# Patient Record
Sex: MALE | Race: WHITE | Employment: PART TIME | URBAN - METROPOLITAN AREA
[De-identification: names, ages, dates, MRNs, and addresses within clinical notes are randomized per-mention and may not be internally consistent; named-entity substitution may affect disease eponyms.]

---

## 2018-12-27 RX ORDER — AMITRIPTYLINE HYDROCHLORIDE 50 MG/1
TABLET, FILM COATED ORAL
Qty: 90 TABLET | Refills: 0 | Status: SHIPPED | OUTPATIENT
Start: 2018-12-27 | End: 2019-03-21 | Stop reason: SDUPTHER

## 2019-01-09 ENCOUNTER — OFFICE VISIT (OUTPATIENT)
Dept: PRIMARY CARE CLINIC | Age: 59
End: 2019-01-09
Payer: MEDICARE

## 2019-01-09 VITALS
BODY MASS INDEX: 28.25 KG/M2 | HEIGHT: 68 IN | WEIGHT: 186.4 LBS | HEART RATE: 114 BPM | SYSTOLIC BLOOD PRESSURE: 128 MMHG | DIASTOLIC BLOOD PRESSURE: 76 MMHG | OXYGEN SATURATION: 94 %

## 2019-01-09 DIAGNOSIS — Z13.6 ENCOUNTER FOR LIPID SCREENING FOR CARDIOVASCULAR DISEASE: ICD-10-CM

## 2019-01-09 DIAGNOSIS — I10 ESSENTIAL HYPERTENSION: Primary | ICD-10-CM

## 2019-01-09 DIAGNOSIS — Z12.5 SCREENING PSA (PROSTATE SPECIFIC ANTIGEN): ICD-10-CM

## 2019-01-09 DIAGNOSIS — Z13.220 ENCOUNTER FOR LIPID SCREENING FOR CARDIOVASCULAR DISEASE: ICD-10-CM

## 2019-01-09 PROCEDURE — 3017F COLORECTAL CA SCREEN DOC REV: CPT | Performed by: FAMILY MEDICINE

## 2019-01-09 PROCEDURE — 1036F TOBACCO NON-USER: CPT | Performed by: FAMILY MEDICINE

## 2019-01-09 PROCEDURE — G8427 DOCREV CUR MEDS BY ELIG CLIN: HCPCS | Performed by: FAMILY MEDICINE

## 2019-01-09 PROCEDURE — G8419 CALC BMI OUT NRM PARAM NOF/U: HCPCS | Performed by: FAMILY MEDICINE

## 2019-01-09 PROCEDURE — G8484 FLU IMMUNIZE NO ADMIN: HCPCS | Performed by: FAMILY MEDICINE

## 2019-01-09 PROCEDURE — 99213 OFFICE O/P EST LOW 20 MIN: CPT | Performed by: FAMILY MEDICINE

## 2019-01-09 RX ORDER — ATENOLOL 50 MG/1
TABLET ORAL
Qty: 90 TABLET | Refills: 3 | Status: SHIPPED | OUTPATIENT
Start: 2019-01-09 | End: 2019-12-11 | Stop reason: SDUPTHER

## 2019-01-09 ASSESSMENT — ENCOUNTER SYMPTOMS
SORE THROAT: 0
RHINORRHEA: 0
VOMITING: 0
COUGH: 0
DIARRHEA: 0
EYE DISCHARGE: 0
EYE REDNESS: 0
NAUSEA: 0
WHEEZING: 0
SHORTNESS OF BREATH: 0
ABDOMINAL PAIN: 0

## 2019-01-09 ASSESSMENT — PATIENT HEALTH QUESTIONNAIRE - PHQ9
1. LITTLE INTEREST OR PLEASURE IN DOING THINGS: 0
SUM OF ALL RESPONSES TO PHQ9 QUESTIONS 1 & 2: 0
SUM OF ALL RESPONSES TO PHQ QUESTIONS 1-9: 0
SUM OF ALL RESPONSES TO PHQ QUESTIONS 1-9: 0
2. FEELING DOWN, DEPRESSED OR HOPELESS: 0

## 2019-01-11 DIAGNOSIS — I10 ESSENTIAL HYPERTENSION: ICD-10-CM

## 2019-01-11 DIAGNOSIS — Z12.5 SCREENING PSA (PROSTATE SPECIFIC ANTIGEN): ICD-10-CM

## 2019-01-11 DIAGNOSIS — Z13.220 ENCOUNTER FOR LIPID SCREENING FOR CARDIOVASCULAR DISEASE: ICD-10-CM

## 2019-01-11 DIAGNOSIS — Z13.6 ENCOUNTER FOR LIPID SCREENING FOR CARDIOVASCULAR DISEASE: ICD-10-CM

## 2019-01-11 LAB
ANION GAP SERPL CALCULATED.3IONS-SCNC: NORMAL MMOL/L
BUN BLDV-MCNC: NORMAL MG/DL
BUN/CREAT BLD: NORMAL
CALCIUM SERPL-MCNC: NORMAL MG/DL
CHLORIDE BLD-SCNC: NORMAL MMOL/L
CHOLESTEROL, FASTING: 176
CO2: NORMAL MMOL/L
CREAT SERPL-MCNC: NORMAL MG/DL
GFR AFRICAN AMERICAN: NORMAL
GFR NON-AFRICAN AMERICAN: NORMAL
GLUCOSE FASTING: 104 MG/DL
HDLC SERPL-MCNC: 39 MG/DL (ref 35–70)
LDL CHOLESTEROL CALCULATED: 122 MG/DL (ref 0–160)
POTASSIUM SERPL-SCNC: NORMAL MMOL/L
PROSTATE SPECIFIC ANTIGEN: NORMAL NG/ML
SODIUM BLD-SCNC: NORMAL MMOL/L
TRIGLYCERIDE, FASTING: 77

## 2019-03-21 RX ORDER — AMITRIPTYLINE HYDROCHLORIDE 50 MG/1
TABLET, FILM COATED ORAL
Qty: 90 TABLET | Refills: 0 | Status: SHIPPED | OUTPATIENT
Start: 2019-03-21 | End: 2019-06-17 | Stop reason: SDUPTHER

## 2019-06-18 RX ORDER — AMITRIPTYLINE HYDROCHLORIDE 50 MG/1
TABLET, FILM COATED ORAL
Qty: 90 TABLET | Refills: 0 | Status: SHIPPED | OUTPATIENT
Start: 2019-06-18 | End: 2019-09-24 | Stop reason: SDUPTHER

## 2019-09-24 RX ORDER — AMITRIPTYLINE HYDROCHLORIDE 50 MG/1
TABLET, FILM COATED ORAL
Qty: 90 TABLET | Refills: 0 | Status: SHIPPED | OUTPATIENT
Start: 2019-09-24 | End: 2019-12-10 | Stop reason: SDUPTHER

## 2019-12-10 DIAGNOSIS — I10 ESSENTIAL HYPERTENSION: ICD-10-CM

## 2019-12-11 RX ORDER — ATENOLOL 50 MG/1
TABLET ORAL
Qty: 90 TABLET | Refills: 0 | Status: SHIPPED | OUTPATIENT
Start: 2019-12-11 | End: 2020-03-24 | Stop reason: SDUPTHER

## 2019-12-11 RX ORDER — AMITRIPTYLINE HYDROCHLORIDE 50 MG/1
50 TABLET, FILM COATED ORAL NIGHTLY
Qty: 90 TABLET | Refills: 0 | Status: SHIPPED | OUTPATIENT
Start: 2019-12-11 | End: 2020-03-24 | Stop reason: SDUPTHER

## 2020-03-24 RX ORDER — AMITRIPTYLINE HYDROCHLORIDE 50 MG/1
50 TABLET, FILM COATED ORAL NIGHTLY
Qty: 90 TABLET | Refills: 0 | Status: SHIPPED | OUTPATIENT
Start: 2020-03-24 | End: 2020-06-29 | Stop reason: SDUPTHER

## 2020-03-24 RX ORDER — ATENOLOL 50 MG/1
TABLET ORAL
Qty: 90 TABLET | Refills: 0 | Status: SHIPPED | OUTPATIENT
Start: 2020-03-24 | End: 2020-06-29 | Stop reason: SDUPTHER

## 2020-05-08 ENCOUNTER — TELEPHONE (OUTPATIENT)
Dept: PRIMARY CARE CLINIC | Age: 60
End: 2020-05-08

## 2020-06-29 RX ORDER — AMITRIPTYLINE HYDROCHLORIDE 50 MG/1
50 TABLET, FILM COATED ORAL NIGHTLY
Qty: 90 TABLET | Refills: 0 | Status: SHIPPED | OUTPATIENT
Start: 2020-06-29 | End: 2020-10-02 | Stop reason: SDUPTHER

## 2020-06-29 RX ORDER — ATENOLOL 50 MG/1
TABLET ORAL
Qty: 90 TABLET | Refills: 0 | Status: SHIPPED | OUTPATIENT
Start: 2020-06-29 | End: 2020-10-01 | Stop reason: SDUPTHER

## 2020-08-19 ENCOUNTER — OFFICE VISIT (OUTPATIENT)
Dept: PRIMARY CARE CLINIC | Age: 60
End: 2020-08-19
Payer: MEDICARE

## 2020-08-19 VITALS
BODY MASS INDEX: 27.58 KG/M2 | SYSTOLIC BLOOD PRESSURE: 124 MMHG | HEART RATE: 123 BPM | TEMPERATURE: 98.1 F | OXYGEN SATURATION: 95 % | DIASTOLIC BLOOD PRESSURE: 88 MMHG | WEIGHT: 181.6 LBS

## 2020-08-19 PROCEDURE — G0439 PPPS, SUBSEQ VISIT: HCPCS | Performed by: PHYSICIAN ASSISTANT

## 2020-08-19 RX ORDER — IBUPROFEN 600 MG/1
600 TABLET ORAL DAILY PRN
Qty: 90 TABLET | Refills: 0 | Status: SHIPPED | OUTPATIENT
Start: 2020-08-19 | End: 2020-11-20 | Stop reason: SDUPTHER

## 2020-08-19 ASSESSMENT — PATIENT HEALTH QUESTIONNAIRE - PHQ9
SUM OF ALL RESPONSES TO PHQ QUESTIONS 1-9: 0
1. LITTLE INTEREST OR PLEASURE IN DOING THINGS: 0
2. FEELING DOWN, DEPRESSED OR HOPELESS: 0
SUM OF ALL RESPONSES TO PHQ9 QUESTIONS 1 & 2: 0
SUM OF ALL RESPONSES TO PHQ QUESTIONS 1-9: 0

## 2020-08-19 ASSESSMENT — ENCOUNTER SYMPTOMS
RHINORRHEA: 0
PHOTOPHOBIA: 0
SHORTNESS OF BREATH: 0
DIARRHEA: 0
SINUS PRESSURE: 0
ABDOMINAL DISTENTION: 0
SORE THROAT: 0
COUGH: 0
ABDOMINAL PAIN: 0
EYE DISCHARGE: 0
CONSTIPATION: 0
TROUBLE SWALLOWING: 1
CHEST TIGHTNESS: 0
VOMITING: 0

## 2020-08-19 NOTE — PROGRESS NOTES
[address] [facility] PRIMARY CARE  [address] [facility] [address]  Dept: [phone]    Too Rolle is a 61 y.o. male who presents today for his medical conditions/complaintsas noted below. Chief Complaint   Patient presents with    Medication Check     Pt last seen in january. 2019    Leg Pain     Pt c/o left leg pain x1 month       HPI:     HPI: The patient is taking both of his medications. He is having no concerns. He is drooling after work which started a month ago. He will be drooling and it will fall on right cheek. No weakness or facial asymmetry. The patient has always needed his pills crushed. He states he doesn't swallow his medication well. He also has pain behind his left knee. Standing at work makes the pain worse. LDL Calculated (mg/dL)   Date Value   01/11/2019 122   01/19/2018 98   12/02/2016 96       (goal LDL is <100)   No results found for: AST, ALT, BUN  BP Readings from Last 3 Encounters:   08/19/20 124/88   01/09/19 128/76   05/16/18 112/78          (goal 120/80)    No past medical history on file. No past surgical history on file. No family history on file. Social History     Tobacco Use    Smoking status: Never Smoker    Smokeless tobacco: Never Used   Substance Use Topics    Alcohol use: No      Current Outpatient Medications   Medication Sig Dispense Refill    ibuprofen (ADVIL;MOTRIN) 600 MG tablet Take 1 tablet by mouth daily as needed for Pain 90 tablet 0    atenolol (TENORMIN) 50 MG tablet TAKE 1 TABLET BY MOUTH DAILY 90 tablet 0    amitriptyline (ELAVIL) 50 MG tablet Take 1 tablet by mouth nightly 90 tablet 0     No current facility-administered medications for this visit.       No Known Allergies    Health Maintenance   Topic Date Due    HIV screen  02/24/1975    DTaP/Tdap/Td vaccine (1 - Tdap) 02/24/1979    Shingles Vaccine (1 of 2) 02/24/2010    Colon cancer screen colonoscopy  02/24/2010   [place] Annual Wellness Visit (AWV)  05/29/2019    Potassium monitoring  01/11/2020    Creatinine monitoring  01/11/2020    Flu vaccine (1) 09/01/2020    Lipid screen  01/11/2024    Hepatitis C screen  Completed    Hepatitis A vaccine  Aged Out    Hepatitis B vaccine  Aged Out    Hib vaccine  Aged Out    Meningococcal (ACWY) vaccine  Aged Out    Pneumococcal 0-64 years Vaccine  Aged Out       Subjective:      Review of Systems   Constitutional: Negative for chills, fever and unexpected weight change. HENT: Positive for drooling and trouble swallowing (chronic ). Negative for congestion, hearing loss, rhinorrhea, sinus pressure and sore throat. Eyes: Negative for photophobia, discharge and visual disturbance. Respiratory: Negative for cough, chest tightness and shortness of breath. Cardiovascular: Positive for leg swelling (left leg over the last month ). Negative for chest pain and palpitations. Gastrointestinal: Negative for abdominal distention, abdominal pain, constipation, diarrhea and vomiting. Endocrine: Negative for polydipsia and polyuria. Genitourinary: Negative for decreased urine volume, difficulty urinating, frequency and urgency. Musculoskeletal: Negative for arthralgias, gait problem and myalgias. Skin: Negative for rash. Allergic/Immunologic: Negative for food allergies. Neurological: Negative for dizziness, weakness, numbness and headaches. Hematological: Negative for adenopathy. Psychiatric/Behavioral: Negative for dysphoric mood and sleep disturbance. The patient is not nervous/anxious. Objective:     /88   Pulse 123   Temp 98.1 °F (36.7 °C)   Wt 181 lb 9.6 oz (82.4 kg)   SpO2 95%   BMI 27.58 kg/m²   Physical Exam  Constitutional:       General: He is not in acute distress. Appearance: Normal appearance. He is not ill-appearing. HENT:      Head: Normocephalic and atraumatic.       Right Ear: Tympanic membrane, ear canal and external ear normal. Left Ear: Tympanic membrane, ear canal and external ear normal.      Nose: Nose normal.      Mouth/Throat:      Mouth: Mucous membranes are moist.   Eyes:      Extraocular Movements: Extraocular movements intact. Conjunctiva/sclera: Conjunctivae normal.      Pupils: Pupils are equal, round, and reactive to light. Neck:      Musculoskeletal: Normal range of motion and neck supple. Vascular: No carotid bruit. Cardiovascular:      Rate and Rhythm: Normal rate and regular rhythm. Pulses: Normal pulses. Heart sounds: Normal heart sounds. Pulmonary:      Effort: Pulmonary effort is normal. No respiratory distress. Breath sounds: Normal breath sounds. Abdominal:      General: Bowel sounds are normal. There is no distension. Tenderness: There is no abdominal tenderness. Musculoskeletal: Normal range of motion. Left lower leg: Edema (with varicose veins ) present. Lymphadenopathy:      Cervical: No cervical adenopathy. Skin:     General: Skin is warm and dry. Neurological:      General: No focal deficit present. Mental Status: He is alert and oriented to person, place, and time. Comments: No facial asymmetry, no asymmetric weakness. No swollen tonsils no deviated uvula. No asymmetric tonsillar folds. Psychiatric:         Mood and Affect: Mood normal.         Thought Content: Thought content normal.         Assessment:       Diagnosis Orders   1. Annual physical exam  Lipid, Fasting    PSA Screening    Comprehensive Metabolic Panel   2. Colon cancer screening  [facility] Screening Colonoscopy   3. Prostate cancer screening  PSA Screening   4. Acute pain of left knee  XR KNEE LEFT (3 VIEWS)    ibuprofen (ADVIL;MOTRIN) 600 MG tablet   5. Encounter for screening for HIV  HIV Screen   6. Left leg swelling  US DUP LOWER EXTREMITY LEFT PAKO        Plan:       Patients last colonoscopy in 2013 reordered due to unknown timeframe and unknown results.    Will contact [place] for patient information. X ray knee   Blood work   Medication reordered   Doppler ordered   Consider neurologic work up if drooling does not improve. Patient educated to contact the office if this does not improve or worsens. Patient educated to go to the ER with any signs symptoms of stroke. AWV in 3 months     Return in about 3 months (around 11/19/2020), or AWV, for Follow up if symptoms persist or worsen. Orders Placed This Encounter   Procedures    XR KNEE LEFT (3 VIEWS)     Standing Status:   Future     Standing Expiration Date:   8/19/2021     Order Specific Question:   Reason for exam:     Answer:   knee pain    US DUP LOWER EXTREMITY LEFT PAKO     Standing Status:   Future     Standing Expiration Date:   8/19/2021    Lipid, Fasting     Standing Status:   Future     Standing Expiration Date:   8/19/2021    PSA Screening     Standing Status:   Future     Standing Expiration Date:   8/19/2021    Comprehensive Metabolic Panel     Standing Status:   Future     Standing Expiration Date:   8/19/2021    HIV Screen     Standing Status:   Future     Standing Expiration Date:   8/19/2021   [facility] Screening Colonoscopy     Referral Priority:   Routine     Referral Type: Other     Referral Reason:   Specialty Services Required     Number of Visits Requested:   1     Orders Placed This Encounter   Medications    ibuprofen (ADVIL;MOTRIN) 600 MG tablet     Sig: Take 1 tablet by mouth daily as needed for Pain     Dispense:  90 tablet     Refill:  0       Patient given educationalmaterials - see patient instructions. Discussed use, benefit, and side effectsof prescribed medications. All patient questions answered. Pt voiced understanding. Reviewed health maintenance. Instructed to continue current medications, diet andexercise. Patient agreed with treatment plan. Follow up as directed.      Electronicallysigned by DOROTA Zhou on 8/19/2020 at 5:02 PM

## 2020-08-20 LAB
ALBUMIN SERPL-MCNC: NORMAL G/DL
ALP BLD-CCNC: NORMAL U/L
ALT SERPL-CCNC: NORMAL U/L
ANION GAP SERPL CALCULATED.3IONS-SCNC: NORMAL MMOL/L
AST SERPL-CCNC: NORMAL U/L
BILIRUB SERPL-MCNC: NORMAL MG/DL
BUN BLDV-MCNC: NORMAL MG/DL
CALCIUM SERPL-MCNC: NORMAL MG/DL
CHLORIDE BLD-SCNC: NORMAL MMOL/L
CHOLESTEROL, FASTING: 181
CO2: NORMAL
CREAT SERPL-MCNC: NORMAL MG/DL
GFR CALCULATED: NORMAL
GLUCOSE BLD-MCNC: 115 MG/DL
HDLC SERPL-MCNC: 42 MG/DL (ref 35–70)
HIV AG/AB: NORMAL
LDL CHOLESTEROL CALCULATED: 124 MG/DL (ref 0–160)
POTASSIUM SERPL-SCNC: NORMAL MMOL/L
PROSTATE SPECIFIC ANTIGEN: NORMAL
SODIUM BLD-SCNC: NORMAL MMOL/L
TOTAL PROTEIN: NORMAL
TRIGLYCERIDE, FASTING: 74

## 2020-08-21 ENCOUNTER — TELEPHONE (OUTPATIENT)
Dept: PRIMARY CARE CLINIC | Age: 60
End: 2020-08-21

## 2020-08-24 ENCOUNTER — HOSPITAL ENCOUNTER (OUTPATIENT)
Dept: GENERAL RADIOLOGY | Age: 60
Discharge: HOME | End: 2020-08-26
Payer: MEDICARE

## 2020-08-24 ENCOUNTER — HOSPITAL ENCOUNTER (OUTPATIENT)
Age: 60
Discharge: HOME | End: 2020-08-26
Payer: MEDICARE

## 2020-08-24 PROCEDURE — 73562 X-RAY EXAM OF KNEE 3: CPT

## 2020-08-25 ENCOUNTER — HOSPITAL ENCOUNTER (OUTPATIENT)
Dept: ULTRASOUND IMAGING | Age: 60
Discharge: HOME | End: 2020-08-27
Payer: MEDICARE

## 2020-08-25 PROCEDURE — 93971 EXTREMITY STUDY: CPT

## 2020-10-02 RX ORDER — AMITRIPTYLINE HYDROCHLORIDE 50 MG/1
50 TABLET, FILM COATED ORAL NIGHTLY
Qty: 90 TABLET | Refills: 0 | Status: SHIPPED | OUTPATIENT
Start: 2020-10-02 | End: 2020-11-16

## 2020-10-02 RX ORDER — ATENOLOL 50 MG/1
TABLET ORAL
Qty: 90 TABLET | Refills: 0 | Status: SHIPPED | OUTPATIENT
Start: 2020-10-02 | End: 2020-11-16

## 2020-11-16 ENCOUNTER — OFFICE VISIT (OUTPATIENT)
Dept: PRIMARY CARE CLINIC | Age: 60
End: 2020-11-16

## 2020-11-16 VITALS
OXYGEN SATURATION: 95 % | HEIGHT: 68 IN | SYSTOLIC BLOOD PRESSURE: 126 MMHG | WEIGHT: 183 LBS | HEART RATE: 100 BPM | BODY MASS INDEX: 27.74 KG/M2 | TEMPERATURE: 97.2 F | DIASTOLIC BLOOD PRESSURE: 78 MMHG

## 2020-11-16 PROCEDURE — G8427 DOCREV CUR MEDS BY ELIG CLIN: HCPCS | Performed by: FAMILY MEDICINE

## 2020-11-16 PROCEDURE — 3017F COLORECTAL CA SCREEN DOC REV: CPT | Performed by: FAMILY MEDICINE

## 2020-11-16 PROCEDURE — 1036F TOBACCO NON-USER: CPT | Performed by: FAMILY MEDICINE

## 2020-11-16 PROCEDURE — G8484 FLU IMMUNIZE NO ADMIN: HCPCS | Performed by: FAMILY MEDICINE

## 2020-11-16 PROCEDURE — G8419 CALC BMI OUT NRM PARAM NOF/U: HCPCS | Performed by: FAMILY MEDICINE

## 2020-11-16 PROCEDURE — 99999 PR OFFICE/OUTPT VISIT,PROCEDURE ONLY: CPT | Performed by: FAMILY MEDICINE

## 2020-11-16 RX ORDER — ATENOLOL 50 MG/1
TABLET ORAL
Qty: 90 TABLET | Refills: 1 | Status: SHIPPED | OUTPATIENT
Start: 2020-11-16 | End: 2021-06-29 | Stop reason: SDUPTHER

## 2020-11-16 RX ORDER — AMITRIPTYLINE HYDROCHLORIDE 50 MG/1
50 TABLET, FILM COATED ORAL NIGHTLY
Qty: 90 TABLET | Refills: 1 | Status: SHIPPED | OUTPATIENT
Start: 2020-11-16 | End: 2021-06-29 | Stop reason: SDUPTHER

## 2020-11-16 ASSESSMENT — ENCOUNTER SYMPTOMS
WHEEZING: 0
ABDOMINAL PAIN: 0
DIARRHEA: 0
EYE DISCHARGE: 0
SORE THROAT: 0
SHORTNESS OF BREATH: 0
EYE REDNESS: 0
VOMITING: 0
RHINORRHEA: 0
COUGH: 0
NAUSEA: 0

## 2020-11-16 ASSESSMENT — PATIENT HEALTH QUESTIONNAIRE - PHQ9
SUM OF ALL RESPONSES TO PHQ QUESTIONS 1-9: 0
SUM OF ALL RESPONSES TO PHQ QUESTIONS 1-9: 0
SUM OF ALL RESPONSES TO PHQ9 QUESTIONS 1 & 2: 0
SUM OF ALL RESPONSES TO PHQ QUESTIONS 1-9: 0
1. LITTLE INTEREST OR PLEASURE IN DOING THINGS: 0
2. FEELING DOWN, DEPRESSED OR HOPELESS: 0

## 2020-11-16 NOTE — PROGRESS NOTES
[address] [facility] PRIMARY CARE  [address] [facility] [address]  Dept: [phone]    Louise Stephenson is a 61 y.o. male who presents today for his medical conditions/complaintsas noted below. Chief Complaint   Patient presents with    3 Month Follow-Up    Flu Vaccine     Declined    Immunizations     Tdap-declined       HPI:     HPI  Patient here for recheck. Denies any chest pain or shortness of breath. Not checking blood pressure outside the office. No nausea or vomiting. Otherwise remarkable. Denies any fevers or chills. LDL Calculated (mg/dL)   Date Value   08/20/2020 124   01/11/2019 122   01/19/2018 98       (goal LDL is <100)   No results found for: AST, ALT, BUN  BP Readings from Last 3 Encounters:   11/16/20 126/78   08/19/20 124/88   01/09/19 128/76          (goal 120/80)    No past medical history on file. No past surgical history on file. No family history on file. Social History     Tobacco Use    Smoking status: Never Smoker    Smokeless tobacco: Never Used   Substance Use Topics    Alcohol use: No      Current Outpatient Medications   Medication Sig Dispense Refill    atenolol (TENORMIN) 50 MG tablet TAKE 1 TABLET BY MOUTH DAILY 90 tablet 1    amitriptyline (ELAVIL) 50 MG tablet Take 1 tablet by mouth nightly 90 tablet 1    ibuprofen (ADVIL;MOTRIN) 600 MG tablet Take 1 tablet by mouth daily as needed for Pain 90 tablet 0     No current facility-administered medications for this visit.       No Known Allergies    Health Maintenance   Topic Date Due    DTaP/Tdap/Td vaccine (1 - Tdap) 02/24/1979    Shingles Vaccine (1 of 2) 02/24/2010    Colon cancer screen colonoscopy  02/24/2010    Flu vaccine (1) 06/30/2021 (Originally 9/1/2020)    Annual Wellness Visit (AWV)  08/20/2021    Potassium monitoring  08/20/2021    Creatinine monitoring  08/20/2021    Diabetes screen  01/11/2022    Lipid screen  08/20/2025    Hepatitis C screen No rash. Neurological:      Mental Status: He is alert and oriented to person, place, and time. Psychiatric:         Mood and Affect: Mood normal.         Behavior: Behavior normal.         Thought Content: Thought content normal.         Assessment:       Diagnosis Orders   1. Essential hypertension  atenolol (TENORMIN) 50 MG tablet        Plan:    get copy of colonoscopy  Continue present medications as is. Return in about 6 months (around 5/16/2021). No orders of the defined types were placed in this encounter. Orders Placed This Encounter   Medications    atenolol (TENORMIN) 50 MG tablet     Sig: TAKE 1 TABLET BY MOUTH DAILY     Dispense:  90 tablet     Refill:  1    amitriptyline (ELAVIL) 50 MG tablet     Sig: Take 1 tablet by mouth nightly     Dispense:  90 tablet     Refill:  1       Patient given educationalmaterials - see patient instructions. Discussed use, benefit, and side effectsof prescribed medications. All patient questions answered. Pt voiced understanding. Reviewed health maintenance. Instructed to continue current medications, diet andexercise. Patient agreed with treatment plan. Follow up as directed.      Electronicallysigned by Anyi Bain MD on 11/16/2020 at 4:03 PM

## 2020-11-20 RX ORDER — IBUPROFEN 600 MG/1
600 TABLET ORAL DAILY PRN
Qty: 90 TABLET | Refills: 0 | Status: SHIPPED | OUTPATIENT
Start: 2020-11-20 | End: 2021-02-17 | Stop reason: SDUPTHER

## 2021-02-17 DIAGNOSIS — M25.562 ACUTE PAIN OF LEFT KNEE: ICD-10-CM

## 2021-02-17 RX ORDER — IBUPROFEN 600 MG/1
600 TABLET ORAL DAILY PRN
Qty: 90 TABLET | Refills: 0 | Status: SHIPPED | OUTPATIENT
Start: 2021-02-17 | End: 2021-05-17

## 2021-05-15 DIAGNOSIS — M25.562 ACUTE PAIN OF LEFT KNEE: ICD-10-CM

## 2021-05-17 RX ORDER — IBUPROFEN 600 MG/1
600 TABLET ORAL DAILY PRN
Qty: 90 TABLET | Refills: 0 | Status: SHIPPED | OUTPATIENT
Start: 2021-05-17 | End: 2021-05-19 | Stop reason: SDUPTHER

## 2021-05-18 DIAGNOSIS — M25.562 ACUTE PAIN OF LEFT KNEE: ICD-10-CM

## 2021-05-19 RX ORDER — IBUPROFEN 600 MG/1
600 TABLET ORAL DAILY PRN
Qty: 90 TABLET | Refills: 0 | Status: SHIPPED | OUTPATIENT
Start: 2021-05-19 | End: 2021-06-29

## 2021-06-29 ENCOUNTER — OFFICE VISIT (OUTPATIENT)
Dept: PRIMARY CARE CLINIC | Age: 61
End: 2021-06-29
Payer: MEDICARE

## 2021-06-29 VITALS
HEART RATE: 71 BPM | BODY MASS INDEX: 27.52 KG/M2 | WEIGHT: 181.6 LBS | OXYGEN SATURATION: 94 % | SYSTOLIC BLOOD PRESSURE: 124 MMHG | HEIGHT: 68 IN | DIASTOLIC BLOOD PRESSURE: 70 MMHG

## 2021-06-29 DIAGNOSIS — I10 ESSENTIAL HYPERTENSION: Primary | ICD-10-CM

## 2021-06-29 DIAGNOSIS — Z00.00 ANNUAL PHYSICAL EXAM: ICD-10-CM

## 2021-06-29 DIAGNOSIS — Z12.5 SCREENING PSA (PROSTATE SPECIFIC ANTIGEN): ICD-10-CM

## 2021-06-29 DIAGNOSIS — Z13.6 ENCOUNTER FOR LIPID SCREENING FOR CARDIOVASCULAR DISEASE: ICD-10-CM

## 2021-06-29 DIAGNOSIS — Z13.220 ENCOUNTER FOR LIPID SCREENING FOR CARDIOVASCULAR DISEASE: ICD-10-CM

## 2021-06-29 PROCEDURE — G8427 DOCREV CUR MEDS BY ELIG CLIN: HCPCS | Performed by: FAMILY MEDICINE

## 2021-06-29 PROCEDURE — G8419 CALC BMI OUT NRM PARAM NOF/U: HCPCS | Performed by: FAMILY MEDICINE

## 2021-06-29 PROCEDURE — 3017F COLORECTAL CA SCREEN DOC REV: CPT | Performed by: FAMILY MEDICINE

## 2021-06-29 PROCEDURE — 1036F TOBACCO NON-USER: CPT | Performed by: FAMILY MEDICINE

## 2021-06-29 PROCEDURE — 99213 OFFICE O/P EST LOW 20 MIN: CPT | Performed by: FAMILY MEDICINE

## 2021-06-29 RX ORDER — ATENOLOL 50 MG/1
TABLET ORAL
Qty: 90 TABLET | Refills: 1 | Status: SHIPPED | OUTPATIENT
Start: 2021-06-29 | End: 2022-01-18 | Stop reason: SDUPTHER

## 2021-06-29 RX ORDER — AMITRIPTYLINE HYDROCHLORIDE 50 MG/1
50 TABLET, FILM COATED ORAL NIGHTLY
Qty: 90 TABLET | Refills: 1 | Status: SHIPPED | OUTPATIENT
Start: 2021-06-29 | End: 2022-01-18 | Stop reason: SDUPTHER

## 2021-06-29 SDOH — ECONOMIC STABILITY: FOOD INSECURITY: WITHIN THE PAST 12 MONTHS, YOU WORRIED THAT YOUR FOOD WOULD RUN OUT BEFORE YOU GOT MONEY TO BUY MORE.: NEVER TRUE

## 2021-06-29 SDOH — ECONOMIC STABILITY: FOOD INSECURITY: WITHIN THE PAST 12 MONTHS, THE FOOD YOU BOUGHT JUST DIDN'T LAST AND YOU DIDN'T HAVE MONEY TO GET MORE.: NEVER TRUE

## 2021-06-29 ASSESSMENT — ENCOUNTER SYMPTOMS
WHEEZING: 0
COUGH: 0
DIARRHEA: 0
VOMITING: 0
EYE REDNESS: 0
SHORTNESS OF BREATH: 0
EYE DISCHARGE: 0
ABDOMINAL PAIN: 0
NAUSEA: 0
RHINORRHEA: 0
SORE THROAT: 0

## 2021-06-29 ASSESSMENT — PATIENT HEALTH QUESTIONNAIRE - PHQ9
1. LITTLE INTEREST OR PLEASURE IN DOING THINGS: 0
SUM OF ALL RESPONSES TO PHQ QUESTIONS 1-9: 0
2. FEELING DOWN, DEPRESSED OR HOPELESS: 0
SUM OF ALL RESPONSES TO PHQ9 QUESTIONS 1 & 2: 0

## 2021-06-29 ASSESSMENT — SOCIAL DETERMINANTS OF HEALTH (SDOH): HOW HARD IS IT FOR YOU TO PAY FOR THE VERY BASICS LIKE FOOD, HOUSING, MEDICAL CARE, AND HEATING?: NOT HARD AT ALL

## 2021-06-29 NOTE — PROGRESS NOTES
[address] [facility] PRIMARY CARE  [address] [facility] [address]  Dept: [phone]    Wesley Adame is a 64 y.o. male Established patient, who presents today for his medical conditions/complaints as noted below. Chief Complaint   Patient presents with    Medication Check       HPI:     HPI  Patient here for blood pressure check. States not checking his sugars on a regular basis. Denies any fevers or chills. No chest pain or shortness of breath. Patient had his colonoscopy done in [place]. States does not remember the hospital or the physician. Reviewed prior notes None  Reviewed previous Labs    LDL Calculated (mg/dL)   Date Value   08/20/2020 124   01/11/2019 122   01/19/2018 98       (goal LDL is <100)   No results found for: AST, ALT, BUN, LABA1C, TSH  BP Readings from Last 3 Encounters:   06/29/21 124/70   11/16/20 126/78   08/19/20 124/88          (goal 120/80)    No past medical history on file. No past surgical history on file. No family history on file. Social History     Tobacco Use    Smoking status: Never Smoker    Smokeless tobacco: Never Used   Substance Use Topics    Alcohol use: No      Current Outpatient Medications   Medication Sig Dispense Refill    atenolol (TENORMIN) 50 MG tablet TAKE 1 TABLET BY MOUTH DAILY 90 tablet 1    amitriptyline (ELAVIL) 50 MG tablet Take 1 tablet by mouth nightly 90 tablet 1     No current facility-administered medications for this visit.      No Known Allergies    Health Maintenance   Topic Date Due    DTaP/Tdap/Td vaccine (1 - Tdap) Never done    Shingles Vaccine (1 of 2) Never done    Colon cancer screen colonoscopy  Never done    Annual Wellness Visit (AWV)  08/20/2021    Potassium monitoring  08/20/2021    Creatinine monitoring  08/20/2021    Flu vaccine (Season Ended) 09/01/2021    Diabetes screen  01/11/2022    Lipid screen  08/20/2025    COVID-19 Vaccine  Completed    Hepatitis C screen  Completed    HIV screen  Completed    Hepatitis A vaccine  Aged Out    Hepatitis B vaccine  Aged Out    Hib vaccine  Aged Out    Meningococcal (ACWY) vaccine  Aged Out    Pneumococcal 0-64 years Vaccine  Aged Out       Subjective:      Review of Systems   Constitutional: Negative for chills and fever. HENT: Negative for rhinorrhea and sore throat. Eyes: Negative for discharge and redness. Respiratory: Negative for cough, shortness of breath and wheezing. Cardiovascular: Negative for chest pain and palpitations. Gastrointestinal: Negative for abdominal pain, diarrhea, nausea and vomiting. Genitourinary: Negative for dysuria and frequency. Musculoskeletal: Negative for arthralgias and myalgias. Neurological: Negative for dizziness, light-headedness and headaches. Psychiatric/Behavioral: Negative for sleep disturbance. Objective:     /70   Pulse 71   Ht 5' 8.04\" (1.728 m)   Wt 181 lb 9.6 oz (82.4 kg)   SpO2 94%   BMI 27.58 kg/m²   Physical Exam  Vitals and nursing note reviewed. Constitutional:       General: He is not in acute distress. Appearance: He is well-developed. He is not ill-appearing. HENT:      Head: Normocephalic and atraumatic. Right Ear: External ear normal.      Left Ear: External ear normal.   Eyes:      General: No scleral icterus. Right eye: No discharge. Left eye: No discharge. Conjunctiva/sclera: Conjunctivae normal.      Pupils: Pupils are equal, round, and reactive to light. Neck:      Thyroid: No thyromegaly. Trachea: No tracheal deviation. Cardiovascular:      Rate and Rhythm: Normal rate and regular rhythm. Heart sounds: Normal heart sounds. Pulmonary:      Effort: Pulmonary effort is normal. No respiratory distress. Breath sounds: Normal breath sounds. No wheezing. Lymphadenopathy:      Cervical: No cervical adenopathy. Skin:     General: Skin is warm.       Findings: No rash.   Neurological:      Mental Status: He is alert and oriented to person, place, and time. Psychiatric:         Mood and Affect: Mood normal.         Behavior: Behavior normal.         Thought Content: Thought content normal.         Assessment:       Diagnosis Orders   1. Essential hypertension  atenolol (TENORMIN) 50 MG tablet   2. Screening PSA (prostate specific antigen)  PSA screening   3. Encounter for lipid screening for cardiovascular disease  Basic Metabolic Panel, Fasting    Hepatic Function Panel   4. Annual physical exam  Lipid, Fasting        Plan:    Patient add on Shingrix and Boostrix. Patient to try to get copy of colonoscopy report. Blood work ordered after August 20. Return in about 6 months (around 12/29/2021), or Medicare wellness. Orders Placed This Encounter   Procedures    Lipid, Fasting     Standing Status:   Future     Standing Expiration Date:   9/29/2021    Basic Metabolic Panel, Fasting     Standing Status:   Future     Standing Expiration Date:   9/29/2021    Hepatic Function Panel     Standing Status:   Future     Standing Expiration Date:   9/29/2021    PSA screening     Standing Status:   Future     Standing Expiration Date:   9/29/2021     Orders Placed This Encounter   Medications    atenolol (TENORMIN) 50 MG tablet     Sig: TAKE 1 TABLET BY MOUTH DAILY     Dispense:  90 tablet     Refill:  1    amitriptyline (ELAVIL) 50 MG tablet     Sig: Take 1 tablet by mouth nightly     Dispense:  90 tablet     Refill:  1       Patient given educational materials - see patient instructions. Discussed use, benefit, and side effects of prescribed medications. All patient questions answered. Pt voiced understanding. Reviewed health maintenance. Instructed to continue current medications, diet andexercise. Patient agreed with treatment plan. Follow up as directed.      Electronicallysigned by Nicolasa Durán MD on 6/29/2021 at 9:17 AM

## 2021-08-20 LAB
A/G RATIO: NORMAL
ALBUMIN SERPL-MCNC: NORMAL G/DL
ALP BLD-CCNC: NORMAL U/L
ALT SERPL-CCNC: NORMAL U/L
ANION GAP SERPL CALCULATED.3IONS-SCNC: NORMAL MMOL/L
AST SERPL-CCNC: NORMAL U/L
BILIRUB SERPL-MCNC: NORMAL MG/DL
BILIRUBIN DIRECT: NORMAL
BILIRUBIN, INDIRECT: NORMAL
BUN BLDV-MCNC: NORMAL MG/DL
BUN/CREAT BLD: NORMAL
CALCIUM SERPL-MCNC: NORMAL MG/DL
CHLORIDE BLD-SCNC: NORMAL MMOL/L
CHOLESTEROL, FASTING: 158
CO2: NORMAL
CREAT SERPL-MCNC: NORMAL MG/DL
GFR AFRICAN AMERICAN: NORMAL
GFR NON-AFRICAN AMERICAN: NORMAL
GLOBULIN: NORMAL
GLUCOSE FASTING: 106 MG/DL
HDLC SERPL-MCNC: 38 MG/DL (ref 35–70)
LDL CHOLESTEROL CALCULATED: 108 MG/DL (ref 0–160)
POTASSIUM SERPL-SCNC: NORMAL MMOL/L
PROSTATE SPECIFIC ANTIGEN: NORMAL
PROTEIN TOTAL: NORMAL
SODIUM BLD-SCNC: NORMAL MMOL/L
TRIGLYCERIDE, FASTING: 61

## 2021-08-25 DIAGNOSIS — Z13.6 ENCOUNTER FOR LIPID SCREENING FOR CARDIOVASCULAR DISEASE: ICD-10-CM

## 2021-08-25 DIAGNOSIS — Z00.00 ANNUAL PHYSICAL EXAM: ICD-10-CM

## 2021-08-25 DIAGNOSIS — Z13.220 ENCOUNTER FOR LIPID SCREENING FOR CARDIOVASCULAR DISEASE: ICD-10-CM

## 2021-08-25 DIAGNOSIS — Z12.5 SCREENING PSA (PROSTATE SPECIFIC ANTIGEN): ICD-10-CM

## 2022-01-18 ENCOUNTER — OFFICE VISIT (OUTPATIENT)
Dept: PRIMARY CARE CLINIC | Age: 62
End: 2022-01-18
Payer: MEDICARE

## 2022-01-18 VITALS
BODY MASS INDEX: 25.04 KG/M2 | WEIGHT: 165.2 LBS | SYSTOLIC BLOOD PRESSURE: 134 MMHG | DIASTOLIC BLOOD PRESSURE: 88 MMHG | HEART RATE: 93 BPM | HEIGHT: 68 IN | OXYGEN SATURATION: 98 %

## 2022-01-18 DIAGNOSIS — Z00.00 ROUTINE GENERAL MEDICAL EXAMINATION AT A HEALTH CARE FACILITY: Primary | ICD-10-CM

## 2022-01-18 DIAGNOSIS — I10 ESSENTIAL HYPERTENSION: ICD-10-CM

## 2022-01-18 PROCEDURE — G0439 PPPS, SUBSEQ VISIT: HCPCS | Performed by: FAMILY MEDICINE

## 2022-01-18 PROCEDURE — G8484 FLU IMMUNIZE NO ADMIN: HCPCS | Performed by: FAMILY MEDICINE

## 2022-01-18 PROCEDURE — 3017F COLORECTAL CA SCREEN DOC REV: CPT | Performed by: FAMILY MEDICINE

## 2022-01-18 RX ORDER — AMITRIPTYLINE HYDROCHLORIDE 50 MG/1
50 TABLET, FILM COATED ORAL NIGHTLY
Qty: 90 TABLET | Refills: 3 | Status: SHIPPED | OUTPATIENT
Start: 2022-01-18

## 2022-01-18 RX ORDER — ATENOLOL 50 MG/1
TABLET ORAL
Qty: 90 TABLET | Refills: 3 | Status: SHIPPED | OUTPATIENT
Start: 2022-01-18

## 2022-01-18 ASSESSMENT — PATIENT HEALTH QUESTIONNAIRE - PHQ9
1. LITTLE INTEREST OR PLEASURE IN DOING THINGS: 0
SUM OF ALL RESPONSES TO PHQ QUESTIONS 1-9: 0
SUM OF ALL RESPONSES TO PHQ QUESTIONS 1-9: 0
2. FEELING DOWN, DEPRESSED OR HOPELESS: 0
SUM OF ALL RESPONSES TO PHQ9 QUESTIONS 1 & 2: 0
SUM OF ALL RESPONSES TO PHQ QUESTIONS 1-9: 0
SUM OF ALL RESPONSES TO PHQ QUESTIONS 1-9: 0

## 2022-01-18 NOTE — PATIENT INSTRUCTIONS
Personalized Preventive Plan for Haley Knox - 1/18/2022  Medicare offers a range of preventive health benefits. Some of the tests and screenings are paid in full while other may be subject to a deductible, co-insurance, and/or copay. Some of these benefits include a comprehensive review of your medical history including lifestyle, illnesses that may run in your family, and various assessments and screenings as appropriate. After reviewing your medical record and screening and assessments performed today your provider may have ordered immunizations, labs, imaging, and/or referrals for you. A list of these orders (if applicable) as well as your Preventive Care list are included within your After Visit Summary for your review. Other Preventive Recommendations:    · A preventive eye exam performed by an eye specialist is recommended every 1-2 years to screen for glaucoma; cataracts, macular degeneration, and other eye disorders. · A preventive dental visit is recommended every 6 months. · Try to get at least 150 minutes of exercise per week or 10,000 steps per day on a pedometer . · Order or download the FREE \"Exercise & Physical Activity: Your Everyday Guide\" from [employer]. Call 9-693.857.5049 or search [employer] online. · You need 1602-1891 mg of calcium and 1297-7198 IU of vitamin D per day. It is possible to meet your calcium requirement with diet alone, but a vitamin D supplement is usually necessary to meet this goal.  · When exposed to the sun, use a sunscreen that protects against both UVA and UVB radiation with an SPF of 30 or greater. Reapply every 2 to 3 hours or after sweating, drying off with a towel, or swimming. · Always wear a seat belt when traveling in a car. Always wear a helmet when riding a bicycle or motorcycle.

## 2022-01-18 NOTE — PROGRESS NOTES
Medicare Annual Wellness Visit  Name: Ayaan Caldera Date: 2022   MRN: [id number] Sex: Male   Age: 64 y.o. Ethnicity: Non- / Non    : 1960 Race: White (non-)      Shiraz Pierce is here for Medicare AWV, Flu Vaccine (Declined), and Other [employer] booster- declined)    Pt denies all vaccinations. Pt states he does not check his blood pressure at home. Pt states his mood has been stable and happy. Screenings for behavioral, psychosocial and functional/safety risks, and cognitive dysfunction are all negative except as indicated below. These results, as well as other patient data from the [address] form, are documented in Flowsheets linked to this Encounter. No Known Allergies    Prior to Visit Medications    Medication Sig Taking? Authorizing Provider   atenolol (TENORMIN) 50 MG tablet TAKE 1 TABLET BY MOUTH DAILY Yes Ramana Rock MD   amitriptyline (ELAVIL) 50 MG tablet Take 1 tablet by mouth nightly Yes Ramana Rock MD       No past medical history on file. No past surgical history on file. No family history on file. CareTeam (Including outside providers/suppliers regularly involved in providing care):   Patient Care Team:  Ramana Rock MD as PCP - General (Family Medicine)  Ramana Rock MD as PCP - [facility] Empaneled Provider    Wt Readings from Last 3 Encounters:   22 165 lb 3.2 oz (74.9 kg)   21 181 lb 9.6 oz (82.4 kg)   20 183 lb (83 kg)     Vitals:    22 0840   BP: 134/88   Pulse: 93   SpO2: 98%   Weight: 165 lb 3.2 oz (74.9 kg)   Height: 5' 8.04\" (1.728 m)     Body mass index is 25.09 kg/m². Based upon direct observation of the patient, evaluation of cognition reveals recent and remote memory intact.     General Appearance: alert and oriented to person, place and time, well developed and well- nourished, in no acute distress  Skin: warm and dry, no rash or erythema  Head: normocephalic and atraumatic  Eyes: pupils equal, round, and reactive to light, extraocular eye movements intact, conjunctivae normal  ENT: tympanic membrane, external ear and ear canal normal bilaterally, nose without deformity, nasal mucosa and turbinates normal without polyps  Neck: supple and non-tender without mass, no thyromegaly or thyroid nodules, no cervical lymphadenopathy  Pulmonary/Chest: clear to auscultation bilaterally- no wheezes, rales or rhonchi, normal air movement, no respiratory distress  Cardiovascular: normal rate, regular rhythm, normal S1 and S2, no murmurs, rubs, clicks, or gallops, distal pulses intact, no carotid bruits  Abdomen: soft, non-tender, non-distended, normal bowel sounds, no masses or organomegaly  Extremities: no cyanosis, clubbing or edema  Musculoskeletal: normal range of motion, no joint swelling, deformity or tenderness  Neurologic: reflexes normal and symmetric, no cranial nerve deficit, gait, coordination and speech normal    Patient's complete Health Risk Assessment and screening values have been reviewed and are found in Flowsheets. The following problems were reviewed today and where indicated follow up appointments were made and/or referrals ordered. Positive Risk Factor Screenings with Interventions:            General Health and ACP:  General  In general, how would you say your health is?: Good  In the past 7 days, have you experienced any of the following? New or Increased Pain, New or Increased Fatigue, Loneliness, Social Isolation, Stress or Anger?: None of These  Do you get the social and emotional support that you need?: Yes  Do you have a Living Will?: Yes  Advance Directives     Power of [place] [employer] Will ACP-Advance Directive ACP-Power of     Not on File Not on File Not on File Not on File      General Health Risk Interventions:  Pt has a living will.     Health Habits/Nutrition:  Health Habits/Nutrition  Do you exercise for at least 20 minutes 2-3 times per week?: (!) No  Have you lost any weight without trying in the past 3 months?: No  Do you eat only one meal per day?: No  Have you seen the dentist within the past year?: (!) No  Body mass index: (!) 25.09  Health Habits/Nutrition Interventions:  · Dental exam overdue:  patient encouraged to make appointment with his/her dentist, patient declines dental evaluation    Hearing/Vision:  No exam data present  Hearing/Vision  Do you or your family notice any trouble with your hearing that hasn't been managed with hearing aids?: No  Do you have difficulty driving, watching TV, or doing any of your daily activities because of your eyesight?: No  Have you had an eye exam within the past year?: (!) No  Hearing/Vision Interventions:  No hearing or vision concerns. Safety:  Safety  Do you have working smoke detectors?: Yes  Have all throw rugs been removed or fastened?: Yes  Do you have non-slip mats or surfaces in all bathtubs/showers?: (!) No  Do all of your stairways have a railing or banister?: Yes  Are your doorways, halls and stairs free of clutter?: Yes  Do you always fasten your seatbelt when you are in a car?: Yes  Safety Interventions:  · Home safety tips provided    ADL:  ADLs  In the past 7 days, did you need help from others to perform any of the following everyday activities? Eating, dressing, grooming, bathing, toileting, or walking/balance?: None  In the past 7 days, did you need help from others to take care of any of the following? Laundry, housekeeping, banking/finances, shopping, telephone use, food preparation, transportation, or taking medications?: (!) Banking/Finances  ADL Interventions:  Pt states his brother helps him with his finances.        Personalized Preventive Plan   Current Health Maintenance Status  Immunization History   Administered Date(s) Administered    COVID-19, ThumbAd, PF, 30mcg/0.3mL 03/03/2021, 03/24/2021, 03/31/2021        Health Maintenance   Topic Date Due    DTaP/Tdap/Td vaccine (1 - Tdap) Never done    Colon cancer screen colonoscopy  Never done    Shingles Vaccine (1 of 2) Never done    Annual Wellness Visit (AWV)  08/20/2021    Flu vaccine (1) Never done    COVID-19 Vaccine (3 - Booster for Pfizer series) 09/30/2021    Depression Screen  06/29/2022    Potassium monitoring  08/20/2022    Creatinine monitoring  08/20/2022    Diabetes screen  08/20/2024    Lipid screen  08/20/2026    Hepatitis C screen  Completed    HIV screen  Completed    Hepatitis A vaccine  Aged Out    Hepatitis B vaccine  Aged Out    Hib vaccine  Aged Out    Meningococcal (ACWY) vaccine  Aged Out    Pneumococcal 0-64 years Vaccine  Aged Out     Recommendations for [employer] Due: see orders and patient instructions/AVS.  . Recommended screening schedule for the next 5-10 years is provided to the patient in written form: see Patient Instructions/AVS.    There are no diagnoses linked to this encounter. Discussion with patient regarding screening colonoscopy. Advised he does have family history that does increase his risk of developing colon cancer. Advised if it is caught early that it can be treated and cured through the scope. Patient will decide.

## 2022-07-20 ENCOUNTER — OFFICE VISIT (OUTPATIENT)
Dept: PRIMARY CARE CLINIC | Age: 62
End: 2022-07-20
Payer: MEDICARE

## 2022-07-20 VITALS
BODY MASS INDEX: 26.98 KG/M2 | OXYGEN SATURATION: 95 % | SYSTOLIC BLOOD PRESSURE: 122 MMHG | HEIGHT: 68 IN | HEART RATE: 113 BPM | WEIGHT: 178 LBS | DIASTOLIC BLOOD PRESSURE: 76 MMHG

## 2022-07-20 DIAGNOSIS — Z13.220 ENCOUNTER FOR LIPID SCREENING FOR CARDIOVASCULAR DISEASE: ICD-10-CM

## 2022-07-20 DIAGNOSIS — Z00.00 ANNUAL PHYSICAL EXAM: Primary | ICD-10-CM

## 2022-07-20 DIAGNOSIS — Z13.6 ENCOUNTER FOR LIPID SCREENING FOR CARDIOVASCULAR DISEASE: ICD-10-CM

## 2022-07-20 DIAGNOSIS — Z12.5 SCREENING PSA (PROSTATE SPECIFIC ANTIGEN): ICD-10-CM

## 2022-07-20 PROCEDURE — G0439 PPPS, SUBSEQ VISIT: HCPCS | Performed by: FAMILY MEDICINE

## 2022-07-20 SDOH — ECONOMIC STABILITY: FOOD INSECURITY: WITHIN THE PAST 12 MONTHS, THE FOOD YOU BOUGHT JUST DIDN'T LAST AND YOU DIDN'T HAVE MONEY TO GET MORE.: NEVER TRUE

## 2022-07-20 SDOH — ECONOMIC STABILITY: FOOD INSECURITY: WITHIN THE PAST 12 MONTHS, YOU WORRIED THAT YOUR FOOD WOULD RUN OUT BEFORE YOU GOT MONEY TO BUY MORE.: NEVER TRUE

## 2022-07-20 ASSESSMENT — PATIENT HEALTH QUESTIONNAIRE - PHQ9
2. FEELING DOWN, DEPRESSED OR HOPELESS: SEVERAL DAYS
DEPRESSION UNABLE TO ASSESS: YES
1. LITTLE INTEREST OR PLEASURE IN DOING THINGS: SEVERAL DAYS
SUM OF ALL RESPONSES TO PHQ9 QUESTIONS 1 & 2: 2

## 2022-07-20 ASSESSMENT — ENCOUNTER SYMPTOMS
WHEEZING: 0
SORE THROAT: 0
COUGH: 0
DIARRHEA: 0
NAUSEA: 0
EYE REDNESS: 0
SHORTNESS OF BREATH: 0
VOMITING: 0
RHINORRHEA: 0
ABDOMINAL PAIN: 0
EYE DISCHARGE: 0

## 2022-07-20 ASSESSMENT — SOCIAL DETERMINANTS OF HEALTH (SDOH): HOW HARD IS IT FOR YOU TO PAY FOR THE VERY BASICS LIKE FOOD, HOUSING, MEDICAL CARE, AND HEATING?: NOT HARD AT ALL

## 2022-07-20 NOTE — PROGRESS NOTES
[facility] PRIMARY CARE  [address]  [facility] [address]  Dept: [phone]    Mahnaz Do is a 58 y.o. male Established patient, who presents today for his medical conditions/complaints as noted below. Chief Complaint   Patient presents with    Annual Exam       HPI:     HPI  Patient here for annual physical for [employer]. Denies any complaints. Has been taking his atenolol for blood pressure. Patient states last had his colonoscopy in 2013 done at a small hospital in [place]. No report available. Patient denies any history of chronic back pain. No history of seizure disorder. Patient has never had restrictions put on him from participation in sports in the past.    Reviewed prior notes None  Reviewed previous Labs    LDL Calculated (mg/dL)   Date Value   08/20/2021 108   08/20/2020 124   01/11/2019 122       (goal LDL is <100)   No results found for: AST, ALT, BUN, CR, LABA1C, TSH  BP Readings from Last 3 Encounters:   07/20/22 122/76   01/18/22 134/88   06/29/21 124/70          (goal 120/80)    No past medical history on file. No past surgical history on file. Family History   Problem Relation Age of Onset    Colon Cancer Father     Colon Cancer Sister        Social History     Tobacco Use    Smoking status: Never    Smokeless tobacco: Never   Substance Use Topics    Alcohol use: No      Current Outpatient Medications   Medication Sig Dispense Refill    atenolol (TENORMIN) 50 MG tablet TAKE 1 TABLET BY MOUTH DAILY 90 tablet 3    amitriptyline (ELAVIL) 50 MG tablet Take 1 tablet by mouth nightly 90 tablet 3     No current facility-administered medications for this visit.      No Known Allergies    Health Maintenance   Topic Date Due    DTaP/Tdap/Td vaccine (1 - Tdap) Never done    Colorectal Cancer Screen  Never done    Shingles vaccine (1 of 2) Never done    COVID-19 Vaccine (4 - Booster for Pfizer series) 07/31/2021    Prostate Specific Antigen (PSA) Screening or Monitoring  08/20/2022    Flu vaccine (1) 09/01/2022    Depression Screen  01/18/2023    Annual Wellness Visit (AWV)  01/19/2023    Diabetes screen  08/20/2024    Lipids  08/20/2026    Hepatitis C screen  Completed    HIV screen  Completed    Hepatitis A vaccine  Aged Out    Hepatitis B vaccine  Aged Out    Hib vaccine  Aged Out    Meningococcal (ACWY) vaccine  Aged Out    Pneumococcal 0-64 years Vaccine  Aged Out       Subjective:      Review of Systems   Constitutional:  Negative for chills and fever. HENT:  Negative for rhinorrhea and sore throat. Eyes:  Negative for discharge and redness. Respiratory:  Negative for cough, shortness of breath and wheezing. Cardiovascular:  Negative for chest pain and palpitations. Gastrointestinal:  Negative for abdominal pain, diarrhea, nausea and vomiting. Genitourinary:  Negative for dysuria and frequency. Musculoskeletal:  Negative for arthralgias and myalgias. Neurological:  Negative for dizziness, light-headedness and headaches. Psychiatric/Behavioral:  Negative for sleep disturbance. Objective:     /76   Pulse (!) 113   Ht 5' 8.04\" (1.728 m)   Wt 178 lb (80.7 kg)   SpO2 95%   BMI 27.03 kg/m²   Physical Exam  Vitals and nursing note reviewed. Constitutional:       General: He is not in acute distress. Appearance: He is well-developed. He is not ill-appearing. HENT:      Head: Normocephalic and atraumatic. Right Ear: External ear normal.      Left Ear: External ear normal.   Eyes:      General: No scleral icterus. Right eye: No discharge. Left eye: No discharge. Conjunctiva/sclera: Conjunctivae normal.   Neck:      Thyroid: No thyromegaly. Trachea: No tracheal deviation. Cardiovascular:      Rate and Rhythm: Normal rate and regular rhythm. Heart sounds: Normal heart sounds. Pulmonary:      Effort: Pulmonary effort is normal. No respiratory distress. Breath sounds: Normal breath sounds. No wheezing. Lymphadenopathy:      Cervical: No cervical adenopathy. Skin:     General: Skin is warm. Findings: No rash. Neurological:      Mental Status: He is alert and oriented to person, place, and time. Psychiatric:         Mood and Affect: Mood normal.         Behavior: Behavior normal.         Thought Content: Thought content normal.       Assessment:       Diagnosis Orders   1. Encounter for lipid screening for cardiovascular disease  Lipid, Fasting      2. Annual physical exam  Basic Metabolic Panel, Fasting    Hepatic Function Panel      3. Screening PSA (prostate specific antigen)  PSA Screening           Plan:   Blood work ordered in August  Staff to try to find colonoscopy report  [employer] form filled out    Return in about 6 months (around 1/20/2023). Orders Placed This Encounter   Procedures    Lipid, Fasting     Standing Status:   Future     Standing Expiration Date:   8/05/6825    Basic Metabolic Panel, Fasting     Standing Status:   Future     Standing Expiration Date:   8/25/2023    Hepatic Function Panel     Standing Status:   Future     Standing Expiration Date:   8/25/2023    PSA Screening     Standing Status:   Future     Standing Expiration Date:   8/25/2023     No orders of the defined types were placed in this encounter. Patient given educational materials - see patient instructions. Discussed use, benefit, and side effects of prescribed medications. All patient questions answered. Pt voiced understanding. Reviewed health maintenance. Instructed to continue current medications, diet andexercise. Patient agreed with treatment plan. Follow up as directed.      Electronicallysigned by Shameka Ramires MD on 7/20/2022 at 3:03 PM

## 2022-08-29 ENCOUNTER — HOSPITAL ENCOUNTER (OUTPATIENT)
Age: 62
Setting detail: SPECIMEN
Discharge: HOME | End: 2022-08-29

## 2022-08-29 DIAGNOSIS — Z13.220 ENCOUNTER FOR LIPID SCREENING FOR CARDIOVASCULAR DISEASE: ICD-10-CM

## 2022-08-29 DIAGNOSIS — Z00.00 ANNUAL PHYSICAL EXAM: ICD-10-CM

## 2022-08-29 DIAGNOSIS — Z13.6 ENCOUNTER FOR LIPID SCREENING FOR CARDIOVASCULAR DISEASE: ICD-10-CM

## 2022-08-29 DIAGNOSIS — Z12.5 SCREENING PSA (PROSTATE SPECIFIC ANTIGEN): ICD-10-CM

## 2022-08-29 LAB
ALBUMIN SERPL-MCNC: 4 G/DL (ref 3.5–5.2)
ALBUMIN/GLOBULIN RATIO: 1.5 (ref 1–2.5)
ALP BLD-CCNC: 51 U/L (ref 40–129)
ALT SERPL-CCNC: 10 U/L (ref 5–41)
ANION GAP SERPL CALCULATED.3IONS-SCNC: 10 MMOL/L (ref 9–17)
AST SERPL-CCNC: 15 U/L
BILIRUB SERPL-MCNC: 0.31 MG/DL (ref 0.3–1.2)
BILIRUBIN DIRECT: <0.08 MG/DL
BILIRUBIN, INDIRECT: NORMAL MG/DL (ref 0–1)
BUN BLDV-MCNC: 11 MG/DL (ref 8–23)
CALCIUM SERPL-MCNC: 8.7 MG/DL (ref 8.6–10.4)
CHLORIDE BLD-SCNC: 103 MMOL/L (ref 98–107)
CHOLESTEROL, FASTING: 146 MG/DL
CHOLESTEROL/HDL RATIO: 3.2
CO2: 25 MMOL/L (ref 20–31)
CREAT SERPL-MCNC: 0.92 MG/DL (ref 0.7–1.2)
GFR AFRICAN AMERICAN: >60 ML/MIN
GFR NON-AFRICAN AMERICAN: >60 ML/MIN
GFR SERPL CREATININE-BSD FRML MDRD: NORMAL ML/MIN/{1.73_M2}
GLUCOSE FASTING: 84 MG/DL (ref 70–99)
HDLC SERPL-MCNC: 45 MG/DL
LDL CHOLESTEROL: 91 MG/DL (ref 0–130)
POTASSIUM SERPL-SCNC: 4.3 MMOL/L (ref 3.7–5.3)
PROSTATE SPECIFIC ANTIGEN: 2.23 NG/ML
SODIUM BLD-SCNC: 138 MMOL/L (ref 135–144)
TOTAL PROTEIN: 6.6 G/DL (ref 6.4–8.3)
TRIGLYCERIDE, FASTING: 50 MG/DL

## 2023-01-03 DIAGNOSIS — I10 ESSENTIAL HYPERTENSION: ICD-10-CM

## 2023-01-03 RX ORDER — ATENOLOL 50 MG/1
TABLET ORAL
Qty: 90 TABLET | Refills: 3 | Status: SHIPPED | OUTPATIENT
Start: 2023-01-03

## 2023-01-13 RX ORDER — AMITRIPTYLINE HYDROCHLORIDE 50 MG/1
50 TABLET, FILM COATED ORAL NIGHTLY
Qty: 90 TABLET | Refills: 3 | Status: SHIPPED | OUTPATIENT
Start: 2023-01-13

## 2023-09-26 ENCOUNTER — TELEPHONE (OUTPATIENT)
Dept: PRIMARY CARE CLINIC | Age: 63
End: 2023-09-26

## 2023-09-27 ENCOUNTER — OFFICE VISIT (OUTPATIENT)
Dept: PRIMARY CARE CLINIC | Age: 63
End: 2023-09-27
Payer: MEDICARE

## 2023-09-27 VITALS
HEIGHT: 68 IN | TEMPERATURE: 97.7 F | BODY MASS INDEX: 26.37 KG/M2 | OXYGEN SATURATION: 94 % | WEIGHT: 174 LBS | SYSTOLIC BLOOD PRESSURE: 122 MMHG | HEART RATE: 100 BPM | DIASTOLIC BLOOD PRESSURE: 68 MMHG

## 2023-09-27 DIAGNOSIS — R05.1 ACUTE COUGH: ICD-10-CM

## 2023-09-27 DIAGNOSIS — R06.7 SNEEZING: ICD-10-CM

## 2023-09-27 DIAGNOSIS — R06.7 SNEEZING: Primary | ICD-10-CM

## 2023-09-27 PROCEDURE — 3078F DIAST BP <80 MM HG: CPT | Performed by: STUDENT IN AN ORGANIZED HEALTH CARE EDUCATION/TRAINING PROGRAM

## 2023-09-27 PROCEDURE — G8427 DOCREV CUR MEDS BY ELIG CLIN: HCPCS | Performed by: STUDENT IN AN ORGANIZED HEALTH CARE EDUCATION/TRAINING PROGRAM

## 2023-09-27 PROCEDURE — 99213 OFFICE O/P EST LOW 20 MIN: CPT | Performed by: STUDENT IN AN ORGANIZED HEALTH CARE EDUCATION/TRAINING PROGRAM

## 2023-09-27 PROCEDURE — 1036F TOBACCO NON-USER: CPT | Performed by: STUDENT IN AN ORGANIZED HEALTH CARE EDUCATION/TRAINING PROGRAM

## 2023-09-27 PROCEDURE — 3074F SYST BP LT 130 MM HG: CPT | Performed by: STUDENT IN AN ORGANIZED HEALTH CARE EDUCATION/TRAINING PROGRAM

## 2023-09-27 PROCEDURE — 3017F COLORECTAL CA SCREEN DOC REV: CPT | Performed by: STUDENT IN AN ORGANIZED HEALTH CARE EDUCATION/TRAINING PROGRAM

## 2023-09-27 PROCEDURE — G8419 CALC BMI OUT NRM PARAM NOF/U: HCPCS | Performed by: STUDENT IN AN ORGANIZED HEALTH CARE EDUCATION/TRAINING PROGRAM

## 2023-09-27 RX ORDER — CETIRIZINE HYDROCHLORIDE 10 MG/1
10 TABLET ORAL DAILY
Qty: 30 TABLET | Refills: 0 | Status: SHIPPED | OUTPATIENT
Start: 2023-09-27 | End: 2023-10-27

## 2023-09-27 RX ORDER — FLUTICASONE PROPIONATE 50 MCG
2 SPRAY, SUSPENSION (ML) NASAL DAILY
Qty: 16 G | Refills: 0 | Status: SHIPPED | OUTPATIENT
Start: 2023-09-27 | End: 2023-09-28

## 2023-09-27 ASSESSMENT — ENCOUNTER SYMPTOMS
COUGH: 1
SORE THROAT: 0
RHINORRHEA: 1
WHEEZING: 0
SHORTNESS OF BREATH: 1
DIARRHEA: 0
SINUS PRESSURE: 0
TROUBLE SWALLOWING: 0
NAUSEA: 0
ABDOMINAL PAIN: 0
VOMITING: 0

## 2023-09-27 NOTE — TELEPHONE ENCOUNTER
LAST VISIT:   9/27/2023     No future appointments.          [facility]

## 2023-09-27 NOTE — PROGRESS NOTES
[address] PRIMARY CARE  [address]  [facility] [address]  Dept: [phone]    Sae Dalton is a 61 y.o. male Established patient, who presents acutely for his complaints as noted below:    Chief Complaint   Patient presents with    Cough     Patient c/o coughing and sneezing x 5 days. HPI:     As noted above: has not tried anything. Not coughing anything up. Denies any sick contacts. No hx of allergies. Reviewed prior notes from PCP. Reviewed previous labs. LDL Cholesterol (mg/dL)   Date Value   08/29/2022 91     LDL Calculated (mg/dL)   Date Value   08/20/2021 108   08/20/2020 124   01/11/2019 122       (goal LDL is <100)   AST (U/L)   Date Value   08/29/2022 15     ALT (U/L)   Date Value   08/29/2022 10     BUN (mg/dL)   Date Value   08/29/2022 11     BP Readings from Last 3 Encounters:   09/27/23 122/68   07/20/22 122/76   01/18/22 134/88          (goal 120/80)    No past medical history on file. No past surgical history on file. Family History   Problem Relation Age of Onset    Colon Cancer Father     Colon Cancer Sister        Social History     Tobacco Use    Smoking status: Never    Smokeless tobacco: Never   Substance Use Topics    Alcohol use: No      Current Outpatient Medications   Medication Sig Dispense Refill    fluticasone (FLONASE) 50 MCG/ACT nasal spray 2 sprays by Each Nostril route daily 16 g 0    cetirizine (ZYRTEC) 10 MG tablet Take 1 tablet by mouth daily 30 tablet 0    amitriptyline (ELAVIL) 50 MG tablet Take 1 tablet by mouth nightly 90 tablet 3    atenolol (TENORMIN) 50 MG tablet TAKE 1 TABLET BY MOUTH DAILY 90 tablet 3     No current facility-administered medications for this visit.      No Known Allergies    Health Maintenance   Topic Date Due    DTaP/Tdap/Td vaccine (1 - Tdap) Never done    Colorectal Cancer Screen  Never done    Shingles vaccine (1 of 2) Never done    COVID-19 Vaccine (4 - Pfizer series) 05/26/2021

## 2023-09-28 RX ORDER — FLUTICASONE PROPIONATE 50 MCG
2 SPRAY, SUSPENSION (ML) NASAL DAILY
Qty: 48 G | Refills: 0 | Status: SHIPPED | OUTPATIENT
Start: 2023-09-28

## 2023-10-24 DIAGNOSIS — R06.7 SNEEZING: ICD-10-CM

## 2023-10-25 RX ORDER — FLUTICASONE PROPIONATE 50 MCG
2 SPRAY, SUSPENSION (ML) NASAL DAILY
Qty: 48 G | Refills: 0 | Status: SHIPPED | OUTPATIENT
Start: 2023-10-25

## 2023-10-25 NOTE — TELEPHONE ENCOUNTER
LAST VISIT:   9/27/2023     Future Appointments   Date Time Provider [address]   11/7/2023  9:20 AM MD TOM Khanna

## 2023-11-07 ENCOUNTER — OFFICE VISIT (OUTPATIENT)
Dept: PRIMARY CARE CLINIC | Age: 63
End: 2023-11-07

## 2023-11-07 VITALS
SYSTOLIC BLOOD PRESSURE: 122 MMHG | HEART RATE: 79 BPM | HEIGHT: 68 IN | BODY MASS INDEX: 25.76 KG/M2 | DIASTOLIC BLOOD PRESSURE: 78 MMHG | WEIGHT: 170 LBS | OXYGEN SATURATION: 100 %

## 2023-11-07 DIAGNOSIS — Z13.220 ENCOUNTER FOR LIPID SCREENING FOR CARDIOVASCULAR DISEASE: ICD-10-CM

## 2023-11-07 DIAGNOSIS — Z12.5 SCREENING PSA (PROSTATE SPECIFIC ANTIGEN): ICD-10-CM

## 2023-11-07 DIAGNOSIS — Z00.00 ANNUAL PHYSICAL EXAM: ICD-10-CM

## 2023-11-07 DIAGNOSIS — Z13.6 ENCOUNTER FOR LIPID SCREENING FOR CARDIOVASCULAR DISEASE: ICD-10-CM

## 2023-11-07 DIAGNOSIS — I10 ESSENTIAL HYPERTENSION: ICD-10-CM

## 2023-11-07 DIAGNOSIS — Z12.11 ENCOUNTER FOR SCREENING FOR MALIGNANT NEOPLASM OF COLON: Primary | ICD-10-CM

## 2023-11-07 RX ORDER — AMITRIPTYLINE HYDROCHLORIDE 50 MG/1
50 TABLET, FILM COATED ORAL NIGHTLY
Qty: 90 TABLET | Refills: 3 | Status: SHIPPED | OUTPATIENT
Start: 2023-11-07

## 2023-11-07 RX ORDER — ATENOLOL 50 MG/1
50 TABLET ORAL DAILY
Qty: 90 TABLET | Refills: 3 | Status: SHIPPED | OUTPATIENT
Start: 2023-11-07

## 2023-11-07 ASSESSMENT — PATIENT HEALTH QUESTIONNAIRE - PHQ9
SUM OF ALL RESPONSES TO PHQ QUESTIONS 1-9: 0
SUM OF ALL RESPONSES TO PHQ9 QUESTIONS 1 & 2: 0
SUM OF ALL RESPONSES TO PHQ QUESTIONS 1-9: 0
2. FEELING DOWN, DEPRESSED OR HOPELESS: 0
1. LITTLE INTEREST OR PLEASURE IN DOING THINGS: 0
SUM OF ALL RESPONSES TO PHQ QUESTIONS 1-9: 0
SUM OF ALL RESPONSES TO PHQ QUESTIONS 1-9: 0

## 2023-11-07 ASSESSMENT — ENCOUNTER SYMPTOMS
VOMITING: 0
SHORTNESS OF BREATH: 0
WHEEZING: 0
NAUSEA: 0
DIARRHEA: 0
SORE THROAT: 0
ABDOMINAL PAIN: 0
RHINORRHEA: 0
EYE DISCHARGE: 0
COUGH: 0
EYE REDNESS: 0

## 2023-11-15 ENCOUNTER — HOSPITAL ENCOUNTER (OUTPATIENT)
Age: 63
Setting detail: SPECIMEN
Discharge: HOME | End: 2023-11-15

## 2023-11-15 DIAGNOSIS — Z12.5 SCREENING PSA (PROSTATE SPECIFIC ANTIGEN): ICD-10-CM

## 2023-11-15 DIAGNOSIS — Z00.00 ANNUAL PHYSICAL EXAM: ICD-10-CM

## 2023-11-15 DIAGNOSIS — Z13.220 ENCOUNTER FOR LIPID SCREENING FOR CARDIOVASCULAR DISEASE: ICD-10-CM

## 2023-11-15 DIAGNOSIS — Z13.6 ENCOUNTER FOR LIPID SCREENING FOR CARDIOVASCULAR DISEASE: ICD-10-CM

## 2023-11-16 LAB
ALBUMIN SERPL-MCNC: 4.1 G/DL (ref 3.5–5.2)
ALBUMIN/GLOB SERPL: 1 {RATIO} (ref 1–2.5)
ALP SERPL-CCNC: 75 U/L (ref 40–129)
ALT SERPL-CCNC: 8 U/L (ref 10–50)
ANION GAP SERPL CALCULATED.3IONS-SCNC: 11 MMOL/L (ref 9–16)
AST SERPL-CCNC: 24 U/L (ref 10–50)
BILIRUB DIRECT SERPL-MCNC: <0.2 MG/DL (ref 0–0.3)
BILIRUB INDIRECT SERPL-MCNC: 0.5 MG/DL (ref 0–1)
BILIRUB SERPL-MCNC: 0.6 MG/DL (ref 0–1.2)
BUN SERPL-MCNC: 11 MG/DL (ref 8–23)
CALCIUM SERPL-MCNC: 8.9 MG/DL (ref 8.6–10.4)
CHLORIDE SERPL-SCNC: 103 MMOL/L (ref 98–107)
CHOLEST SERPL-MCNC: 156 MG/DL (ref 0–199)
CHOLESTEROL/HDL RATIO: 3
CO2 SERPL-SCNC: 25 MMOL/L (ref 20–31)
CREAT SERPL-MCNC: 0.9 MG/DL (ref 0.7–1.2)
GLOBULIN SER CALC-MCNC: 3 G/DL
GLUCOSE P FAST SERPL-MCNC: 85 MG/DL (ref 74–99)
HDLC SERPL-MCNC: 48 MG/DL
LDLC SERPL CALC-MCNC: 98 MG/DL (ref 0–100)
POTASSIUM SERPL-SCNC: 4 MMOL/L (ref 3.7–5.3)
PROT SERPL-MCNC: 7.1 G/DL (ref 6.6–8.7)
PSA SERPL-MCNC: 2.6 NG/ML (ref 0–4)
SODIUM SERPL-SCNC: 139 MMOL/L (ref 136–145)
TRIGL SERPL-MCNC: 47 MG/DL (ref 0–149)
VLDLC SERPL CALC-MCNC: 9 MG/DL

## 2024-01-16 ENCOUNTER — OFFICE VISIT (OUTPATIENT)
Dept: PRIMARY CARE CLINIC | Age: 64
End: 2024-01-16
Payer: MEDICARE

## 2024-01-16 VITALS
DIASTOLIC BLOOD PRESSURE: 78 MMHG | OXYGEN SATURATION: 97 % | WEIGHT: 163.6 LBS | HEIGHT: 68 IN | SYSTOLIC BLOOD PRESSURE: 118 MMHG | BODY MASS INDEX: 24.8 KG/M2 | HEART RATE: 89 BPM

## 2024-01-16 DIAGNOSIS — J06.9 UPPER RESPIRATORY TRACT INFECTION, UNSPECIFIED TYPE: Primary | ICD-10-CM

## 2024-01-16 PROCEDURE — G8420 CALC BMI NORM PARAMETERS: HCPCS | Performed by: FAMILY MEDICINE

## 2024-01-16 PROCEDURE — G8427 DOCREV CUR MEDS BY ELIG CLIN: HCPCS | Performed by: FAMILY MEDICINE

## 2024-01-16 PROCEDURE — 1036F TOBACCO NON-USER: CPT | Performed by: FAMILY MEDICINE

## 2024-01-16 PROCEDURE — G8484 FLU IMMUNIZE NO ADMIN: HCPCS | Performed by: FAMILY MEDICINE

## 2024-01-16 PROCEDURE — 3078F DIAST BP <80 MM HG: CPT | Performed by: FAMILY MEDICINE

## 2024-01-16 PROCEDURE — 3074F SYST BP LT 130 MM HG: CPT | Performed by: FAMILY MEDICINE

## 2024-01-16 PROCEDURE — 3017F COLORECTAL CA SCREEN DOC REV: CPT | Performed by: FAMILY MEDICINE

## 2024-01-16 PROCEDURE — 99213 OFFICE O/P EST LOW 20 MIN: CPT | Performed by: FAMILY MEDICINE

## 2024-01-16 RX ORDER — AZITHROMYCIN 250 MG/1
250 TABLET, FILM COATED ORAL SEE ADMIN INSTRUCTIONS
Qty: 6 TABLET | Refills: 0 | Status: SHIPPED | OUTPATIENT
Start: 2024-01-16 | End: 2024-01-21

## 2024-01-16 RX ORDER — GUAIFENESIN AND DEXTROMETHORPHAN HYDROBROMIDE 1200; 60 MG/1; MG/1
1 TABLET, EXTENDED RELEASE ORAL 2 TIMES DAILY
Qty: 28 TABLET | Refills: 0 | Status: SHIPPED | OUTPATIENT
Start: 2024-01-16

## 2024-01-16 SDOH — ECONOMIC STABILITY: FOOD INSECURITY: WITHIN THE PAST 12 MONTHS, YOU WORRIED THAT YOUR FOOD WOULD RUN OUT BEFORE YOU GOT MONEY TO BUY MORE.: NEVER TRUE

## 2024-01-16 SDOH — ECONOMIC STABILITY: HOUSING INSECURITY
IN THE LAST 12 MONTHS, WAS THERE A TIME WHEN YOU DID NOT HAVE A STEADY PLACE TO SLEEP OR SLEPT IN A SHELTER (INCLUDING NOW)?: NO

## 2024-01-16 SDOH — ECONOMIC STABILITY: INCOME INSECURITY: HOW HARD IS IT FOR YOU TO PAY FOR THE VERY BASICS LIKE FOOD, HOUSING, MEDICAL CARE, AND HEATING?: NOT HARD AT ALL

## 2024-01-16 SDOH — ECONOMIC STABILITY: FOOD INSECURITY: WITHIN THE PAST 12 MONTHS, THE FOOD YOU BOUGHT JUST DIDN'T LAST AND YOU DIDN'T HAVE MONEY TO GET MORE.: NEVER TRUE

## 2024-01-16 ASSESSMENT — PATIENT HEALTH QUESTIONNAIRE - PHQ9
SUM OF ALL RESPONSES TO PHQ QUESTIONS 1-9: 2
SUM OF ALL RESPONSES TO PHQ QUESTIONS 1-9: 2
2. FEELING DOWN, DEPRESSED OR HOPELESS: 1
SUM OF ALL RESPONSES TO PHQ9 QUESTIONS 1 & 2: 2
SUM OF ALL RESPONSES TO PHQ QUESTIONS 1-9: 2
1. LITTLE INTEREST OR PLEASURE IN DOING THINGS: 1
SUM OF ALL RESPONSES TO PHQ QUESTIONS 1-9: 2

## 2024-01-16 ASSESSMENT — ENCOUNTER SYMPTOMS
VOMITING: 0
WHEEZING: 0
SORE THROAT: 0
DIARRHEA: 0
EYE REDNESS: 0
BACK PAIN: 1
NAUSEA: 0
SHORTNESS OF BREATH: 0
RHINORRHEA: 0
ABDOMINAL PAIN: 0
COUGH: 1
EYE DISCHARGE: 0

## 2024-01-16 NOTE — PROGRESS NOTES
[facility]  [facility] PRIMARY CARE  [address]  [facility] [address]  Dept: [phone]    William Babcock is a 63 y.o. male Established patient, who presents today for his medical conditions/complaints as noted below.      Chief Complaint   Patient presents with    Cough     Pt c/o cough 12/30    Back Pain       HPI:     HPI  Pt with cough since 12/30.  Has some phlegm.  No fever or chills. No myalgias.  Has been having some back pain work.  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   Pt did not get colonoscopy done yet.     Reviewed prior notes None  Reviewed previous Labs    LDL Cholesterol (mg/dL)   Date Value   11/15/2023 98   08/29/2022 91     LDL Calculated (mg/dL)   Date Value   08/20/2021 108   08/20/2020 124   01/11/2019 122       (goal LDL is <100)   AST (U/L)   Date Value   11/15/2023 24     ALT (U/L)   Date Value   11/15/2023 8 (L)     BUN (mg/dL)   Date Value   11/15/2023 11     BP Readings from Last 3 Encounters:   01/16/24 118/78   11/07/23 122/78   09/27/23 122/68          (goal 120/80)    No past medical history on file.   No past surgical history on file.    Family History   Problem Relation Age of Onset    Colon Cancer Father     Colon Cancer Sister        Social History     Tobacco Use    Smoking status: Never    Smokeless tobacco: Never   Substance Use Topics    Alcohol use: No      Current Outpatient Medications   Medication Sig Dispense Refill    azithromycin (ZITHROMAX) 250 MG tablet Take 1 tablet by mouth See Admin Instructions for 5 days 500mg on day 1 followed by 250mg on days 2 - 5 6 tablet 0    Dextromethorphan-guaiFENesin (MUCINEX DM MAXIMUM STRENGTH)  MG TB12 Take 1 tablet by mouth 2 times daily 28 tablet 0    atenolol (TENORMIN) 50 MG tablet Take 1 tablet by mouth daily 90 tablet 3    amitriptyline (ELAVIL) 50 MG tablet Take 1 tablet by mouth nightly 90 tablet 3     No current facility-administered medications for this visit.     No Known

## 2024-01-20 DIAGNOSIS — R06.7 SNEEZING: ICD-10-CM

## 2024-01-22 RX ORDER — FLUTICASONE PROPIONATE 50 MCG
2 SPRAY, SUSPENSION (ML) NASAL DAILY
Qty: 48 G | Refills: 0 | Status: SHIPPED | OUTPATIENT
Start: 2024-01-22

## 2024-02-05 ENCOUNTER — APPOINTMENT (OUTPATIENT)
Dept: GENERAL RADIOLOGY | Age: 64
DRG: 876 | End: 2024-02-05
Payer: MEDICARE

## 2024-02-05 ENCOUNTER — APPOINTMENT (OUTPATIENT)
Dept: CT IMAGING | Age: 64
DRG: 876 | End: 2024-02-05
Payer: MEDICARE

## 2024-02-05 ENCOUNTER — HOSPITAL ENCOUNTER (INPATIENT)
Age: 64
LOS: 1 days | Discharge: ANOTHER HOSPITAL | DRG: 876 | End: 2024-02-06
Attending: EMERGENCY MEDICINE | Admitting: INTERNAL MEDICINE
Payer: MEDICARE

## 2024-02-05 DIAGNOSIS — F29 PSYCHOSIS, UNSPECIFIED PSYCHOSIS TYPE (HCC): ICD-10-CM

## 2024-02-05 DIAGNOSIS — R93.0 ABNORMAL CT OF THE HEAD: Primary | ICD-10-CM

## 2024-02-05 LAB
ALBUMIN SERPL-MCNC: 4.6 G/DL (ref 3.5–5.2)
ALBUMIN/GLOB SERPL: 1.4 {RATIO} (ref 1–2.5)
ALP SERPL-CCNC: 78 U/L (ref 40–129)
ALT SERPL-CCNC: 15 U/L (ref 5–41)
ANION GAP SERPL CALCULATED.3IONS-SCNC: 10 MMOL/L (ref 9–17)
APAP SERPL-MCNC: <5 UG/ML (ref 10–30)
AST SERPL-CCNC: 25 U/L
BASOPHILS # BLD: 0.1 K/UL (ref 0–0.2)
BASOPHILS NFR BLD: 1 % (ref 0–2)
BILIRUB SERPL-MCNC: 0.8 MG/DL (ref 0.3–1.2)
BUN SERPL-MCNC: 12 MG/DL (ref 8–23)
CALCIUM SERPL-MCNC: 9.1 MG/DL (ref 8.6–10.4)
CHLORIDE SERPL-SCNC: 99 MMOL/L (ref 98–107)
CO2 SERPL-SCNC: 25 MMOL/L (ref 20–31)
CREAT SERPL-MCNC: 0.8 MG/DL (ref 0.7–1.2)
EOSINOPHIL # BLD: 0.1 K/UL (ref 0–0.4)
EOSINOPHILS RELATIVE PERCENT: 1 % (ref 1–4)
ERYTHROCYTE [DISTWIDTH] IN BLOOD BY AUTOMATED COUNT: 14 % (ref 12.5–15.4)
ETHANOL PERCENT: <0.01 %
ETHANOLAMINE SERPL-MCNC: <10 MG/DL
GFR SERPL CREATININE-BSD FRML MDRD: >60 ML/MIN/1.73M2
GLUCOSE SERPL-MCNC: 92 MG/DL (ref 70–99)
HCT VFR BLD AUTO: 43.2 % (ref 41–53)
HGB BLD-MCNC: 14.8 G/DL (ref 13.5–17.5)
LYMPHOCYTES NFR BLD: 1.7 K/UL (ref 1–4.8)
LYMPHOCYTES RELATIVE PERCENT: 22 % (ref 24–44)
MAGNESIUM SERPL-MCNC: 2.3 MG/DL (ref 1.6–2.6)
MCH RBC QN AUTO: 30.3 PG (ref 26–34)
MCHC RBC AUTO-ENTMCNC: 34.2 G/DL (ref 31–37)
MCV RBC AUTO: 88.5 FL (ref 80–100)
MONOCYTES NFR BLD: 0.9 K/UL (ref 0.1–1.2)
MONOCYTES NFR BLD: 11 % (ref 2–11)
NEUTROPHILS NFR BLD: 65 % (ref 36–66)
NEUTS SEG NFR BLD: 5.1 K/UL (ref 1.8–7.7)
PLATELET # BLD AUTO: 268 K/UL (ref 140–450)
PMV BLD AUTO: 6.5 FL (ref 6–12)
POTASSIUM SERPL-SCNC: 3.8 MMOL/L (ref 3.7–5.3)
PROT SERPL-MCNC: 7.9 G/DL (ref 6.4–8.3)
RBC # BLD AUTO: 4.88 M/UL (ref 4.5–5.9)
SALICYLATES SERPL-MCNC: <1 MG/DL (ref 3–10)
SODIUM SERPL-SCNC: 134 MMOL/L (ref 135–144)
TROPONIN I SERPL HS-MCNC: 15 NG/L (ref 0–22)
TSH SERPL DL<=0.05 MIU/L-ACNC: 2.61 UIU/ML (ref 0.3–5)
WBC OTHER # BLD: 7.8 K/UL (ref 3.5–11)

## 2024-02-05 PROCEDURE — 83735 ASSAY OF MAGNESIUM: CPT

## 2024-02-05 PROCEDURE — 93005 ELECTROCARDIOGRAM TRACING: CPT | Performed by: NURSE PRACTITIONER

## 2024-02-05 PROCEDURE — 2580000003 HC RX 258: Performed by: NURSE PRACTITIONER

## 2024-02-05 PROCEDURE — 99285 EMERGENCY DEPT VISIT HI MDM: CPT

## 2024-02-05 PROCEDURE — 83036 HEMOGLOBIN GLYCOSYLATED A1C: CPT

## 2024-02-05 PROCEDURE — 71045 X-RAY EXAM CHEST 1 VIEW: CPT

## 2024-02-05 PROCEDURE — 80143 DRUG ASSAY ACETAMINOPHEN: CPT

## 2024-02-05 PROCEDURE — G0480 DRUG TEST DEF 1-7 CLASSES: HCPCS

## 2024-02-05 PROCEDURE — 81001 URINALYSIS AUTO W/SCOPE: CPT

## 2024-02-05 PROCEDURE — 80061 LIPID PANEL: CPT

## 2024-02-05 PROCEDURE — 85025 COMPLETE CBC W/AUTO DIFF WBC: CPT

## 2024-02-05 PROCEDURE — 80053 COMPREHEN METABOLIC PANEL: CPT

## 2024-02-05 PROCEDURE — 36415 COLL VENOUS BLD VENIPUNCTURE: CPT

## 2024-02-05 PROCEDURE — 84443 ASSAY THYROID STIM HORMONE: CPT

## 2024-02-05 PROCEDURE — 80179 DRUG ASSAY SALICYLATE: CPT

## 2024-02-05 PROCEDURE — 80307 DRUG TEST PRSMV CHEM ANLYZR: CPT

## 2024-02-05 PROCEDURE — 84484 ASSAY OF TROPONIN QUANT: CPT

## 2024-02-05 PROCEDURE — 70498 CT ANGIOGRAPHY NECK: CPT

## 2024-02-05 PROCEDURE — 6360000004 HC RX CONTRAST MEDICATION: Performed by: NURSE PRACTITIONER

## 2024-02-05 PROCEDURE — 70450 CT HEAD/BRAIN W/O DYE: CPT

## 2024-02-05 RX ORDER — HALOPERIDOL 5 MG/ML
INJECTION INTRAMUSCULAR
Status: DISPENSED
Start: 2024-02-05 | End: 2024-02-06

## 2024-02-05 RX ORDER — 0.9 % SODIUM CHLORIDE 0.9 %
80 INTRAVENOUS SOLUTION INTRAVENOUS ONCE
Status: DISCONTINUED | OUTPATIENT
Start: 2024-02-05 | End: 2024-02-06 | Stop reason: HOSPADM

## 2024-02-05 RX ORDER — SODIUM CHLORIDE 0.9 % (FLUSH) 0.9 %
5-40 SYRINGE (ML) INJECTION PRN
Status: DISCONTINUED | OUTPATIENT
Start: 2024-02-05 | End: 2024-02-06 | Stop reason: HOSPADM

## 2024-02-05 RX ADMIN — IOPAMIDOL 75 ML: 755 INJECTION, SOLUTION INTRAVENOUS at 20:38

## 2024-02-05 RX ADMIN — Medication 80 ML: at 20:49

## 2024-02-05 RX ADMIN — SODIUM CHLORIDE, PRESERVATIVE FREE 10 ML: 5 INJECTION INTRAVENOUS at 20:50

## 2024-02-05 NOTE — ED TRIAGE NOTES
Pt stated \"there is mold in the lamp light cafe in the fan which has been there for month.\" and \"eyes hurt, spitting, decreased urination because someone is putting medication in the food.\" Family is concerned due to cspo being filed due to showing up unannounced and \"alarming voicemails\". Family stated he has a change in his speech, patient stayed in hotel due to CO2 concern, patient rented care because his car was broken but it wasn't. Patient isn't at his baseline.

## 2024-02-06 ENCOUNTER — APPOINTMENT (OUTPATIENT)
Dept: MRI IMAGING | Age: 64
DRG: 876 | End: 2024-02-06
Payer: MEDICARE

## 2024-02-06 ENCOUNTER — HOSPITAL ENCOUNTER (INPATIENT)
Age: 64
LOS: 6 days | Discharge: ANOTHER HOSPITAL | DRG: 876 | End: 2024-02-12
Attending: STUDENT IN AN ORGANIZED HEALTH CARE EDUCATION/TRAINING PROGRAM
Payer: MEDICARE

## 2024-02-06 VITALS
SYSTOLIC BLOOD PRESSURE: 127 MMHG | TEMPERATURE: 98.3 F | HEART RATE: 137 BPM | WEIGHT: 160.94 LBS | RESPIRATION RATE: 28 BRPM | HEIGHT: 68 IN | DIASTOLIC BLOOD PRESSURE: 96 MMHG | BODY MASS INDEX: 24.39 KG/M2 | OXYGEN SATURATION: 97 %

## 2024-02-06 DIAGNOSIS — I72.9 PSEUDOANEURYSM (HCC): Primary | ICD-10-CM

## 2024-02-06 PROBLEM — R93.0 ABNORMAL CT OF THE HEAD: Status: ACTIVE | Noted: 2024-02-06

## 2024-02-06 PROBLEM — F29 PSYCHOSIS (HCC): Status: ACTIVE | Noted: 2024-02-06

## 2024-02-06 PROBLEM — F22 PSYCHOSIS, PARANOID (HCC): Status: ACTIVE | Noted: 2024-02-06

## 2024-02-06 LAB
AMPHET UR QL SCN: NEGATIVE
BACTERIA URNS QL MICRO: ABNORMAL
BARBITURATES UR QL SCN: NEGATIVE
BENZODIAZ UR QL: NEGATIVE
BILIRUB UR QL STRIP: ABNORMAL
CANNABINOIDS UR QL SCN: NEGATIVE
CHARACTER UR: ABNORMAL
CHOLEST SERPL-MCNC: 157 MG/DL
CHOLESTEROL/HDL RATIO: 2.8
CLARITY UR: CLEAR
COCAINE UR QL SCN: NEGATIVE
COLOR UR: YELLOW
EPI CELLS #/AREA URNS HPF: ABNORMAL /HPF (ref 0–5)
EST. AVERAGE GLUCOSE BLD GHB EST-MCNC: 97 MG/DL
FENTANYL UR QL: NEGATIVE
GLUCOSE UR STRIP-MCNC: NEGATIVE MG/DL
HBA1C MFR BLD: 5 % (ref 4–6)
HDLC SERPL-MCNC: 56 MG/DL
HGB UR QL STRIP.AUTO: ABNORMAL
KETONES UR STRIP-MCNC: ABNORMAL MG/DL
LDLC SERPL CALC-MCNC: 90 MG/DL (ref 0–130)
LEUKOCYTE ESTERASE UR QL STRIP: NEGATIVE
METHADONE UR QL: NEGATIVE
MUCOUS THREADS URNS QL MICRO: ABNORMAL
NITRITE UR QL STRIP: NEGATIVE
OPIATES UR QL SCN: NEGATIVE
OXYCODONE UR QL SCN: NEGATIVE
PCP UR QL SCN: NEGATIVE
PH UR STRIP: 6 [PH] (ref 5–8)
PROT UR STRIP-MCNC: NEGATIVE MG/DL
RBC #/AREA URNS HPF: ABNORMAL /HPF (ref 0–2)
SP GR UR STRIP: 1.02 (ref 1–1.03)
TEST INFORMATION: NORMAL
TRIGL SERPL-MCNC: 57 MG/DL
UROBILINOGEN UR STRIP-ACNC: NORMAL EU/DL (ref 0–1)
WBC #/AREA URNS HPF: ABNORMAL /HPF (ref 0–5)

## 2024-02-06 PROCEDURE — 6360000002 HC RX W HCPCS: Performed by: NURSE PRACTITIONER

## 2024-02-06 PROCEDURE — 97166 OT EVAL MOD COMPLEX 45 MIN: CPT

## 2024-02-06 PROCEDURE — 6370000000 HC RX 637 (ALT 250 FOR IP): Performed by: NURSE PRACTITIONER

## 2024-02-06 PROCEDURE — 6360000002 HC RX W HCPCS: Performed by: STUDENT IN AN ORGANIZED HEALTH CARE EDUCATION/TRAINING PROGRAM

## 2024-02-06 PROCEDURE — 2580000003 HC RX 258: Performed by: STUDENT IN AN ORGANIZED HEALTH CARE EDUCATION/TRAINING PROGRAM

## 2024-02-06 PROCEDURE — 2580000003 HC RX 258: Performed by: PHYSICIAN ASSISTANT

## 2024-02-06 PROCEDURE — 6370000000 HC RX 637 (ALT 250 FOR IP): Performed by: STUDENT IN AN ORGANIZED HEALTH CARE EDUCATION/TRAINING PROGRAM

## 2024-02-06 PROCEDURE — 99221 1ST HOSP IP/OBS SF/LOW 40: CPT | Performed by: STUDENT IN AN ORGANIZED HEALTH CARE EDUCATION/TRAINING PROGRAM

## 2024-02-06 PROCEDURE — 2580000003 HC RX 258: Performed by: NURSE PRACTITIONER

## 2024-02-06 PROCEDURE — 92610 EVALUATE SWALLOWING FUNCTION: CPT

## 2024-02-06 PROCEDURE — 99223 1ST HOSP IP/OBS HIGH 75: CPT

## 2024-02-06 PROCEDURE — G1010 CDSM STANSON: HCPCS

## 2024-02-06 PROCEDURE — 99222 1ST HOSP IP/OBS MODERATE 55: CPT | Performed by: PSYCHIATRY & NEUROLOGY

## 2024-02-06 PROCEDURE — 1210000000 HC MED SURG R&B

## 2024-02-06 PROCEDURE — 99443 PR PHYS/QHP TELEPHONE EVALUATION 21-30 MIN: CPT | Performed by: PSYCHIATRY & NEUROLOGY

## 2024-02-06 PROCEDURE — 97162 PT EVAL MOD COMPLEX 30 MIN: CPT

## 2024-02-06 PROCEDURE — 1200000000 HC SEMI PRIVATE

## 2024-02-06 RX ORDER — SODIUM CHLORIDE 9 MG/ML
INJECTION, SOLUTION INTRAVENOUS PRN
Status: DISCONTINUED | OUTPATIENT
Start: 2024-02-06 | End: 2024-02-06 | Stop reason: HOSPADM

## 2024-02-06 RX ORDER — LORAZEPAM 2 MG/ML
0.5 INJECTION INTRAMUSCULAR EVERY 6 HOURS PRN
Status: DISCONTINUED | OUTPATIENT
Start: 2024-02-06 | End: 2024-02-06 | Stop reason: HOSPADM

## 2024-02-06 RX ORDER — CLOPIDOGREL BISULFATE 75 MG/1
75 TABLET ORAL DAILY
Status: DISCONTINUED | OUTPATIENT
Start: 2024-02-06 | End: 2024-02-06

## 2024-02-06 RX ORDER — DEXTROSE MONOHYDRATE 50 MG/ML
INJECTION, SOLUTION INTRAVENOUS CONTINUOUS
Status: CANCELLED | OUTPATIENT
Start: 2024-02-06

## 2024-02-06 RX ORDER — ASPIRIN 81 MG/1
243 TABLET, CHEWABLE ORAL ONCE
Status: COMPLETED | OUTPATIENT
Start: 2024-02-06 | End: 2024-02-06

## 2024-02-06 RX ORDER — SODIUM CHLORIDE 9 MG/ML
INJECTION, SOLUTION INTRAVENOUS PRN
Status: CANCELLED | OUTPATIENT
Start: 2024-02-06

## 2024-02-06 RX ORDER — POTASSIUM CHLORIDE 20 MEQ/1
40 TABLET, EXTENDED RELEASE ORAL PRN
Status: DISCONTINUED | OUTPATIENT
Start: 2024-02-06 | End: 2024-02-12 | Stop reason: HOSPADM

## 2024-02-06 RX ORDER — ENOXAPARIN SODIUM 100 MG/ML
40 INJECTION SUBCUTANEOUS DAILY
Status: DISCONTINUED | OUTPATIENT
Start: 2024-02-06 | End: 2024-02-06 | Stop reason: HOSPADM

## 2024-02-06 RX ORDER — ASPIRIN 81 MG/1
81 TABLET, CHEWABLE ORAL ONCE
Status: COMPLETED | OUTPATIENT
Start: 2024-02-06 | End: 2024-02-06

## 2024-02-06 RX ORDER — HALOPERIDOL 5 MG/ML
5 INJECTION INTRAMUSCULAR ONCE
Status: COMPLETED | OUTPATIENT
Start: 2024-02-06 | End: 2024-02-06

## 2024-02-06 RX ORDER — SODIUM CHLORIDE 0.9 % (FLUSH) 0.9 %
10 SYRINGE (ML) INJECTION EVERY 12 HOURS SCHEDULED
Status: DISCONTINUED | OUTPATIENT
Start: 2024-02-06 | End: 2024-02-06 | Stop reason: HOSPADM

## 2024-02-06 RX ORDER — ACETAMINOPHEN 325 MG/1
650 TABLET ORAL EVERY 6 HOURS PRN
Status: DISCONTINUED | OUTPATIENT
Start: 2024-02-06 | End: 2024-02-09 | Stop reason: SDUPTHER

## 2024-02-06 RX ORDER — ONDANSETRON 4 MG/1
4 TABLET, ORALLY DISINTEGRATING ORAL EVERY 8 HOURS PRN
Status: DISCONTINUED | OUTPATIENT
Start: 2024-02-06 | End: 2024-02-06 | Stop reason: HOSPADM

## 2024-02-06 RX ORDER — ASPIRIN 325 MG
300 TABLET ORAL ONCE
Status: DISCONTINUED | OUTPATIENT
Start: 2024-02-06 | End: 2024-02-06

## 2024-02-06 RX ORDER — ONDANSETRON 4 MG/1
4 TABLET, ORALLY DISINTEGRATING ORAL EVERY 8 HOURS PRN
Status: DISCONTINUED | OUTPATIENT
Start: 2024-02-06 | End: 2024-02-09 | Stop reason: SDUPTHER

## 2024-02-06 RX ORDER — ONDANSETRON 2 MG/ML
4 INJECTION INTRAMUSCULAR; INTRAVENOUS EVERY 6 HOURS PRN
Status: CANCELLED | OUTPATIENT
Start: 2024-02-06

## 2024-02-06 RX ORDER — POTASSIUM CHLORIDE 7.45 MG/ML
10 INJECTION INTRAVENOUS PRN
Status: DISCONTINUED | OUTPATIENT
Start: 2024-02-06 | End: 2024-02-12 | Stop reason: HOSPADM

## 2024-02-06 RX ORDER — ONDANSETRON 2 MG/ML
4 INJECTION INTRAMUSCULAR; INTRAVENOUS EVERY 6 HOURS PRN
Status: DISCONTINUED | OUTPATIENT
Start: 2024-02-06 | End: 2024-02-06 | Stop reason: HOSPADM

## 2024-02-06 RX ORDER — CLOPIDOGREL BISULFATE 75 MG/1
150 TABLET ORAL ONCE
Status: DISCONTINUED | OUTPATIENT
Start: 2024-02-06 | End: 2024-02-06

## 2024-02-06 RX ORDER — SODIUM CHLORIDE 0.9 % (FLUSH) 0.9 %
10 SYRINGE (ML) INJECTION PRN
Status: DISCONTINUED | OUTPATIENT
Start: 2024-02-06 | End: 2024-02-06 | Stop reason: HOSPADM

## 2024-02-06 RX ORDER — POLYETHYLENE GLYCOL 3350 17 G/17G
17 POWDER, FOR SOLUTION ORAL DAILY PRN
Status: DISCONTINUED | OUTPATIENT
Start: 2024-02-06 | End: 2024-02-12 | Stop reason: HOSPADM

## 2024-02-06 RX ORDER — SODIUM CHLORIDE 0.9 % (FLUSH) 0.9 %
5-40 SYRINGE (ML) INJECTION EVERY 12 HOURS SCHEDULED
Status: DISCONTINUED | OUTPATIENT
Start: 2024-02-06 | End: 2024-02-12 | Stop reason: HOSPADM

## 2024-02-06 RX ORDER — CLOPIDOGREL BISULFATE 75 MG/1
75 TABLET ORAL ONCE
Status: COMPLETED | OUTPATIENT
Start: 2024-02-06 | End: 2024-02-06

## 2024-02-06 RX ORDER — LORAZEPAM 2 MG/ML
0.5 INJECTION INTRAMUSCULAR EVERY 6 HOURS PRN
Status: CANCELLED | OUTPATIENT
Start: 2024-02-06

## 2024-02-06 RX ORDER — CLOPIDOGREL BISULFATE 75 MG/1
225 TABLET ORAL ONCE
Status: COMPLETED | OUTPATIENT
Start: 2024-02-06 | End: 2024-02-06

## 2024-02-06 RX ORDER — ONDANSETRON 2 MG/ML
4 INJECTION INTRAMUSCULAR; INTRAVENOUS EVERY 6 HOURS PRN
Status: DISCONTINUED | OUTPATIENT
Start: 2024-02-06 | End: 2024-02-09 | Stop reason: SDUPTHER

## 2024-02-06 RX ORDER — SODIUM CHLORIDE 0.9 % (FLUSH) 0.9 %
10 SYRINGE (ML) INJECTION PRN
Status: CANCELLED | OUTPATIENT
Start: 2024-02-06

## 2024-02-06 RX ORDER — ASPIRIN 81 MG/1
243 TABLET, CHEWABLE ORAL DAILY
Status: DISCONTINUED | OUTPATIENT
Start: 2024-02-06 | End: 2024-02-06

## 2024-02-06 RX ORDER — ENOXAPARIN SODIUM 100 MG/ML
40 INJECTION SUBCUTANEOUS DAILY
Status: CANCELLED | OUTPATIENT
Start: 2024-02-07

## 2024-02-06 RX ORDER — ONDANSETRON 4 MG/1
4 TABLET, ORALLY DISINTEGRATING ORAL EVERY 8 HOURS PRN
Status: CANCELLED | OUTPATIENT
Start: 2024-02-06

## 2024-02-06 RX ORDER — DEXTROSE MONOHYDRATE 50 MG/ML
INJECTION, SOLUTION INTRAVENOUS CONTINUOUS
Status: DISCONTINUED | OUTPATIENT
Start: 2024-02-06 | End: 2024-02-06 | Stop reason: HOSPADM

## 2024-02-06 RX ORDER — ATORVASTATIN CALCIUM 40 MG/1
40 TABLET, FILM COATED ORAL NIGHTLY
Status: DISCONTINUED | OUTPATIENT
Start: 2024-02-06 | End: 2024-02-06 | Stop reason: HOSPADM

## 2024-02-06 RX ORDER — SODIUM CHLORIDE 0.9 % (FLUSH) 0.9 %
10 SYRINGE (ML) INJECTION EVERY 12 HOURS SCHEDULED
Status: CANCELLED | OUTPATIENT
Start: 2024-02-06

## 2024-02-06 RX ORDER — MAGNESIUM SULFATE IN WATER 40 MG/ML
2000 INJECTION, SOLUTION INTRAVENOUS PRN
Status: DISCONTINUED | OUTPATIENT
Start: 2024-02-06 | End: 2024-02-12 | Stop reason: HOSPADM

## 2024-02-06 RX ORDER — ACETAMINOPHEN 650 MG/1
650 SUPPOSITORY RECTAL EVERY 6 HOURS PRN
Status: DISCONTINUED | OUTPATIENT
Start: 2024-02-06 | End: 2024-02-12 | Stop reason: HOSPADM

## 2024-02-06 RX ORDER — SODIUM CHLORIDE 0.9 % (FLUSH) 0.9 %
5-40 SYRINGE (ML) INJECTION PRN
Status: CANCELLED | OUTPATIENT
Start: 2024-02-06

## 2024-02-06 RX ORDER — ENOXAPARIN SODIUM 100 MG/ML
40 INJECTION SUBCUTANEOUS DAILY
Status: DISCONTINUED | OUTPATIENT
Start: 2024-02-07 | End: 2024-02-12 | Stop reason: HOSPADM

## 2024-02-06 RX ORDER — SODIUM CHLORIDE 9 MG/ML
INJECTION, SOLUTION INTRAVENOUS PRN
Status: DISCONTINUED | OUTPATIENT
Start: 2024-02-06 | End: 2024-02-12 | Stop reason: HOSPADM

## 2024-02-06 RX ORDER — SODIUM CHLORIDE 0.9 % (FLUSH) 0.9 %
5-40 SYRINGE (ML) INJECTION PRN
Status: DISCONTINUED | OUTPATIENT
Start: 2024-02-06 | End: 2024-02-12 | Stop reason: HOSPADM

## 2024-02-06 RX ORDER — ASPIRIN 81 MG/1
81 TABLET ORAL DAILY
Status: DISCONTINUED | OUTPATIENT
Start: 2024-02-06 | End: 2024-02-06

## 2024-02-06 RX ADMIN — ASPIRIN 81 MG 81 MG: 81 TABLET ORAL at 01:08

## 2024-02-06 RX ADMIN — HALOPERIDOL LACTATE 5 MG: 5 INJECTION, SOLUTION INTRAMUSCULAR at 21:08

## 2024-02-06 RX ADMIN — CLOPIDOGREL BISULFATE 225 MG: 75 TABLET ORAL at 16:11

## 2024-02-06 RX ADMIN — CLOPIDOGREL BISULFATE 75 MG: 75 TABLET ORAL at 08:26

## 2024-02-06 RX ADMIN — ENOXAPARIN SODIUM 40 MG: 100 INJECTION SUBCUTANEOUS at 08:26

## 2024-02-06 RX ADMIN — SODIUM CHLORIDE, PRESERVATIVE FREE 10 ML: 5 INJECTION INTRAVENOUS at 23:06

## 2024-02-06 RX ADMIN — Medication 0.5 MG: at 08:26

## 2024-02-06 RX ADMIN — OLANZAPINE 5 MG: 10 INJECTION, POWDER, LYOPHILIZED, FOR SOLUTION INTRAMUSCULAR at 23:01

## 2024-02-06 RX ADMIN — CLOPIDOGREL BISULFATE 75 MG: 75 TABLET ORAL at 01:30

## 2024-02-06 RX ADMIN — ASPIRIN 81 MG 243 MG: 81 TABLET ORAL at 16:11

## 2024-02-06 RX ADMIN — ASPIRIN 81 MG: 81 TABLET, COATED ORAL at 08:26

## 2024-02-06 RX ADMIN — DEXTROSE MONOHYDRATE: 50 INJECTION, SOLUTION INTRAVENOUS at 16:53

## 2024-02-06 ASSESSMENT — ENCOUNTER SYMPTOMS
RESPIRATORY NEGATIVE: 1
GASTROINTESTINAL NEGATIVE: 1
EYES NEGATIVE: 1

## 2024-02-06 NOTE — ED PROVIDER NOTES
[facility] EMERGENCY DEPARTMENT  EMERGENCY DEPARTMENT ENCOUNTER      Pt Name: William Babcock  MRN: [id number]  Birthdate 1960  Date of evaluation: 2/5/2024  Provider: BENOIT Beauchamp CNP  2:21 AM    CHIEF COMPLAINT       Chief Complaint   Patient presents with    Mental Health Problem         HISTORY OF PRESENT ILLNESS    William Babcock is a 63 y.o. male who presents to the emergency department      This is a nontoxic unkempt appearing 63-year-old male being brought in by his nephew and niece in law, the family has noticed extremely odd behavior the patient showing up in weird places, the patient has had increased confusion and altered mental status, they report that he now has a stalking charge because the patient has been calling and showing up at a previous  office on solicited, the patient does have a history of learning disability with IQ of 70 per the family members; the patient moved from [facility] to the [facility] in 2015 the family states that they have not seen him since Christmas 2023 but the patient has had a major decline in behavior, housekeeping, paranoia in the last 4 to 6 weeks the patient believes that people are poisoning his food, that he is getting 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  The patient is alert and oriented to self confused regarding circumstance place and time.    The history is provided by the patient, medical records and a relative.       Nursing Notes were reviewed.    REVIEW OF SYSTEMS       Review of Systems   Unable to perform ROS: Psychiatric disorder       Except as noted above the remainder of the review of systems was reviewed and negative.       PAST MEDICAL HISTORY     Past Medical History:   Diagnosis Date     (37 °C) -- 93 16 97 % 1.727 m (5' 8\") --       Physical Exam  Vitals and nursing note reviewed.   Constitutional:       General: He is not in acute distress.     Appearance: Normal appearance. He is not ill-appearing or toxic-appearing.      Comments: Unkempt   HENT:      Head: Normocephalic and atraumatic.      Right Ear: External ear normal. There is no impacted cerumen.      Left Ear: External ear normal. There is no impacted cerumen.      Nose: Nose normal.      Mouth/Throat:      Mouth: Mucous membranes are moist.   Eyes:      General:         Right eye: No discharge.         Left eye: No discharge.      Conjunctiva/sclera: Conjunctivae normal.      Pupils: Pupils are equal, round, and reactive to light.      Comments: Bilateral pupils PERRL 3 mm.  Patient will not follow instructions for EOMI.   Cardiovascular:      Rate and Rhythm: Normal rate and regular rhythm.      Pulses: Normal pulses.   Pulmonary:      Effort: Pulmonary effort is normal.      Comments:   Left lower lobe diminished otherwise clear lung sounds.  Abdominal:      General: There is no distension.      Palpations: Abdomen is soft. There is no mass.      Tenderness: There is no abdominal tenderness. There is no guarding.      Hernia: No hernia is present.   Musculoskeletal:         General: No deformity or signs of injury.      Cervical back: Normal range of motion and neck supple. No tenderness.      Right lower leg: No edema.      Left lower leg: No edema.      Comments: Bilateral upper lower extremities 5 out of 5 motor strength without gross deformities or swelling.     Skin:     General: Skin is warm and dry.      Capillary Refill: Capillary refill takes less than 2 seconds.   Neurological:      General: No focal deficit present.      Mental Status: He is alert. He is disoriented.      Gait: Gait normal.   Psychiatric:      Comments: Patient has flight of ideas, paranoid thoughts, states that he does not want to harm himself or anybody

## 2024-02-06 NOTE — CONSULTS
Denies     Panic Attacks: Endorses history, none recent     Delusions:  Denies     Phobias:  Denies     Trauma: Denies    Prior to Admission medications    Medication Sig Start Date End Date Taking? Authorizing Provider   fluticasone (FLONASE) 50 MCG/ACT nasal spray SHAKE LIQUID AND USE 2 SPRAYS IN EACH NOSTRIL DAILY  Patient not taking: Reported on 2/5/2024 1/22/24   Billy Francois MD   Dextromethorphan-guaiFENesin (MUCINEX DM MAXIMUM STRENGTH)  MG TB12 Take 1 tablet by mouth 2 times daily  Patient not taking: Reported on 2/5/2024 1/16/24   Billy Francois MD   atenolol (TENORMIN) 50 MG tablet Take 1 tablet by mouth daily 11/7/23   Billy Francois MD   amitriptyline (ELAVIL) 50 MG tablet Take 1 tablet by mouth nightly 11/7/23   Billy Francois MD        Medications:    Current Facility-Administered Medications: sodium chloride flush 0.9 % injection 10 mL, 10 mL, IntraVENous, 2 times per day  sodium chloride flush 0.9 % injection 10 mL, 10 mL, IntraVENous, PRN  0.9 % sodium chloride infusion, , IntraVENous, PRN  ondansetron (ZOFRAN-ODT) disintegrating tablet 4 mg, 4 mg, Oral, Q8H PRN **OR** ondansetron (ZOFRAN) injection 4 mg, 4 mg, IntraVENous, Q6H PRN  magnesium hydroxide (MILK OF MAGNESIA) 400 MG/5ML suspension 30 mL, 30 mL, Oral, Daily PRN  enoxaparin (LOVENOX) injection 40 mg, 40 mg, SubCUTAneous, Daily  atorvastatin (LIPITOR) tablet 40 mg, 40 mg, Oral, Nightly  clopidogrel (PLAVIX) tablet 75 mg, 75 mg, Oral, Daily  aspirin EC tablet 81 mg, 81 mg, Oral, Daily  LORazepam (ATIVAN) injection 0.5 mg, 0.5 mg, IntraVENous, Q6H PRN  sodium chloride 0.9 % bolus 80 mL, 80 mL, IntraVENous, Once  sodium chloride flush 0.9 % injection 5-40 mL, 5-40 mL, IntraVENous, PRN     Physical:    Vitals:  BP (!) 103/58   Pulse 72   Temp 98.3 °F (36.8 °C) (Oral)   Resp 16   Ht 1.727 m (5' 8\")   Wt 73 kg (160 lb 15 oz)   SpO2 98%   BMI 24.47 kg/m²      Qtc: 447 on 2/5/2020    Neuro Exam:   Muscle

## 2024-02-06 NOTE — CONSULTS
[facility] [place]  [address], [address]  [place] [address]  Ph: [phone] or [phone]  FAX: [phone]        Reason for consult: Abnormal CT scan of the head    I had the pleasure of seeing your patient in neurology consultation for his symptoms. As you would recall William Babcock is a 63 y.o. yo male admitted on 2/5/2024.  The patient is not a reliable historian.  He presented with altered mental status.  The patient has a past medical history of psychosis and paranoia.  He tells me that he stopped taking all of his medications due to concern of side effects.  As part of workup, he underwent CT head which showed some chronic changes.  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  MRI scan of the brain is negative for acute process.  The patient believes lately, he has been having falls and intractable bouts of coughing  Past Medical History:   Diagnosis Date    Anxiety     Autism      Past Surgical History:   Procedure Laterality Date    EYE SURGERY       Social History     Socioeconomic History    Marital status: Single     Spouse name: Not on file    Number of children: Not on file    Years of education: Not on file    Highest education level: Not on file   Occupational History    Not on file   Tobacco Use    Smoking status: Never    Smokeless tobacco: Never   Substance and Sexual Activity    Alcohol use: No    Drug use: No    Sexual activity: Never   Other Topics Concern    Not on file   Social History Narrative    Not on file     Social Determinants of Health     Financial Resource Strain: Low Risk  (1/16/2024)    Overall Financial Resource Strain (CARDIA)     Difficulty of Paying Living Expenses: Not hard at all   Food Insecurity: No Food Insecurity (2/6/2024)    Hunger Vital Sign     Worried About Running Out of Food in the Last Year: Never true     Ran Out of Food in the Last Year: Never true   Transportation Needs: No Transportation           III, IV, VI - extra-ocular muscles full: no pupillary defect; no SARA, no nystagmus, no ptosis                                                                      V - normal facial sensation                                                               VII - normal facial symmetry                                                             VIII - intact hearing                                                                             IX, X - symmetrical palate                                                                  XI - symmetrical shoulder shrug                                                       XII - midline tongue without atrophy or fasciculation     Motor function  Normal muscle bulk and tone; normal power 5/5, including fine motor movements     Sensory function Intact to touch, pin, vibration, proprioception     Cerebellar Intact fine motor movement. No involuntary movements or tremors     Reflex function Intact 2+ DTR and symmetric. Negative Babinski     Gait                  Not tested       Lab Results   Component Value Date    LDLCALC 108 08/20/2021    LDLCHOLESTEROL 98 11/15/2023      No components found for: \"CHLPL\"   Lab Results   Component Value Date    TRIG 61 01/19/2018    TRIG 77 12/02/2016      Lab Results   Component Value Date    HDL 48 11/15/2023    HDL 45 08/29/2022    HDL 38 08/20/2021      Lab Results   Component Value Date    LDLCALC 108 08/20/2021    LDLCALC 124 08/20/2020    LDLCALC 122 01/11/2019      No components found for: \"LABVLDL\"   No results found for: \"LABA1C\"   No results found for: \"EAG\"   No results found for: \"[id number]\"     Neurological work up:  Study Finding   CT head    CTA Head and neck 7 mm pseudoaneurysm along proximal right ICA     MRI brain    2 D Echo    TRINA    EEG      Assessment and Recommendations:   Patient with agitation, psychosis, baseline developmental delay  7 mm pseudoaneurysm along the right ICA, bleeding along right ICA, possible

## 2024-02-06 NOTE — ED NOTES
Pt wanting to leave, able to redirect pt, but continuously asking to leave, attempting to obtain urine sample, pt states he will give a sample in the morning. Dr. Bateman and Rolanda lockett.

## 2024-02-06 NOTE — PLAN OF CARE
Problem: Discharge Planning  Goal: Discharge to home or other facility with appropriate resources  Outcome: Progressing  Flowsheets  Taken 2/6/2024 0330  Discharge to home or other facility with appropriate resources: Arrange for needed discharge resources and transportation as appropriate  Taken 2/6/2024 0326  Discharge to home or other facility with appropriate resources:   Identify barriers to discharge with patient and caregiver   Identify discharge learning needs (meds, wound care, etc)   Refer to discharge planning if patient needs post-hospital services based on physician order or complex needs related to functional status, cognitive ability or social support system   Arrange for needed discharge resources and transportation as appropriate   Arrange for interpreters to assist at discharge as needed     Problem: Safety - Adult  Goal: Free from fall injury  Outcome: Progressing     Problem: ABCDS Injury Assessment  Goal: Absence of physical injury  Outcome: Progressing     Problem: Skin/Tissue Integrity  Goal: Absence of new skin breakdown  Description: 1.  Monitor for areas of redness and/or skin breakdown  2.  Assess vascular access sites hourly  3.  Every 4-6 hours minimum:  Change oxygen saturation probe site  4.  Every 4-6 hours:  If on nasal continuous positive airway pressure, respiratory therapy assess nares and determine need for appliance change or resting period.  Outcome: Progressing     Problem: Risk for Elopement  Goal: Patient will not exit the unit/facility without proper excort  Outcome: Progressing  Flowsheets  Taken 2/6/2024 0330  Nursing Interventions for Elopement Risk: Assist with personal care needs such as toileting, eating, dressing, as needed to reduce the risk of wandering  Taken 2/6/2024 0322  Nursing Interventions for Elopement Risk:   Assist with personal care needs such as toileting, eating, dressing, as needed to reduce the risk of wandering   Collaborate with family

## 2024-02-06 NOTE — ED NOTES
Family niece in law and nephew in law here with pt. They report pt has a history of autism and anxiety, however the last couple of days pt has been paranoid, he reported to them that someone was putting meds in his food and water. He also stayed in a hotel last night because he was afraid that he was getting carbon monoxide poisoning. They report that these paranoia thoughts are new for him. They report he has a history of being violent towards others when he becomes angry. Today he was served a CSPO for stalking his  and now has a new . They report he became upset when he was served the papers today and was now violent towards a person,but was using a banging a hammer upstairs. Family had to leave, but they left number eitan [phone] and atilio [phone]. They report they are the most calming to him , however his brother Maximo Babcock is next of kin, but not to mention his name.

## 2024-02-06 NOTE — PROGRESS NOTES
SPEECH LANGUAGE PATHOLOGY    Facility/Department: [address]   CLINICAL BEDSIDE SWALLOW EVALUATION    NAME: William Babcock  : 1960  MRN: [id number]    ADMISSION DATE: 2024  ADMITTING DIAGNOSIS: has Essential hypertension; Primary insomnia; Influenza vaccine refused; Refused Streptococcus pneumoniae vaccination; Psychosis, paranoid (HCC); Abnormal CT of the head; and Psychosis (HCC) on their problem list.    Recent Chest Xray: 24  IMPRESSION:  No acute cardiopulmonary abnormality is identified.    Date of Eval: 2024  Evaluating Therapist: ELENI Martines,  Clinician     Current Diet level:  Current Diet : NPO  Current Liquid Diet : NPO    Primary Complaint  William Babcock is a 63 y.o. Non- / non  male who presents with Mental Health Problem   and is admitted to the hospital for the management of Psychosis, paranoid (HCC).     63-year-old male who was brought into the hospital due to concerning of altered mental status and with behavior, at time of my evaluation today patient was awake alert and oriented that he is poor historian he is not sure why he is in the hospital, patient labs were reviewed unremarkable, urine drug screen test was negative, chest x-ray clear with no infiltrate or consolidation, CT head done and showed chronic infarct, CTA head and neck showed 7 mm pseudoaneurysm in the proximal right internal carotid artery, neurology was consulted who recommended MRI brain, and dual antiplatelet and psych consult, and patient was admitted for further evaluation and treatment    Pain:  Pain Assessment  Pain Assessment: None - Denies Pain  Pain Level:  (Pt. could not privde number or any information on pain. Pt. stated it was from \"being posioned\")    Reason for Referral  William Babcock was referred for a bedside swallow evaluation to assess the efficiency of his swallow function, identify signs and symptoms of aspiration and make recommendations  regarding safe dietary consistencies, effective compensatory strategies, and safe eating environment.    Impression  Dysphagia Diagnosis: Mild oral stage dysphagia;Mild to moderate pharyngeal stage dysphagia;Suspected needs further assessment  Dysphagia Impression : Pt presented with overt s/s of aspiration and dysphagia for consistencies trested at bedside, warranting instrumental assessment. Pt exhibited cough/throat clear/wet vocal quality following thin and midly thick liquids via cup, and prolonged oral phase with residue following soft solids and regular solids. ST recommends pt remain NPO and complete video swallow study to determine safety of PO intake.  Dysphagia Outcome Severity Scale: Level 3: Moderate dysphagia- Total assistance, supervision or strategies. Two or more diet consistencies restricted     Treatment Plan  Requires SLP Intervention: Yes     D/C Recommendations: Ongoing speech therapy is recommended at next level of care;Ongoing speech therapy is recommended during this hospitalization       Recommended Diet and Intervention  Diet Solids Recommendation:  (Pending MBS recommendation)  Liquid Consistency Recommendation:  (Pending MBS recommendation)  Recommended Form of Meds: Meds in puree  Recommendations: NPO  Therapeutic Interventions: Pharyngeal exercises;Oral motor exercises;Laryngeal exercises;Patient/Family education    Compensatory Swallowing Strategies  Compensatory Swallowing Strategies : Other (comments) (TBD)    Treatment/Goals  Dysphagia Goals: The patient will tolerate instrumental swallowing procedure    General  Chart Reviewed: Yes  Comments: Pt referred for skilled ST bedside swallowing evaluation due to concerns of dysphagia.  Behavior/Cognition: Pleasant mood;Confused;Cooperative;Alert  Respiratory Status: Room air  O2 Device: None (Room air)  Communication Observation: Functional  Follows Directions: Simple  Dentition: Some missing teeth (Back teeth)  Patient Positioning:

## 2024-02-06 NOTE — PROGRESS NOTES
Nutrition Note    PNS received d/t Muslim/ethnic/cultural beliefs: pt is Druze. Noted. No report of recent weight loss or poor appetite, weight is stable per EMR, no wounds noted. No other nutritional concerns at this time.    SHAY Matias, RDN, LD  Registered Dietitian  [facility]  [phone]

## 2024-02-06 NOTE — PROGRESS NOTES
Physical Therapy  Facility/Department: [address]  Physical Therapy Initial Assessment    Name: William Babcock  : 1960  MRN: [id number]  Date of Service: 2024    Discharge Recommendations:  Patient would benefit from continued therapy after discharge   PT Equipment Recommendations  Equipment Needed: No      Patient Diagnosis(es): The primary encounter diagnosis was Abnormal CT of the head. A diagnosis of Psychosis, unspecified psychosis type (HCC) was also pertinent to this visit.  Past Medical History:  has a past medical history of Anxiety and Autism.  Past Surgical History:  has a past surgical history that includes eye surgery.    Assessment   Body Structures, Functions, Activity Limitations Requiring Skilled Therapeutic Intervention: Decreased safe awareness;Decreased functional mobility   Assessment: The patient was seen for PT evaluation and treatment. He was able to ambulate 150ft with CGA, perform transfers with CGA-SBA. He is currently on 1:1 sitter and demonstrates decreased safety awareness. He states that he lives alone, however 24 hour care would be recommended for patient safety. Continued PT recommended for balance and gait safety.  Treatment Diagnosis: decreased balance  Therapy Prognosis: Good  Decision Making: Medium Complexity  Requires PT Follow-Up: Yes  Activity Tolerance  Activity Tolerance: Patient tolerated evaluation without incident     Plan   Physical Therapy Plan  General Plan:  (5-6x/week)  Current Treatment Recommendations: Strengthening, Balance training, Functional mobility training, Gait training, Stair training, Transfer training, Therapeutic activities  Safety Devices  Type of Devices: Left in chair, Call light within reach, Chair alarm in place, Nurse notified, Gait belt, Sitter present  Restraints  Restraints Initially in Place: No     Restrictions  Restrictions/Precautions  Restrictions/Precautions: Bed Alarm, Fall Risk  Required Braces or Orthoses?: No  Position

## 2024-02-06 NOTE — PROGRESS NOTES
Occupational Therapy  Facility/Department: [address]  Occupational Therapy Initial Assessment    Name: William Babcock  : 1960  MRN: [id number]  Date of Service: 2024    Chief Complaint   Patient presents with    Mental Health Problem     Discharge Recommendations:  24 hour supervision or assist  OT Equipment Recommendations  Equipment Needed:  (AE/DME recommnedations TBD)     Patient Diagnosis(es): The primary encounter diagnosis was Abnormal CT of the head. A diagnosis of Psychosis, unspecified psychosis type (HCC) was also pertinent to this visit.  Past Medical History:  has a past medical history of Anxiety and Autism.  Past Surgical History:  has a past surgical history that includes eye surgery.    Assessment   Performance deficits / Impairments: Decreased functional mobility ;Decreased safe awareness;Decreased balance;Decreased ADL status;Decreased cognition;Decreased endurance;Decreased high-level IADLs    Assessment: Pt seen for therapy eval s/p admission with paranoid psychosis. Pt reporting PLOF to be IND all ADLs and IADLs including driving, unsure of living situation however. Pt is oriented x3 however appears disoriented to situation. At this time pt is SBA-CGA for all ADLs and ADL transfers as well as functional mobility with no AE/DME in and out of room. Pt will require 24-hour SUP/assist upon discharge. Pt would benefit from skilled OT services during hospitalization and following discharge to support return to PLOF.    Prognosis: Good  Decision Making: Medium Complexity    REQUIRES OT FOLLOW-UP: Yes  Activity Tolerance  Activity Tolerance: Treatment limited secondary to decreased cognition        Plan   Occupational Therapy Plan  Times Per Week: 3-5x/wk  Times Per Day: Once a day  Current Treatment Recommendations: Balance training, Functional mobility training, Endurance training, Safety education & training, Patient/Caregiver education & training, Equipment evaluation, education, &

## 2024-02-06 NOTE — H&P
ug/mL   Lipid Panel    Collection Time: 02/05/24  7:43 PM   Result Value Ref Range    Cholesterol 157 <200 mg/dL    HDL 56 >40 mg/dL    LDL Cholesterol 90 0 - 130 mg/dL    Chol/HDL Ratio 2.8 <5    Triglycerides 57 <150 mg/dL       Imaging/Diagnostics:  MRI brain without contrast    Result Date: 2/6/2024  Chronic microvascular disease without acute intracranial abnormality.     CTA HEAD NECK W CONTRAST    Addendum Date: 2/5/2024    ADDENDUM: There is likely developmental venous anomaly in the left basal ganglia, normal variant.  There is associated coarse calcification, may be related to cavernomas.     Result Date: 2/5/2024  7 mm pseudoaneurysm along proximal right internal carotid artery. Beading along the right internal carotid artery, likely related to fibromuscular dysplasia. No acute abnormality or flow-limiting stenosis of the major arteries of the head.     CT HEAD WO CONTRAST    Result Date: 2/5/2024  Asymmetric hypodensity in the left corona radiata may represent chronic ischemia versus an acute infarct.  Consider MRI for further evaluation.     XR CHEST PORTABLE    Result Date: 2/5/2024  No acute cardiopulmonary abnormality is identified.       Assessment :      Hospital Problems             Last Modified POA    * (Principal) Psychosis, paranoid (HCC) 2/6/2024 Yes       Plan:     Patient status inpatient in the Med/Surge    Patient with history of autism and anxiety was brought into the hospital due to weird behavior and being paranoid, psychiatry was consulted will follow recommendation, sitter at bedside  Altered mental status, chronic infarct on CT head continue with dual antiplatelet, MRI ordered results still pending, speech swallow eval, neurology was consulted  DVT prophylaxis  Fall precaution  Correct electrolyte abnormalities  PT/OT    Consultations:   IP CONSULT TO NEUROLOGY  IP CONSULT TO SOCIAL WORK  IP CONSULT TO PSYCHIATRY    Patient is admitted as inpatient status because of co-morbidities  listed above, severity of signs and symptoms as outlined, requirement for current medical therapies and most importantly because of direct risk to patient if care not provided in a hospital setting.  Expected length of stay > 48 hours.    Jones Ledesma MD  2/6/2024  12:57 PM    Copy sent to Dr. Francois, Billy Figueroa MD

## 2024-02-06 NOTE — VIRTUAL HEALTH
William Babcock, was evaluated through a synchronous (real-time) audio-video encounter. The patient (and/or guardian if applicable) is aware that this is a billable service, which includes applicable co-pays. This virtual visit was conducted with patient's (and/or legal guardian's) consent. Patient identification was verified, and a caregiver was present when appropriate.  The patient was located at Facility (Appt Department): [facility] EMERGENCY DEPARTMENT  [address]  [place] [address]  Loc: [phone]    Consults     Total time spent on this encounter: 30 minutes     --Chiqui Park DO on 2/6/2024 at 1:25 AM    An electronic signature was used to authenticate this note.      [facility] Stroke [facility] Vascular Neurology Consult for  [place] ED Stroke Alert through [employer] @ 12:33 a.m.  2/6/2024 1:25 AM  Pt Name: William Babcock  MRN: [id number]  YOB: 1960  Date of evaluation: 2/6/2024  Primary Care Physician: Billy Francois MD  Reason for Evaluation: Stroke     William Babcock is a 63 y.o. male who presents with several weeks of agitation, psychosis. Patient with baseline developmental delay/ encephalopathy. Recently had a restraining order against him. Stroke called for incidental chronic infarct on HCT.     Allergies  has No Known Allergies.  Medications  Prior to Admission medications    Medication Sig Start Date End Date Taking? Authorizing Provider   fluticasone (FLONASE) 50 MCG/ACT nasal spray SHAKE LIQUID AND USE 2 SPRAYS IN EACH NOSTRIL DAILY  Patient not taking: Reported on 2/5/2024 1/22/24   Billy Francois MD   Dextromethorphan-guaiFENesin (MUCINEX DM MAXIMUM STRENGTH)  MG TB12 Take 1 tablet by mouth 2 times daily  Patient not taking: Reported on 2/5/2024 1/16/24   Billy Francois MD   atenolol (TENORMIN) 50 MG tablet Take 1 tablet by mouth daily 11/7/23   Billy Francois MD    amitriptyline (ELAVIL) 50 MG tablet Take 1 tablet by mouth nightly 11/7/23   Billy Francois MD    Scheduled Meds:   clopidogrel  75 mg Oral Once    haloperidol lactate        sodium chloride  80 mL IntraVENous Once     Continuous Infusions:  PRN Meds:.haloperidol lactate, sodium chloride flush  Past Medical History   has a past medical history of Anxiety and Autism.  Social History  Social History     Socioeconomic History    Marital status: Single     Spouse name: Not on file    Number of children: Not on file    Years of education: Not on file    Highest education level: Not on file   Occupational History    Not on file   Tobacco Use    Smoking status: Never    Smokeless tobacco: Never   Substance and Sexual Activity    Alcohol use: No    Drug use: No    Sexual activity: Never   Other Topics Concern    Not on file   Social History Narrative    Not on file     Social Determinants of Health     Financial Resource Strain: Low Risk  (1/16/2024)    Overall Financial Resource Strain (CARDIA)     Difficulty of Paying Living Expenses: Not hard at all   Food Insecurity: No Food Insecurity (1/16/2024)    Hunger Vital Sign     Worried About Running Out of Food in the Last Year: Never true     Ran Out of Food in the Last Year: Never true   Transportation Needs: Unknown (1/16/2024)    PRAPARE - Transportation     Lack of Transportation (Medical): Not on file     Lack of Transportation (Non-Medical): No   Physical Activity: Not on file   Stress: Not on file   Social Connections: Not on file   Intimate Partner Violence: Not on file   Housing Stability: Unknown (1/16/2024)    Housing Stability Vital Sign     Unable to Pay for Housing in the Last Year: Not on file     Number of Places Lived in the Last Year: Not on file     Unstable Housing in the Last Year: No     Family History      Problem Relation Age of Onset    Colon Cancer Father     Colon Cancer Sister        OBJECTIVE  BP (!) 146/87   Pulse 95   Temp 98.6 °F (37

## 2024-02-06 NOTE — CARE COORDINATION
SW consulted for living situation. Attempted to see patient, patient being assessed at this time. Sitter at bedside. Will follow up at a later time.

## 2024-02-06 NOTE — ED PROVIDER NOTES
Emergency Department     Faculty Attestation    I performed a history and physical examination of the patient and discussed management with the mid level provider. I reviewed the mid level provider's note and agree with the documented findings and plan of care. Any areas of disagreement are noted on the chart. I was personally present for the key portions of any procedures. I have documented in the chart those procedures where I was not present during the key portions. I have reviewed the emergency nurses triage note. I agree with the chief complaint, past medical history, past surgical history, allergies, medications, social and family history as documented unless otherwise noted below. Documentation of the HPI, Physical Exam and Medical Decision Making performed by medical students or scribes is based on my personal performance of the HPI, PE and MDM. For Physician Assistant/ Nurse Practitioner cases/documentation I have personally evaluated this patient and have completed at least one if not all key elements of the E/M (history, physical exam, and MDM). Additional findings are as noted.      Primary Care Physician:  Billy Francois MD    CHIEF COMPLAINT       Chief Complaint   Patient presents with    Mental Health Problem       RECENT VITALS:   Temp: 98.6 °F (37 °C),  Pulse: 93, Respirations: 16, BP: (!) 137/90    LABS:  Labs Reviewed   CBC WITH AUTO DIFFERENTIAL - Abnormal; Notable for the following components:       Result Value    Lymphocytes % 22 (*)     All other components within normal limits   COMPREHENSIVE METABOLIC PANEL - Abnormal; Notable for the following components:    Sodium 134 (*)     All other components within normal limits   SALICYLATE LEVEL - Abnormal; Notable for the following components:    Salicylate Lvl <1.0 (*)     All other components within normal limits   ACETAMINOPHEN LEVEL - Abnormal; Notable for the following components:    Acetaminophen Level <5 (*)     All other

## 2024-02-07 ENCOUNTER — ANESTHESIA EVENT (OUTPATIENT)
Dept: INTERVENTIONAL RADIOLOGY/VASCULAR | Age: 64
End: 2024-02-07
Payer: MEDICARE

## 2024-02-07 ENCOUNTER — APPOINTMENT (OUTPATIENT)
Dept: INTERVENTIONAL RADIOLOGY/VASCULAR | Age: 64
DRG: 876 | End: 2024-02-07
Attending: STUDENT IN AN ORGANIZED HEALTH CARE EDUCATION/TRAINING PROGRAM
Payer: MEDICARE

## 2024-02-07 ENCOUNTER — ANESTHESIA (OUTPATIENT)
Dept: INTERVENTIONAL RADIOLOGY/VASCULAR | Age: 64
End: 2024-02-07
Payer: MEDICARE

## 2024-02-07 LAB
ACT BLD: 128 SEC (ref 79–149)
ANION GAP SERPL CALCULATED.3IONS-SCNC: 12 MMOL/L (ref 9–17)
BASOPHILS # BLD: 0.04 K/UL (ref 0–0.2)
BASOPHILS NFR BLD: 1 % (ref 0–2)
BUN SERPL-MCNC: 12 MG/DL (ref 8–23)
CALCIUM SERPL-MCNC: 8.4 MG/DL (ref 8.6–10.4)
CHLORIDE SERPL-SCNC: 101 MMOL/L (ref 98–107)
CO2 SERPL-SCNC: 21 MMOL/L (ref 20–31)
CREAT SERPL-MCNC: 0.8 MG/DL (ref 0.7–1.2)
CRP SERPL HS-MCNC: 5.6 MG/L (ref 0–5)
EKG ATRIAL RATE: 93 BPM
EKG P AXIS: 15 DEGREES
EKG P-R INTERVAL: 158 MS
EKG Q-T INTERVAL: 360 MS
EKG QRS DURATION: 88 MS
EKG QTC CALCULATION (BAZETT): 447 MS
EKG R AXIS: -14 DEGREES
EKG T AXIS: 39 DEGREES
EKG VENTRICULAR RATE: 93 BPM
EOSINOPHIL # BLD: 0.05 K/UL (ref 0–0.44)
EOSINOPHILS RELATIVE PERCENT: 1 % (ref 1–4)
ERYTHROCYTE [DISTWIDTH] IN BLOOD BY AUTOMATED COUNT: 13 % (ref 11.8–14.4)
ERYTHROCYTE [SEDIMENTATION RATE] IN BLOOD BY PHOTOMETRIC METHOD: 2 MM/HR (ref 0–20)
GFR SERPL CREATININE-BSD FRML MDRD: >60 ML/MIN/1.73M2
GLUCOSE BLD-MCNC: 70 MG/DL (ref 75–110)
GLUCOSE BLD-MCNC: 79 MG/DL (ref 75–110)
GLUCOSE BLD-MCNC: 94 MG/DL (ref 75–110)
GLUCOSE BLD-MCNC: 98 MG/DL (ref 75–110)
GLUCOSE SERPL-MCNC: 83 MG/DL (ref 70–99)
HCT VFR BLD AUTO: 43.3 % (ref 40.7–50.3)
HGB BLD-MCNC: 14 G/DL (ref 13–17)
IMM GRANULOCYTES # BLD AUTO: <0.03 K/UL (ref 0–0.3)
IMM GRANULOCYTES NFR BLD: 0 %
LYMPHOCYTES NFR BLD: 0.76 K/UL (ref 1.1–3.7)
LYMPHOCYTES RELATIVE PERCENT: 17 % (ref 24–43)
MAGNESIUM SERPL-MCNC: 2.6 MG/DL (ref 1.6–2.6)
MCH RBC QN AUTO: 30.2 PG (ref 25.2–33.5)
MCHC RBC AUTO-ENTMCNC: 32.3 G/DL (ref 28.4–34.8)
MCV RBC AUTO: 93.3 FL (ref 82.6–102.9)
MONOCYTES NFR BLD: 0.57 K/UL (ref 0.1–1.2)
MONOCYTES NFR BLD: 13 % (ref 3–12)
NEUTROPHILS NFR BLD: 68 % (ref 36–65)
NEUTS SEG NFR BLD: 3.03 K/UL (ref 1.5–8.1)
NRBC BLD-RTO: 0 PER 100 WBC
PLATELET # BLD AUTO: 185 K/UL (ref 138–453)
PMV BLD AUTO: 8.7 FL (ref 8.1–13.5)
POTASSIUM SERPL-SCNC: 3.5 MMOL/L (ref 3.7–5.3)
RBC # BLD AUTO: 4.64 M/UL (ref 4.21–5.77)
SODIUM SERPL-SCNC: 134 MMOL/L (ref 135–144)
WBC OTHER # BLD: 4.5 K/UL (ref 3.5–11.3)

## 2024-02-07 PROCEDURE — 80048 BASIC METABOLIC PNL TOTAL CA: CPT

## 2024-02-07 PROCEDURE — 2580000003 HC RX 258: Performed by: INTERNAL MEDICINE

## 2024-02-07 PROCEDURE — 1200000000 HC SEMI PRIVATE

## 2024-02-07 PROCEDURE — 6370000000 HC RX 637 (ALT 250 FOR IP): Performed by: PSYCHIATRY & NEUROLOGY

## 2024-02-07 PROCEDURE — 36415 COLL VENOUS BLD VENIPUNCTURE: CPT

## 2024-02-07 PROCEDURE — 2580000003 HC RX 258: Performed by: STUDENT IN AN ORGANIZED HEALTH CARE EDUCATION/TRAINING PROGRAM

## 2024-02-07 PROCEDURE — 2580000003 HC RX 258

## 2024-02-07 PROCEDURE — 2500000003 HC RX 250 WO HCPCS: Performed by: ANESTHESIOLOGY

## 2024-02-07 PROCEDURE — 82947 ASSAY GLUCOSE BLOOD QUANT: CPT

## 2024-02-07 PROCEDURE — 6360000002 HC RX W HCPCS: Performed by: INTERNAL MEDICINE

## 2024-02-07 PROCEDURE — 6360000002 HC RX W HCPCS

## 2024-02-07 PROCEDURE — 99233 SBSQ HOSP IP/OBS HIGH 50: CPT | Performed by: PSYCHIATRY & NEUROLOGY

## 2024-02-07 PROCEDURE — 83605 ASSAY OF LACTIC ACID: CPT

## 2024-02-07 PROCEDURE — 85025 COMPLETE CBC W/AUTO DIFF WBC: CPT

## 2024-02-07 PROCEDURE — 86140 C-REACTIVE PROTEIN: CPT

## 2024-02-07 PROCEDURE — 85347 COAGULATION TIME ACTIVATED: CPT

## 2024-02-07 PROCEDURE — 83735 ASSAY OF MAGNESIUM: CPT

## 2024-02-07 PROCEDURE — 82565 ASSAY OF CREATININE: CPT

## 2024-02-07 PROCEDURE — 3700000001 HC ADD 15 MINUTES (ANESTHESIA): Performed by: ANESTHESIOLOGY

## 2024-02-07 PROCEDURE — 82330 ASSAY OF CALCIUM: CPT

## 2024-02-07 PROCEDURE — 6360000002 HC RX W HCPCS: Performed by: EMERGENCY MEDICINE

## 2024-02-07 PROCEDURE — 94761 N-INVAS EAR/PLS OXIMETRY MLT: CPT

## 2024-02-07 PROCEDURE — 3700000000 HC ANESTHESIA ATTENDED CARE: Performed by: ANESTHESIOLOGY

## 2024-02-07 PROCEDURE — 84520 ASSAY OF UREA NITROGEN: CPT

## 2024-02-07 PROCEDURE — 80051 ELECTROLYTE PANEL: CPT

## 2024-02-07 PROCEDURE — 6370000000 HC RX 637 (ALT 250 FOR IP): Performed by: INTERNAL MEDICINE

## 2024-02-07 PROCEDURE — 36620 INSERTION CATHETER ARTERY: CPT

## 2024-02-07 PROCEDURE — 99222 1ST HOSP IP/OBS MODERATE 55: CPT | Performed by: INTERNAL MEDICINE

## 2024-02-07 PROCEDURE — 6360000002 HC RX W HCPCS: Performed by: ANESTHESIOLOGY

## 2024-02-07 PROCEDURE — 85652 RBC SED RATE AUTOMATED: CPT

## 2024-02-07 PROCEDURE — 6360000002 HC RX W HCPCS: Performed by: PSYCHIATRY & NEUROLOGY

## 2024-02-07 PROCEDURE — 82803 BLOOD GASES ANY COMBINATION: CPT

## 2024-02-07 PROCEDURE — 85014 HEMATOCRIT: CPT

## 2024-02-07 PROCEDURE — 99232 SBSQ HOSP IP/OBS MODERATE 35: CPT | Performed by: PSYCHIATRY & NEUROLOGY

## 2024-02-07 RX ORDER — PHENYLEPHRINE HCL IN 0.9% NACL 1 MG/10 ML
SYRINGE (ML) INTRAVENOUS PRN
Status: DISCONTINUED | OUTPATIENT
Start: 2024-02-07 | End: 2024-02-07 | Stop reason: SDUPTHER

## 2024-02-07 RX ORDER — FENTANYL CITRATE 50 UG/ML
INJECTION, SOLUTION INTRAMUSCULAR; INTRAVENOUS PRN
Status: DISCONTINUED | OUTPATIENT
Start: 2024-02-07 | End: 2024-02-07 | Stop reason: SDUPTHER

## 2024-02-07 RX ORDER — HALOPERIDOL 5 MG/ML
5 INJECTION INTRAMUSCULAR ONCE
Status: COMPLETED | OUTPATIENT
Start: 2024-02-07 | End: 2024-02-07

## 2024-02-07 RX ORDER — ENOXAPARIN SODIUM 100 MG/ML
40 INJECTION SUBCUTANEOUS DAILY
Status: DISCONTINUED | OUTPATIENT
Start: 2024-02-07 | End: 2024-02-07

## 2024-02-07 RX ORDER — QUETIAPINE FUMARATE 25 MG/1
25 TABLET, FILM COATED ORAL 2 TIMES DAILY
Status: DISCONTINUED | OUTPATIENT
Start: 2024-02-07 | End: 2024-02-12 | Stop reason: HOSPADM

## 2024-02-07 RX ORDER — PROPOFOL 10 MG/ML
INJECTION, EMULSION INTRAVENOUS PRN
Status: DISCONTINUED | OUTPATIENT
Start: 2024-02-07 | End: 2024-02-07 | Stop reason: SDUPTHER

## 2024-02-07 RX ORDER — AMITRIPTYLINE HYDROCHLORIDE 50 MG/1
50 TABLET, FILM COATED ORAL NIGHTLY
Status: DISCONTINUED | OUTPATIENT
Start: 2024-02-07 | End: 2024-02-12 | Stop reason: HOSPADM

## 2024-02-07 RX ORDER — SODIUM CHLORIDE 0.9 % (FLUSH) 0.9 %
5-40 SYRINGE (ML) INJECTION PRN
Status: DISCONTINUED | OUTPATIENT
Start: 2024-02-07 | End: 2024-02-12 | Stop reason: HOSPADM

## 2024-02-07 RX ORDER — SODIUM CHLORIDE 0.9 % (FLUSH) 0.9 %
10 SYRINGE (ML) INJECTION EVERY 12 HOURS SCHEDULED
Status: DISCONTINUED | OUTPATIENT
Start: 2024-02-07 | End: 2024-02-12 | Stop reason: HOSPADM

## 2024-02-07 RX ORDER — EPINEPHRINE 1 MG/ML(1)
AMPUL (ML) INJECTION PRN
Status: DISCONTINUED | OUTPATIENT
Start: 2024-02-07 | End: 2024-02-07 | Stop reason: SDUPTHER

## 2024-02-07 RX ORDER — SUCCINYLCHOLINE CHLORIDE 20 MG/ML
INJECTION INTRAMUSCULAR; INTRAVENOUS PRN
Status: DISCONTINUED | OUTPATIENT
Start: 2024-02-07 | End: 2024-02-07 | Stop reason: SDUPTHER

## 2024-02-07 RX ORDER — CLOPIDOGREL BISULFATE 75 MG/1
75 TABLET ORAL DAILY
Status: DISCONTINUED | OUTPATIENT
Start: 2024-02-07 | End: 2024-02-12 | Stop reason: HOSPADM

## 2024-02-07 RX ORDER — ONDANSETRON 2 MG/ML
4 INJECTION INTRAMUSCULAR; INTRAVENOUS EVERY 6 HOURS PRN
Status: DISCONTINUED | OUTPATIENT
Start: 2024-02-07 | End: 2024-02-08 | Stop reason: SDUPTHER

## 2024-02-07 RX ORDER — ONDANSETRON 2 MG/ML
4 INJECTION INTRAMUSCULAR; INTRAVENOUS EVERY 6 HOURS PRN
Status: CANCELLED | OUTPATIENT
Start: 2024-02-07

## 2024-02-07 RX ORDER — DEXTROSE MONOHYDRATE 100 MG/ML
INJECTION, SOLUTION INTRAVENOUS CONTINUOUS PRN
Status: DISCONTINUED | OUTPATIENT
Start: 2024-02-07 | End: 2024-02-12 | Stop reason: HOSPADM

## 2024-02-07 RX ORDER — SODIUM CHLORIDE 9 MG/ML
INJECTION, SOLUTION INTRAVENOUS PRN
Status: CANCELLED | OUTPATIENT
Start: 2024-02-07

## 2024-02-07 RX ORDER — LIDOCAINE HYDROCHLORIDE 10 MG/ML
INJECTION, SOLUTION EPIDURAL; INFILTRATION; INTRACAUDAL; PERINEURAL PRN
Status: DISCONTINUED | OUTPATIENT
Start: 2024-02-07 | End: 2024-02-07 | Stop reason: SDUPTHER

## 2024-02-07 RX ORDER — ONDANSETRON 4 MG/1
4 TABLET, ORALLY DISINTEGRATING ORAL EVERY 8 HOURS PRN
Status: DISCONTINUED | OUTPATIENT
Start: 2024-02-07 | End: 2024-02-08 | Stop reason: SDUPTHER

## 2024-02-07 RX ORDER — MIDAZOLAM HYDROCHLORIDE 1 MG/ML
INJECTION INTRAMUSCULAR; INTRAVENOUS PRN
Status: DISCONTINUED | OUTPATIENT
Start: 2024-02-07 | End: 2024-02-07 | Stop reason: SDUPTHER

## 2024-02-07 RX ORDER — SODIUM CHLORIDE 9 MG/ML
INJECTION, SOLUTION INTRAVENOUS PRN
Status: DISCONTINUED | OUTPATIENT
Start: 2024-02-07 | End: 2024-02-12 | Stop reason: HOSPADM

## 2024-02-07 RX ORDER — DEXTROSE MONOHYDRATE 50 MG/ML
INJECTION, SOLUTION INTRAVENOUS CONTINUOUS
Status: DISCONTINUED | OUTPATIENT
Start: 2024-02-07 | End: 2024-02-10

## 2024-02-07 RX ORDER — ATENOLOL 50 MG/1
50 TABLET ORAL DAILY
Status: DISCONTINUED | OUTPATIENT
Start: 2024-02-07 | End: 2024-02-12 | Stop reason: HOSPADM

## 2024-02-07 RX ORDER — ONDANSETRON 4 MG/1
4 TABLET, ORALLY DISINTEGRATING ORAL EVERY 8 HOURS PRN
Status: CANCELLED | OUTPATIENT
Start: 2024-02-07

## 2024-02-07 RX ORDER — SODIUM CHLORIDE 0.9 % (FLUSH) 0.9 %
5-40 SYRINGE (ML) INJECTION PRN
Status: CANCELLED | OUTPATIENT
Start: 2024-02-07

## 2024-02-07 RX ORDER — MIDAZOLAM HYDROCHLORIDE 2 MG/2ML
2 INJECTION, SOLUTION INTRAMUSCULAR; INTRAVENOUS ONCE
Status: COMPLETED | OUTPATIENT
Start: 2024-02-07 | End: 2024-02-07

## 2024-02-07 RX ORDER — SODIUM CHLORIDE 0.9 % (FLUSH) 0.9 %
5-40 SYRINGE (ML) INJECTION EVERY 12 HOURS SCHEDULED
Status: CANCELLED | OUTPATIENT
Start: 2024-02-07

## 2024-02-07 RX ORDER — VASOPRESSIN 20 [USP'U]/ML
INJECTION, SOLUTION INTRAMUSCULAR; SUBCUTANEOUS PRN
Status: DISCONTINUED | OUTPATIENT
Start: 2024-02-07 | End: 2024-02-07 | Stop reason: SDUPTHER

## 2024-02-07 RX ORDER — CALCIUM CHLORIDE 100 MG/ML
INJECTION INTRAVENOUS; INTRAVENTRICULAR PRN
Status: DISCONTINUED | OUTPATIENT
Start: 2024-02-07 | End: 2024-02-07 | Stop reason: SDUPTHER

## 2024-02-07 RX ORDER — ASPIRIN 81 MG/1
81 TABLET ORAL DAILY
Status: DISCONTINUED | OUTPATIENT
Start: 2024-02-07 | End: 2024-02-10

## 2024-02-07 RX ORDER — SODIUM CHLORIDE 0.9 % (FLUSH) 0.9 %
10 SYRINGE (ML) INJECTION PRN
Status: DISCONTINUED | OUTPATIENT
Start: 2024-02-07 | End: 2024-02-12 | Stop reason: HOSPADM

## 2024-02-07 RX ORDER — LORAZEPAM 2 MG/ML
0.5 INJECTION INTRAMUSCULAR EVERY 6 HOURS PRN
Status: DISCONTINUED | OUTPATIENT
Start: 2024-02-07 | End: 2024-02-08

## 2024-02-07 RX ORDER — ACETAMINOPHEN 325 MG/1
650 TABLET ORAL EVERY 4 HOURS PRN
Status: CANCELLED | OUTPATIENT
Start: 2024-02-07

## 2024-02-07 RX ORDER — POTASSIUM CHLORIDE 7.45 MG/ML
10 INJECTION INTRAVENOUS
Status: DISPENSED | OUTPATIENT
Start: 2024-02-07 | End: 2024-02-07

## 2024-02-07 RX ADMIN — QUETIAPINE FUMARATE 25 MG: 25 TABLET, FILM COATED ORAL at 20:18

## 2024-02-07 RX ADMIN — QUETIAPINE FUMARATE 25 MG: 25 TABLET, FILM COATED ORAL at 04:20

## 2024-02-07 RX ADMIN — WATER 5 MG: 1 INJECTION INTRAMUSCULAR; INTRAVENOUS; SUBCUTANEOUS at 21:20

## 2024-02-07 RX ADMIN — AMITRIPTYLINE HYDROCHLORIDE 50 MG: 50 TABLET, FILM COATED ORAL at 20:18

## 2024-02-07 RX ADMIN — MIDAZOLAM 2 MG: 1 INJECTION INTRAMUSCULAR; INTRAVENOUS at 14:48

## 2024-02-07 RX ADMIN — MIDAZOLAM 2 MG: 1 INJECTION INTRAMUSCULAR; INTRAVENOUS at 14:21

## 2024-02-07 RX ADMIN — Medication 100 MG: at 15:02

## 2024-02-07 RX ADMIN — CALCIUM CHLORIDE 1 G: 100 INJECTION INTRAVENOUS; INTRAVENTRICULAR at 15:07

## 2024-02-07 RX ADMIN — CLOPIDOGREL BISULFATE 75 MG: 75 TABLET ORAL at 12:52

## 2024-02-07 RX ADMIN — Medication 100 MCG: at 15:28

## 2024-02-07 RX ADMIN — EPINEPHRINE 300 MCG: 0.1 INJECTION, SOLUTION INTRAVENOUS at 15:07

## 2024-02-07 RX ADMIN — CALCIUM CHLORIDE 1 G: 100 INJECTION INTRAVENOUS; INTRAVENTRICULAR at 15:18

## 2024-02-07 RX ADMIN — LIDOCAINE HYDROCHLORIDE 50 MG: 10 INJECTION, SOLUTION EPIDURAL; INFILTRATION; INTRACAUDAL; PERINEURAL at 15:07

## 2024-02-07 RX ADMIN — PROPOFOL 150 MG: 10 INJECTION, EMULSION INTRAVENOUS at 15:02

## 2024-02-07 RX ADMIN — LIDOCAINE HYDROCHLORIDE 50 MG: 10 INJECTION, SOLUTION EPIDURAL; INFILTRATION; INTRACAUDAL; PERINEURAL at 15:02

## 2024-02-07 RX ADMIN — HALOPERIDOL LACTATE 5 MG: 5 INJECTION, SOLUTION INTRAMUSCULAR at 17:40

## 2024-02-07 RX ADMIN — EPINEPHRINE 30 MCG: 0.1 INJECTION, SOLUTION INTRAVENOUS at 15:16

## 2024-02-07 RX ADMIN — VASOPRESSIN 1 UNITS: 20 INJECTION, SOLUTION INTRAVENOUS at 15:27

## 2024-02-07 RX ADMIN — Medication 100 MCG: at 15:07

## 2024-02-07 RX ADMIN — DEXTROSE MONOHYDRATE: 50 INJECTION, SOLUTION INTRAVENOUS at 16:26

## 2024-02-07 RX ADMIN — VASOPRESSIN 1 UNITS: 20 INJECTION, SOLUTION INTRAVENOUS at 15:20

## 2024-02-07 RX ADMIN — VASOPRESSIN 1 UNITS: 20 INJECTION, SOLUTION INTRAVENOUS at 15:32

## 2024-02-07 RX ADMIN — FENTANYL CITRATE 50 MCG: 50 INJECTION, SOLUTION INTRAMUSCULAR; INTRAVENOUS at 15:02

## 2024-02-07 RX ADMIN — OLANZAPINE 5 MG: 10 INJECTION, POWDER, LYOPHILIZED, FOR SOLUTION INTRAMUSCULAR at 22:19

## 2024-02-07 RX ADMIN — ASPIRIN 81 MG: 81 TABLET, COATED ORAL at 12:51

## 2024-02-07 RX ADMIN — SODIUM CHLORIDE, PRESERVATIVE FREE 10 ML: 5 INJECTION INTRAVENOUS at 20:18

## 2024-02-07 RX ADMIN — Medication 100 MCG: at 14:59

## 2024-02-07 RX ADMIN — WATER 5 MG: 1 INJECTION INTRAMUSCULAR; INTRAVENOUS; SUBCUTANEOUS at 14:15

## 2024-02-07 RX ADMIN — Medication 100 MCG: at 15:16

## 2024-02-07 RX ADMIN — DEXTROSE MONOHYDRATE: 50 INJECTION, SOLUTION INTRAVENOUS at 02:32

## 2024-02-07 NOTE — CONSULTS
Endovascular Neurosurgery Consult      Reason for evaluation: R ICA pseudoaneurysm    SUBJECTIVE:   History of Chief Complaint:      63 year old man with autism, presented to [facility] ED for odd behavior, agitation and psychosis over the past 4-6 weeks. 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    Allergies  has No Known Allergies.  Medications  Prior to Admission medications    Medication Sig Start Date End Date Taking? Authorizing Provider   fluticasone (FLONASE) 50 MCG/ACT nasal spray SHAKE LIQUID AND USE 2 SPRAYS IN EACH NOSTRIL DAILY  Patient not taking: Reported on 2/5/2024 1/22/24   Billy Francois MD   Dextromethorphan-guaiFENesin (MUCINEX DM MAXIMUM STRENGTH)  MG TB12 Take 1 tablet by mouth 2 times daily  Patient not taking: Reported on 2/5/2024 1/16/24   Billy Francois MD   atenolol (TENORMIN) 50 MG tablet Take 1 tablet by mouth daily 11/7/23   Billy Francois MD   amitriptyline (ELAVIL) 50 MG tablet Take 1 tablet by mouth nightly 11/7/23   Billy Francois MD    Scheduled Meds:   QUEtiapine  25 mg Oral BID    atenolol  50 mg Oral Daily    amitriptyline  50 mg Oral Nightly    enoxaparin  40 mg SubCUTAneous Daily    sodium chloride flush  10 mL IntraVENous 2 times per day    sodium chloride flush  5-40 mL IntraVENous 2 times per day    enoxaparin  40 mg SubCUTAneous Daily     Continuous Infusions:   sodium chloride      dextrose 50 mL/hr at 02/07/24 0232    sodium chloride       PRN Meds:.OLANZapine (ZyPREXA) 5 mg in sterile water 1 mL injection, sodium chloride, LORazepam, magnesium hydroxide, ondansetron **OR** ondansetron, sodium chloride flush, sodium chloride flush, sodium chloride flush, sodium chloride, potassium chloride **OR** potassium alternative oral replacement **OR** potassium chloride, magnesium sulfate, ondansetron **OR** ondansetron,

## 2024-02-07 NOTE — CARE COORDINATION
02/07/24 1249   Readmission Assessment   Number of Days since last admission? 8-30 days   Previous Disposition Home Alone   Who is being Interviewed Caregiver   What was the patient's/caregiver's perception as to why they think they needed to return back to the hospital? Other (Comment)  (ams)   Did you visit your Primary Care Physician after you left the hospital, before you returned this time? No   Why weren't you able to visit your PCP? Did not have an appointment   Did you see a specialist, such as Cardiac, Pulmonary, Orthopedic Physician, etc. after you left the hospital? No   Who advised the patient to return to the hospital? Other (Comment)  (family)   Does the patient report anything that got in the way of taking their medications? No   In our efforts to provide the best possible care to you and others like you, can you think of anything that we could have done to help you after you left the hospital the first time, so that you might not have needed to return so soon? Arrange for more help when leaving the hospital;Additional Community resources available for illness support;Identify patient's health literacy needs

## 2024-02-07 NOTE — CARE COORDINATION
.Case Management Assessment  Initial Evaluation    Date/Time of Evaluation: 2/7/2024 12:56 PM  Assessment Completed by: Cary Rose RN    If patient is discharged prior to next notation, then this note serves as note for discharge by case management.    Patient Name: William Babcock                   YOB: 1960  Diagnosis: Pseudoaneurysm (HCC) [I72.9]  Psychosis, paranoid (HCC) [F22]                   Date / Time: 2/6/2024 10:24 PM    Patient Admission Status: Inpatient   Readmission Risk (Low < 19, Mod (19-27), High > 27): Readmission Risk Score: 11.3    Current PCP: Billy Francois MD  PCP verified by CM? Yes    Chart Reviewed: Yes      History Provided by: Child/Family  Patient Orientation: Person, Place    Patient Cognition: Other (see comment) (paranoid, delusional)    Hospitalization in the last 30 days (Readmission):  Yes    If yes, Readmission Assessment in CM Navigator will be completed.    Advance Directives:      Code Status: Full Code   Patient's Primary Decision Maker is: Named in Scanned ACP Document    Primary Decision Maker: DARREN BABCOCK - Healthcare Decision Maker - [phone]    Discharge Planning:    Patient lives with: Alone Type of Home: Other (Comment) (condo)  Primary Care Giver: Self  Patient Support Systems include: Family Members   Current Financial resources: Medicare  Current community resources: Psychiatric Treatment  Current services prior to admission: None            Current DME:              Type of Home Care services:  None    ADLS  Prior functional level: Independent in ADLs/IADLs  Current functional level: Independent in ADLs/IADLs    PT AM-PAC:   /24  OT AM-PAC:   /24    Family can provide assistance at DC: Yes  Would you like Case Management to discuss the discharge plan with any other family members/significant others, and if so, who? Yes  Plans to Return to Present Housing: No  Other Identified Issues/Barriers to RETURNING to current housing: medical

## 2024-02-07 NOTE — ANESTHESIA PRE PROCEDURE
auscultation                             Cardiovascular:    (+) hypertension:      ECG reviewed  Rhythm: regular  Rate: normal                    Neuro/Psych:   (+) psychiatric history:depression/anxiety              ROS comment: Pseudoaneurysm    Psychosis    Autism    MRI IMPRESSION:  Chronic microvascular disease without acute intracranial abnormality.  Specimen Collected: 02/06/24 11:25 EST         GI/Hepatic/Renal: Neg GI/Hepatic/Renal ROS            Endo/Other: Negative Endo/Other ROS                    Abdominal:             Vascular: negative vascular ROS.         Other Findings: BP (!) 152/92   Pulse 96   Temp 98.1 °F (36.7 °C) (Oral)   Resp 18   Ht 1.727 m (5' 7.99\")   Wt 72 kg (158 lb 11.7 oz)   SpO2 100%   BMI 24.14 kg/m²           Anesthesia Plan      general     ASA 3       Induction: intravenous.  arterial line  MIPS: Postoperative opioids intended.  Plan/risks discussed with: Phone consent obtained from Maximo Babcock  (health care decision maker) @ [phone].    Use of blood products discussed with whom consented to blood products.   Plan discussed with CRNA.                  Sony Ortega MD   2/7/2024

## 2024-02-07 NOTE — H&P
[facility]  Office: [phone]  Wyatt Davis DO, Selvin Brunson DO, Jose Herrera DO, Iván Earl DO, Mallory Willams MD, Charley Pearce MD, Jun Lugo MD, Alivia Lamb MD,  Jose M Casarez MD, Lamonte Steele MD, Rohit Zamora MD,  Reno Haq DO, Sj Singh MD, See Womack MD, Fermin Davis DO, Ashly Melendez MD,  Matias Laura DO, Inna Tinajero MD, Denise Balderrama MD, Marija Oliveira MD, Maki Hassan MD,  Giles Floyd MD, Xenia Rand MD, Elijah Alejandro MD, Jones Ledesma MD, Uzair Mckeon MD, Jada Montero MD, Tc Cabrera DO, Eliseo Mendoza DO, Issac Rubi MD,  Bert Hernandez MD, Shirley Waterhouse, CNP,  Raiza Maher, CNP, Johnie Nelson, CNP,  Queenie Chun, DNP, Haley Munoz, CNP, Selam Arambula, CNP, Kori Flor CNP, Cary Contreras, CNP, Xin Quevedo, CNP, Sienna De Santiago, PA-C, Samara Mar PA-C, Maria E Vazquez, CNP, Dayana Harris, CNP, Evi Messina, CNS, Kristi Rose, CNP, Rosie Gomez, CNP, Tracy Schwab, CNP         [facility]   IN-PATIENT SERVICE   [facility]    HISTORY AND PHYSICAL EXAMINATION            Date:   2/7/2024  Patient name:  William Babcock  Date of admission:  2/6/2024 10:24 PM  MRN:   [id number]  Account:  [id number]  YOB: 1960  PCP:    Billy Francois MD  Room:   [address] [place] [address]  Code Status:    Prior    Chief Complaint:     \"I'm ok\"      History Obtained From:     patient    History of Present Illness:     William Babcock is a 63 y.o. male with history of autism who presented to [place] ED with odd behavior, agitation and psychosis over past 4-6 weeks.  S/p neuro and psychiatry evaluation w/ 7mm pseudoaneurysm along right proximal ICA with beading suspected fibromuscular dysplasia.  Patient recommended for ICA stent and transferred for neuro endovascular evaluation at [facility] for the management of Pseudoaneurysm (HCC).    Review of systems essentially

## 2024-02-07 NOTE — H&P
Neuro ICU History & Physical    Patient Name: William Babcock  Patient : 1960  Room/Bed: 0130/0130-01  Code Status: Full Code  Allergies: No Known Allergies    CHIEF COMPLAINT     Paranoia, AMS    HPI    History Obtained From: EMR    The patient is a 63 y.o. male w/ Hx of MRDD, autism presented with increased paranoia and AMS to [place] ED on 24 with family. Per chart review his nephew was worried for increasing confusion and \"alarming voicemails\", showing up in weird places over the last few weeks with increasing paranoia over the last few days. Patient recently had restraining order placed against him for stalking. He was telling family someone was putting medications/poison in his food and has been staying at a hotel over fear of carbon monoxide poisoning. Patient had imaging obtain of his head at OSH and CTH showed asymmetric hypodensity in L corona radiata concerning for chronic ischemia vs acute infarct. CTA head and neck with 7mm pseudoaneurysm in R ICA with beading along R ICA likely related to fibromuscular dysplasia. Patient was transferred here for further work up. MRI obtain on  with chronic microvascular disease without acute intracranial abnormality. Patient taken on  for flow diverter embolization of pseudoaneurysm however after intubation patients BP became labile in the OR and procedure was aborted. Patient was extubated and brought back to the [facility]. Of note, during admission to medicine service patient has required multiple antipsychotics and sitter bedside. Patient was evaluated by psychiatry on . At that time was endorsing auditory hallucinations and stated he previously had auditory hallucinations back in .     Admitted to ICU From: OR   Reason for ICU Admission: Labile Bps during procedure.        PATIENT HISTORY   Past Medical History:        Diagnosis Date    Anxiety     Autism        Past Surgical History:        Procedure Laterality Date    EYE SURGERY

## 2024-02-07 NOTE — PROGRESS NOTES
Paged Shirley Waterhouse, CNP to ask for something PRN for pt d/t anxiety and trying to elope. Pt is scheduled to transfer to [facility] and wanting to make ride better for pt so he is not so anxious and fighting.   Haldol ordered and also advised if restraints need to be added to do so.

## 2024-02-07 NOTE — SEDATION DOCUMENTATION
Pt brought to biplane room 1. Was given Versed. Not following commands non verbal. Respirations even regular

## 2024-02-07 NOTE — CARE COORDINATION
Received a call from Les patient's brother.  He tells me that he is autism and behavioral issues.    He tells me that he will need placement.  I informed him that it appears he has been accept to Psych once medically stable.  I asked if he is not going to get admitted to psych once medically stable that I will need SNF choices.  Jaiden tells me taht he does not have a preference, just somewhere in [facility].  Referrals sent to the following     care and rehab  [facility]  with Muna Daniele [phone]

## 2024-02-07 NOTE — SEDATION DOCUMENTATION
Blood pressure 220s then dropped down t0 60s. Anesthesia spoke with Dr Sellers. Case aborted. Dr Pina to speak with critical care attending to update

## 2024-02-07 NOTE — PROGRESS NOTES
Patient agitated upon RN arrival. While writer taking report, patient ran towards the door into the hallway. Patient was able to be redirected to the room by other RN present. Patient has since made several attempts to run towards the door but has so far been able to be redirected back to his room. Patient continuously pacing in room saying he is not confused. Patient did allow RN to obtain some vital signs and do a brief assessment. NP notified of patients status. Orders placed.

## 2024-02-07 NOTE — PLAN OF CARE
Problem: Discharge Planning  Goal: Discharge to home or other facility with appropriate resources  Outcome: Adequate for Discharge  Flowsheets  Taken 2/6/2024 2000 by Rolanda Viramontes RN  Discharge to home or other facility with appropriate resources:   Identify barriers to discharge with patient and caregiver   Arrange for needed discharge resources and transportation as appropriate   Identify discharge learning needs (meds, wound care, etc)   Arrange for interpreters to assist at discharge as needed   Refer to discharge planning if patient needs post-hospital services based on physician order or complex needs related to functional status, cognitive ability or social support system  Taken 2/6/2024 0839 by Patricia Cason  Discharge to home or other facility with appropriate resources:   Arrange for needed discharge resources and transportation as appropriate   Identify discharge learning needs (meds, wound care, etc)     Problem: Safety - Adult  Goal: Free from fall injury  Outcome: Adequate for Discharge  Flowsheets (Taken 2/6/2024 1109 by Patricia Cason)  Free From Fall Injury: Based on caregiver fall risk screen, instruct family/caregiver to ask for assistance with transferring infant if caregiver noted to have fall risk factors     Problem: ABCDS Injury Assessment  Goal: Absence of physical injury  Outcome: Adequate for Discharge     Problem: Skin/Tissue Integrity  Goal: Absence of new skin breakdown  Description: 1.  Monitor for areas of redness and/or skin breakdown  2.  Assess vascular access sites hourly  3.  Every 4-6 hours minimum:  Change oxygen saturation probe site  4.  Every 4-6 hours:  If on nasal continuous positive airway pressure, respiratory therapy assess nares and determine need for appliance change or resting period.  Outcome: Adequate for Discharge     Problem: Risk for Elopement  Goal: Patient will not exit the unit/facility without proper excort  Outcome: Adequate for

## 2024-02-08 ENCOUNTER — APPOINTMENT (OUTPATIENT)
Age: 64
DRG: 876 | End: 2024-02-08
Attending: STUDENT IN AN ORGANIZED HEALTH CARE EDUCATION/TRAINING PROGRAM
Payer: MEDICARE

## 2024-02-08 ENCOUNTER — APPOINTMENT (OUTPATIENT)
Dept: GENERAL RADIOLOGY | Age: 64
DRG: 876 | End: 2024-02-08
Attending: STUDENT IN AN ORGANIZED HEALTH CARE EDUCATION/TRAINING PROGRAM
Payer: MEDICARE

## 2024-02-08 PROBLEM — Z01.810 PREOPERATIVE CARDIOVASCULAR EXAMINATION: Status: ACTIVE | Noted: 2024-02-08

## 2024-02-08 LAB
ALBUMIN SERPL-MCNC: 3.9 G/DL (ref 3.5–5.2)
ALBUMIN/GLOB SERPL: 1.5 {RATIO} (ref 1–2.5)
ALP SERPL-CCNC: 63 U/L (ref 40–129)
ALT SERPL-CCNC: 14 U/L (ref 5–41)
ANION GAP SERPL CALCULATED.3IONS-SCNC: 10 MMOL/L (ref 9–17)
AST SERPL-CCNC: 28 U/L
BASOPHILS # BLD: 0.07 K/UL (ref 0–0.2)
BASOPHILS NFR BLD: 1 % (ref 0–2)
BILIRUB SERPL-MCNC: 1 MG/DL (ref 0.3–1.2)
BUN BLD-MCNC: NORMAL MG/DL (ref 8–26)
BUN SERPL-MCNC: 7 MG/DL (ref 8–23)
CA-I BLD-SCNC: 1.66 MMOL/L (ref 1.15–1.33)
CALCIUM SERPL-MCNC: 8.7 MG/DL (ref 8.6–10.4)
CHLORIDE BLD-SCNC: 109 MMOL/L (ref 98–107)
CHLORIDE SERPL-SCNC: 104 MMOL/L (ref 98–107)
CO2 BLD CALC-SCNC: 22 MMOL/L (ref 22–30)
CO2 SERPL-SCNC: 25 MMOL/L (ref 20–31)
CORTIS SERPL-MCNC: 8.8 UG/DL (ref 2.7–18.4)
CREAT SERPL-MCNC: 0.6 MG/DL (ref 0.7–1.2)
ECHO AO ROOT DIAM: 3.3 CM
ECHO AO ROOT INDEX: 1.78 CM/M2
ECHO AV AREA PEAK VELOCITY: 2.9 CM2
ECHO AV AREA VTI: 3 CM2
ECHO AV AREA/BSA PEAK VELOCITY: 1.6 CM2/M2
ECHO AV AREA/BSA VTI: 1.6 CM2/M2
ECHO AV MEAN GRADIENT: 3 MMHG
ECHO AV MEAN VELOCITY: 0.8 M/S
ECHO AV PEAK VELOCITY: 1.3 M/S
ECHO AV VTI: 23.1 CM
ECHO BSA: 1.85 M2
ECHO LA AREA 2C: 14.2 CM2
ECHO LA AREA 4C: 15.7 CM2
ECHO LA DIAMETER INDEX: 1.78 CM/M2
ECHO LA DIAMETER: 3.3 CM
ECHO LA MAJOR AXIS: 5.6 CM
ECHO LA MINOR AXIS: 5.5 CM
ECHO LA TO AORTIC ROOT RATIO: 1
ECHO LA VOL BP: 31 ML (ref 18–58)
ECHO LA VOL MOD A2C: 30 ML (ref 18–58)
ECHO LA VOL MOD A4C: 32 ML (ref 18–58)
ECHO LA VOL/BSA BIPLANE: 17 ML/M2 (ref 16–34)
ECHO LA VOLUME INDEX MOD A2C: 16 ML/M2 (ref 16–34)
ECHO LA VOLUME INDEX MOD A4C: 17 ML/M2 (ref 16–34)
ECHO LV E' LATERAL VELOCITY: 9 CM/S
ECHO LV E' SEPTAL VELOCITY: 7 CM/S
ECHO LV EDV A2C: 49 ML
ECHO LV EDV A4C: 63 ML
ECHO LV EDV INDEX A4C: 34 ML/M2
ECHO LV EDV NDEX A2C: 26 ML/M2
ECHO LV EJECTION FRACTION A2C: 50 %
ECHO LV EJECTION FRACTION A4C: 49 %
ECHO LV EJECTION FRACTION BIPLANE: 50 % (ref 55–100)
ECHO LV ESV A2C: 24 ML
ECHO LV ESV A4C: 32 ML
ECHO LV ESV INDEX A2C: 13 ML/M2
ECHO LV ESV INDEX A4C: 17 ML/M2
ECHO LV FRACTIONAL SHORTENING: 26 % (ref 28–44)
ECHO LV INTERNAL DIMENSION DIASTOLE INDEX: 2.05 CM/M2
ECHO LV INTERNAL DIMENSION DIASTOLIC: 3.8 CM (ref 4.2–5.9)
ECHO LV INTERNAL DIMENSION SYSTOLIC INDEX: 1.51 CM/M2
ECHO LV INTERNAL DIMENSION SYSTOLIC: 2.8 CM
ECHO LV IVSD: 0.9 CM (ref 0.6–1)
ECHO LV MASS 2D: 101.1 G (ref 88–224)
ECHO LV MASS INDEX 2D: 54.6 G/M2 (ref 49–115)
ECHO LV POSTERIOR WALL DIASTOLIC: 0.9 CM (ref 0.6–1)
ECHO LV RELATIVE WALL THICKNESS RATIO: 0.47
ECHO LVOT AREA: 3.8 CM2
ECHO LVOT AV VTI INDEX: 0.8
ECHO LVOT DIAM: 2.2 CM
ECHO LVOT MEAN GRADIENT: 2 MMHG
ECHO LVOT PEAK GRADIENT: 4 MMHG
ECHO LVOT PEAK VELOCITY: 1 M/S
ECHO LVOT STROKE VOLUME INDEX: 37.8 ML/M2
ECHO LVOT SV: 69.9 ML
ECHO LVOT VTI: 18.4 CM
ECHO MV A VELOCITY: 0.88 M/S
ECHO MV AREA VTI: 4.9 CM2
ECHO MV E DECELERATION TIME (DT): 127 MS
ECHO MV E VELOCITY: 0.5 M/S
ECHO MV E/A RATIO: 0.57
ECHO MV E/E' LATERAL: 5.56
ECHO MV E/E' RATIO (AVERAGED): 6.35
ECHO MV LVOT VTI INDEX: 0.78
ECHO MV MAX VELOCITY: 1.1 M/S
ECHO MV MEAN GRADIENT: 2 MMHG
ECHO MV MEAN VELOCITY: 0.6 M/S
ECHO MV PEAK GRADIENT: 5 MMHG
ECHO MV VTI: 14.4 CM
ECHO PV MAX VELOCITY: 0.8 M/S
ECHO PV PEAK GRADIENT: 2 MMHG
ECHO RV BASAL DIMENSION: 3.1 CM
ECHO RV FREE WALL PEAK S': 18 CM/S
ECHO RV TAPSE: 2.6 CM (ref 1.7–?)
EGFR, POC: NORMAL ML/MIN/1.73M2
EOSINOPHIL # BLD: 0.07 K/UL (ref 0–0.4)
EOSINOPHILS RELATIVE PERCENT: 1 % (ref 1–4)
ERYTHROCYTE [DISTWIDTH] IN BLOOD BY AUTOMATED COUNT: 12.8 % (ref 11.8–14.4)
GFR SERPL CREATININE-BSD FRML MDRD: >60 ML/MIN/1.73M2
GLUCOSE BLD-MCNC: 103 MG/DL (ref 75–110)
GLUCOSE BLD-MCNC: 118 MG/DL (ref 75–110)
GLUCOSE BLD-MCNC: 83 MG/DL (ref 75–110)
GLUCOSE BLD-MCNC: 84 MG/DL (ref 74–100)
GLUCOSE BLD-MCNC: 91 MG/DL (ref 75–110)
GLUCOSE SERPL-MCNC: 106 MG/DL (ref 70–99)
HCT VFR BLD AUTO: 40.7 % (ref 40.7–50.3)
HCT VFR BLD AUTO: NORMAL % (ref 41–53)
HGB BLD-MCNC: 13.9 G/DL (ref 13–17)
IMM GRANULOCYTES # BLD AUTO: 0 K/UL (ref 0–0.3)
IMM GRANULOCYTES NFR BLD: 0 %
LYMPHOCYTES NFR BLD: 0.92 K/UL (ref 1–4.8)
LYMPHOCYTES RELATIVE PERCENT: 14 % (ref 24–44)
MAGNESIUM SERPL-MCNC: 2 MG/DL (ref 1.6–2.6)
MCH RBC QN AUTO: 30 PG (ref 25.2–33.5)
MCHC RBC AUTO-ENTMCNC: 34.2 G/DL (ref 28.4–34.8)
MCV RBC AUTO: 87.9 FL (ref 82.6–102.9)
MONOCYTES NFR BLD: 0.46 K/UL (ref 0.1–0.8)
MONOCYTES NFR BLD: 7 % (ref 1–7)
MORPHOLOGY: NORMAL
NEGATIVE BASE EXCESS, ART: 2.4 MMOL/L (ref 0–2)
NEUTROPHILS NFR BLD: 77 % (ref 36–66)
NEUTS SEG NFR BLD: 5.08 K/UL (ref 1.8–7.7)
NRBC BLD-RTO: 0 PER 100 WBC
PLATELET # BLD AUTO: 178 K/UL (ref 138–453)
PMV BLD AUTO: 8.7 FL (ref 8.1–13.5)
POC ANION GAP: 10 MMOL/L (ref 7–16)
POC CREATININE: NORMAL MG/DL (ref 0.51–1.19)
POC HCO3: 22.5 MMOL/L (ref 21–28)
POC HEMOGLOBIN (CALC): NORMAL G/DL (ref 13.5–17.5)
POC LACTIC ACID: 1.2 MMOL/L (ref 0.56–1.39)
POC O2 SATURATION: 99.9 % (ref 94–98)
POC PCO2: 38.3 MM HG (ref 35–48)
POC PH: 7.38 (ref 7.35–7.45)
POC PO2: 290.7 MM HG (ref 83–108)
POTASSIUM BLD-SCNC: ABNORMAL MMOL/L (ref 3.5–4.5)
POTASSIUM SERPL-SCNC: 3.4 MMOL/L (ref 3.7–5.3)
PROT SERPL-MCNC: 6.5 G/DL (ref 6.4–8.3)
RBC # BLD AUTO: 4.63 M/UL (ref 4.21–5.77)
SODIUM BLD-SCNC: 140 MMOL/L (ref 138–146)
SODIUM SERPL-SCNC: 139 MMOL/L (ref 135–144)
WBC OTHER # BLD: 6.6 K/UL (ref 3.5–11.3)

## 2024-02-08 PROCEDURE — 92611 MOTION FLUOROSCOPY/SWALLOW: CPT

## 2024-02-08 PROCEDURE — 93306 TTE W/DOPPLER COMPLETE: CPT | Performed by: INTERNAL MEDICINE

## 2024-02-08 PROCEDURE — 82947 ASSAY GLUCOSE BLOOD QUANT: CPT

## 2024-02-08 PROCEDURE — 83735 ASSAY OF MAGNESIUM: CPT

## 2024-02-08 PROCEDURE — 6360000002 HC RX W HCPCS

## 2024-02-08 PROCEDURE — 6370000000 HC RX 637 (ALT 250 FOR IP): Performed by: INTERNAL MEDICINE

## 2024-02-08 PROCEDURE — 80053 COMPREHEN METABOLIC PANEL: CPT

## 2024-02-08 PROCEDURE — 99223 1ST HOSP IP/OBS HIGH 75: CPT | Performed by: INTERNAL MEDICINE

## 2024-02-08 PROCEDURE — 6370000000 HC RX 637 (ALT 250 FOR IP): Performed by: PSYCHIATRY & NEUROLOGY

## 2024-02-08 PROCEDURE — 2580000003 HC RX 258: Performed by: STUDENT IN AN ORGANIZED HEALTH CARE EDUCATION/TRAINING PROGRAM

## 2024-02-08 PROCEDURE — 93306 TTE W/DOPPLER COMPLETE: CPT

## 2024-02-08 PROCEDURE — 74230 X-RAY XM SWLNG FUNCJ C+: CPT

## 2024-02-08 PROCEDURE — 6360000002 HC RX W HCPCS: Performed by: EMERGENCY MEDICINE

## 2024-02-08 PROCEDURE — 6360000002 HC RX W HCPCS: Performed by: PHYSICIAN ASSISTANT

## 2024-02-08 PROCEDURE — 2580000003 HC RX 258: Performed by: PHYSICIAN ASSISTANT

## 2024-02-08 PROCEDURE — 82533 TOTAL CORTISOL: CPT

## 2024-02-08 PROCEDURE — 2580000003 HC RX 258: Performed by: EMERGENCY MEDICINE

## 2024-02-08 PROCEDURE — 99233 SBSQ HOSP IP/OBS HIGH 50: CPT | Performed by: PSYCHIATRY & NEUROLOGY

## 2024-02-08 PROCEDURE — 1200000000 HC SEMI PRIVATE

## 2024-02-08 PROCEDURE — 99223 1ST HOSP IP/OBS HIGH 75: CPT | Performed by: PSYCHIATRY & NEUROLOGY

## 2024-02-08 PROCEDURE — 85025 COMPLETE CBC W/AUTO DIFF WBC: CPT

## 2024-02-08 RX ORDER — LORAZEPAM 2 MG/ML
0.5 INJECTION INTRAMUSCULAR EVERY 6 HOURS PRN
Status: DISCONTINUED | OUTPATIENT
Start: 2024-02-08 | End: 2024-02-12 | Stop reason: HOSPADM

## 2024-02-08 RX ORDER — POTASSIUM CHLORIDE 7.45 MG/ML
10 INJECTION INTRAVENOUS
Status: COMPLETED | OUTPATIENT
Start: 2024-02-08 | End: 2024-02-08

## 2024-02-08 RX ADMIN — QUETIAPINE FUMARATE 25 MG: 25 TABLET, FILM COATED ORAL at 10:42

## 2024-02-08 RX ADMIN — CLOPIDOGREL BISULFATE 75 MG: 75 TABLET ORAL at 09:00

## 2024-02-08 RX ADMIN — Medication 0.5 MG: at 16:25

## 2024-02-08 RX ADMIN — POTASSIUM CHLORIDE 10 MEQ: 10 INJECTION, SOLUTION INTRAVENOUS at 08:00

## 2024-02-08 RX ADMIN — ASPIRIN 81 MG: 81 TABLET, COATED ORAL at 09:00

## 2024-02-08 RX ADMIN — AMITRIPTYLINE HYDROCHLORIDE 50 MG: 50 TABLET, FILM COATED ORAL at 21:13

## 2024-02-08 RX ADMIN — QUETIAPINE FUMARATE 25 MG: 25 TABLET, FILM COATED ORAL at 21:13

## 2024-02-08 RX ADMIN — POTASSIUM CHLORIDE 10 MEQ: 10 INJECTION, SOLUTION INTRAVENOUS at 06:37

## 2024-02-08 RX ADMIN — DEXTROSE MONOHYDRATE: 50 INJECTION, SOLUTION INTRAVENOUS at 04:28

## 2024-02-08 RX ADMIN — SODIUM CHLORIDE, PRESERVATIVE FREE 10 ML: 5 INJECTION INTRAVENOUS at 21:04

## 2024-02-08 RX ADMIN — SODIUM CHLORIDE, PRESERVATIVE FREE 10 ML: 5 INJECTION INTRAVENOUS at 21:05

## 2024-02-08 RX ADMIN — ENOXAPARIN SODIUM 40 MG: 100 INJECTION SUBCUTANEOUS at 09:00

## 2024-02-08 RX ADMIN — POTASSIUM CHLORIDE 10 MEQ: 10 INJECTION, SOLUTION INTRAVENOUS at 09:00

## 2024-02-08 NOTE — ANESTHESIA POSTPROCEDURE EVALUATION
Department of Anesthesiology  Postprocedure Note    Patient: William Babcock  MRN: [id number]  YOB: 1960  Date of evaluation: 2/7/2024    Procedure Summary     Date: 02/07/24 Room / Location: [facility] Special Procedures    Anesthesia Start: 1439 Anesthesia Stop: 1559    Procedure: IR NEURO ANESTHESIA Diagnosis:     Scheduled Providers:  Responsible Provider: Sony Ortega MD    Anesthesia Type: general ASA Status: 3          Anesthesia Type: No value filed.    Wanda Phase I:      Wanda Phase II:      Anesthesia Post Evaluation    Patient location during evaluation: ICU  Patient participation: complete - patient participated  Level of consciousness: awake and sleepy but conscious  Airway patency: patent  Nausea & Vomiting: no nausea and no vomiting  Cardiovascular status: hemodynamically stable  Respiratory status: acceptable  Hydration status: euvolemic  Comments: BP (!) 136/100   Pulse (!) 108   Temp 98.4 °F (36.9 °C)   Resp 19   Ht 1.727 m (5' 7.99\")   Wt 72 kg (158 lb 11.7 oz)   SpO2 95%   BMI 24.14 kg/m²     After induction, patient became severely hypotensive that was refractory to common interventions.  The volatile anesthetic was discontinued, the patient was given fluids, as well as significant doses of phenylephrine, epinephrine, calcium, vasopressin.  While the blood pressure eventually recovered, it was not a normal response seen after administering these medications, and when the volatile anesthetic was restarted, the patient's blood pressure continued to drop to unsafe levels.  Since the case was not extremely urgent, the anesthesiologist and proceduralist agreed that the safest course of action would be to wake the patient up and work up the patient for what might have caused the extreme level of hypotension before proceeding with the surgery.  Pain management: adequate        No notable events documented.

## 2024-02-08 NOTE — CONSULTS
Attestation signed by      Attending Physician Statement:    I have discussed the care of  William Babcock , including pertinent history and exam findings, with the Cardiology fellow/resident.     I have seen and examined the patient and the key elements of all parts of the encounter have been performed by me. I agree with the assessment, plan and orders as documented by the fellow/resident, after I modified exam findings and plan of treatments, and the final version is my approved version of the assessment.     Additional Comments:   Patient was seen and examined  Has a history of MRDD  Found to have right internal carotid artery pseudoaneurysm with plan to go to embolization today.  We are asked to provide preoperative stratification.  An echocardiogram has been ordered, if LVEF is preserved no further inpatient cardiac workup, can proceed at a moderate risk.    Discussed with patient in detail at bedside. All questions answered. Patient agrees with plan as outlined above.     Thank you for allowing me to participate in the care of this patient, please do not hesitate to call if you have any questions.    Bhavesh Cha DO, FACC, RPVI, BRITNEY, HAYLIE  [facility]  [facility]  [phone]     [facility]    Consult / H&P               Today's Date: 2/8/2024  Patient Name: William Babcock  Date of admission: 2/6/2024 10:24 PM  Patient's age: 63 y.o., 1960  Admission Dx: Pseudoaneurysm (HCC) [I72.9]  Psychosis, paranoid (HCC) [F22]    Reason for Consult:  Cardiac evaluation    Requesting Physician: Charley Pearce MD    CHIEF COMPLAINT:  Altered mental status     History Obtained From:  patient, electronic medical record    HISTORY OF PRESENT ILLNESS:      The patient is a 63 y.o.  male who is admitted to the hospital for altered mental status. Neurology and Neuro-endovascular were consulted and the patient was noted to have acute infarct vs chronic ischemia on

## 2024-02-08 NOTE — PROCEDURES
understanding           Goals:    Long Term: To Maximize safety with intake, optimize nutrition/hydration and minimize risk for aspiration.               Short Term:                         Dysphagia Goals: The patient will tolerate recommended diet without observed clinical signs of aspiration      Oral Preparation / Oral Phase   Prolonged A-P transit with puree, small bites  Extended but functional mastication time with regular solid.  Pt refused soft solid      Pharyngeal Phase   Puree, regular solid: No penetration, no aspiration. Mod vallec, min pyriform residue decreased with double swallow  Nectar tsp and straw: No penetration, no aspiration. Min vallec and pyrif residue.  Thin straw sequential sips: No penetration, no aspiration. Min vallec and pyrif residue.      Esophageal Phase  Esophageal Screen: WFL        Pain      Pain Level: pt denies      Therapy Time:   Individual Concurrent Group Co-treatment   Time In  1435         Time Out  1450         Minutes  15                   Chasity Kunz, SLP, 2/8/2024, 3:42 PM

## 2024-02-09 ENCOUNTER — APPOINTMENT (OUTPATIENT)
Dept: INTERVENTIONAL RADIOLOGY/VASCULAR | Age: 64
DRG: 876 | End: 2024-02-09
Attending: STUDENT IN AN ORGANIZED HEALTH CARE EDUCATION/TRAINING PROGRAM
Payer: MEDICARE

## 2024-02-09 ENCOUNTER — ANESTHESIA (OUTPATIENT)
Dept: INTERVENTIONAL RADIOLOGY/VASCULAR | Age: 64
End: 2024-02-09
Payer: MEDICARE

## 2024-02-09 ENCOUNTER — ANESTHESIA EVENT (OUTPATIENT)
Dept: INTERVENTIONAL RADIOLOGY/VASCULAR | Age: 64
End: 2024-02-09
Payer: MEDICARE

## 2024-02-09 PROBLEM — I72.0 CAROTID PSEUDOANEURYSM (HCC): Status: ACTIVE | Noted: 2024-02-09

## 2024-02-09 PROBLEM — R45.1 AGITATION: Status: ACTIVE | Noted: 2024-02-09

## 2024-02-09 PROBLEM — I65.21 STENOSIS OF RIGHT CAROTID ARTERY GREATER THAN 50%: Status: ACTIVE | Noted: 2024-02-09

## 2024-02-09 LAB
ABO + RH BLD: NORMAL
ACT BLD: 139 SEC (ref 79–149)
ACT BLD: 201 SEC (ref 79–149)
ACT BLD: 220 SEC (ref 79–149)
ACT BLD: 244 SEC (ref 79–149)
ACT BLD: 271 SEC (ref 79–149)
ANION GAP SERPL CALCULATED.3IONS-SCNC: 11 MMOL/L (ref 9–17)
ARM BAND NUMBER: NORMAL
BACTERIA URNS QL MICRO: NORMAL
BASOPHILS # BLD: 0.04 K/UL (ref 0–0.2)
BASOPHILS NFR BLD: 1 % (ref 0–2)
BILIRUB UR QL STRIP: NEGATIVE
BLOOD BANK SAMPLE EXPIRATION: NORMAL
BLOOD GROUP ANTIBODIES SERPL: NEGATIVE
BUN SERPL-MCNC: 6 MG/DL (ref 8–23)
CALCIUM SERPL-MCNC: 7.8 MG/DL (ref 8.6–10.4)
CASTS #/AREA URNS LPF: NORMAL /LPF (ref 0–8)
CHLORIDE SERPL-SCNC: 99 MMOL/L (ref 98–107)
CLARITY UR: CLEAR
CLOSURE TME COLL+ADP BLD: >300 SEC (ref 67–112)
CO2 SERPL-SCNC: 27 MMOL/L (ref 20–31)
COLLAGEN EPINEPHRINE TIME: 189 SEC (ref 85–172)
COLOR UR: YELLOW
CREAT SERPL-MCNC: 0.8 MG/DL (ref 0.7–1.2)
EOSINOPHIL # BLD: 0.1 K/UL (ref 0–0.44)
EOSINOPHILS RELATIVE PERCENT: 2 % (ref 1–4)
EPI CELLS #/AREA URNS HPF: NORMAL /HPF (ref 0–5)
ERYTHROCYTE [DISTWIDTH] IN BLOOD BY AUTOMATED COUNT: 12.9 % (ref 11.8–14.4)
GFR SERPL CREATININE-BSD FRML MDRD: >60 ML/MIN/1.73M2
GLUCOSE BLD-MCNC: 119 MG/DL (ref 75–110)
GLUCOSE BLD-MCNC: 131 MG/DL (ref 75–110)
GLUCOSE BLD-MCNC: 87 MG/DL (ref 75–110)
GLUCOSE SERPL-MCNC: 139 MG/DL (ref 70–99)
GLUCOSE UR STRIP-MCNC: NEGATIVE MG/DL
HCT VFR BLD AUTO: 42.6 % (ref 40.7–50.3)
HGB BLD-MCNC: 14 G/DL (ref 13–17)
HGB UR QL STRIP.AUTO: ABNORMAL
IMM GRANULOCYTES # BLD AUTO: 0.03 K/UL (ref 0–0.3)
IMM GRANULOCYTES NFR BLD: 1 %
KETONES UR STRIP-MCNC: NEGATIVE MG/DL
LEUKOCYTE ESTERASE UR QL STRIP: ABNORMAL
LYMPHOCYTES NFR BLD: 0.85 K/UL (ref 1.1–3.7)
LYMPHOCYTES RELATIVE PERCENT: 16 % (ref 24–43)
MCH RBC QN AUTO: 29.9 PG (ref 25.2–33.5)
MCHC RBC AUTO-ENTMCNC: 32.9 G/DL (ref 28.4–34.8)
MCV RBC AUTO: 91 FL (ref 82.6–102.9)
MONOCYTES NFR BLD: 0.57 K/UL (ref 0.1–1.2)
MONOCYTES NFR BLD: 11 % (ref 3–12)
NEUTROPHILS NFR BLD: 69 % (ref 36–65)
NEUTS SEG NFR BLD: 3.74 K/UL (ref 1.5–8.1)
NITRITE UR QL STRIP: NEGATIVE
NRBC BLD-RTO: 0 PER 100 WBC
PH UR STRIP: 7 [PH] (ref 5–8)
PLATELET # BLD AUTO: 182 K/UL (ref 138–453)
PLATELET FUNCTION INTERP: ABNORMAL
PMV BLD AUTO: 8.6 FL (ref 8.1–13.5)
POTASSIUM SERPL-SCNC: 3.1 MMOL/L (ref 3.7–5.3)
PROT UR STRIP-MCNC: NEGATIVE MG/DL
RBC # BLD AUTO: 4.68 M/UL (ref 4.21–5.77)
RBC #/AREA URNS HPF: NORMAL /HPF (ref 0–4)
SODIUM SERPL-SCNC: 137 MMOL/L (ref 135–144)
SP GR UR STRIP: 1.01 (ref 1–1.03)
UROBILINOGEN UR STRIP-ACNC: NORMAL EU/DL (ref 0–1)
WBC #/AREA URNS HPF: NORMAL /HPF (ref 0–5)
WBC OTHER # BLD: 5.3 K/UL (ref 3.5–11.3)

## 2024-02-09 PROCEDURE — 3700000000 HC ANESTHESIA ATTENDED CARE

## 2024-02-09 PROCEDURE — 2580000003 HC RX 258: Performed by: PSYCHIATRY & NEUROLOGY

## 2024-02-09 PROCEDURE — 61626 TCAT PERM OCCLS/EMBOL NONCNS: CPT

## 2024-02-09 PROCEDURE — 97530 THERAPEUTIC ACTIVITIES: CPT

## 2024-02-09 PROCEDURE — 36224 PLACE CATH CAROTD ART: CPT | Performed by: PSYCHIATRY & NEUROLOGY

## 2024-02-09 PROCEDURE — 82947 ASSAY GLUCOSE BLOOD QUANT: CPT

## 2024-02-09 PROCEDURE — 6360000002 HC RX W HCPCS

## 2024-02-09 PROCEDURE — 97166 OT EVAL MOD COMPLEX 45 MIN: CPT

## 2024-02-09 PROCEDURE — 75898 FOLLOW-UP ANGIOGRAPHY: CPT | Performed by: PSYCHIATRY & NEUROLOGY

## 2024-02-09 PROCEDURE — 86900 BLOOD TYPING SEROLOGIC ABO: CPT

## 2024-02-09 PROCEDURE — 99233 SBSQ HOSP IP/OBS HIGH 50: CPT | Performed by: NURSE PRACTITIONER

## 2024-02-09 PROCEDURE — 6370000000 HC RX 637 (ALT 250 FOR IP): Performed by: INTERNAL MEDICINE

## 2024-02-09 PROCEDURE — 75894 X-RAYS TRANSCATH THERAPY: CPT

## 2024-02-09 PROCEDURE — 86850 RBC ANTIBODY SCREEN: CPT

## 2024-02-09 PROCEDURE — 2500000003 HC RX 250 WO HCPCS

## 2024-02-09 PROCEDURE — 36224 PLACE CATH CAROTD ART: CPT

## 2024-02-09 PROCEDURE — C1894 INTRO/SHEATH, NON-LASER: HCPCS

## 2024-02-09 PROCEDURE — 3700000001 HC ADD 15 MINUTES (ANESTHESIA)

## 2024-02-09 PROCEDURE — 03VK3HZ RESTRICTION OF RIGHT INTERNAL CAROTID ARTERY WITH INTRALUMINAL DEVICE, FLOW DIVERTER, PERCUTANEOUS APPROACH: ICD-10-PCS | Performed by: PSYCHIATRY & NEUROLOGY

## 2024-02-09 PROCEDURE — 6360000002 HC RX W HCPCS: Performed by: INTERNAL MEDICINE

## 2024-02-09 PROCEDURE — 7100000000 HC PACU RECOVERY - FIRST 15 MIN

## 2024-02-09 PROCEDURE — 1200000000 HC SEMI PRIVATE

## 2024-02-09 PROCEDURE — 2580000003 HC RX 258: Performed by: EMERGENCY MEDICINE

## 2024-02-09 PROCEDURE — 99231 SBSQ HOSP IP/OBS SF/LOW 25: CPT | Performed by: PSYCHIATRY & NEUROLOGY

## 2024-02-09 PROCEDURE — 85576 BLOOD PLATELET AGGREGATION: CPT

## 2024-02-09 PROCEDURE — 2580000003 HC RX 258: Performed by: PHYSICIAN ASSISTANT

## 2024-02-09 PROCEDURE — 80048 BASIC METABOLIC PNL TOTAL CA: CPT

## 2024-02-09 PROCEDURE — 6360000002 HC RX W HCPCS: Performed by: PHYSICIAN ASSISTANT

## 2024-02-09 PROCEDURE — B3131ZZ FLUOROSCOPY OF RIGHT COMMON CAROTID ARTERY USING LOW OSMOLAR CONTRAST: ICD-10-PCS | Performed by: PSYCHIATRY & NEUROLOGY

## 2024-02-09 PROCEDURE — 2580000003 HC RX 258

## 2024-02-09 PROCEDURE — 7100000001 HC PACU RECOVERY - ADDTL 15 MIN

## 2024-02-09 PROCEDURE — 75894 X-RAYS TRANSCATH THERAPY: CPT | Performed by: PSYCHIATRY & NEUROLOGY

## 2024-02-09 PROCEDURE — 36415 COLL VENOUS BLD VENIPUNCTURE: CPT

## 2024-02-09 PROCEDURE — 85025 COMPLETE CBC W/AUTO DIFF WBC: CPT

## 2024-02-09 PROCEDURE — 97535 SELF CARE MNGMENT TRAINING: CPT

## 2024-02-09 PROCEDURE — 6360000002 HC RX W HCPCS: Performed by: EMERGENCY MEDICINE

## 2024-02-09 PROCEDURE — 97163 PT EVAL HIGH COMPLEX 45 MIN: CPT

## 2024-02-09 PROCEDURE — 61626 TCAT PERM OCCLS/EMBOL NONCNS: CPT | Performed by: PSYCHIATRY & NEUROLOGY

## 2024-02-09 PROCEDURE — 81001 URINALYSIS AUTO W/SCOPE: CPT

## 2024-02-09 PROCEDURE — 6370000000 HC RX 637 (ALT 250 FOR IP): Performed by: PSYCHIATRY & NEUROLOGY

## 2024-02-09 PROCEDURE — B3161ZZ FLUOROSCOPY OF RIGHT INTERNAL CAROTID ARTERY USING LOW OSMOLAR CONTRAST: ICD-10-PCS | Performed by: PSYCHIATRY & NEUROLOGY

## 2024-02-09 PROCEDURE — 2580000003 HC RX 258: Performed by: INTERNAL MEDICINE

## 2024-02-09 PROCEDURE — 6360000004 HC RX CONTRAST MEDICATION: Performed by: PSYCHIATRY & NEUROLOGY

## 2024-02-09 PROCEDURE — 86901 BLOOD TYPING SEROLOGIC RH(D): CPT

## 2024-02-09 PROCEDURE — 99233 SBSQ HOSP IP/OBS HIGH 50: CPT | Performed by: PSYCHIATRY & NEUROLOGY

## 2024-02-09 PROCEDURE — 2580000003 HC RX 258: Performed by: STUDENT IN AN ORGANIZED HEALTH CARE EDUCATION/TRAINING PROGRAM

## 2024-02-09 PROCEDURE — 03VK3DZ RESTRICTION OF RIGHT INTERNAL CAROTID ARTERY WITH INTRALUMINAL DEVICE, PERCUTANEOUS APPROACH: ICD-10-PCS | Performed by: PSYCHIATRY & NEUROLOGY

## 2024-02-09 PROCEDURE — 85347 COAGULATION TIME ACTIVATED: CPT

## 2024-02-09 RX ORDER — SODIUM CHLORIDE, SODIUM LACTATE, POTASSIUM CHLORIDE, CALCIUM CHLORIDE 600; 310; 30; 20 MG/100ML; MG/100ML; MG/100ML; MG/100ML
INJECTION, SOLUTION INTRAVENOUS CONTINUOUS PRN
Status: DISCONTINUED | OUTPATIENT
Start: 2024-02-09 | End: 2024-02-09 | Stop reason: SDUPTHER

## 2024-02-09 RX ORDER — SODIUM CHLORIDE 9 MG/ML
INJECTION, SOLUTION INTRAVENOUS PRN
OUTPATIENT
Start: 2024-02-09

## 2024-02-09 RX ORDER — FENTANYL CITRATE 50 UG/ML
INJECTION, SOLUTION INTRAMUSCULAR; INTRAVENOUS PRN
Status: DISCONTINUED | OUTPATIENT
Start: 2024-02-09 | End: 2024-02-09 | Stop reason: SDUPTHER

## 2024-02-09 RX ORDER — HEPARIN SODIUM 1000 [USP'U]/ML
INJECTION, SOLUTION INTRAVENOUS; SUBCUTANEOUS PRN
Status: DISCONTINUED | OUTPATIENT
Start: 2024-02-09 | End: 2024-02-09 | Stop reason: SDUPTHER

## 2024-02-09 RX ORDER — DEXAMETHASONE SODIUM PHOSPHATE 10 MG/ML
INJECTION INTRAMUSCULAR; INTRAVENOUS PRN
Status: DISCONTINUED | OUTPATIENT
Start: 2024-02-09 | End: 2024-02-09 | Stop reason: SDUPTHER

## 2024-02-09 RX ORDER — SODIUM CHLORIDE 0.9 % (FLUSH) 0.9 %
5-40 SYRINGE (ML) INJECTION EVERY 12 HOURS SCHEDULED
Status: DISCONTINUED | OUTPATIENT
Start: 2024-02-09 | End: 2024-02-12 | Stop reason: HOSPADM

## 2024-02-09 RX ORDER — 0.9 % SODIUM CHLORIDE 0.9 %
1000 INTRAVENOUS SOLUTION INTRAVENOUS ONCE
Status: COMPLETED | OUTPATIENT
Start: 2024-02-09 | End: 2024-02-09

## 2024-02-09 RX ORDER — POTASSIUM CHLORIDE 7.45 MG/ML
10 INJECTION INTRAVENOUS
Status: COMPLETED | OUTPATIENT
Start: 2024-02-09 | End: 2024-02-09

## 2024-02-09 RX ORDER — ONDANSETRON 2 MG/ML
INJECTION INTRAMUSCULAR; INTRAVENOUS PRN
Status: DISCONTINUED | OUTPATIENT
Start: 2024-02-09 | End: 2024-02-09 | Stop reason: SDUPTHER

## 2024-02-09 RX ORDER — SODIUM CHLORIDE 0.9 % (FLUSH) 0.9 %
5-40 SYRINGE (ML) INJECTION PRN
OUTPATIENT
Start: 2024-02-09

## 2024-02-09 RX ORDER — SODIUM CHLORIDE 9 MG/ML
INJECTION, SOLUTION INTRAVENOUS CONTINUOUS
Status: ACTIVE | OUTPATIENT
Start: 2024-02-09 | End: 2024-02-09

## 2024-02-09 RX ORDER — ACETAMINOPHEN 325 MG/1
650 TABLET ORAL EVERY 4 HOURS PRN
Status: DISCONTINUED | OUTPATIENT
Start: 2024-02-09 | End: 2024-02-12 | Stop reason: HOSPADM

## 2024-02-09 RX ORDER — MEPERIDINE HYDROCHLORIDE 50 MG/ML
12.5 INJECTION INTRAMUSCULAR; INTRAVENOUS; SUBCUTANEOUS EVERY 5 MIN PRN
OUTPATIENT
Start: 2024-02-09

## 2024-02-09 RX ORDER — ROCURONIUM BROMIDE 10 MG/ML
INJECTION, SOLUTION INTRAVENOUS PRN
Status: DISCONTINUED | OUTPATIENT
Start: 2024-02-09 | End: 2024-02-09 | Stop reason: SDUPTHER

## 2024-02-09 RX ORDER — LIDOCAINE HYDROCHLORIDE 10 MG/ML
INJECTION, SOLUTION EPIDURAL; INFILTRATION; INTRACAUDAL; PERINEURAL PRN
Status: DISCONTINUED | OUTPATIENT
Start: 2024-02-09 | End: 2024-02-09 | Stop reason: SDUPTHER

## 2024-02-09 RX ORDER — ONDANSETRON 2 MG/ML
4 INJECTION INTRAMUSCULAR; INTRAVENOUS EVERY 6 HOURS PRN
Status: DISCONTINUED | OUTPATIENT
Start: 2024-02-09 | End: 2024-02-12 | Stop reason: HOSPADM

## 2024-02-09 RX ORDER — SODIUM CHLORIDE 9 MG/ML
INJECTION, SOLUTION INTRAVENOUS PRN
Status: DISCONTINUED | OUTPATIENT
Start: 2024-02-09 | End: 2024-02-12 | Stop reason: HOSPADM

## 2024-02-09 RX ORDER — DROPERIDOL 2.5 MG/ML
0.62 INJECTION, SOLUTION INTRAMUSCULAR; INTRAVENOUS
OUTPATIENT
Start: 2024-02-09 | End: 2024-02-10

## 2024-02-09 RX ORDER — METOCLOPRAMIDE HYDROCHLORIDE 5 MG/ML
10 INJECTION INTRAMUSCULAR; INTRAVENOUS
OUTPATIENT
Start: 2024-02-09 | End: 2024-02-10

## 2024-02-09 RX ORDER — SODIUM CHLORIDE 9 MG/ML
INJECTION, SOLUTION INTRAVENOUS CONTINUOUS PRN
Status: DISCONTINUED | OUTPATIENT
Start: 2024-02-09 | End: 2024-02-09 | Stop reason: SDUPTHER

## 2024-02-09 RX ORDER — SODIUM CHLORIDE 0.9 % (FLUSH) 0.9 %
5-40 SYRINGE (ML) INJECTION EVERY 12 HOURS SCHEDULED
OUTPATIENT
Start: 2024-02-09

## 2024-02-09 RX ORDER — HYDRALAZINE HYDROCHLORIDE 20 MG/ML
10 INJECTION INTRAMUSCULAR; INTRAVENOUS
OUTPATIENT
Start: 2024-02-09

## 2024-02-09 RX ORDER — IODIXANOL 270 MG/ML
100 INJECTION, SOLUTION INTRAVASCULAR
Status: COMPLETED | OUTPATIENT
Start: 2024-02-09 | End: 2024-02-09

## 2024-02-09 RX ORDER — MIDAZOLAM HYDROCHLORIDE 1 MG/ML
INJECTION INTRAMUSCULAR; INTRAVENOUS PRN
Status: DISCONTINUED | OUTPATIENT
Start: 2024-02-09 | End: 2024-02-09 | Stop reason: SDUPTHER

## 2024-02-09 RX ORDER — PHENYLEPHRINE HCL IN 0.9% NACL 1 MG/10 ML
SYRINGE (ML) INTRAVENOUS PRN
Status: DISCONTINUED | OUTPATIENT
Start: 2024-02-09 | End: 2024-02-09 | Stop reason: SDUPTHER

## 2024-02-09 RX ORDER — DIPHENHYDRAMINE HYDROCHLORIDE 50 MG/ML
12.5 INJECTION INTRAMUSCULAR; INTRAVENOUS
OUTPATIENT
Start: 2024-02-09 | End: 2024-02-10

## 2024-02-09 RX ORDER — ETOMIDATE 2 MG/ML
INJECTION INTRAVENOUS PRN
Status: DISCONTINUED | OUTPATIENT
Start: 2024-02-09 | End: 2024-02-09 | Stop reason: SDUPTHER

## 2024-02-09 RX ORDER — CEFAZOLIN SODIUM 1 G/3ML
INJECTION, POWDER, FOR SOLUTION INTRAMUSCULAR; INTRAVENOUS PRN
Status: DISCONTINUED | OUTPATIENT
Start: 2024-02-09 | End: 2024-02-09 | Stop reason: SDUPTHER

## 2024-02-09 RX ORDER — ONDANSETRON 4 MG/1
4 TABLET, ORALLY DISINTEGRATING ORAL EVERY 8 HOURS PRN
Status: DISCONTINUED | OUTPATIENT
Start: 2024-02-09 | End: 2024-02-12 | Stop reason: HOSPADM

## 2024-02-09 RX ORDER — SODIUM CHLORIDE 0.9 % (FLUSH) 0.9 %
5-40 SYRINGE (ML) INJECTION PRN
Status: DISCONTINUED | OUTPATIENT
Start: 2024-02-09 | End: 2024-02-12 | Stop reason: HOSPADM

## 2024-02-09 RX ADMIN — DEXAMETHASONE SODIUM PHOSPHATE 10 MG: 10 INJECTION INTRAMUSCULAR; INTRAVENOUS at 13:47

## 2024-02-09 RX ADMIN — QUETIAPINE FUMARATE 25 MG: 25 TABLET, FILM COATED ORAL at 08:45

## 2024-02-09 RX ADMIN — IODIXANOL 78 ML: 270 INJECTION, SOLUTION INTRAVASCULAR at 16:13

## 2024-02-09 RX ADMIN — FENTANYL CITRATE 25 MCG: 50 INJECTION, SOLUTION INTRAMUSCULAR; INTRAVENOUS at 15:39

## 2024-02-09 RX ADMIN — SUGAMMADEX 200 MG: 100 INJECTION, SOLUTION INTRAVENOUS at 16:04

## 2024-02-09 RX ADMIN — ONDANSETRON 4 MG: 2 INJECTION INTRAMUSCULAR; INTRAVENOUS at 16:04

## 2024-02-09 RX ADMIN — Medication 100 MCG: at 14:03

## 2024-02-09 RX ADMIN — HEPARIN SODIUM 5000 UNITS: 1000 INJECTION, SOLUTION INTRAVENOUS; SUBCUTANEOUS at 14:05

## 2024-02-09 RX ADMIN — QUETIAPINE FUMARATE 25 MG: 25 TABLET, FILM COATED ORAL at 19:09

## 2024-02-09 RX ADMIN — Medication 100 MCG: at 13:54

## 2024-02-09 RX ADMIN — POTASSIUM CHLORIDE 10 MEQ: 7.46 INJECTION, SOLUTION INTRAVENOUS at 07:44

## 2024-02-09 RX ADMIN — POTASSIUM CHLORIDE 10 MEQ: 7.46 INJECTION, SOLUTION INTRAVENOUS at 21:01

## 2024-02-09 RX ADMIN — ROCURONIUM BROMIDE 50 MG: 10 INJECTION INTRAVENOUS at 13:29

## 2024-02-09 RX ADMIN — SODIUM CHLORIDE: 9 INJECTION, SOLUTION INTRAVENOUS at 15:15

## 2024-02-09 RX ADMIN — CEFAZOLIN 2 G: 1 INJECTION, POWDER, FOR SOLUTION INTRAMUSCULAR; INTRAVENOUS at 13:56

## 2024-02-09 RX ADMIN — ROCURONIUM BROMIDE 20 MG: 10 INJECTION INTRAVENOUS at 15:11

## 2024-02-09 RX ADMIN — FENTANYL CITRATE 50 MCG: 50 INJECTION, SOLUTION INTRAMUSCULAR; INTRAVENOUS at 16:10

## 2024-02-09 RX ADMIN — WATER 5 MG: 1 INJECTION INTRAMUSCULAR; INTRAVENOUS; SUBCUTANEOUS at 22:36

## 2024-02-09 RX ADMIN — SODIUM CHLORIDE, PRESERVATIVE FREE 10 ML: 5 INJECTION INTRAVENOUS at 19:10

## 2024-02-09 RX ADMIN — Medication 100 MCG: at 13:48

## 2024-02-09 RX ADMIN — SODIUM CHLORIDE 1000 ML: 9 INJECTION, SOLUTION INTRAVENOUS at 01:19

## 2024-02-09 RX ADMIN — AMITRIPTYLINE HYDROCHLORIDE 50 MG: 50 TABLET, FILM COATED ORAL at 19:09

## 2024-02-09 RX ADMIN — WATER 5 MG: 1 INJECTION INTRAMUSCULAR; INTRAVENOUS; SUBCUTANEOUS at 11:11

## 2024-02-09 RX ADMIN — PHENYLEPHRINE HYDROCHLORIDE 50 MCG/MIN: 10 INJECTION INTRAVENOUS at 13:54

## 2024-02-09 RX ADMIN — POTASSIUM CHLORIDE 10 MEQ: 7.46 INJECTION, SOLUTION INTRAVENOUS at 08:47

## 2024-02-09 RX ADMIN — HEPARIN SODIUM 2000 UNITS: 1000 INJECTION, SOLUTION INTRAVENOUS; SUBCUTANEOUS at 14:50

## 2024-02-09 RX ADMIN — SODIUM CHLORIDE, POTASSIUM CHLORIDE, SODIUM LACTATE AND CALCIUM CHLORIDE: 600; 310; 30; 20 INJECTION, SOLUTION INTRAVENOUS at 13:59

## 2024-02-09 RX ADMIN — HEPARIN SODIUM 1000 UNITS: 1000 INJECTION, SOLUTION INTRAVENOUS; SUBCUTANEOUS at 15:16

## 2024-02-09 RX ADMIN — LIDOCAINE HYDROCHLORIDE 50 MG: 10 INJECTION, SOLUTION EPIDURAL; INFILTRATION; INTRACAUDAL; PERINEURAL at 13:28

## 2024-02-09 RX ADMIN — SODIUM CHLORIDE: 9 INJECTION, SOLUTION INTRAVENOUS at 14:04

## 2024-02-09 RX ADMIN — SODIUM CHLORIDE, PRESERVATIVE FREE 10 ML: 5 INJECTION INTRAVENOUS at 19:09

## 2024-02-09 RX ADMIN — SODIUM CHLORIDE: 9 INJECTION, SOLUTION INTRAVENOUS at 13:21

## 2024-02-09 RX ADMIN — Medication 0.5 MG: at 19:09

## 2024-02-09 RX ADMIN — FENTANYL CITRATE 25 MCG: 50 INJECTION, SOLUTION INTRAMUSCULAR; INTRAVENOUS at 13:58

## 2024-02-09 RX ADMIN — HEPARIN SODIUM 3000 UNITS: 1000 INJECTION, SOLUTION INTRAVENOUS; SUBCUTANEOUS at 14:26

## 2024-02-09 RX ADMIN — ASPIRIN 81 MG: 81 TABLET, COATED ORAL at 08:45

## 2024-02-09 RX ADMIN — SODIUM CHLORIDE: 9 INJECTION, SOLUTION INTRAVENOUS at 07:35

## 2024-02-09 RX ADMIN — CLOPIDOGREL BISULFATE 75 MG: 75 TABLET ORAL at 08:45

## 2024-02-09 RX ADMIN — ROCURONIUM BROMIDE 20 MG: 10 INJECTION INTRAVENOUS at 14:11

## 2024-02-09 RX ADMIN — ETOMIDATE INJECTION 20 MG: 2 SOLUTION INTRAVENOUS at 13:28

## 2024-02-09 RX ADMIN — POTASSIUM CHLORIDE 10 MEQ: 7.46 INJECTION, SOLUTION INTRAVENOUS at 22:10

## 2024-02-09 RX ADMIN — POTASSIUM CHLORIDE 10 MEQ: 7.46 INJECTION, SOLUTION INTRAVENOUS at 23:46

## 2024-02-09 RX ADMIN — MIDAZOLAM 2 MG: 1 INJECTION INTRAMUSCULAR; INTRAVENOUS at 13:20

## 2024-02-09 RX ADMIN — POTASSIUM CHLORIDE 10 MEQ: 7.46 INJECTION, SOLUTION INTRAVENOUS at 09:49

## 2024-02-09 RX ADMIN — SODIUM CHLORIDE, PRESERVATIVE FREE 10 ML: 5 INJECTION INTRAVENOUS at 07:44

## 2024-02-09 RX ADMIN — SODIUM CHLORIDE, POTASSIUM CHLORIDE, SODIUM LACTATE AND CALCIUM CHLORIDE: 600; 310; 30; 20 INJECTION, SOLUTION INTRAVENOUS at 13:18

## 2024-02-09 NOTE — SEDATION DOCUMENTATION
Pre procedure assessment:    Pt awake/ alert.  Pt in no obvious distress.  Resp even/ non labored.  Skin pink, warm, dry, cap refill < 3 sec.  Face symmetric.  Pupils 4 mm PERRLA.  Pt not speaking to writer.  Pt not following commands.  Pt moves all extremities x 4 with good strength x 4.  No obvious motor deficits noted.  Pedal pulses palpable bilateral + 3 and marked.  Ang already inserted, draining pale yellow urine.  Pt ID band checked / Lipski correct

## 2024-02-09 NOTE — BRIEF OP NOTE
[facility]    NEUROENDOVASCULAR SERVICE: POST-OP NOTE: 2/9/2024    Pt Name: William Babcock  MRN: [id number]  YOB: 1960  Date of Procedure: 2/9/2024  Primary Care Physician: Billy Francois MD  Referring Physician:Melinda Montana MD      Pre-Procedural Diagnosis:R ICA dissecting pseudoaneurysm  Post-Procedural Diagnosis: as above      Procedure Performed: diagnostic cerebral angiogram and embolization of cervical pseudoaneurysm    Surgeon:   Eliseo Sellers MD    Fellow:  Elvis Pina MD     Assisting Tech:  Ernie Nickerson    PRE-PROCEDURAL EXAM:  Prestroke baseline mRS MODIFIED ARNOLD SCORE: 0 - No symptoms at all.  Neurological exam performed and unchanged from initial H&P or consult  MODIFIED ARNOLD SCORE: 0 - No symptoms at all.      Anesthesia: General Anesthesia  Complications: none    Intra-Operative EXAM:  Neurological exam performed and unchanged from initial H&P or consult    EBL: < Minimal      Cc            Specimens: Were not Obtained  Contrast:     Visipaque 270 low osmolar 78 Cc             Fluoro: 43.4 min    Findings:  Please see dictated Radiology note for further details  There is a right ICA cervical dissecting long pseudoaneurysm with multiple segment of luminal irregularities.  The above was treated with 6fr shuttle sheath, 5Fr Berenstein, Cat 5, XT 27, synchro support, Surpass Evolve 5x40mm, with the second one of the same size telescoping into the first one proximal, this is then followed by the Wallstent 8x29 over the exchange length Synchro Support telescoping into the proximal second Surpass Evolve. ]  Post treatment, there is early contrast stagnation of the pseudoaneurysm, the Wallstent and second Surpass Evolve is well apposed, the distal end of the first Surpass Evolve is sufficiently apposed without any flow limitation.        Rio score: class III            POST-PROCEDURAL EXAM :   Stable neurological Exam  Neurological exam performed and

## 2024-02-09 NOTE — SEDATION DOCUMENTATION
Closure time:    Vascade deployed  All instrumentation removed  Manual pressure held at puncture site by Dr. Sellers

## 2024-02-10 PROBLEM — I15.9 SECONDARY HYPERTENSION: Status: ACTIVE | Noted: 2024-02-10

## 2024-02-10 LAB
ANION GAP SERPL CALCULATED.3IONS-SCNC: 11 MMOL/L (ref 9–17)
BASOPHILS # BLD: 0 K/UL (ref 0–0.2)
BASOPHILS NFR BLD: 0 % (ref 0–2)
BUN SERPL-MCNC: 7 MG/DL (ref 8–23)
CALCIUM SERPL-MCNC: 8.2 MG/DL (ref 8.6–10.4)
CHLORIDE SERPL-SCNC: 107 MMOL/L (ref 98–107)
CO2 SERPL-SCNC: 21 MMOL/L (ref 20–31)
CREAT SERPL-MCNC: 0.6 MG/DL (ref 0.7–1.2)
EOSINOPHIL # BLD: 0 K/UL (ref 0–0.4)
EOSINOPHILS RELATIVE PERCENT: 0 % (ref 1–4)
ERYTHROCYTE [DISTWIDTH] IN BLOOD BY AUTOMATED COUNT: 12.8 % (ref 11.8–14.4)
GFR SERPL CREATININE-BSD FRML MDRD: >60 ML/MIN/1.73M2
GLUCOSE BLD-MCNC: 117 MG/DL (ref 75–110)
GLUCOSE SERPL-MCNC: 104 MG/DL (ref 70–99)
HCT VFR BLD AUTO: 34 % (ref 40.7–50.3)
HCT VFR BLD AUTO: 34.2 % (ref 40.7–50.3)
HCT VFR BLD AUTO: 35.4 % (ref 40.7–50.3)
HGB BLD-MCNC: 11.3 G/DL (ref 13–17)
HGB BLD-MCNC: 11.6 G/DL (ref 13–17)
HGB BLD-MCNC: 11.8 G/DL (ref 13–17)
IMM GRANULOCYTES # BLD AUTO: 0 K/UL (ref 0–0.3)
IMM GRANULOCYTES NFR BLD: 0 %
LYMPHOCYTES NFR BLD: 1.1 K/UL (ref 1–4.8)
LYMPHOCYTES RELATIVE PERCENT: 12 % (ref 24–44)
MCH RBC QN AUTO: 29.9 PG (ref 25.2–33.5)
MCHC RBC AUTO-ENTMCNC: 33.3 G/DL (ref 28.4–34.8)
MCV RBC AUTO: 89.8 FL (ref 82.6–102.9)
MONOCYTES NFR BLD: 0.18 K/UL (ref 0.1–0.8)
MONOCYTES NFR BLD: 2 % (ref 1–7)
MORPHOLOGY: NORMAL
NEUTROPHILS NFR BLD: 86 % (ref 36–66)
NEUTS SEG NFR BLD: 7.92 K/UL (ref 1.8–7.7)
NRBC BLD-RTO: 0 PER 100 WBC
PLATELET # BLD AUTO: 198 K/UL (ref 138–453)
PMV BLD AUTO: 8.8 FL (ref 8.1–13.5)
POTASSIUM SERPL-SCNC: 4 MMOL/L (ref 3.7–5.3)
RBC # BLD AUTO: 3.94 M/UL (ref 4.21–5.77)
SODIUM SERPL-SCNC: 139 MMOL/L (ref 135–144)
WBC OTHER # BLD: 9.2 K/UL (ref 3.5–11.3)

## 2024-02-10 PROCEDURE — 2580000003 HC RX 258: Performed by: PSYCHIATRY & NEUROLOGY

## 2024-02-10 PROCEDURE — 2580000003 HC RX 258: Performed by: REGISTERED NURSE

## 2024-02-10 PROCEDURE — 85014 HEMATOCRIT: CPT

## 2024-02-10 PROCEDURE — 82947 ASSAY GLUCOSE BLOOD QUANT: CPT

## 2024-02-10 PROCEDURE — 99233 SBSQ HOSP IP/OBS HIGH 50: CPT | Performed by: PSYCHIATRY & NEUROLOGY

## 2024-02-10 PROCEDURE — 85018 HEMOGLOBIN: CPT

## 2024-02-10 PROCEDURE — 6360000002 HC RX W HCPCS: Performed by: INTERNAL MEDICINE

## 2024-02-10 PROCEDURE — 36415 COLL VENOUS BLD VENIPUNCTURE: CPT

## 2024-02-10 PROCEDURE — 80048 BASIC METABOLIC PNL TOTAL CA: CPT

## 2024-02-10 PROCEDURE — 2060000000 HC ICU INTERMEDIATE R&B

## 2024-02-10 PROCEDURE — 99231 SBSQ HOSP IP/OBS SF/LOW 25: CPT | Performed by: PSYCHIATRY & NEUROLOGY

## 2024-02-10 PROCEDURE — 2580000003 HC RX 258: Performed by: INTERNAL MEDICINE

## 2024-02-10 PROCEDURE — 99221 1ST HOSP IP/OBS SF/LOW 40: CPT | Performed by: NURSE PRACTITIONER

## 2024-02-10 PROCEDURE — 6370000000 HC RX 637 (ALT 250 FOR IP): Performed by: PSYCHIATRY & NEUROLOGY

## 2024-02-10 PROCEDURE — 85025 COMPLETE CBC W/AUTO DIFF WBC: CPT

## 2024-02-10 PROCEDURE — 6360000002 HC RX W HCPCS: Performed by: REGISTERED NURSE

## 2024-02-10 PROCEDURE — 6360000002 HC RX W HCPCS: Performed by: PHYSICIAN ASSISTANT

## 2024-02-10 PROCEDURE — 99232 SBSQ HOSP IP/OBS MODERATE 35: CPT | Performed by: PHYSICIAN ASSISTANT

## 2024-02-10 PROCEDURE — 94761 N-INVAS EAR/PLS OXIMETRY MLT: CPT

## 2024-02-10 PROCEDURE — 2580000003 HC RX 258: Performed by: PHYSICIAN ASSISTANT

## 2024-02-10 PROCEDURE — 6370000000 HC RX 637 (ALT 250 FOR IP): Performed by: INTERNAL MEDICINE

## 2024-02-10 PROCEDURE — 6370000000 HC RX 637 (ALT 250 FOR IP): Performed by: NURSE PRACTITIONER

## 2024-02-10 PROCEDURE — 2580000003 HC RX 258: Performed by: STUDENT IN AN ORGANIZED HEALTH CARE EDUCATION/TRAINING PROGRAM

## 2024-02-10 RX ORDER — LABETALOL HYDROCHLORIDE 5 MG/ML
10 INJECTION, SOLUTION INTRAVENOUS
Status: DISCONTINUED | OUTPATIENT
Start: 2024-02-10 | End: 2024-02-12 | Stop reason: HOSPADM

## 2024-02-10 RX ORDER — SODIUM CHLORIDE, SODIUM LACTATE, POTASSIUM CHLORIDE, CALCIUM CHLORIDE 600; 310; 30; 20 MG/100ML; MG/100ML; MG/100ML; MG/100ML
INJECTION, SOLUTION INTRAVENOUS CONTINUOUS
Status: DISCONTINUED | OUTPATIENT
Start: 2024-02-10 | End: 2024-02-12

## 2024-02-10 RX ORDER — HALOPERIDOL 5 MG/ML
5 INJECTION INTRAMUSCULAR ONCE
Status: COMPLETED | OUTPATIENT
Start: 2024-02-10 | End: 2024-02-10

## 2024-02-10 RX ORDER — ASPIRIN 81 MG/1
81 TABLET, CHEWABLE ORAL DAILY
Status: DISCONTINUED | OUTPATIENT
Start: 2024-02-10 | End: 2024-02-12 | Stop reason: HOSPADM

## 2024-02-10 RX ADMIN — ZIPRASIDONE MESYLATE 10 MG: 20 INJECTION, POWDER, LYOPHILIZED, FOR SOLUTION INTRAMUSCULAR at 03:04

## 2024-02-10 RX ADMIN — CLOPIDOGREL BISULFATE 75 MG: 75 TABLET ORAL at 08:31

## 2024-02-10 RX ADMIN — SODIUM CHLORIDE, POTASSIUM CHLORIDE, SODIUM LACTATE AND CALCIUM CHLORIDE: 600; 310; 30; 20 INJECTION, SOLUTION INTRAVENOUS at 03:04

## 2024-02-10 RX ADMIN — AMITRIPTYLINE HYDROCHLORIDE 50 MG: 50 TABLET, FILM COATED ORAL at 20:06

## 2024-02-10 RX ADMIN — SODIUM CHLORIDE, PRESERVATIVE FREE 5 ML: 5 INJECTION INTRAVENOUS at 20:05

## 2024-02-10 RX ADMIN — SODIUM CHLORIDE, PRESERVATIVE FREE 10 ML: 5 INJECTION INTRAVENOUS at 08:31

## 2024-02-10 RX ADMIN — QUETIAPINE FUMARATE 25 MG: 25 TABLET, FILM COATED ORAL at 20:06

## 2024-02-10 RX ADMIN — ASPIRIN 81 MG 81 MG: 81 TABLET ORAL at 08:30

## 2024-02-10 RX ADMIN — HALOPERIDOL LACTATE 5 MG: 5 INJECTION, SOLUTION INTRAMUSCULAR at 00:55

## 2024-02-10 RX ADMIN — POTASSIUM CHLORIDE 10 MEQ: 7.46 INJECTION, SOLUTION INTRAVENOUS at 00:53

## 2024-02-10 RX ADMIN — WATER 5 MG: 1 INJECTION INTRAMUSCULAR; INTRAVENOUS; SUBCUTANEOUS at 10:29

## 2024-02-10 NOTE — CONSULTS
Department of Psychiatry  Consult Service   Psychiatric Assessment        REASON FOR CONSULT: Acute psychosis    CONSULTING PHYSICIAN: Nilda Casas    History obtained from: patient, treatment team, EMR    INTERIM HISTORY:      William is lying in bed with soft-restraints during assessment. Staff note that he is mostly redirectable, but has periods of increased agitation and becomes more aggressive, attempting to get out of bed. He is confused. Discharge focused. Staff note that he has been restless and has required PRN Zyprexa 5 mg twice in the past 24 hours. He is compliant with his scheduled medications. Started on quetiapine 25 mg po BID on 2/7/2024.    He is oriented to self, month and year. Could not identify he was in the hospital or why he was there. He has been responding to internal stimuli. Staff not he is engaging in self-talk. Does appear to be internally stimulated. He is also exhibiting paranoia. Noted that he was not living at home and went to stay in a hotel secondary to concern of carbon monoxide poisoning and believed his food was being poisoned. Staff not that he has been eating during admission, but continues to express these delusions. Patient is an extremely poor historian, unable to provide account of his psychiatric history. Did attempt to reach out to his niece Rubi [phone] with patient's permission, but she was not available. Incomplete phone number for patient's family member Natanael.     Patient noted to have been in surgery yesterday. Had cerebral angiogram and embolization of cervical pseudoaneurysm performed.     MRI obtained 2/6/2024: Chronic microvascular disease without acute intracranial abnormality.     HPI:  Patient initially seen as consult on 2/6/2024        The patient is a 63 y.o. male with with no significant psychiatric history who presented with several weeks of agitation, psychosis. Patient with baseline development delays/encephalopathy.  Per prior

## 2024-02-10 NOTE — DISCHARGE SUMMARY
[facility]   IN-PATIENT SERVICE   [facility]     Discharge Summary     Patient ID: William Babcock  :  1960           MRN: [id number]                                     ACCOUNT:  [id number]   Patient's PCP: Billy Francois MD  Admit Date: 2024   Discharge Date: 2024   Length of Stay: 1  Code Status:  Full Code  Admitting Physician: Charley Pearce MD  Discharge Physician: Jones Ledesma MD      Active Discharge Diagnoses:      Hospital Problem Lists:  Principal Problem:    Psychosis, paranoid (HCC)  Active Problems:    Abnormal CT of the head    Psychosis (HCC)  Resolved Problems:    * No resolved hospital problems. 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  The patient was transferred in stable condition  Significant therapeutic interventions: as above      Significant Diagnostic Studies:   Labs / Micro:  CBC:         Lab Results   Component Value Date/Time     WBC 9.2 02/10/2024 06:34 AM     RBC 3.94 02/10/2024 06:34 AM     HGB 11.8 02/10/2024 06:34 AM     HCT 35.4 02/10/2024 06:34 AM     MCV 89.8 02/10/2024 06:34 AM     MCH 29.9 02/10/2024 06:34 AM     MCHC 33.3 02/10/2024 06:34 AM     RDW 12.8 02/10/2024 06:34 AM      02/10/2024 06:34 AM      BMP:           Lab Results   Component Value Date/Time     GLUCOSE 104 02/10/2024 06:34 AM      02/10/2024 06:34 AM     K 4.0 02/10/2024 06:34 AM      02/10/2024 06:34 AM     CO2 21 02/10/2024 06:34 AM     ANIONGAP 11 02/10/2024 06:34 AM     BUN 7 02/10/2024 06:34 AM     CREATININE 0.6 02/10/2024 06:34 AM     CALCIUM 8.2 02/10/2024 06:34 AM     LABGLOM >60 02/10/2024 06:34 AM     GFRAA >60 08/29/2022 07:37 AM     GFR      08/29/2022 07:37 AM         Radiology:  FL MODIFIED BARIUM SWALLOW W VIDEO     Result Date: 2/8/2024  No penetration or aspiration with the above administered substances. Please see separate speech pathology report for full discussion of findings and recommendations.      CT HEAD WO CONTRAST     Result Date: 2/6/2024  Asymmetric hypodensity in the left corona radiata may represent chronic ischemia versus an acute infarct.  Consider MRI for further evaluation.      MRI brain without contrast     Result Date: 2/6/2024  Chronic microvascular disease without acute intracranial abnormality.      CTA HEAD NECK W CONTRAST     Addendum Date: 2/5/2024    ADDENDUM: There is likely developmental venous anomaly in the left basal ganglia, normal variant.  There is associated coarse calcification, may be related to cavernomas.      Result Date: 2/5/2024  7 mm pseudoaneurysm along proximal right internal carotid artery. Beading along the right internal carotid artery, likely related to fibromuscular dysplasia. No acute abnormality or flow-limiting stenosis of the major arteries of the head.      XR CHEST PORTABLE     Result Date: 2/5/2024  No acute cardiopulmonary abnormality is identified.         Consultations:    Consults:      Final Specialist Recommendations/Findings:   IP CONSULT TO NEUROLOGY  IP CONSULT TO SOCIAL WORK  IP CONSULT TO PSYCHIATRY        The patient was seen and examined on day of discharge and this discharge summary is in conjunction with any daily progress note from day of discharge.

## 2024-02-10 NOTE — H&P
[facility]  Office: [phone]  Wyatt Davis DO, Selvin Brunson DO, Jose Herrera DO, Iván Earl DO, Mallory Willams MD, Charley Pearce MD, Jun Lugo MD, Alivia Lamb MD,  Jose M Casarez MD, Lamonte Steele MD, Rohit Zamora MD,  Reno Haq DO, Sj Singh MD, See Womack MD, Fermin Davis DO, Ashly Melendez MD,  Matias Laura DO, Inna Tinajero MD, Denise Balderrama MD, Marija Oliveira MD, Maki Hassan MD,  Giles Floyd MD, Xenia Rand MD, Elijah Alejandro MD, Jones Ledesma MD, Uzair Mckeon MD, Jada Montero MD, Tc Cabrera DO, Eliseo Mendoza DO, Issac Rubi MD,  Bert Hernandez MD, Shirley Waterhouse, CNP,  Raiza Maher, CNP, Johnie Nelson, CNP,  Queenie Chun, DNP, Haley Munoz, CNP, Selam Arambula, CNP, Kori Flor CNP, Cary Contreras, CNP, Xin Quevedo, CNP, Sienna De Santiago, PA-C, Samara Mar, PA-C, Maria E Vazquez, CNP, Dayana Harris, CNP, Evi Messina, CNS, Kristi Rose, CNP, Rosie Gomez, CNP, Tracy Schwab, CNP         [facility]   IN-PATIENT SERVICE   [facility]    Discharge Summary     Patient ID: William Babcock  :  1960   MRN: [id number]     ACCOUNT:  [id number]   Patient's PCP: Billy Francois MD  Admit Date: 2024   Discharge Date: 2/10/2024   Length of Stay: 1  Code Status:  Full Code  Admitting Physician: Chraley Pearce MD  Discharge Physician: Jones Ledesma MD     Active Discharge Diagnoses:     Hospital Problem Lists:  Principal Problem:    Psychosis, paranoid (HCC)  Active Problems:    Abnormal CT of the head    Psychosis (HCC)  Resolved Problems:    * No resolved hospital problems. *      Admission Condition:  fair     Discharged Condition: stable    Hospital Stay:     Hospital Course:     63-year-old male who was brought into the hospital due to concerning of altered mental status and odd behavior, at time of my evaluation today patient was awake alert and oriented that he  NECK W CONTRAST    Addendum Date: 2/5/2024    ADDENDUM: There is likely developmental venous anomaly in the left basal ganglia, normal variant.  There is associated coarse calcification, may be related to cavernomas.     Result Date: 2/5/2024  7 mm pseudoaneurysm along proximal right internal carotid artery. Beading along the right internal carotid artery, likely related to fibromuscular dysplasia. No acute abnormality or flow-limiting stenosis of the major arteries of the head.     XR CHEST PORTABLE    Result Date: 2/5/2024  No acute cardiopulmonary abnormality is identified.       Consultations:    Consults:     Final Specialist Recommendations/Findings:   IP CONSULT TO NEUROLOGY  IP CONSULT TO SOCIAL WORK  IP CONSULT TO PSYCHIATRY      The patient was seen and examined on day of discharge and this discharge summary is in conjunction with any daily progress note from day of discharge.    Discharge plan:     Disposition: To [facility]        Diet: regular diet    Activity: As tolerated        Discharge Medications:      Medication List        ASK your doctor about these medications      amitriptyline 50 MG tablet  Commonly known as: ELAVIL  Take 1 tablet by mouth nightly     atenolol 50 MG tablet  Commonly known as: TENORMIN  Take 1 tablet by mouth daily     fluticasone 50 MCG/ACT nasal spray  Commonly known as: FLONASE  SHAKE LIQUID AND USE 2 SPRAYS IN EACH NOSTRIL DAILY     Mucinex DM Maximum Strength  MG Tb12  Take 1 tablet by mouth 2 times daily              No discharge procedures on file.    Time Spent on discharge is  33 mins in patient examination, evaluation, counseling as well as medication reconciliation, prescriptions for required medications, discharge plan and follow up.    Electronically signed by   Jones Ledesma MD  2/10/2024  12:26 PM      Thank you Billy Koch MD for the opportunity to be involved in this patient's care.

## 2024-02-10 NOTE — ANESTHESIA POSTPROCEDURE EVALUATION
Department of Anesthesiology  Postprocedure Note    Patient: William Babcock  MRN: [id number]  YOB: 1960  Date of evaluation: 2/10/2024    Procedure Summary       Date: 02/09/24 Room / Location: [facility]    Anesthesia Start: 1318 Anesthesia Stop: 1630    Procedure: IR ANGIOGRAM CAROTID CEREBRAL BILATERAL Diagnosis:       (KARTHIK pseudo aneurysm stenting - Dr. Sellers)      (general anesthesia)    Scheduled Providers:  Responsible Provider: Domo Swift MD    Anesthesia Type: general ASA Status: 3            Anesthesia Type: No value filed.    Wanda Phase I: Wanda Score: 7    Wanda Phase II:      Anesthesia Post Evaluation    Patient location during evaluation: ICU  Patient participation: complete - patient participated  Level of consciousness: awake and alert  Airway patency: patent  Nausea & Vomiting: no nausea and no vomiting  Cardiovascular status: blood pressure returned to baseline  Respiratory status: acceptable  Hydration status: euvolemic  Comments: No known anesthesia related complication  Multimodal analgesia pain management approach  Pain management: adequate    No notable events documented.

## 2024-02-11 LAB
ANION GAP SERPL CALCULATED.3IONS-SCNC: 9 MMOL/L (ref 9–17)
BASOPHILS # BLD: 0.05 K/UL (ref 0–0.2)
BUN SERPL-MCNC: 11 MG/DL (ref 8–23)
CALCIUM SERPL-MCNC: 8 MG/DL (ref 8.6–10.4)
CHLORIDE SERPL-SCNC: 109 MMOL/L (ref 98–107)
CO2 SERPL-SCNC: 22 MMOL/L (ref 20–31)
CREAT SERPL-MCNC: 0.7 MG/DL (ref 0.7–1.2)
EOSINOPHIL # BLD: 0.07 K/UL (ref 0–0.44)
EOSINOPHILS RELATIVE PERCENT: 1 % (ref 1–4)
ERYTHROCYTE [DISTWIDTH] IN BLOOD BY AUTOMATED COUNT: 13.2 % (ref 11.8–14.4)
GFR SERPL CREATININE-BSD FRML MDRD: >60 ML/MIN/1.73M2
GLUCOSE SERPL-MCNC: 76 MG/DL (ref 70–99)
HCT VFR BLD AUTO: 32.1 % (ref 40.7–50.3)
HCT VFR BLD AUTO: 34.2 % (ref 40.7–50.3)
HCT VFR BLD AUTO: 34.5 % (ref 40.7–50.3)
HCT VFR BLD AUTO: 35.4 % (ref 40.7–50.3)
HGB BLD-MCNC: 10.8 G/DL (ref 13–17)
HGB BLD-MCNC: 11.3 G/DL (ref 13–17)
HGB BLD-MCNC: 11.7 G/DL (ref 13–17)
HGB BLD-MCNC: 11.7 G/DL (ref 13–17)
IMM GRANULOCYTES # BLD AUTO: 0.04 K/UL (ref 0–0.3)
IMM GRANULOCYTES NFR BLD: 1 %
INHIBITION AA TEG: 92.4 % (ref 0–11)
INHIBITION ADP TEG: 33.8 % (ref 0–17)
LYMPHOCYTES NFR BLD: 0.9 K/UL (ref 1.1–3.7)
LYMPHOCYTES RELATIVE PERCENT: 13 % (ref 24–43)
MA (MAX CLOT) TEG KAOLIN: 64.2 MM (ref 53–68)
MA(AA) TEG: 16.6 MM (ref 51–71)
MA(ACTIVATED) TEG: 12.7 MM (ref 2–19)
MA(ADP) TEG: 46.8 MM (ref 45–69)
MCH RBC QN AUTO: 30.8 PG (ref 25.2–33.5)
MCHC RBC AUTO-ENTMCNC: 33.6 G/DL (ref 28.4–34.8)
MCV RBC AUTO: 91.5 FL (ref 82.6–102.9)
MONOCYTES NFR BLD: 0.73 K/UL (ref 0.1–1.2)
MONOCYTES NFR BLD: 11 % (ref 3–12)
NEUTROPHILS NFR BLD: 74 % (ref 36–65)
NEUTS SEG NFR BLD: 5.09 K/UL (ref 1.5–8.1)
NRBC BLD-RTO: 0 PER 100 WBC
PLATELET # BLD AUTO: 301 K/UL (ref 138–453)
PMV BLD AUTO: 10.1 FL (ref 8.1–13.5)
POTASSIUM SERPL-SCNC: 3.4 MMOL/L (ref 3.7–5.3)
RBC # BLD AUTO: 3.51 M/UL (ref 4.21–5.77)
SODIUM SERPL-SCNC: 140 MMOL/L (ref 135–144)
WBC OTHER # BLD: 6.9 K/UL (ref 3.5–11.3)

## 2024-02-11 PROCEDURE — 6360000002 HC RX W HCPCS: Performed by: INTERNAL MEDICINE

## 2024-02-11 PROCEDURE — 80048 BASIC METABOLIC PNL TOTAL CA: CPT

## 2024-02-11 PROCEDURE — 85018 HEMOGLOBIN: CPT

## 2024-02-11 PROCEDURE — 2580000003 HC RX 258: Performed by: INTERNAL MEDICINE

## 2024-02-11 PROCEDURE — 6370000000 HC RX 637 (ALT 250 FOR IP): Performed by: NURSE PRACTITIONER

## 2024-02-11 PROCEDURE — 2580000003 HC RX 258: Performed by: STUDENT IN AN ORGANIZED HEALTH CARE EDUCATION/TRAINING PROGRAM

## 2024-02-11 PROCEDURE — 85576 BLOOD PLATELET AGGREGATION: CPT

## 2024-02-11 PROCEDURE — 85014 HEMATOCRIT: CPT

## 2024-02-11 PROCEDURE — 6370000000 HC RX 637 (ALT 250 FOR IP): Performed by: PSYCHIATRY & NEUROLOGY

## 2024-02-11 PROCEDURE — 94761 N-INVAS EAR/PLS OXIMETRY MLT: CPT

## 2024-02-11 PROCEDURE — 99232 SBSQ HOSP IP/OBS MODERATE 35: CPT | Performed by: INTERNAL MEDICINE

## 2024-02-11 PROCEDURE — 99233 SBSQ HOSP IP/OBS HIGH 50: CPT | Performed by: PSYCHIATRY & NEUROLOGY

## 2024-02-11 PROCEDURE — 6370000000 HC RX 637 (ALT 250 FOR IP): Performed by: INTERNAL MEDICINE

## 2024-02-11 PROCEDURE — 2060000000 HC ICU INTERMEDIATE R&B

## 2024-02-11 PROCEDURE — 85025 COMPLETE CBC W/AUTO DIFF WBC: CPT

## 2024-02-11 PROCEDURE — 2580000003 HC RX 258: Performed by: PSYCHIATRY & NEUROLOGY

## 2024-02-11 PROCEDURE — 6370000000 HC RX 637 (ALT 250 FOR IP): Performed by: PHYSICIAN ASSISTANT

## 2024-02-11 PROCEDURE — 36415 COLL VENOUS BLD VENIPUNCTURE: CPT

## 2024-02-11 PROCEDURE — 6360000002 HC RX W HCPCS: Performed by: PHYSICIAN ASSISTANT

## 2024-02-11 RX ORDER — WATER 10 ML/10ML
INJECTION INTRAMUSCULAR; INTRAVENOUS; SUBCUTANEOUS
Status: DISPENSED
Start: 2024-02-11 | End: 2024-02-11

## 2024-02-11 RX ADMIN — QUETIAPINE FUMARATE 25 MG: 25 TABLET, FILM COATED ORAL at 07:52

## 2024-02-11 RX ADMIN — CLOPIDOGREL BISULFATE 75 MG: 75 TABLET ORAL at 07:52

## 2024-02-11 RX ADMIN — ASPIRIN 81 MG 81 MG: 81 TABLET ORAL at 07:52

## 2024-02-11 RX ADMIN — SODIUM CHLORIDE, PRESERVATIVE FREE 10 ML: 5 INJECTION INTRAVENOUS at 07:52

## 2024-02-11 RX ADMIN — AMITRIPTYLINE HYDROCHLORIDE 50 MG: 50 TABLET, FILM COATED ORAL at 22:49

## 2024-02-11 RX ADMIN — QUETIAPINE FUMARATE 25 MG: 25 TABLET, FILM COATED ORAL at 22:49

## 2024-02-11 RX ADMIN — POTASSIUM CHLORIDE 40 MEQ: 1500 TABLET, EXTENDED RELEASE ORAL at 07:52

## 2024-02-11 RX ADMIN — SODIUM CHLORIDE, PRESERVATIVE FREE 10 ML: 5 INJECTION INTRAVENOUS at 22:49

## 2024-02-11 RX ADMIN — WATER 5 MG: 1 INJECTION INTRAMUSCULAR; INTRAVENOUS; SUBCUTANEOUS at 07:41

## 2024-02-11 RX ADMIN — ENOXAPARIN SODIUM 40 MG: 100 INJECTION SUBCUTANEOUS at 07:52

## 2024-02-11 RX ADMIN — ATENOLOL 50 MG: 50 TABLET ORAL at 07:53

## 2024-02-12 ENCOUNTER — HOSPITAL ENCOUNTER (INPATIENT)
Age: 64
LOS: 4 days | Discharge: HOME | DRG: 885 | End: 2024-02-16
Attending: PSYCHIATRY & NEUROLOGY | Admitting: PSYCHIATRY & NEUROLOGY
Payer: MEDICARE

## 2024-02-12 VITALS
DIASTOLIC BLOOD PRESSURE: 56 MMHG | OXYGEN SATURATION: 98 % | BODY MASS INDEX: 23.95 KG/M2 | RESPIRATION RATE: 14 BRPM | HEART RATE: 72 BPM | HEIGHT: 68 IN | SYSTOLIC BLOOD PRESSURE: 107 MMHG | TEMPERATURE: 98.2 F | WEIGHT: 158 LBS

## 2024-02-12 PROBLEM — F23 ACUTE PSYCHOSIS (HCC): Status: ACTIVE | Noted: 2024-02-12

## 2024-02-12 LAB
ANION GAP SERPL CALCULATED.3IONS-SCNC: 11 MMOL/L (ref 9–17)
BASOPHILS # BLD: 0.05 K/UL (ref 0–0.2)
BASOPHILS NFR BLD: 1 % (ref 0–2)
BUN SERPL-MCNC: 18 MG/DL (ref 8–23)
CALCIUM SERPL-MCNC: 7.9 MG/DL (ref 8.6–10.4)
CHLORIDE SERPL-SCNC: 102 MMOL/L (ref 98–107)
CO2 SERPL-SCNC: 22 MMOL/L (ref 20–31)
CREAT SERPL-MCNC: 0.7 MG/DL (ref 0.7–1.2)
EOSINOPHIL # BLD: 0.1 K/UL (ref 0–0.44)
EOSINOPHILS RELATIVE PERCENT: 2 % (ref 1–4)
ERYTHROCYTE [DISTWIDTH] IN BLOOD BY AUTOMATED COUNT: 13 % (ref 11.8–14.4)
GFR SERPL CREATININE-BSD FRML MDRD: >60 ML/MIN/1.73M2
GLUCOSE SERPL-MCNC: 74 MG/DL (ref 70–99)
HCT VFR BLD AUTO: 33.7 % (ref 40.7–50.3)
HCT VFR BLD AUTO: 37.1 % (ref 40.7–50.3)
HGB BLD-MCNC: 11 G/DL (ref 13–17)
HGB BLD-MCNC: 12 G/DL (ref 13–17)
IMM GRANULOCYTES # BLD AUTO: 0.05 K/UL (ref 0–0.3)
IMM GRANULOCYTES NFR BLD: 1 %
LYMPHOCYTES NFR BLD: 1.05 K/UL (ref 1.1–3.7)
LYMPHOCYTES RELATIVE PERCENT: 18 % (ref 24–43)
MCH RBC QN AUTO: 30.1 PG (ref 25.2–33.5)
MCHC RBC AUTO-ENTMCNC: 32.6 G/DL (ref 28.4–34.8)
MCV RBC AUTO: 92.3 FL (ref 82.6–102.9)
MONOCYTES NFR BLD: 0.62 K/UL (ref 0.1–1.2)
MONOCYTES NFR BLD: 10 % (ref 3–12)
NEUTROPHILS NFR BLD: 69 % (ref 36–65)
NEUTS SEG NFR BLD: 4.13 K/UL (ref 1.5–8.1)
NRBC BLD-RTO: 0 PER 100 WBC
PLATELET # BLD AUTO: 195 K/UL (ref 138–453)
PMV BLD AUTO: 9 FL (ref 8.1–13.5)
POTASSIUM SERPL-SCNC: 3.7 MMOL/L (ref 3.7–5.3)
RBC # BLD AUTO: 3.65 M/UL (ref 4.21–5.77)
SODIUM SERPL-SCNC: 135 MMOL/L (ref 135–144)
WBC OTHER # BLD: 6 K/UL (ref 3.5–11.3)

## 2024-02-12 PROCEDURE — 85018 HEMOGLOBIN: CPT

## 2024-02-12 PROCEDURE — 97535 SELF CARE MNGMENT TRAINING: CPT

## 2024-02-12 PROCEDURE — 85014 HEMATOCRIT: CPT

## 2024-02-12 PROCEDURE — 99232 SBSQ HOSP IP/OBS MODERATE 35: CPT | Performed by: INTERNAL MEDICINE

## 2024-02-12 PROCEDURE — 97116 GAIT TRAINING THERAPY: CPT

## 2024-02-12 PROCEDURE — 36415 COLL VENOUS BLD VENIPUNCTURE: CPT

## 2024-02-12 PROCEDURE — 1240000000 HC EMOTIONAL WELLNESS R&B

## 2024-02-12 PROCEDURE — 6370000000 HC RX 637 (ALT 250 FOR IP): Performed by: NURSE PRACTITIONER

## 2024-02-12 PROCEDURE — 6370000000 HC RX 637 (ALT 250 FOR IP): Performed by: PSYCHIATRY & NEUROLOGY

## 2024-02-12 PROCEDURE — 85025 COMPLETE CBC W/AUTO DIFF WBC: CPT

## 2024-02-12 PROCEDURE — 99233 SBSQ HOSP IP/OBS HIGH 50: CPT | Performed by: PSYCHIATRY & NEUROLOGY

## 2024-02-12 PROCEDURE — 80048 BASIC METABOLIC PNL TOTAL CA: CPT

## 2024-02-12 PROCEDURE — 6370000000 HC RX 637 (ALT 250 FOR IP): Performed by: INTERNAL MEDICINE

## 2024-02-12 PROCEDURE — 6360000002 HC RX W HCPCS: Performed by: PHYSICIAN ASSISTANT

## 2024-02-12 PROCEDURE — 99232 SBSQ HOSP IP/OBS MODERATE 35: CPT | Performed by: PSYCHIATRY & NEUROLOGY

## 2024-02-12 PROCEDURE — 97110 THERAPEUTIC EXERCISES: CPT

## 2024-02-12 PROCEDURE — 94761 N-INVAS EAR/PLS OXIMETRY MLT: CPT

## 2024-02-12 RX ORDER — ATENOLOL 50 MG/1
50 TABLET ORAL DAILY
Status: CANCELLED | OUTPATIENT
Start: 2024-02-13

## 2024-02-12 RX ORDER — QUETIAPINE FUMARATE 25 MG/1
25 TABLET, FILM COATED ORAL 2 TIMES DAILY
Status: CANCELLED | OUTPATIENT
Start: 2024-02-12

## 2024-02-12 RX ORDER — SODIUM CHLORIDE 0.9 % (FLUSH) 0.9 %
10 SYRINGE (ML) INJECTION EVERY 12 HOURS SCHEDULED
Status: CANCELLED | OUTPATIENT
Start: 2024-02-12

## 2024-02-12 RX ORDER — SODIUM CHLORIDE 0.9 % (FLUSH) 0.9 %
5-40 SYRINGE (ML) INJECTION EVERY 12 HOURS SCHEDULED
Status: CANCELLED | OUTPATIENT
Start: 2024-02-12

## 2024-02-12 RX ORDER — LABETALOL HYDROCHLORIDE 5 MG/ML
10 INJECTION, SOLUTION INTRAVENOUS
Status: CANCELLED | OUTPATIENT
Start: 2024-02-12

## 2024-02-12 RX ORDER — POTASSIUM CHLORIDE 20 MEQ/1
40 TABLET, EXTENDED RELEASE ORAL PRN
Status: CANCELLED | OUTPATIENT
Start: 2024-02-12

## 2024-02-12 RX ORDER — POLYETHYLENE GLYCOL 3350 17 G/17G
17 POWDER, FOR SOLUTION ORAL DAILY PRN
Status: CANCELLED | OUTPATIENT
Start: 2024-02-12

## 2024-02-12 RX ORDER — SODIUM CHLORIDE 0.9 % (FLUSH) 0.9 %
5-40 SYRINGE (ML) INJECTION PRN
Status: CANCELLED | OUTPATIENT
Start: 2024-02-12

## 2024-02-12 RX ORDER — ACETAMINOPHEN 325 MG/1
650 TABLET ORAL EVERY 6 HOURS PRN
Status: DISCONTINUED | OUTPATIENT
Start: 2024-02-12 | End: 2024-02-16 | Stop reason: HOSPADM

## 2024-02-12 RX ORDER — IBUPROFEN 400 MG/1
400 TABLET ORAL EVERY 6 HOURS PRN
Status: DISCONTINUED | OUTPATIENT
Start: 2024-02-12 | End: 2024-02-16 | Stop reason: HOSPADM

## 2024-02-12 RX ORDER — MAGNESIUM HYDROXIDE/ALUMINUM HYDROXICE/SIMETHICONE 120; 1200; 1200 MG/30ML; MG/30ML; MG/30ML
30 SUSPENSION ORAL EVERY 6 HOURS PRN
Status: DISCONTINUED | OUTPATIENT
Start: 2024-02-12 | End: 2024-02-16 | Stop reason: HOSPADM

## 2024-02-12 RX ORDER — SODIUM CHLORIDE 0.9 % (FLUSH) 0.9 %
10 SYRINGE (ML) INJECTION PRN
Status: CANCELLED | OUTPATIENT
Start: 2024-02-12

## 2024-02-12 RX ORDER — ASPIRIN 81 MG/1
81 TABLET, CHEWABLE ORAL DAILY
Status: CANCELLED | OUTPATIENT
Start: 2024-02-13

## 2024-02-12 RX ORDER — CLOPIDOGREL BISULFATE 75 MG/1
75 TABLET ORAL DAILY
Status: CANCELLED | OUTPATIENT
Start: 2024-02-13

## 2024-02-12 RX ORDER — LORAZEPAM 2 MG/ML
0.5 INJECTION INTRAMUSCULAR EVERY 6 HOURS PRN
Status: CANCELLED | OUTPATIENT
Start: 2024-02-12

## 2024-02-12 RX ORDER — MAGNESIUM SULFATE IN WATER 40 MG/ML
2000 INJECTION, SOLUTION INTRAVENOUS PRN
Status: CANCELLED | OUTPATIENT
Start: 2024-02-12

## 2024-02-12 RX ORDER — ONDANSETRON 4 MG/1
4 TABLET, ORALLY DISINTEGRATING ORAL EVERY 8 HOURS PRN
Status: CANCELLED | OUTPATIENT
Start: 2024-02-12

## 2024-02-12 RX ORDER — ACETAMINOPHEN 325 MG/1
650 TABLET ORAL EVERY 4 HOURS PRN
Status: CANCELLED | OUTPATIENT
Start: 2024-02-12

## 2024-02-12 RX ORDER — POTASSIUM CHLORIDE 7.45 MG/ML
10 INJECTION INTRAVENOUS PRN
Status: CANCELLED | OUTPATIENT
Start: 2024-02-12

## 2024-02-12 RX ORDER — TRAZODONE HYDROCHLORIDE 50 MG/1
50 TABLET ORAL NIGHTLY PRN
Status: DISCONTINUED | OUTPATIENT
Start: 2024-02-12 | End: 2024-02-16 | Stop reason: HOSPADM

## 2024-02-12 RX ORDER — ENOXAPARIN SODIUM 100 MG/ML
40 INJECTION SUBCUTANEOUS DAILY
Status: CANCELLED | OUTPATIENT
Start: 2024-02-13

## 2024-02-12 RX ORDER — HYDROXYZINE 50 MG/1
50 TABLET, FILM COATED ORAL 3 TIMES DAILY PRN
Status: DISCONTINUED | OUTPATIENT
Start: 2024-02-12 | End: 2024-02-16 | Stop reason: HOSPADM

## 2024-02-12 RX ORDER — ACETAMINOPHEN 650 MG/1
650 SUPPOSITORY RECTAL EVERY 6 HOURS PRN
Status: CANCELLED | OUTPATIENT
Start: 2024-02-12

## 2024-02-12 RX ORDER — AMITRIPTYLINE HYDROCHLORIDE 50 MG/1
50 TABLET, FILM COATED ORAL NIGHTLY
Status: CANCELLED | OUTPATIENT
Start: 2024-02-12

## 2024-02-12 RX ORDER — ONDANSETRON 2 MG/ML
4 INJECTION INTRAMUSCULAR; INTRAVENOUS EVERY 6 HOURS PRN
Status: CANCELLED | OUTPATIENT
Start: 2024-02-12

## 2024-02-12 RX ADMIN — ASPIRIN 81 MG 81 MG: 81 TABLET ORAL at 07:54

## 2024-02-12 RX ADMIN — ATENOLOL 50 MG: 50 TABLET ORAL at 07:53

## 2024-02-12 RX ADMIN — ENOXAPARIN SODIUM 40 MG: 100 INJECTION SUBCUTANEOUS at 07:53

## 2024-02-12 RX ADMIN — QUETIAPINE FUMARATE 25 MG: 25 TABLET, FILM COATED ORAL at 07:53

## 2024-02-12 RX ADMIN — CLOPIDOGREL BISULFATE 75 MG: 75 TABLET ORAL at 07:53

## 2024-02-12 NOTE — PLAN OF CARE
Problem: Pain  Goal: Verbalizes/displays adequate comfort level or baseline comfort level  Outcome: Progressing     Problem: Skin/Tissue Integrity  Goal: Absence of new skin breakdown  Description: 1.  Monitor for areas of redness and/or skin breakdown  2.  Assess vascular access sites hourly  3.  Every 4-6 hours minimum:  Change oxygen saturation probe site  4.  Every 4-6 hours:  If on nasal continuous positive airway pressure, respiratory therapy assess nares and determine need for appliance change or resting period.  Outcome: Progressing     Problem: Safety - Adult  Goal: Free from fall injury  Outcome: Progressing     Problem: ABCDS Injury Assessment  Goal: Absence of physical injury  Outcome: Progressing     Problem: Discharge Planning  Goal: Discharge to home or other facility with appropriate resources  Outcome: Progressing     
  Problem: Pain  Goal: Verbalizes/displays adequate comfort level or baseline comfort level  Outcome: Progressing     Problem: Skin/Tissue Integrity  Goal: Absence of new skin breakdown  Description: 1.  Monitor for areas of redness and/or skin breakdown  2.  Assess vascular access sites hourly  3.  Every 4-6 hours minimum:  Change oxygen saturation probe site  4.  Every 4-6 hours:  If on nasal continuous positive airway pressure, respiratory therapy assess nares and determine need for appliance change or resting period.  Outcome: Progressing     Problem: Safety - Adult  Goal: Free from fall injury  Outcome: Progressing     Problem: ABCDS Injury Assessment  Goal: Absence of physical injury  Outcome: Progressing     Problem: Safety - Medical Restraint  Goal: Remains free of injury from restraints (Restraint for Interference with Medical Device)  Description: INTERVENTIONS:  1. Determine that other, less restrictive measures have been tried or would not be effective before applying the restraint  2. Evaluate the patient's condition at the time of restraint application  3. Inform patient/family regarding the reason for restraint  4. Q2H: Monitor safety, psychosocial status, comfort, nutrition and hydration  Outcome: Progressing  Flowsheets  Taken 2/7/2024 1848 by Anisha Maldonado RN  Remains free of injury from restraints (restraint for interference with medical device):   Determine that other, less restrictive measures have been tried or would not be effective before applying the restraint   Evaluate the patient's condition at the time of restraint application   Inform patient/family regarding the reason for restraint   Every 2 hours: Monitor safety, psychosocial status, comfort, nutrition and hydration  Taken 2/7/2024 1639 by Anisha Maldonado RN  Remains free of injury from restraints (restraint for interference with medical device):   Determine that other, less restrictive measures have been tried or would 
  Problem: Pain  Goal: Verbalizes/displays adequate comfort level or baseline comfort level  Outcome: Progressing     Problem: Skin/Tissue Integrity  Goal: Absence of new skin breakdown  Description: 1.  Monitor for areas of redness and/or skin breakdown  2.  Assess vascular access sites hourly  3.  Every 4-6 hours minimum:  Change oxygen saturation probe site  4.  Every 4-6 hours:  If on nasal continuous positive airway pressure, respiratory therapy assess nares and determine need for appliance change or resting period.  Outcome: Progressing     Problem: Safety - Adult  Goal: Free from fall injury  Outcome: Progressing  Flowsheets (Taken 2/8/2024 2000)  Free From Fall Injury: Instruct family/caregiver on patient safety     Problem: ABCDS Injury Assessment  Goal: Absence of physical injury  Outcome: Progressing     Problem: Safety - Medical Restraint  Goal: Remains free of injury from restraints (Restraint for Interference with Medical Device)  Description: INTERVENTIONS:  1. Determine that other, less restrictive measures have been tried or would not be effective before applying the restraint  2. Evaluate the patient's condition at the time of restraint application  3. Inform patient/family regarding the reason for restraint  4. Q2H: Monitor safety, psychosocial status, comfort, nutrition and hydration  Outcome: Progressing  Flowsheets  Taken 2/9/2024 0400 by Darnell Monge RN  Remains free of injury from restraints (restraint for interference with medical device): Determine that other, less restrictive measures have been tried or would not be effective before applying the restraint  Taken 2/9/2024 0200 by Darnell Monge RN  Remains free of injury from restraints (restraint for interference with medical device): Evaluate the patient's condition at the time of restraint application  Taken 2/9/2024 0000 by Darnell Monge RN  Remains free of injury from restraints (restraint for interference with medical device): 
  Problem: Pain  Goal: Verbalizes/displays adequate comfort level or baseline comfort level  Outcome: Progressing  Flowsheets  Taken 2/7/2024 0349  Verbalizes/displays adequate comfort level or baseline comfort level: Encourage patient to monitor pain and request assistance  Taken 2/6/2024 2246  Verbalizes/displays adequate comfort level or baseline comfort level: Encourage patient to monitor pain and request assistance     Problem: Skin/Tissue Integrity  Goal: Absence of new skin breakdown  Description: 1.  Monitor for areas of redness and/or skin breakdown  2.  Assess vascular access sites hourly  3.  Every 4-6 hours minimum:  Change oxygen saturation probe site  4.  Every 4-6 hours:  If on nasal continuous positive airway pressure, respiratory therapy assess nares and determine need for appliance change or resting period.  Outcome: Progressing     Problem: Safety - Adult  Goal: Free from fall injury  Outcome: Progressing  Flowsheets (Taken 2/6/2024 2351)  Free From Fall Injury: Based on caregiver fall risk screen, instruct family/caregiver to ask for assistance with transferring infant if caregiver noted to have fall risk factors     Problem: ABCDS Injury Assessment  Goal: Absence of physical injury  Outcome: Progressing  Flowsheets (Taken 2/6/2024 2351)  Absence of Physical Injury: Implement safety measures based on patient assessment     
  Problem: Pain  Goal: Verbalizes/displays adequate comfort level or baseline comfort level  Outcome: Progressing  Flowsheets (Taken 2/11/2024 2000 by KALEIGH ARNDT)  Verbalizes/displays adequate comfort level or baseline comfort level:   Encourage patient to monitor pain and request assistance   Assess pain using appropriate pain scale   Administer analgesics based on type and severity of pain and evaluate response   Implement non-pharmacological measures as appropriate and evaluate response   Consider cultural and social influences on pain and pain management   Notify Licensed Independent Practitioner if interventions unsuccessful or patient reports new pain     Problem: Skin/Tissue Integrity  Goal: Absence of new skin breakdown  Description: 1.  Monitor for areas of redness and/or skin breakdown  2.  Assess vascular access sites hourly  3.  Every 4-6 hours minimum:  Change oxygen saturation probe site  4.  Every 4-6 hours:  If on nasal continuous positive airway pressure, respiratory therapy assess nares and determine need for appliance change or resting period.  Outcome: Progressing     Problem: Safety - Adult  Goal: Free from fall injury  Outcome: Progressing     Problem: ABCDS Injury Assessment  Goal: Absence of physical injury  Outcome: Progressing     Problem: Discharge Planning  Goal: Discharge to home or other facility with appropriate resources  Outcome: Progressing     
  Problem: Safety - Medical Restraint  Goal: Remains free of injury from restraints (Restraint for Interference with Medical Device)  Description: INTERVENTIONS:  1. Determine that other, less restrictive measures have been tried or would not be effective before applying the restraint  2. Evaluate the patient's condition at the time of restraint application  3. Inform patient/family regarding the reason for restraint  4. Q2H: Monitor safety, psychosocial status, comfort, nutrition and hydration  2/10/2024 0050 by Prerna Day RN  Outcome: Completed  Flowsheets (Taken 2/9/2024 2300)  Remains free of injury from restraints (restraint for interference with medical device): Every 2 hours: Monitor safety, psychosocial status, comfort, nutrition and hydration  2/9/2024 2158 by Prerna Day RN  Outcome: Progressing  Flowsheets  Taken 2/9/2024 2100 by Prerna Day RN  Remains free of injury from restraints (restraint for interference with medical device): Every 2 hours: Monitor safety, psychosocial status, comfort, nutrition and hydration  Taken 2/9/2024 1900 by Prerna Day RN  Remains free of injury from restraints (restraint for interference with medical device): Every 2 hours: Monitor safety, psychosocial status, comfort, nutrition and hydration  Taken 2/9/2024 1835 by Prerna Day RN  Remains free of injury from restraints (restraint for interference with medical device): Every 2 hours: Monitor safety, psychosocial status, comfort, nutrition and hydration  Taken 2/9/2024 0800 by Jesenia Garcia RN  Remains free of injury from restraints (restraint for interference with medical device): Every 2 hours: Monitor safety, psychosocial status, comfort, nutrition and hydration     
  Problem: Safety - Medical Restraint  Goal: Remains free of injury from restraints (Restraint for Interference with Medical Device)  Description: INTERVENTIONS:  1. Determine that other, less restrictive measures have been tried or would not be effective before applying the restraint  2. Evaluate the patient's condition at the time of restraint application  3. Inform patient/family regarding the reason for restraint  4. Q2H: Monitor safety, psychosocial status, comfort, nutrition and hydration  2/8/2024 0010 by Babar Rojo RN  Outcome: Progressing  Flowsheets (Taken 2/7/2024 2000)  Remains free of injury from restraints (restraint for interference with medical device):   Determine that other, less restrictive measures have been tried or would not be effective before applying the restraint   Evaluate the patient's condition at the time of restraint application   Inform patient/family regarding the reason for restraint   Every 2 hours: Monitor safety, psychosocial status, comfort, nutrition and hydration  2/7/2024 1916 by Anisha Maldonado RN  Outcome: Progressing  Flowsheets  Taken 2/7/2024 1848 by Anisha Maldonado RN  Remains free of injury from restraints (restraint for interference with medical device):   Determine that other, less restrictive measures have been tried or would not be effective before applying the restraint   Evaluate the patient's condition at the time of restraint application   Inform patient/family regarding the reason for restraint   Every 2 hours: Monitor safety, psychosocial status, comfort, nutrition and hydration  Taken 2/7/2024 1639 by Anisha Maldonado RN  Remains free of injury from restraints (restraint for interference with medical device):   Determine that other, less restrictive measures have been tried or would not be effective before applying the restraint   Evaluate the patient's condition at the time of restraint application   Inform patient/family regarding the 
  Problem: Safety - Medical Restraint  Goal: Remains free of injury from restraints (Restraint for Interference with Medical Device)  Description: INTERVENTIONS:  1. Determine that other, less restrictive measures have been tried or would not be effective before applying the restraint  2. Evaluate the patient's condition at the time of restraint application  3. Inform patient/family regarding the reason for restraint  4. Q2H: Monitor safety, psychosocial status, comfort, nutrition and hydration  Outcome: Progressing  Flowsheets  Taken 2/10/2024 1200  Remains free of injury from restraints (restraint for interference with medical device):   Determine that other, less restrictive measures have been tried or would not be effective before applying the restraint   Evaluate the patient's condition at the time of restraint application   Inform patient/family regarding the reason for restraint   Every 2 hours: Monitor safety, psychosocial status, comfort, nutrition and hydration  Taken 2/10/2024 1134  Remains free of injury from restraints (restraint for interference with medical device):   Determine that other, less restrictive measures have been tried or would not be effective before applying the restraint   Evaluate the patient's condition at the time of restraint application   Inform patient/family regarding the reason for restraint   Every 2 hours: Monitor safety, psychosocial status, comfort, nutrition and hydration     
  Problem: Safety - Medical Restraint  Goal: Remains free of injury from restraints (Restraint for Interference with Medical Device)  Description: INTERVENTIONS:  1. Determine that other, less restrictive measures have been tried or would not be effective before applying the restraint  2. Evaluate the patient's condition at the time of restraint application  3. Inform patient/family regarding the reason for restraint  4. Q2H: Monitor safety, psychosocial status, comfort, nutrition and hydration  Outcome: Progressing  Flowsheets  Taken 2/9/2024 2100 by Prerna Day RN  Remains free of injury from restraints (restraint for interference with medical device): Every 2 hours: Monitor safety, psychosocial status, comfort, nutrition and hydration  Taken 2/9/2024 1900 by Prerna Day RN  Remains free of injury from restraints (restraint for interference with medical device): Every 2 hours: Monitor safety, psychosocial status, comfort, nutrition and hydration  Taken 2/9/2024 1835 by Prerna Day RN  Remains free of injury from restraints (restraint for interference with medical device): Every 2 hours: Monitor safety, psychosocial status, comfort, nutrition and hydration  Taken 2/9/2024 0800 by Jesenia Garcia RN  Remains free of injury from restraints (restraint for interference with medical device): Every 2 hours: Monitor safety, psychosocial status, comfort, nutrition and hydration     
Monitor safety, psychosocial status, comfort, nutrition and hydration  Taken 2/10/2024 1200  Remains free of injury from restraints (restraint for interference with medical device):   Determine that other, less restrictive measures have been tried or would not be effective before applying the restraint   Evaluate the patient's condition at the time of restraint application   Inform patient/family regarding the reason for restraint   Every 2 hours: Monitor safety, psychosocial status, comfort, nutrition and hydration  Taken 2/10/2024 1134  Remains free of injury from restraints (restraint for interference with medical device):   Determine that other, less restrictive measures have been tried or would not be effective before applying the restraint   Evaluate the patient's condition at the time of restraint application   Inform patient/family regarding the reason for restraint   Every 2 hours: Monitor safety, psychosocial status, comfort, nutrition and hydration

## 2024-02-12 NOTE — CARE COORDINATION
Transitional Planning:  Spoke with patients nurse Rosa who states that psych said patient is appropriate for [facility] as per their assessment.    11:18  Call to access [phone] opt 4.  Spoke with Mary Carmen who verified we are ok with psych eval from 2/10.  Per IM note patient must be free of restraints for over 24 hrs and hgb needs to be stable.  Restraints d/c 2/10/24  at 1945.    1154  Voluntary admission slip signed and faxed to [facility] access [phone] and [facility] [phone].  Provided acces and report number to nurse Lee.  Informed her I will set up transport once we know they have a bed.    14:50  Informed [facility] has bed and will arrange transport.  Per psychiatry note, pt now involuntary.  PS Dr. Willams for pink slip, progress note stating patient is stable for discharge to [facility] & discharge order.      15:10  Left vm for Parvin at [facility] re: above.    15:25  Harmon slip faxed to [facility] and placed in completed blue packet.  Given to nurse Lee.

## 2024-02-12 NOTE — DISCHARGE SUMMARY
[facility]  Office: [phone]  Wyatt Davis DO, Selvin Brunson DO, Jose Herrera DO, Iván Earl DO, Mallory Willams MD, Charley Pearce MD, Jun Lugo MD, Alivia Lamb MD,  Jose M Casarez MD, Lamonte Steeel MD, Rohit Zamora MD,  Reno Haq DO, Sj Singh MD, See Womack MD, Fermin Davis DO, Ashly Melendez MD,  Matias Laura DO, Inna Tinajero MD, Denise Balderrama MD, Marija Oliveira MD, Maki Hassan MD,  Giles Floyd MD, Xenia Rand MD, Elijah Alejandro MD, Jones Ledesma MD, Uzair Mckeon MD, Jada Montero MD, Tc Cabrera DO, Eliseo Mendoza DO, Issac Rubi MD,  Bert Hernandez MD, Shirley Waterhouse, CNP,  Raiza Maher, CNP, Johnie Nelson, CNP,  Queenie Chun, DNP, Haley Munoz, CNP, Selam Arambula, CNP, Kori Flor CNP, Cary Contreras, CNP, Xin Quevedo, CNP, Sienna De Santiago, PA-C, Samara Mar PA-C, Maria E Vazquez, CNP, Dayana Harris, CNP, Evi Messina, CNS, Kristi Rose, CNP, Rosie Gomez, CNP, Tracy Schwab, CNP         [facility]   IN-PATIENT SERVICE   [facility]    Discharge Summary     Patient ID: William Babcock  :  1960   MRN: [id number]     ACCOUNT:  [id number]   Patient's PCP: Billy Francois MD  Admit Date: 2024   Discharge Date: 2024     Length of Stay: 6  Code Status:  Full Code  Admitting Physician: No admitting provider for patient encounter.  Discharge Physician: Mallory Willams MD     Active Discharge Diagnoses:     Hospital Problem Lists:  Principal Problem:    Pseudoaneurysm (HCC)  Active Problems:    Preoperative cardiovascular examination    Psychosis, paranoid (HCC)    Carotid pseudoaneurysm (HCC)    Agitation    Stenosis of right carotid artery greater than 50%    Secondary hypertension  Resolved Problems:    * No resolved hospital problems. *      Admission Condition:  poor     Discharged Condition: Medically stable    Hospital Stay:   Admitting history:  William

## 2024-02-12 NOTE — CARE COORDINATION
Transitional Planning:  Call from Parvin at [facility].  [phone].  Informed her patient is off restrains and awaiting psych consult.

## 2024-02-12 NOTE — CONSULTS
Department of Psychiatry  Consult Service  Progress Note     Reason for Consult: Paranoia    SUBJECTIVE:    Patient is a 63-year-old male with extensive history of MRDD presented initially for worsening paranoia but was admitted to the medical floor for workup of pseudoaneurysm.  Currently endovascular neurology cleared him and plan to continue him on aspirin and Plavix with follow-up with them in 2 weeks.  Psychiatry was consulted to help with severe worsening paranoia and ongoing agitation.  Last he was evaluated by psychiatry on 2/10/2024 during which recommended inpatient psych hospitalization after he is medically cleared.  This morning he has been out of restraints has been very pleasant however has limited understanding of his situation.  Continues to be very paranoid that people are poisoning him.  Indifferent to the idea that he was talking to people and he also had a restraining order against him.  Discussed with him about inpatient hospitalization and he is hesitant about the plan.  Mentions that [place] and stayed [place] until his friends can come pick him up.  Some thought disorganization noted.  Unable to come up with a good safety plan at this time.    OBJECTIVE      Medications  Current Facility-Administered Medications: aspirin chewable tablet 81 mg, 81 mg, Oral, Daily  labetalol (NORMODYNE;TRANDATE) injection 10 mg, 10 mg, IntraVENous, Q1H PRN  sodium chloride flush 0.9 % injection 5-40 mL, 5-40 mL, IntraVENous, 2 times per day  sodium chloride flush 0.9 % injection 5-40 mL, 5-40 mL, IntraVENous, PRN  0.9 % sodium chloride infusion, , IntraVENous, PRN  acetaminophen (TYLENOL) tablet 650 mg, 650 mg, Oral, Q4H PRN  ondansetron (ZOFRAN-ODT) disintegrating tablet 4 mg, 4 mg, Oral, Q8H PRN **OR** ondansetron (ZOFRAN) injection 4 mg, 4 mg, IntraVENous, Q6H PRN  LORazepam (ATIVAN) injection 0.5 mg, 0.5 mg, IntraVENous, Q6H PRN  OLANZapine (ZyPREXA) 5 mg in sterile water 1 mL injection, 5 mg,

## 2024-02-12 NOTE — PROGRESS NOTES
Endovascular Neurosurgery  Progress Note      Reason for evaluation: R ICA pseudoaneurysm    SUBJECTIVE:     Confused and agitated overnight, otherwise no new event    Procedure was aborted yesterday before it started due to pt's volatile BP after intubation, anesthesia recommended cardiac clearance with ECHO      History of Chief Complaint from 2/7/2024:      63 year old man with autism, presented to [facility] ED for odd behavior, agitation and psychosis over the past 4-6 weeks. 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    Review of Systems:  CONSTITUTIONAL:  negative for fevers, chills, fatigue and malaise    EYES:  negative for double vision, blurred vision and photophobia     HEENT:  negative for tinnitus, epistaxis and sore throat    RESPIRATORY:  negative for cough, shortness of breath, wheezing    CARDIOVASCULAR:  negative for chest pain, palpitations, syncope, edema    GASTROINTESTINAL:  negative for nausea, vomiting    GENITOURINARY:  negative for incontinence    MUSCULOSKELETAL:  negative for neck or back pain    NEUROLOGICAL:  Negative for weakness and tingling  negative for headaches and dizziness    PSYCHIATRIC:  negative for anxiety      Review of systems otherwise negative.      OBJECTIVE:     Vitals:    02/08/24 0604   BP: (!) 102/55   Pulse: 67   Resp: 16   Temp: 97.7 °F (36.5 °C)   SpO2: 98%        General:  Gen: normal habitus, NAD  HEENT: NCAT, mucosa moist  Cvs: RRR, S1 S2 normal  Resp: symmetric unlabored breathing  Abd: s/nd/nt  Ext: no edema  Skin: no lesions seen, warm and dry    Neuro:  Gen: awake and alert, oriented x3 but agitated  Lang/speech: no aphasia or dysarthria. Follows commands.  CN: PERRL, EOMI, VFF, V1-3 intact, face symmetric, hearing intact, shoulder shrug symmetric, tongue midline  Motor: grossly 5/5 UE and LE b/l  Sense: LT intact in all 4 
    Endovascular Neurosurgery  Progress Note      Reason for evaluation: R ICA pseudoaneurysm    SUBJECTIVE:     Doing well, well oriented, know the [place] chief won the game. Otherwise no new event      History of Chief Complaint from 2/7/2024:      63 year old man with autism, presented to [facility] ED for odd behavior, agitation and psychosis over the past 4-6 weeks. 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    Review of Systems:  CONSTITUTIONAL:  negative for fevers, chills, fatigue and malaise    EYES:  negative for double vision, blurred vision and photophobia     HEENT:  negative for tinnitus, epistaxis and sore throat    RESPIRATORY:  negative for cough, shortness of breath, wheezing    CARDIOVASCULAR:  negative for chest pain, palpitations, syncope, edema    GASTROINTESTINAL:  negative for nausea, vomiting    GENITOURINARY:  negative for incontinence    MUSCULOSKELETAL:  negative for neck or back pain    NEUROLOGICAL:  Negative for weakness and tingling  negative for headaches and dizziness    PSYCHIATRIC:  negative for anxiety      Review of systems otherwise negative.      OBJECTIVE:     Vitals:    02/12/24 0410   BP: (!) 88/59   Pulse: 58   Resp: 15   Temp: 98.6 °F (37 °C)   SpO2: 95%        General:  Gen: normal habitus, NAD  HEENT: NCAT, mucosa moist  Cvs: RRR, S1 S2 normal  Resp: symmetric unlabored breathing  Abd: s/nd/nt  Ext: no edema  Skin: no lesions seen, warm and dry  Right groin: small bruise, no swelling, distal pulse normal    Neuro:  Gen: awake and alert, oriented x3  Lang/speech: no aphasia or dysarthria. Follows commands.  CN: PERRL, EOMI, VFF, V1-3 intact, face symmetric, hearing intact, shoulder shrug symmetric, tongue midline  Motor: grossly 5/5 UE and LE b/l  Sense: LT intact in all 4 ext.  Coord: FTN and HTS intact b/l  DTR: deferred  Gait: deferred    NIH 
    Endovascular Neurosurgery  Progress Note      Reason for evaluation: R ICA pseudoaneurysm    SUBJECTIVE:     Pleasantly confused. Otherwise no new event      History of Chief Complaint from 2/7/2024:      63 year old man with autism, presented to [facility] ED for odd behavior, agitation and psychosis over the past 4-6 weeks. 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    Review of Systems:  CONSTITUTIONAL:  negative for fevers, chills, fatigue and malaise    EYES:  negative for double vision, blurred vision and photophobia     HEENT:  negative for tinnitus, epistaxis and sore throat    RESPIRATORY:  negative for cough, shortness of breath, wheezing    CARDIOVASCULAR:  negative for chest pain, palpitations, syncope, edema    GASTROINTESTINAL:  negative for nausea, vomiting    GENITOURINARY:  negative for incontinence    MUSCULOSKELETAL:  negative for neck or back pain    NEUROLOGICAL:  Negative for weakness and tingling  negative for headaches and dizziness    PSYCHIATRIC:  negative for anxiety      Review of systems otherwise negative.      OBJECTIVE:     Vitals:    02/11/24 1000   BP: 110/70   Pulse: 75   Resp: 22   Temp:    SpO2:         General:  Gen: normal habitus, NAD  HEENT: NCAT, mucosa moist  Cvs: RRR, S1 S2 normal  Resp: symmetric unlabored breathing  Abd: s/nd/nt  Ext: no edema  Skin: no lesions seen, warm and dry  Right groin: small bruise, no swelling, distal pulse normal    Neuro:  Gen: awake and alert, oriented x1  Lang/speech: no aphasia or dysarthria. Follows commands.  CN: PERRL, EOMI, VFF, V1-3 intact, face symmetric, hearing intact, shoulder shrug symmetric, tongue midline  Motor: grossly 5/5 UE and LE b/l  Sense: LT intact in all 4 ext.  Coord: FTN and HTS intact b/l  DTR: deferred  Gait: deferred    NIH Stroke Scale:   1a  Level of consciousness: 0 - alert; keenly 
    Endovascular Neurosurgery  Progress Note      Reason for evaluation: R ICA pseudoaneurysm    SUBJECTIVE:     Pleasantly confused. Otherwise no new event      History of Chief Complaint from 2/7/2024:      63 year old man with autism, presented to [facility] ED for odd behavior, agitation and psychosis over the past 4-6 weeks. 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    Review of Systems:  CONSTITUTIONAL:  negative for fevers, chills, fatigue and malaise    EYES:  negative for double vision, blurred vision and photophobia     HEENT:  negative for tinnitus, epistaxis and sore throat    RESPIRATORY:  negative for cough, shortness of breath, wheezing    CARDIOVASCULAR:  negative for chest pain, palpitations, syncope, edema    GASTROINTESTINAL:  negative for nausea, vomiting    GENITOURINARY:  negative for incontinence    MUSCULOSKELETAL:  negative for neck or back pain    NEUROLOGICAL:  Negative for weakness and tingling  negative for headaches and dizziness    PSYCHIATRIC:  negative for anxiety      Review of systems otherwise negative.      OBJECTIVE:     Vitals:    02/10/24 0800   BP:    Pulse: (!) 122   Resp: 16   Temp: 98.5 °F (36.9 °C)   SpO2: 96%        General:  Gen: normal habitus, NAD  HEENT: NCAT, mucosa moist  Cvs: RRR, S1 S2 normal  Resp: symmetric unlabored breathing  Abd: s/nd/nt  Ext: no edema  Skin: no lesions seen, warm and dry  Right groin: small bruise, no swelling, distal pulse normal    Neuro:  Gen: awake and alert, oriented x1  Lang/speech: no aphasia or dysarthria. Follows commands.  CN: PERRL, EOMI, VFF, V1-3 intact, face symmetric, hearing intact, shoulder shrug symmetric, tongue midline  Motor: grossly 5/5 UE and LE b/l  Sense: LT intact in all 4 ext.  Coord: FTN and HTS intact b/l  DTR: deferred  Gait: deferred    NIH Stroke Scale:   1a  Level of consciousness: 0 
   Daily Progress Note  Neuro Critical Care    Patient Name: William Babcock  Patient : 1960  Room/Bed: 0130/0130-01  Code Status: Full Code  Allergies: No Known Allergies    CHIEF COMPLAINT:      Paranoia, AMS      INTERVAL HISTORY    Initial Presentation (Admitted 24):  The patient is a 63 y.o. male w/ Hx of MRDD, autism presented with increased paranoia and AMS to [facility] ED on 24 with family. Per chart review his nephew was worried for increasing confusion and \"alarming voicemails\", showing up in weird places over the last few weeks with increasing paranoia over the last few days. Patient recently had restraining order placed against him for stalking. He was telling family someone was putting medications/poison in his food and has been staying at a hotel over fear of carbon monoxide poisoning. Patient had imaging obtain of his head at OSH and CTH showed asymmetric hypodensity in L corona radiata concerning for chronic ischemia vs acute infarct. CTA head and neck with 7mm pseudoaneurysm in R ICA with beading along R ICA likely related to fibromuscular dysplasia. Patient was transferred here for further work up. MRI obtain on  with chronic microvascular disease without acute intracranial abnormality. Patient taken on  for flow diverter embolization of pseudoaneurysm however after intubation patients BP became labile in the OR and procedure was aborted. Patient was extubated and brought back to the [facility]. Of note, during admission to medicine service patient has required multiple antipsychotics and sitter bedside. Patient was evaluated by psychiatry on . At that time was endorsing auditory hallucinations and stated he previously had auditory hallucinations back in .      Hospital Course:   : NEV attempted embolization however patient BP labile, procedure aborted, transferred to [facility].     Last 24h:    No acute events overnight. -150s. Afebrile. Episodes of sinus tachycardia, 
  [facility] Neurology Specialist  [address]  [place] [address]  PH:  [phone] or [phone]  FAX:  [phone]            Brief history: William Babcock is a 63 y.o. old male admitted on 2/6/2024 with       Subjective: No new neurological events overnight. Patient denies any new weakness, numbness, tingling or headache.     Objective: BP (!) 152/92   Pulse 96   Temp 98.1 °F (36.7 °C) (Oral)   Resp 18   Ht 1.727 m (5' 7.99\")   Wt 72 kg (158 lb 11.7 oz)   SpO2 100%   BMI 24.14 kg/m²       Medications:    QUEtiapine  25 mg Oral BID    atenolol  50 mg Oral Daily    amitriptyline  50 mg Oral Nightly    sodium chloride flush  10 mL IntraVENous 2 times per day    aspirin  81 mg Oral Daily    clopidogrel  75 mg Oral Daily    midazolam  2 mg IntraVENous Once    sodium chloride flush  5-40 mL IntraVENous 2 times per day    enoxaparin  40 mg SubCUTAneous Daily      General examination:    Head: Normocephalic, atraumatic  Eyes: Extraocular movements intact  Lungs: Respirations unlabored, chest wall no deformity  ENT: Normal external ear canals, no sinus tenderness  Heart: Regular rate rhythm  Abdomen: No masses, tenderness  Extremities: No cyanosis or edema, 2+ pulses  Skin: Intact, normal skin color    Neurological examination:    Mental status   Alert and oriented; intact memory with no confusion, speech or language problems; no hallucinations or delusions     Cranial nerves   II - visual fields intact to confrontation                                                III, IV, VI - extra-ocular muscles full: no pupillary defect; no SARA, no nystagmus, no ptosis   V - normal facial sensation                                                               VII - normal facial symmetry                                                             VIII - intact hearing                                                                             IX, X - symmetrical palate                                
  Physician Progress Note      PATIENT:               DINA MEDELLIN  CSN #:                  [id number]  :                       1960  ADMIT DATE:       2024 10:24 PM  DISCH DATE:  RESPONDING  PROVIDER #:        BHAVNA FERRLEL          QUERY TEXT:    Pt admitted with Rt ICA pseudoaneurysm. Pt noted to have  documentation on    per anesthesia note that after induction became  severely hypotensive   requiring procedure to be aborted and anesthetic to be discontinued. Event   treated with fluids and significant doses described in note of phenylephrine,   epinephrine , calcium and vasopressin  with documentation of BP eventually   recovered and not a normal response. Transferred to ICU for monitoring.NCC H&P   notes BP labile with noted systolic of . If possible, please document   in the progress notes and discharge summary if yo    The medical record reflects the following:  Risk Factors: age, acute psychosis with administration of Haldol  Clinical Indicators:admitted with Rt ICA pseudoaneurysm. Pt noted to have    documentation on  per anesthesia note that after induction became  severely   hypotensive requiring procedure to be aborted and anesthetic to be   discontinued. Event treated with fluids and significant doses described in   note of phenylephrine, epinephrine , calcium and vasopressin  with   documentation of BP eventually recovered and not a normal response.   Transferred to ICU for monitoring.NCC H&P notes BP labile with noted systolic   of .  Treatment: ICU monitoring, procedure , iv Phenylephrine, epinephrine,   calcium and vasopressin IV    Thank You Fredrick CLAYTON BSN CCDS  Email [email or web address]  Cell [phone]  office hours M-F 6am to 2:30p  Options provided:  -- Shock due to anesthesia administration  -- Hypovolemic Shock  -- Hypovolemia without Shock  -- Hypotension without Shock  -- Other - I will add my own diagnosis  -- Disagree - Not applicable / Not 
  [facility]  Occupational Therapy Not Seen Note    DATE: 2024    NAME: William Babcock  MRN: [id number]   : 1960      Patient not seen this date for Occupational Therapy due to:    Patient is not appropriate for active participation in OT evaluation/treatment at this time d/t confusion, agitation and unable to be unrestrained at this time per RN. OT will check back tomorrow as able.    Next Scheduled Treatment:       Electronically signed by CHLOÉ Gimenez on 2024 at 12:12 PM    
  [facility]  Progress Note                   Date:   2/9/2024  Patient name: William Babcock  Date of admission:  2/6/2024 10:24 PM  MRN:   [id number]  YOB: 1960  PCP: Billy Francois MD    Reason for Admission: Pseudoaneurysm (HCC) [I72.9]  Psychosis, paranoid (HCC) [F22]    Subjective:       Clinical Changes /Abnormalities: Seen & examined in room with RN. No CV issues/concerns overnight. Labs, vitals, tele, & echo reviewed. NPO for surgery this afternoon per RN.     Review of Systems    Medications:   Scheduled Meds:   potassium chloride  10 mEq IntraVENous Q1H    QUEtiapine  25 mg Oral BID    [Held by provider] atenolol  50 mg Oral Daily    amitriptyline  50 mg Oral Nightly    sodium chloride flush  10 mL IntraVENous 2 times per day    aspirin  81 mg Oral Daily    clopidogrel  75 mg Oral Daily    sodium chloride flush  5-40 mL IntraVENous 2 times per day    [Held by provider] enoxaparin  40 mg SubCUTAneous Daily     Continuous Infusions:   sodium chloride      dextrose Stopped (02/08/24 1426)    dextrose      sodium chloride       CBC:   Recent Labs     02/07/24  0617 02/08/24 0456 02/09/24 0624   WBC 4.5 6.6 5.3   HGB 14.0 13.9 14.0    178 182     BMP:    Recent Labs     02/07/24  0617 02/07/24  1522 02/08/24 0456 02/09/24 0624   *  --  139 137   K 3.5*  --  3.4* 3.1*     --  104 99   CO2 21  --  25 27   BUN 12  --  7* 6*   CREATININE 0.8 Can not be calculated 0.6* 0.8   GLUCOSE 83  --  106* 139*     Hepatic:  Recent Labs     02/08/24 0456   AST 28   ALT 14   BILITOT 1.0   ALKPHOS 63     Troponin: No results for input(s): \"TROPHS\" in the last 72 hours.  BNP: No results for input(s): \"BNP\" in the last 72 hours.  Lipids: No results for input(s): \"CHOL\", \"HDL\" in the last 72 hours.    Invalid input(s): \"LDLCALCU\"  INR: No results for input(s): \"INR\" in the last 72 hours.    Objective:   Vitals: BP (!) 88/57   Pulse 59   Temp 97.2 °F (36.2 °C)   Resp 
2250 Patient arrived in the unit, agitated, restless, and impulsive. Unable to sit still and has been ripping the leads on the chest. NP notified, RN unable to keep px on telemetry monitoring. Bedside sitter at bedside    2350 attempted to put back on telemetry while asleep; px awaken and has been refusing it back on. Px still pulling off cords and patches on chest. Kept safe; No other needs at this time.     
All belongings taken down to room 130 in the neuro ICU by nursing assistant Sophia.  
Assessment: Patient was awake when  visited. Family was not present at the time. Patient sounded a bit confused and declined spiritual care. Patient said he was raised Denominational.   Plan: No follow up visits recommended unless called.     
Blood drawn and sent at appropriate time. PTT lab value not back in timely matter. Lab called, tx two times. Per lab, will run blood received; now.  
[facility]  Speech Language Pathology    Date: 2/7/2024  Patient Name: William Babcock  YOB: 1960   AGE: 63 y.o.  MRN: [id number]        Patient Not Available for Speech Therapy     Due to:  [] Testing  [] Hemodialysis  [] Cancelled by RN  [] Surgery   [] Intubation/Sedation/Pain Medication  [] Medical instability  [x] Other: New orders for bedside swallowing eval received. Recommendations from ST garcia yesterday were for a modified barium swallow study due to overt s/s of aspiration. No orders were place.  contacted RN who reports that pt is not appropriate for a MBSS this date and is also NPO for a procedure. RN to requests MBSS orders which will be completed 2/8 or as appropriate.     Next scheduled treatment: 2/8/24    Alessandra Medrano M.S., CCC-SLP    Completed by: Alessandra Medrano, SLP    
Occupational Therapy    [facility]  Occupational Therapy Not Seen Note    DATE: 2024    NAME: William Babcock  MRN: [id number]   : 1960      Patient not seen this date for Occupational Therapy due to:    Other: Hold per RN; pt intermittently following commands. OT will chk back PM as time allows or 2024.    Next Scheduled Treatment: PM or 2024    Electronically signed by CHLOÉ Squires on 2024 at 10:11 AM   
Occupational Therapy  Facility/Department: [address] NEURO ICU  Occupational Therapy Initial Assessment    Name: William Babcock  : 1960  MRN: [id number]  Date of Service: 2024    No chief complaint on file.  The patient is a 63 y.o. male w/ Hx of MRDD, autism presented with increased paranoia and AMS to [place] ED on 24 with family. Per chart review his nephew was worried for increasing confusion and \"alarming voicemails\", showing up in weird places over the last few weeks with increasing paranoia over the last few days. Patient had imaging obtain of his head at OSH and CTH showed asymmetric hypodensity in L corona radiata concerning for chronic ischemia vs acute infarct. CTA head and neck with 7mm pseudoaneurysm in R ICA with beading along R ICA likely related to fibromuscular dysplasia. Patient was transferred here for further work up. MRI obtain on  with chronic microvascular disease without acute intracranial abnormality. Patient taken on  for flow diverter embolization of pseudoaneurysm however after intubation patients BP became labile in the OR and procedure was aborted. Of note, during admission to medicine service patient has required multiple antipsychotics and sitter bedside. Patient was evaluated by psychiatry on . At that time was endorsing auditory hallucinations and stated he previously had auditory hallucinations back in .      Discharge Recommendations:  Patient would benefit from continued therapy after discharge  OT Equipment Recommendations  Other: continue to assess       Patient Diagnosis(es): The encounter diagnosis was Pseudoaneurysm (HCC).  Past Medical History:  has a past medical history of Anxiety and Autism.  Past Surgical History:  has a past surgical history that includes eye surgery.           Assessment   Performance deficits / Impairments: Decreased functional mobility ;Decreased ADL status;Decreased safe awareness;Decreased cognition;Decreased 
Occupational Therapy  Facility/Department: [address] NEURO ICU  Occupational Therapy Daily Treatment    Name: William Babcock  : 1960  MRN: [id number]  Date of Service: 2024    Discharge Recommendations:  Patient would benefit from continued therapy after discharge     Patient Diagnosis(es): The encounter diagnosis was Pseudoaneurysm (HCC).  Past Medical History:  has a past medical history of Anxiety and Autism.  Past Surgical History:  has a past surgical history that includes eye surgery and Cerebral angiogram (2024).     Assessment   Performance deficits / Impairments: Decreased functional mobility ;Decreased ADL status;Decreased safe awareness;Decreased cognition;Decreased balance;Decreased high-level IADLs;Decreased fine motor control;Decreased endurance  Assessment: Patient demonstrates decreased safety awareness throughout session, requiring verbal cues for implementation of fall prevention strategies. Pt completed LB dressing task in seated position at Supervision and engaged in functional reaching task to simulate ADL and IADL tasks at SBA with good success. Pt would benefit from continued acute OT services to address functional deficits to promote safety and independence for safe return to prior living environment.  Prognosis: Good  Decision Making: Medium Complexity  REQUIRES OT FOLLOW-UP: Yes  Activity Tolerance  Activity Tolerance: Treatment limited secondary to decreased cognition;Patient Tolerated treatment well        Plan   Occupational Therapy Plan  Times Per Week: 2-3x/wk  Current Treatment Recommendations: Balance training, Functional mobility training, Safety education & training, Patient/Caregiver education & training, Equipment evaluation, education, & procurement, Self-Care / ADL, Cognitive/Perceptual training, Sensory integration     Restrictions  Restrictions/Precautions  Restrictions/Precautions: Up as Tolerated  Required Braces or Orthoses?: No  Position Activity 
Patient agitated, tachycardic, and attempting to get out of bed with bilateral wrist restraints and sitter at bedside. RN notified neuro critical team. New orders received.   
Patient continuing to get out of bilateral wrist restraints and attempting to get out of bed with bedside sitter. RN had to place left mitten. RN notified neuro critical resident of 3.5 potassium and speech consult notes recommending to keep NPO and complete barium swallow.   
Patient taken to Neuro ICU on monitor with Aspen rodríguez RN and Pct. Personally assisted RN on icu unit to get patient transferred to their monitor. Patient safety maintained.   
Physical Therapy        Physical Therapy Cancel Note      DATE: 2024    NAME: William Babcock  MRN: [id number]   : 1960      Patient not seen this date for Physical Therapy due to:    Patient is not appropriate for PT evaluation/treatment at this time d/t discussed with RN, pt following minimal commands at this time, and is sleeping right now. Will ck back in PM if able, or       Electronically signed by Savanah Meyers PT on 2024 at 10:11 AM    
Physical Therapy  Facility/Department: [address] NEURO ICU  Physical Therapy Initial Assessment    Name: William Babcock  : 1960  MRN: [id number]  Date of Service: 2024    Discharge Recommendations: Further therapy recommended at discharge.  No chief complaint on file.  The patient is a 63 y.o. male w/ Hx of MRDD, autism presented with increased paranoia and AMS to [place] ED on 24 with family. Per chart review his nephew was worried for increasing confusion and \"alarming voicemails\", showing up in weird places over the last few weeks with increasing paranoia over the last few days. Patient had imaging obtain of his head at OSH and CTH showed asymmetric hypodensity in L corona radiata concerning for chronic ischemia vs acute infarct. CTA head and neck with 7mm pseudoaneurysm in R ICA with beading along R ICA likely related to fibromuscular dysplasia. Patient was transferred here for further work up. MRI obtain on  with chronic microvascular disease without acute intracranial abnormality. Patient taken on  for flow diverter embolization of pseudoaneurysm however after intubation patients BP became labile in the OR and procedure was aborted. Of note, during admission to medicine service patient has required multiple antipsychotics and sitter bedside. Patient was evaluated by psychiatry on . At that time was endorsing auditory hallucinations and stated he previously had auditory hallucinations back in .          PT Equipment Recommendations  Equipment Needed: No      Patient Diagnosis(es): The encounter diagnosis was Pseudoaneurysm (HCC).  Past Medical History:  has a past medical history of Anxiety and Autism.  Past Surgical History:  has a past surgical history that includes eye surgery.    Assessment   Body Structures, Functions, Activity Limitations Requiring Skilled Therapeutic Intervention: Decreased functional mobility ;Decreased ADL status;Decreased body mechanics;Decreased 
Report called to I  
[facility]  Office: [phone]  Wyatt Davis DO, Selvin Brunson DO, Jose Herrera DO, Iván Earl DO, Mallory Willams MD, Charley Pearce MD, Jun Lugo MD, Alivia Lamb MD,  Jose M Casarez MD, Lamonte Steele MD, Rohit Zamora MD,  Reno Haq DO, Sj Singh MD, See Womack MD, Fermin Davis DO, Ashly Melendez MD,  Matias Laura DO, Inna Tinajero MD, Denise Balderrama MD, Marija Oliveira MD, Maki Hassan MD,  Giles Floyd MD, Xenia Rand MD, Elijah Alejandro MD, Jones Ledesma MD, Uzair Mckeon MD, Jada Montero MD, Tc Cabrera DO, Eliseo Mendoza DO, Issac Rubi MD,  Bert Hernandez MD, Shirley Waterhouse, CNP,  Raiza Maher CNP, Johnie Nelson, CNP,  Queenie Chun, DNP, Haley Munoz, CNP, Selam Arambula, CNP, Kori Flor CNP, Cary Contreras CNP, Xin Quevedo, CNP, Sienna De Santiago, PA-C, Samara Mar, PA-C, Maria E Vazquez, CNP, Dayana Harris, CNP, Evi Messina, CNS, Kristi Rose, CNP, Rosie Gomez CNP, Tracy Schwab, CNP         [facility]   IN-PATIENT SERVICE   [facility]    Progress Note    2/11/2024    12:03 PM    Name:   William Babcock  MRN:     [id number]     Acct:      [id number]   Room:   013/0130-01   Day:  5  Admit Date:  2/6/2024 10:24 PM    PCP:   Billy Francois MD  Code Status:  Full Code    Subjective:     C/C: AMS     Interval History Status: .   Patient is off restraints since yesterday evening 2/10/2024.  He is still pleasantly confused but he knows the place and persons as well as time and year.  Has been eating by himself ,he is not agitated at present  Vascular neurology determined that he is medically stable for to [facility].  [facility] will take him once he is off restraints for 24 hours or more    Brief History:   William Babcock is a 64 y/o male who presented to [place] ED 2/5/24 for AMS, agitation, and psychosis for several weeks. Patient was found to have 7mm pseudoaneurysm with R proximal 
Writer contacted [facility], to request [facility] visit for pt. [place] office will pass along request to staff.  
answers both questions correctly   1c. LOC commands: 0 - performs both tasks correctly   2.  Best Gaze: 0 - normal   3. Visual: 0 - no visual loss   4. Facial Palsy: 0 - normal symmetric movement   5a. Motor left arm: 0 - no drift, limb holds 90 (or 45) degrees for full 10 seconds   5b.  Motor right arm: 0 - no drift, limb holds 90 (or 45) degrees for full 10 seconds   6a. Motor left le - no drift; leg holds 30 degree position for full 5 seconds   6b  Motor right le - no drift; leg holds 30 degree position for full 5 seconds   7. Limb Ataxia: 0 - absent   8.  Sensory: 0 - normal; no sensory loss   9. Best Language:  0 - no aphasia, normal   10. Dysarthria: 0 - normal   11. Extinction and Inattention: 0 - no abnormality         Total:   0     MRS: 0      LABS:   Reviewed.  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 Endovascular consulted for treatment.    PLAN:       - con't aspirin 81mg daily  - con't plavix 75mg daily    - plan for flow diverter embolization of the pseudoaneurysm today 1pm  - psy consult   - family updated    Risks and benefits discussed patient, his brother (POA) and niece, risks include but are not limited to bruising, stroke, subarachnoid hemorrhage, death, retroperitoneal hematoma, pseudoaneurysm, lower extremity/renal/peripheral vascular compromise, informed consent obtained.      Case discussed with Dr. Sellers attending.    Elvis Pina MD  Stroke, Neurocritical Care & 
transfers.  Ambulation  Surface: Level tile  Device: No Device  Assistance: Stand by assistance;Contact guard assistance  Quality of Gait: overall fair stability, no true LOB  Gait Deviations: Slow Araseli;Staggers  Distance: 250ft x2  Comments: Pt overall steady throughout ambulation with no true LOB. Pt motivated and eager to get up and walk around.  More Ambulation?: No  Stairs/Curb  Stairs?: No     Balance  Posture: Good  Sitting - Static: Good  Sitting - Dynamic: Good  Standing - Static: Good  Standing - Dynamic: Fair  Comments: standing balance assessed with no AD, able to sit EOB with supervision.    Exercise Treatment:   Seated LE exercises: LAQ, ankle pumps, marches. Reps: x10      AM-PAC - Mobility  AM-PAC Basic Mobility - Inpatient   How much help is needed turning from your back to your side while in a flat bed without using bedrails?: None  How much help is needed moving from lying on your back to sitting on the side of a flat bed without using bedrails?: None  How much help is needed moving to and from a bed to a chair?: None  How much help is needed standing up from a chair using your arms?: None  How much help is needed walking in hospital room?: None  How much help is needed climbing 3-5 steps with a railing?: A Little  [facility] : 23  AM-PAC Inpatient T-Scale Score : 56.93  Mobility Inpatient CMS 0-100% Score: 11.2  Mobility Inpatient CMS G-Code Modifier : CI      Goals  Short Term Goals  Time Frame for Short Term Goals: 14 visits  Short Term Goal 1: Complete transfers independently  Short Term Goal 2: Complete 200 ft of gait independently  Short Term Goal 3: Participate in 30 minutes of therapy to promote endurance       Education  Patient Education  Education Given To: Patient  Education Provided: Role of Therapy;Plan of Care;Transfer Training;Precautions  Education Method: Verbal;Demonstration  Barriers to Learning: Cognition  Education Outcome: Continued education 
water 1 mL injection, sodium chloride, magnesium hydroxide, sodium chloride flush, sodium chloride flush, glucose, dextrose bolus **OR** dextrose bolus, glucagon (rDNA), dextrose, sodium chloride flush, sodium chloride, potassium chloride **OR** potassium alternative oral replacement **OR** potassium chloride, magnesium sulfate, polyethylene glycol, [DISCONTINUED] acetaminophen **OR** acetaminophen    VITALS:  Temperature Range: Temp: 98.5 °F (36.9 °C) Temp  Av.5 °F (36.4 °C)  Min: 96.3 °F (35.7 °C)  Max: 98.5 °F (36.9 °C)  BP Range: Systolic (24hrs), Av , Min:85 , Max:146     Diastolic (24hrs), Av, Min:50, Max:106    Pulse Range: Pulse  Av.9  Min: 78  Max: 122  Respiration Range: Resp  Av.3  Min: 0  Max: 25  Current Pulse Ox: SpO2: 98 %  24HR Pulse Ox Range: SpO2  Av.1 %  Min: 90 %  Max: 100 %  Patient Vitals for the past 12 hrs:   BP Temp Temp src Pulse Resp SpO2   02/10/24 1000 (!) 130/52 -- -- 98 22 --   02/10/24 0900 133/75 -- -- 81 19 98 %   02/10/24 0800 (!) 146/93 98.5 °F (36.9 °C) Oral (!) 122 16 96 %   02/10/24 0700 112/79 -- -- (!) 107 18 --   02/10/24 0600 98/85 98.1 °F (36.7 °C) Bladder 91 20 95 %   02/10/24 0500 122/81 -- -- 98 20 95 %   02/10/24 0430 108/75 -- -- -- -- --   02/10/24 0400 -- 98.2 °F (36.8 °C) Bladder (!) 107 13 93 %   02/10/24 0130 (!) 133/50 -- -- -- -- --   02/10/24 0100 125/89 98.2 °F (36.8 °C) -- (!) 113 19 --   02/10/24 0015 (!) 125/106 97.9 °F (36.6 °C) -- (!) 111 19 94 %   02/10/24 0000 127/82 98.1 °F (36.7 °C) Bladder (!) 111 21 95 %   24 2330 105/85 98.1 °F (36.7 °C) -- (!) 109 20 93 %         RECENT LABS:   Lab Results   Component Value Date    WBC 9.2 02/10/2024    HGB 11.8 (L) 02/10/2024    HCT 35.4 (L) 02/10/2024     02/10/2024    CHOL 157 2024    TRIG 57 2024    HDL 56 2024    ALT 14 2024    AST 28 2024     02/10/2024    K 4.0 02/10/2024     02/10/2024    CREATININE 0.6 (L) 02/10/2024    
    Physical Exam  Constitutional:       General: He is not in acute distress.     Appearance: Normal appearance. He is not ill-appearing.   HENT:      Head: Normocephalic and atraumatic.   Eyes:      Conjunctiva/sclera: Conjunctivae normal.      Pupils: Pupils are equal, round, and reactive to light.   Cardiovascular:      Rate and Rhythm: Normal rate and regular rhythm.      Pulses: Normal pulses.      Heart sounds: Normal heart sounds. No murmur heard.     No friction rub. No gallop.   Pulmonary:      Effort: Pulmonary effort is normal.      Breath sounds: Normal breath sounds. No wheezing, rhonchi or rales.   Abdominal:      General: Abdomen is flat. Bowel sounds are normal.      Palpations: Abdomen is soft.   Skin:     General: Skin is warm and dry.      Findings: No bruising or erythema.   Neurological:      Mental Status: Mental status is at baseline.   Psychiatric:         Attention and Perception: He is inattentive.         Mood and Affect: Mood is anxious. Affect is blunt.         Behavior: Behavior is withdrawn.         Thought Content: Thought content is paranoid.         Assessment:        Hospital Problems             Last Modified POA    * (Principal) Pseudoaneurysm (HCC) 2/6/2024 Yes    Preoperative cardiovascular examination 2/8/2024 Yes    Psychosis, paranoid (HCC) 2/7/2024 Yes    Carotid pseudoaneurysm (HCC) 2/9/2024 Yes    Agitation 2/9/2024 Yes    Stenosis of right carotid artery greater than 50% 2/9/2024 Yes       Plan:        Carotid Pseudoaneurysm - POD #1 s/p cerebral angiogram and embolization. Neuro endovascular following. Continue ASA and Plavix. Patient was noted to have drop in hgb this AM. Repeat ordered, will follow up.     Psychosis - Lab workup unremarkable, believed to be acute psychosis. Continue Elavil, Seroquel, and Zyprexa PRN. Psych recommends [facility] once patient is medically stable. Once patient settles, will do trial without restraints. Patient needs to be out of 
SpO2  Av.1 %  Min: 88 %  Max: 100 %  - On RA.     RENAL/FLUID/ELECTROLYTE:  - BUN 7/ Creatinine 0.6  - I/O: In: 2157.5 [I.V.:856.3]  Out: 1335 [Urine:1335]  - IVF: D5W @100cc/hr while NPO. Give additional 1 L IVF bolus.     GI/NUTRITION:  NUTRITION:  Diet NPO Exceptions are: Sips of Water with Meds  - Bowel regimen: glycolax   - GI prophylaxis: not indicated   - Passed MBSS with thin liquids. Refused solid food intake.   - Maintain NPO for OR today.   - LFTs WNL     ID:  Tmax: Temp (24hrs), Av.8 °F (36.6 °C), Min:96.4 °F (35.8 °C), Max:99 °F (37.2 °C)    Temperature Range: Temp: 97.2 °F (36.2 °C) Temp  Av.8 °F (36.6 °C)  Min: 96.4 °F (35.8 °C)  Max: 99 °F (37.2 °C)  - WBC   Lab Results   Component Value Date    WBC 5.3 2024     - Antimicrobials: not indicated   - UA negative  - CXR clear, no acute process   - CRP 5.6, Sed rate 2    HEME:   Recent Labs     24  0617 24  0456 24  0624   HGB 14.0 13.9 14.0       - Platelets 178    ENDOCRINE:  - Continue to monitor blood glucose, goal <180  - glucose controlled - most recent BGL is   Recent Labs     24  0617 24  0456 24  0624   GLUCOSE 83 106* 139*     - TSH 2.61  - Cortisol 8.8    OTHER:  - PT/OT/ST   - Code Status: Full Code    PROPHYLAXIS:  Stress ulcer: not indicated     DVT PROPHYLAXIS:  - SCD sleeves - Thigh High   - Hold Lovenox 40mg daily for OR today     DISPOSITION:  [] To remain ICU:   [x] OK for out of ICU from Neuro Critical Care standpoint    We will continue to follow along.     For any changes in exam or patient status please contact Neuro Critical Care.      Nilda aCsas,   Neuro Critical Care  2024     7:23 AM  
benefit from inpatient psychiatric hospitalization, recommend presenting to psychiatric attending for possible admission once medically stabilized   -tsh wnml, =  -has bedside sitter. No change in emdications, daily Qtc, continue zyprexa  -cortisol level, assess why hypoglycemic, possible ingestion.   -daily EKG for Qtc monitoing,        #Fibromuscular dysplasia  -MRI showing Beading along the right internal carotid artery, likely related to  fibromuscular dysplasia.  -esr, crp, neuro chacks       #Autism with developmental delay.   -Supportive rx.     Vte ppx on lovenox  
gross lesions, rashes, induration    Assessment:        Hospital Problems             Last Modified POA    * (Principal) Pseudoaneurysm (HCC) 2/6/2024 Yes    Preoperative cardiovascular examination 2/8/2024 Yes    Psychosis, paranoid (HCC) 2/7/2024 Yes    Carotid pseudoaneurysm (HCC) 2/9/2024 Yes    Agitation 2/9/2024 Yes    Stenosis of right carotid artery greater than 50% 2/9/2024 Yes    Secondary hypertension 2/10/2024 Yes     Plan:      Carotid Pseudoaneurysm - POD #3 s/p cerebral angiogram and embolization. Neuro endovascular following and is okay to transfer to [facility]. Continue ASA and Plavix. .      Psychosis - Lab workup unremarkable, believed to be acute psychosis. Continue Elavil, Seroquel, and Zyprexa PRN. Psych earlier recommended transfer to [facility] once patient is medically stable. Patient is out of restraints for >24 hours to qualify for [facility] admission .     Hypertension - Under control, continue Atenolol     Discharge Planning - Transfer to [facility] if accepted by psychiatry today    Mallory Willams MD  2/12/2024  11:20 AM

## 2024-02-13 LAB — GLUCOSE BLD-MCNC: 99 MG/DL (ref 75–110)

## 2024-02-13 PROCEDURE — 6370000000 HC RX 637 (ALT 250 FOR IP): Performed by: NURSE PRACTITIONER

## 2024-02-13 PROCEDURE — APPSS60 APP SPLIT SHARED TIME 46-60 MINUTES: Performed by: NURSE PRACTITIONER

## 2024-02-13 PROCEDURE — 99223 1ST HOSP IP/OBS HIGH 75: CPT | Performed by: PSYCHIATRY & NEUROLOGY

## 2024-02-13 PROCEDURE — 82947 ASSAY GLUCOSE BLOOD QUANT: CPT

## 2024-02-13 PROCEDURE — 1240000000 HC EMOTIONAL WELLNESS R&B

## 2024-02-13 PROCEDURE — 99222 1ST HOSP IP/OBS MODERATE 55: CPT | Performed by: INTERNAL MEDICINE

## 2024-02-13 PROCEDURE — 6370000000 HC RX 637 (ALT 250 FOR IP): Performed by: INTERNAL MEDICINE

## 2024-02-13 RX ORDER — POTASSIUM CHLORIDE 20 MEQ/1
40 TABLET, EXTENDED RELEASE ORAL PRN
Status: DISCONTINUED | OUTPATIENT
Start: 2024-02-13 | End: 2024-02-13

## 2024-02-13 RX ORDER — ONDANSETRON 4 MG/1
4 TABLET, ORALLY DISINTEGRATING ORAL EVERY 8 HOURS PRN
Status: DISCONTINUED | OUTPATIENT
Start: 2024-02-13 | End: 2024-02-16 | Stop reason: HOSPADM

## 2024-02-13 RX ORDER — MAGNESIUM SULFATE HEPTAHYDRATE 40 MG/ML
2000 INJECTION, SOLUTION INTRAVENOUS PRN
Status: DISCONTINUED | OUTPATIENT
Start: 2024-02-13 | End: 2024-02-13

## 2024-02-13 RX ORDER — ACETAMINOPHEN 650 MG/1
650 SUPPOSITORY RECTAL EVERY 6 HOURS PRN
Status: DISCONTINUED | OUTPATIENT
Start: 2024-02-13 | End: 2024-02-13

## 2024-02-13 RX ORDER — AMITRIPTYLINE HYDROCHLORIDE 25 MG/1
50 TABLET, FILM COATED ORAL NIGHTLY
Status: DISCONTINUED | OUTPATIENT
Start: 2024-02-13 | End: 2024-02-16 | Stop reason: HOSPADM

## 2024-02-13 RX ORDER — SODIUM CHLORIDE 0.9 % (FLUSH) 0.9 %
5-40 SYRINGE (ML) INJECTION PRN
Status: DISCONTINUED | OUTPATIENT
Start: 2024-02-13 | End: 2024-02-13

## 2024-02-13 RX ORDER — ONDANSETRON 2 MG/ML
4 INJECTION INTRAMUSCULAR; INTRAVENOUS EVERY 6 HOURS PRN
Status: DISCONTINUED | OUTPATIENT
Start: 2024-02-13 | End: 2024-02-16 | Stop reason: HOSPADM

## 2024-02-13 RX ORDER — POLYETHYLENE GLYCOL 3350 17 G/17G
17 POWDER, FOR SOLUTION ORAL DAILY PRN
Status: DISCONTINUED | OUTPATIENT
Start: 2024-02-13 | End: 2024-02-16 | Stop reason: HOSPADM

## 2024-02-13 RX ORDER — ACETAMINOPHEN 325 MG/1
650 TABLET ORAL EVERY 4 HOURS PRN
Status: DISCONTINUED | OUTPATIENT
Start: 2024-02-13 | End: 2024-02-13

## 2024-02-13 RX ORDER — SODIUM CHLORIDE 0.9 % (FLUSH) 0.9 %
5-40 SYRINGE (ML) INJECTION EVERY 12 HOURS SCHEDULED
Status: DISCONTINUED | OUTPATIENT
Start: 2024-02-13 | End: 2024-02-13

## 2024-02-13 RX ORDER — ATENOLOL 50 MG/1
50 TABLET ORAL DAILY
Status: DISCONTINUED | OUTPATIENT
Start: 2024-02-13 | End: 2024-02-13

## 2024-02-13 RX ORDER — LORAZEPAM 2 MG/ML
0.5 INJECTION INTRAMUSCULAR EVERY 6 HOURS PRN
Status: DISCONTINUED | OUTPATIENT
Start: 2024-02-13 | End: 2024-02-13

## 2024-02-13 RX ORDER — ASPIRIN 81 MG/1
81 TABLET, CHEWABLE ORAL DAILY
Status: DISCONTINUED | OUTPATIENT
Start: 2024-02-13 | End: 2024-02-16 | Stop reason: HOSPADM

## 2024-02-13 RX ORDER — POTASSIUM CHLORIDE 7.45 MG/ML
10 INJECTION INTRAVENOUS PRN
Status: DISCONTINUED | OUTPATIENT
Start: 2024-02-13 | End: 2024-02-13

## 2024-02-13 RX ORDER — QUETIAPINE FUMARATE 25 MG/1
25 TABLET, FILM COATED ORAL 2 TIMES DAILY
Status: DISCONTINUED | OUTPATIENT
Start: 2024-02-13 | End: 2024-02-16 | Stop reason: HOSPADM

## 2024-02-13 RX ORDER — SODIUM CHLORIDE 0.9 % (FLUSH) 0.9 %
10 SYRINGE (ML) INJECTION EVERY 12 HOURS SCHEDULED
Status: DISCONTINUED | OUTPATIENT
Start: 2024-02-13 | End: 2024-02-13

## 2024-02-13 RX ORDER — ATENOLOL 50 MG/1
50 TABLET ORAL DAILY
Status: DISCONTINUED | OUTPATIENT
Start: 2024-02-13 | End: 2024-02-16 | Stop reason: HOSPADM

## 2024-02-13 RX ORDER — LABETALOL HYDROCHLORIDE 5 MG/ML
10 INJECTION, SOLUTION INTRAVENOUS
Status: DISCONTINUED | OUTPATIENT
Start: 2024-02-13 | End: 2024-02-13

## 2024-02-13 RX ORDER — SODIUM CHLORIDE 0.9 % (FLUSH) 0.9 %
10 SYRINGE (ML) INJECTION PRN
Status: DISCONTINUED | OUTPATIENT
Start: 2024-02-13 | End: 2024-02-13

## 2024-02-13 RX ORDER — ENOXAPARIN SODIUM 100 MG/ML
40 INJECTION SUBCUTANEOUS DAILY
Status: DISCONTINUED | OUTPATIENT
Start: 2024-02-13 | End: 2024-02-13

## 2024-02-13 RX ORDER — CLOPIDOGREL BISULFATE 75 MG/1
75 TABLET ORAL DAILY
Status: DISCONTINUED | OUTPATIENT
Start: 2024-02-13 | End: 2024-02-16 | Stop reason: HOSPADM

## 2024-02-13 RX ADMIN — QUETIAPINE FUMARATE 25 MG: 25 TABLET ORAL at 13:02

## 2024-02-13 RX ADMIN — QUETIAPINE FUMARATE 25 MG: 25 TABLET ORAL at 20:52

## 2024-02-13 RX ADMIN — CLOPIDOGREL BISULFATE 75 MG: 75 TABLET ORAL at 13:02

## 2024-02-13 RX ADMIN — ATENOLOL 50 MG: 50 TABLET ORAL at 13:02

## 2024-02-13 RX ADMIN — ASPIRIN 81 MG: 81 TABLET, CHEWABLE ORAL at 13:02

## 2024-02-13 NOTE — GROUP NOTE
Group Therapy Note    Date: 2/13/2024    Group Start Time: 1005  Group End Time: 1030  Group Topic: Psychoeducation    Kayleigh Kenney MSW; Gabbi Baker MSW, LSW        Group Therapy Note    Attendees: 4/10       Patient's Goal: Expression of feelings    Notes:  Educate the importance of gratitude and identify things to show gratitude for.    Status After Intervention:  Unchanged    Participation Level: Minimal    Participation Quality: Appropriate and Sharing      Speech:  normal      Thought Process/Content: Flight of ideas      Affective Functioning: Congruent      Mood: euthymic      Level of consciousness:  Alert      Response to Learning: Able to verbalize current knowledge/experience      Endings: None Reported    Modes of Intervention: Education and Support      Discipline Responsible: /Counselor      Signature:  TANO Allan
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
## 2024-02-13 NOTE — CARE COORDINATION
Social work met with Bart Carbajal from the [facility] [employer], after obtaining an MIKE from the pt. Bart wanted to meet with pt face to face but pt did not want to see Bart. Bart shared the pts brother Maximo, has a sister in law, who is a  requesting an expert eval be completed so they can apply for Legal Guardianship. Social work questioned why the expert eval is not being completed by the outpatient provider. Bart shared that the pt is independent, lives on his own, drives to appointments and has [employer] personal care staff that comes out 4 hours a week to assist pt. Pts HPS is Jad Suazo  [phone]. Bart shared the family noticed the pt started to decline about 2-3 weeks ago and they are concerned the pt maybe unable to care for himself at home. Bart is willing to assist with resources for the pt in the home. Bart shared he recently became the pts PATRICK after the pt started crossing boundaries with the female PATRICK, Bart shared the pt does have a \"deep attraction\" to blonde woman and does become intrusive with blondes.

## 2024-02-13 NOTE — GROUP NOTE
Group Therapy Note    Date: 2/13/2024    Group Start Time: 0900  Group End Time: 0915  Group Topic: Community Meeting    Pati Terrell        Group Therapy Note    Attendees: 4/9       Patient's Goal:  talk with doctor     Status After Intervention:  Improved    Participation Level: Active Listener and Interactive    Participation Quality: Appropriate and Attentive      Speech:  normal      Thought Process/Content: Logical      Affective Functioning: Congruent      Mood: euthymic      Level of consciousness:  Alert and Oriented x4      Response to Learning: Able to verbalize current knowledge/experience and Able to verbalize/acknowledge new learning      Endings: None Reported    Modes of Intervention: Education and Support      Discipline Responsible: Behavorial Health Tech      Signature:  Pati Cosme
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
## 2024-02-13 NOTE — GROUP NOTE
Group Therapy Note    Date: 2/13/2024    Group Start Time: 1330  Group End Time: 1415  Group Topic: Music Therapy    Dejuan Bragg        Group Therapy Note    Attendees: 6/8       Patient's Goal:  Patients were asked to share a song to dedicate to an important person in their life, and answered questions about these people and relationships, as asked by this writer. Patient goals to reflect on relationships, increase sense of community; Increase socialization; Demonstrate positive use of time     Notes:  Patient attended group primarily as an active listener. Sat on periphery of group. Was alert and attentive throughout though declined to share music at this time. Observed multiple times singing along to songs (primarily pre-2000's rock songs)    Status After Intervention:  Improved    Participation Level: Active Listener    Participation Quality: Appropriate and Attentive      Speech:  normal      Thought Process/Content: Logical  Linear      Affective Functioning: Congruent      Mood: euthymic      Level of consciousness:  Alert and Attentive      Response to Learning: Progressing to goal      Endings: None Reported    Modes of Intervention: Support, Socialization, Exploration, Activity, Media, and Reality-testing      Discipline Responsible: Psychoeducational Specialist      Signature:  Dejuan Whitman

## 2024-02-14 PROCEDURE — 1240000000 HC EMOTIONAL WELLNESS R&B

## 2024-02-14 PROCEDURE — 6370000000 HC RX 637 (ALT 250 FOR IP): Performed by: NURSE PRACTITIONER

## 2024-02-14 PROCEDURE — 6370000000 HC RX 637 (ALT 250 FOR IP): Performed by: INTERNAL MEDICINE

## 2024-02-14 PROCEDURE — 99232 SBSQ HOSP IP/OBS MODERATE 35: CPT | Performed by: PSYCHIATRY & NEUROLOGY

## 2024-02-14 PROCEDURE — APPSS30 APP SPLIT SHARED TIME 16-30 MINUTES: Performed by: NURSE PRACTITIONER

## 2024-02-14 RX ADMIN — ATENOLOL 50 MG: 50 TABLET ORAL at 08:52

## 2024-02-14 RX ADMIN — ASPIRIN 81 MG: 81 TABLET, CHEWABLE ORAL at 08:52

## 2024-02-14 RX ADMIN — QUETIAPINE FUMARATE 25 MG: 25 TABLET ORAL at 20:42

## 2024-02-14 RX ADMIN — CLOPIDOGREL BISULFATE 75 MG: 75 TABLET ORAL at 08:53

## 2024-02-14 RX ADMIN — QUETIAPINE FUMARATE 25 MG: 25 TABLET ORAL at 08:52

## 2024-02-14 NOTE — CARE COORDINATION
Bart Carbajal from [facility] Board of Development Disability [phone] called for an update on the pt. Social work explained meeting with the doctor and due to this being pts first admission the doctor suggested the pts outpatient provider complete the expert evaluation. Bart is asking to be updated in discharge plans.

## 2024-02-14 NOTE — GROUP NOTE
Group Therapy Note    Date: 2/14/2024    Group Start Time: 1000  Group End Time: 1030  Group Topic: Psychotherapy    [facility]    Gabbi Baker MSW, MORGAN; Kayleigh Ochoa MSW        Group Therapy Note    Attendees: 5/10       Patient was offered group therapy today but declined to participate despite encouragement from staff.  1:1 was offered.       Signature:  TANO Escobedo LSW

## 2024-02-14 NOTE — GROUP NOTE
Group Therapy Note    Date: 2/14/2024    Group Start Time: 0900  Group End Time: 0915  Group Topic: Community Meeting    Pati Terrell        Group Therapy Note    Attendees: 3/9       Patient's Goal:  discharge planning     Status After Intervention:  Unchanged    Participation Level: Active Listener and Interactive    Participation Quality: Appropriate and Attentive      Speech:  normal      Thought Process/Content: Linear      Affective Functioning: Flat      Mood: euthymic      Level of consciousness:  Alert and Oriented x4      Response to Learning: Able to verbalize current knowledge/experience and Able to verbalize/acknowledge new learning      Endings: None Reported    Modes of Intervention: Education and Support      Discipline Responsible: Behavorial Health Tech      Signature:  Pati Cosme

## 2024-02-14 NOTE — GROUP NOTE
Group Therapy Note    Date: 2/14/2024    Group Start Time: 1330  Group End Time: 1430  Group Topic: Healthy Living/Wellness    Nia Vail, MAURY        Group Therapy Note    Attendees: 4/11     Patient's Goal: Topic: To increase social interaction and to practice creative expression as a means for relaxation/positive coping, and self expression  with RT and peers.         Notes:  Pt was pleasant and cooperative, and did not participate in group task but did ask RT polite questions and was attentive to conversation in group. Pt was initially aloof of table of peers -pushed seat back away from table but did attend to group and did independently ask questions, and complimented RT and peers briefly.    At end of group pt came up to RT, stood quite close and thanked RT for having group , and politely stated \"Nice to meet you\".        Status After Intervention:  Improved     Participation Level: Active Listener and briefly Interactive in conversation, polite     Participation Quality: Appropriate, Attentive, briefly engaged in social conversation     Speech:  normal tone, brief polite questions and compliments       Thought Process/Content: [place], guarded but increased verbal interaction as group progressed, repetitive at times.       Affective Functioning: Restricted, brightened x3 as group progressed       Mood: Cooperative, aloof but verbally interacted briefly x5, appeared more at ease as group progressed      Level of consciousness:  Alert, and Attentive      Response to Learning: Able to verbalize polite questions -recognized that RT had an accent and asked x2 where it was from, Attentive to discussion/new learning, and Progressing to goal      Endings: None Reported      Modes of Intervention: Education, Support, Socialization, and Problem-solving    Discipline Responsible: Psychoeducational Specialist      Signature:  NIA

## 2024-02-15 LAB
BASOPHILS # BLD: 0.1 K/UL (ref 0–0.2)
BASOPHILS NFR BLD: 1 % (ref 0–2)
EOSINOPHIL # BLD: 0.1 K/UL (ref 0–0.4)
EOSINOPHILS RELATIVE PERCENT: 2 % (ref 0–4)
ERYTHROCYTE [DISTWIDTH] IN BLOOD BY AUTOMATED COUNT: 13.4 % (ref 11.5–14.9)
HCT VFR BLD AUTO: 35.9 % (ref 41–53)
LYMPHOCYTES NFR BLD: 0.8 K/UL (ref 1–4.8)
LYMPHOCYTES RELATIVE PERCENT: 15 % (ref 24–44)
MCH RBC QN AUTO: 30.5 PG (ref 26–34)
MCHC RBC AUTO-ENTMCNC: 34.3 G/DL (ref 31–37)
MCV RBC AUTO: 88.7 FL (ref 80–100)
MONOCYTES NFR BLD: 0.6 K/UL (ref 0.1–1.3)
MONOCYTES NFR BLD: 11 % (ref 1–7)
NEUTROPHILS NFR BLD: 71 % (ref 36–66)
NEUTS SEG NFR BLD: 3.8 K/UL (ref 1.3–9.1)
PLATELET # BLD AUTO: 281 K/UL (ref 150–450)
PMV BLD AUTO: 6.6 FL (ref 6–12)
RBC # BLD AUTO: 4.05 M/UL (ref 4.5–5.9)
WBC OTHER # BLD: 5.4 K/UL (ref 3.5–11)

## 2024-02-15 PROCEDURE — 36415 COLL VENOUS BLD VENIPUNCTURE: CPT

## 2024-02-15 PROCEDURE — APPSS30 APP SPLIT SHARED TIME 16-30 MINUTES: Performed by: NURSE PRACTITIONER

## 2024-02-15 PROCEDURE — 1240000000 HC EMOTIONAL WELLNESS R&B

## 2024-02-15 PROCEDURE — 6370000000 HC RX 637 (ALT 250 FOR IP): Performed by: INTERNAL MEDICINE

## 2024-02-15 PROCEDURE — 99232 SBSQ HOSP IP/OBS MODERATE 35: CPT | Performed by: INTERNAL MEDICINE

## 2024-02-15 PROCEDURE — 90833 PSYTX W PT W E/M 30 MIN: CPT | Performed by: PSYCHIATRY & NEUROLOGY

## 2024-02-15 PROCEDURE — 6370000000 HC RX 637 (ALT 250 FOR IP): Performed by: NURSE PRACTITIONER

## 2024-02-15 PROCEDURE — 99232 SBSQ HOSP IP/OBS MODERATE 35: CPT | Performed by: PSYCHIATRY & NEUROLOGY

## 2024-02-15 PROCEDURE — 85025 COMPLETE CBC W/AUTO DIFF WBC: CPT

## 2024-02-15 RX ADMIN — QUETIAPINE FUMARATE 25 MG: 25 TABLET ORAL at 21:33

## 2024-02-15 RX ADMIN — CLOPIDOGREL BISULFATE 75 MG: 75 TABLET ORAL at 11:05

## 2024-02-15 RX ADMIN — QUETIAPINE FUMARATE 25 MG: 25 TABLET ORAL at 11:05

## 2024-02-15 RX ADMIN — ASPIRIN 81 MG: 81 TABLET, CHEWABLE ORAL at 11:05

## 2024-02-15 RX ADMIN — ATENOLOL 50 MG: 50 TABLET ORAL at 11:06

## 2024-02-15 NOTE — GROUP NOTE
Group Therapy Note    Date: 2/15/2024    Group Start Time: 1430  Group End Time: 1515  Group Topic: Cognitive Skills    Nia Vail CTRS        Group Therapy Note    Attendees: 3/7     Patient's Goal: Topic: To increase social interaction, practice decision making ,concentration, and communication skills.        Notes:  Pt was pleasant and cooperative, and participated fully in group. Pt was able to practice decision making ,concentration, and communication skills . Pt engaged in group task and discussion 1:1 with RT.   .        Status After Intervention:  Improved     Participation Level: Active Listener and Interactive r/t  task and topic-more so 1:1 conversation with RT    Participation Quality: Appropriate, Attentive, engaged in task and topic     Speech:  Conversation focused on task, and shared socially 1:1 with RT also .       Thought Process/Content: Logical ,concrete and mostly linear r/t task and topic . Pt needed some prompts r/t choices in task but was somewhat familiar with task and able to make decisions from choices offered.      Affective Functioning: Blunted, brightened .        Mood: Cooperative, Euthymic      Level of consciousness:  Alert, and Attentive      Response to Learning: Able to verbalize current knowledge/experience, Able to   verbalize/acknowledge new learning, and Progressing to goal      Endings: None Reported      Modes of Intervention: Education, Support, Socialization, and Problem-solving    Discipline Responsible: Psychoeducational Specialist      Signature:  MAURY BARNES

## 2024-02-15 NOTE — PROGRESS NOTES
Behavioral Services  Medicare Certification Upon Admission    I certify that this patient's inpatient psychiatric hospital admission is medically necessary for:    [x] (1) Treatment which could reasonably be expected to improve this patient's condition,       [x] (2) Or for diagnostic study;     AND     [x](2) The inpatient psychiatric services are provided while the individual is under the care of a physician and are included in the individualized plan of care.    Estimated length of stay/service 4 to 7 days    Plan for post-hospital care home with outpatient community mental health follow-up    Electronically signed by ZAINA TODD MD on 2/13/2024 at 1:48 PM      
   02/13/24 1113   Encounter Summary   Encounter Overview/Reason   Encounter   Service Provided For: Patient   Referral/Consult From: Rounding   Last Encounter  02/13/24   Complexity of Encounter Low   Spiritual/Emotional needs   Type Spiritual Support   Rituals, Rites and Sacraments   Type Sacrament of Sick  (2/13/24)       
  Daily Progress Note  2/14/2024    Patient Name: William Babcock    CHIEF COMPLAINT: Acute psychosis         SUBJECTIVE:      Patient seen face-to-face for follow-up assessment.  He is sitting in the day area.  He reports that he has been discharged and that he is leaving today.  This is not accurate.  Reagan remains discharge focused throughout discussion.  He is evasive and guarded.  He states that when he is discharged he plans to go back to [place] and stay with some friends.  He does have his own condo which is paid for by his POA, but patient states he will not return there.  States \"something is there\" and presents as paranoid and suspicious.  He continues to be afraid of living in his own home.  Concerned that he would not be able to care for self appropriately if he were to wander back to [place], where he has not lived in over 10 years.  Prior to admission to [facility], patient paranoid of living in his and thought that he was being poisoned with carbon oxide and that his food was being tampered with.    Staff report that patient has been compliant with his medications.  Currently taking quetiapine 25 mg twice daily.  He has not been exhibiting any agitation and has been able to maintain behavioral control.  He has not required any emergency medications.  His vitals are stable.    Patient does present risk to self at this time.  Requires continued inpatient hospitalization for safety.    Appetite:  [x] Adequate/Unchanged  [] Increased  [] Decreased      Sleep:       [x] Adequate/Unchanged  [] Fair  [] Poor      Group Attendance on Unit:   [] Yes   [] Selectively    [x] No    Compliant with scheduled medications: [x] Yes  [] No    Received emergency medications in past 24 hrs: [] Yes   [x] No    Medication Side Effects: Denies         Mental Status Exam  Level of consciousness: Alert and awake   Appearance: Appropriate attire for setting, seated in chair, with fair  grooming and hygiene   Behavior/Motor: 
  Daily Progress Note  2/15/2024    Patient Name: William Babcock    CHIEF COMPLAINT:  Acute psychosis         SUBJECTIVE:      Patient is seen today for a follow up assessment.  Upon approach patient is found seated at a table in the day room, accepted the need for privacy, moved to his private room for interview.  He is discharge focused, [facility] [place].  He remains compliant with taking scheduled psychotropic medication and denies adverse effects.  Denies feeling depressed or anxious.  Denies suicidal or homicidal ideation.  Denies auditory, visual hallucinations or other perceptual disturbances.  Presents with paranoia.  Patient endorses history of autism and does not feel he has other mental disorder.  He lacks insight of events that led to hospitalization and need for medication.  In review of documentation it is noted that patient's brother Maximo Hilario is power of  and had expressed concern that patient has been decompensating over the last 6 months, noted to be more forgetful, restless, exhibiting aggression and bizarre behavior.  Noted that the family has discussed moving forward with guardianship as patient has refused to take medications in the past.    Appetite:  [x] Adequate/Unchanged  [] Increased  [] Decreased      Sleep:       [x] Adequate/Unchanged  [] Fair  [] Poor      Group Attendance on Unit:   [] Yes   [x] Selectively    [] No    Compliant with scheduled medications: [x] Yes  [] No    Received emergency medications in past 24 hrs: [] Yes   [x] No    Medication Side Effects: Denies         Mental Status Exam  Level of consciousness: Alert and awake   Appearance: Appropriate attire for setting, seated on bed, with fair  grooming and hygiene   Behavior/Motor: Approachable, engages with interviewer, no psychomotor abnormalities   Attitude toward examiner: Cooperative, attentive, fair eye contact  Speech: Normal rate, volume, and tone  Mood:  per patient 
Dear Providers,     Due to the increased risk of metabolic abnormalities of atypical antipsychotics or second-generation antipsychotics (SGA) lipid panel has been ordered using an approved pharmacist driven [facility] P&T Protocol for appropriate monitoring of atypical antipsychotic therapy.     Please reach out to inpatient pharmacy via [phone], or to PGY-1 Pharmacy Resident, Viola Gomez [phone], with further questions or concerns.     Thank you,   [facility]   Viola Gomez, [facility]  PGY-1 Pharmacy Resident    
Pharmacy Medication History Note      List of current medications patient is taking is complete.    Source of information: [facility] ([phone]); Epic; PDMP    Changes made to medication list:  Medications removed (include reason, ex. therapy complete or physician discontinued, noncompliance):  Mucinex DM    Medications flagged for provider review:  Flonase - never picked up    Medications added/doses adjusted:  none        Current Home Medication List at Time of Admission:  Prior to Admission medications    Medication Sig   atenolol (TENORMIN) 50 MG tablet Take 1 tablet by mouth daily   amitriptyline (ELAVIL) 50 MG tablet Take 1 tablet by mouth nightly         Please let me know if you have any questions about this encounter. Thank you!    Electronically signed by Sarah Garrison RPH on 2/13/2024 at 9:07 AM     
RT ASSESSMENT TREATMENT GOALS    [x]Pt Goal:  Pt will identify 1-2 positive coping skills by time of discharge.    []Pt Goal:  Pt will identify 1-2 positive aspects of self by time of discharge.    []Pt Goal:  Pt will remain on task/topic for 15-30 minutes during group by time of discharge.    []Pt Goal:  Pt will identify 1-2 aspects of relapse prevention plan by time of discharge.    [x]Pt Goal:  Pt will join in conversation with peers 1-2 times per group by time of discharge.    []Pt Goal:  Pt will identify 1-2 new leisure interests by time of discharge.    [x]Pt Goal:  Pt will not voice any delusional content by time of discharge.   
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  Although every effort was made to ensure the accuracy of this automated transcription, some errors in transcription may have occurred.

## 2024-02-15 NOTE — GROUP NOTE
Group Therapy Note    Date: 2/15/2024    Group Start Time: 1105  Group End Time: 1155  Group Topic: Cognitive Skills    Nia Vail CTRS        Group Therapy Note    Attendees: 4/10     Patient's Goal: Topic: To increase social interaction, discuss discharge plans, coping skills, and practice communication skills.         Notes:  Pt was pleasant and cooperative. As a whole, the patients on the unit were not interested in focusing on a single task or topic as a group.     RT spent time 1:1 with this patient and peers talking about leisure interests, and coping skills. Pt engaged in 1:1 conversation with RT and shared leisure interests, and stated he is \"going home today\".       Pt was asked about any stressors  or concerns leading to admission, pt stated \"a little\" stress but that's in the past now\". RT talked with pt about coping with stress, pt did not identify specific sources of stress but did list watching sports, riding his bike, playing golf when it is warmer as positive activities. Pt states he is looking forward to the Spring, states he likes the Spring. Pt was encouraged to talk to his support people when he starts to feel stressed -pt agreed.        Status After Intervention:  Improved     Participation Level: Active Listener and Interactive 1:1 r/t  topic     Participation Quality: Appropriate, Attentive, engaged 1:1 r/t topic     Speech:  Brief but spontaneous answers relevant to questions, some repetition of phrases at times.       Thought Process/Content: [place] r/t topic . Pt is focused on going home today.       Affective Functioning: Blunted, brightens briefly at intervals.        Mood: Cooperative, Euthymic on approach      Level of consciousness:  Alert, and Attentive      Response to Learning: Able to verbalize some relevant current knowledge/experience, Able to verbalize/acknowledge some new learning, and

## 2024-02-16 VITALS
SYSTOLIC BLOOD PRESSURE: 125 MMHG | BODY MASS INDEX: 24.02 KG/M2 | HEIGHT: 68 IN | TEMPERATURE: 98 F | RESPIRATION RATE: 14 BRPM | HEART RATE: 73 BPM | DIASTOLIC BLOOD PRESSURE: 88 MMHG

## 2024-02-16 PROCEDURE — 6370000000 HC RX 637 (ALT 250 FOR IP): Performed by: INTERNAL MEDICINE

## 2024-02-16 PROCEDURE — 99239 HOSP IP/OBS DSCHRG MGMT >30: CPT | Performed by: PSYCHIATRY & NEUROLOGY

## 2024-02-16 PROCEDURE — 6370000000 HC RX 637 (ALT 250 FOR IP): Performed by: NURSE PRACTITIONER

## 2024-02-16 RX ORDER — QUETIAPINE FUMARATE 25 MG/1
25 TABLET, FILM COATED ORAL 2 TIMES DAILY
Qty: 60 TABLET | Refills: 0 | Status: SHIPPED | OUTPATIENT
Start: 2024-02-16

## 2024-02-16 RX ORDER — CLOPIDOGREL BISULFATE 75 MG/1
75 TABLET ORAL DAILY
Qty: 30 TABLET | Refills: 0 | Status: SHIPPED | OUTPATIENT
Start: 2024-02-17

## 2024-02-16 RX ORDER — ASPIRIN 81 MG/1
81 TABLET, CHEWABLE ORAL DAILY
Qty: 30 TABLET | Refills: 0 | Status: SHIPPED | OUTPATIENT
Start: 2024-02-17

## 2024-02-16 RX ADMIN — QUETIAPINE FUMARATE 25 MG: 25 TABLET ORAL at 08:35

## 2024-02-16 RX ADMIN — ASPIRIN 81 MG: 81 TABLET, CHEWABLE ORAL at 08:36

## 2024-02-16 RX ADMIN — CLOPIDOGREL BISULFATE 75 MG: 75 TABLET ORAL at 08:36

## 2024-02-16 RX ADMIN — ATENOLOL 50 MG: 50 TABLET ORAL at 08:36

## 2024-02-16 NOTE — GROUP NOTE
Group Therapy Note    Date: 2/16/2024    Group Start Time: 1030  Group End Time: 1100  Group Topic: Cognitive Skills    Lina Ceja CTRS    Cognitive Skills Group Note        Date: February 16, 2024 Start Time:  1030am   End Time:  1100am      Number of Participants in Group & Unit Census:  3/9    Topic: cognitive skills     Goal of Group: pt will demonstrate improved communication skills and improved interpersonal relationships       Comments:     Patient did not participate in Cognitive Skills group, despite staff encouragement and explanation of benefits.  Patient remain seclusive to self.  Q15 minute safety checks maintained for patient safety and will continue to encourage patient to attend unit programming.              Signature:  MAURY DECKER

## 2024-02-16 NOTE — DISCHARGE INSTRUCTIONS
Information:  Medications:   Medication summary provided   I understand that I should take only the medications on my list.     -why and when I need to take each medicine.     -which side effects to watch for.     -that I should carry my medication information at all times in case of     Emergency situations.    I will take all of my medicines to follow up appointments.     -check with my physician or pharmacist before taking any new    Medication, over the counter product or drink alcohol.    -Ask about food, drug or dietary supplement interactions.    -discard old lists and update records with medication providers.    Notify Physician:  Notify physician if you notice:   Always call 911 if you feel your life is in danger  In case of an emergency call 911 immediately!  If 911 is not available call your local emergency medical system for help    Behavioral Health Follow Up:  Original Referral Source: [facility]/Progress toward goals: Adherence to medication and follow up appointments  Recommendations for Level of Care: Medication compliance and group and supportive therapy  Patient status at discharge: Stable  My hospital  was: [facility]  Aftercare plan faxed: Devin   -faxed by: Noelle CLAYTON   -date: 2/16/2024   -time: 11:43 am  Prescriptions: Please  prescriptions at [id number] [id number] [place] [address] [phone]     Smoking: Quit Smoking.   Call the [employer]'s smoking quitline at 9-320-12A-QUIT  Know the signs of a heart attack   If you have any of the following symptoms call 911 immediately, do not wait more    Than five minutes.    1. Pressure, fullness and/ or squeezing in the center of the chest spreading to    The jaw, neck or shoulder.    2. Chest discomfort with light headedness, fainting, sweating, nausea or    Shortness of breath.   3. Upper abdominal pressure or discomfort.   4. Lower chest pain, back pain, unusual fatigue, shortness of breath,

## 2024-02-16 NOTE — GROUP NOTE
Group Therapy Note    Date: 2/16/2024    Group Start Time: 0900  Group End Time: 0910  Group Topic: Group Documentation    Sammie Gaytan, RN        Group Therapy Note    Attendees: 2/10         Patient's Goal:  To go home    Notes:  Goals group    Status After Intervention:  Improved    Participation Level: Active Listener and Interactive    Participation Quality: Appropriate, Attentive, and Sharing      Speech:  normal      Thought Process/Content: Logical      Affective Functioning: Congruent      Mood: anxious      Level of consciousness:  Alert, Oriented x4, and Attentive      Response to Learning: Able to verbalize current knowledge/experience and Able to verbalize/acknowledge new learning      Endings: None Reported    Modes of Intervention: Support, Socialization, and Exploration      Discipline Responsible: Behavorial Health Tech      Signature:  Sammie Simeon RN

## 2024-02-16 NOTE — TRANSITION OF CARE
Behavioral Health Transition Record to Provider    Patient Name: William Babcock  YOB: 1960   Medical Record Number: [id number]  Date of Admission: 2/12/2024  5:56 PM   Date of Discharge: 2/16/2024    Attending Provider: Donnie Rosa MD   Discharging Provider: Dr. Rosa  To contact this individual call [phone] and ask the  to page.  If unavailable, ask to be transferred to Behavioral Health Provider on call.  A Behavioral Health Provider will be available on call 24/7 and during holidays.    Primary Care Provider: Billy Francois MD    No Known Allergies    Reason for Admission: Bizarre behavior, paranoia and agitation.     Admission Diagnosis: Acute psychosis (HCC) [F23]    * No surgery found *    Results for orders placed or performed during the hospital encounter of 02/12/24   CBC with Auto Differential   Result Value Ref Range    WBC 5.4 3.5 - 11.0 k/uL    RBC 4.05 (L) 4.5 - 5.9 m/uL    Hemoglobin 12.3 (L) 13.5 - 17.5 g/dL    Hematocrit 35.9 (L) 41 - 53 %    MCV 88.7 80 - 100 fL    MCH 30.5 26 - 34 pg    MCHC 34.3 31 - 37 g/dL    RDW 13.4 11.5 - 14.9 %    Platelets 281 150 - 450 k/uL    MPV 6.6 6.0 - 12.0 fL    Neutrophils % 71 (H) 36 - 66 %    Lymphocytes % 15 (L) 24 - 44 %    Monocytes % 11 (H) 1 - 7 %    Eosinophils % 2 0 - 4 %    Basophils % 1 0 - 2 %    Neutrophils Absolute 3.80 1.3 - 9.1 k/uL    Lymphocytes Absolute 0.80 (L) 1.0 - 4.8 k/uL    Monocytes Absolute 0.60 0.1 - 1.3 k/uL    Eosinophils Absolute 0.10 0.0 - 0.4 k/uL    Basophils Absolute 0.10 0.0 - 0.2 k/uL   POC Glucose Fingerstick   Result Value Ref Range    POC Glucose 99 75 - 110 mg/dL       Immunizations administered during this encounter:   Immunization History   Administered Date(s) Administered    COVID-19, PFIZER PURPLE top, DILUTE for use, (age 12 y+), 30mcg/0.3mL 03/03/2021, 03/24/2021, 03/31/2021     Documentation of patient's or caregiver's refusal of influenza vaccine.    Screening for Metabolic

## 2024-02-16 NOTE — DISCHARGE SUMMARY
applicable    Discharge Exam:  Level of consciousness:  Within normal limits  Appearance: Street clothes, seated, with good grooming  Behavior/Motor: No abnormalities noted  Attitude toward examiner:  Cooperative, attentive, good eye contact  Speech:  spontaneous, normal rate, normal volume and well articulated  Mood:  euthymic  Affect:  Full range  Thought processes:  linear, goal directed and coherent  Thought content:  denies homicidal ideation  Suicidal Ideation:  denies suicidal ideation  Delusions:  no evidence of delusions  Perceptual Disturbance:  denies any perceptual disturbance  Cognition:  Intact  Memory: age appropriate  Insight & Judgement: fair  Medication side effects: denies     Disposition: home    Patient Instructions:   Activity: activity as tolerated  1. Patient instructed to take medications regularly and follow up with outpatient appointments.     Follow-up as scheduled with outpatient [facility]      Signed:    Electronically signed by ZAINA TODD MD on 2/16/24 at 3:32 PM EST    Time Spent on discharge is more than 30 minutes in the examination, evaluation, counseling and review of medications and discharge plan.

## 2024-02-16 NOTE — CARE COORDINATION
MICHELINE received voice mail [phone] that pt intake appt at [facility] [address] location will be 2/27/2024 12p for intake and @1230 for assessment. MICHELINE left voice mail for pt PATRICK Carbajal as he states he will be assisting pt with the followup.

## 2024-02-16 NOTE — CARE COORDINATION
MICHELINE spoke with Yady, supervisor of patient PATRICK with [facility] [employer] of DD as he is out of office to update on patient d/c today. Yady states she will update pt in home care assistant Yady Street states there have been discussions among pt care team at [facility] that pt may need more/more structured hours for in home care/support utilizing his waiver services, also states discussions are being held on extra supports for pt at home. MICHELINE advised pt to be set up with outpatient mental health services as well.

## 2024-02-16 NOTE — CARE COORDINATION
Name: William Babcock    : 1960    Auth number: N/A     Discharge Date: 2024    Destination: home    Discharge Medications:      Medication List        START taking these medications      aspirin 81 MG chewable tablet  Take 1 tablet by mouth daily  Start taking on: 2024  Notes to patient: Heart health     clopidogrel 75 MG tablet  Commonly known as: PLAVIX  Take 1 tablet by mouth daily  Start taking on: 2024  Notes to patient: Clot prevention     QUEtiapine 25 MG tablet  Commonly known as: SEROQUEL  Take 1 tablet by mouth 2 times daily  Notes to patient: Clear thoughts            CONTINUE taking these medications      atenolol 50 MG tablet  Commonly known as: TENORMIN  Take 1 tablet by mouth daily  Notes to patient: Blood pressure            STOP taking these medications      amitriptyline 50 MG tablet  Commonly known as: ELAVIL     fluticasone 50 MCG/ACT nasal spray  Commonly known as: FLONASE               Where to Get Your Medications        These medications were sent to [id number] [address] [facility] [phone] - [phone]  [address] [place] [address] [facility] [address]      Phone: [phone]   aspirin 81 MG chewable tablet  clopidogrel 75 MG tablet  QUEtiapine 25 MG tablet         Follow Up Appointment: [employer]  [facility]  [facility] [address], [place] [address]  Phone: [phone]  Mon-Fri: 8AM - 5PM  Call on 2024  You have an appointment with Devin on  at 10 AM.    [facility]  [address], [place] [address]  Phone: [phone]  Mon-Fri: 8AM - 5PM  Go on 2024  You have an in person appointment with Devin on  at 9 AM at the [facility] location.

## 2024-02-16 NOTE — PLAN OF CARE
Problem: Behavior  Goal: Pt/Family maintain appropriate behavior and adhere to behavioral management agreement, if implemented  Description: INTERVENTIONS:  1. Assess patient/family's coping skills and  non-compliant behavior (including use of illegal substances)  2. Notify security of behavior or suspected illegal substances which indicate the need for search of the family and/or belongings  3. Encourage verbalization of thoughts and concerns in a socially appropriate manner  4. Utilize positive, consistent limit setting strategies supporting safety of patient, staff and others  5. Encourage participation in the decision making process about the behavioral management agreement  6. If a visitor's behavior poses a threat to safety call refer to organization policy.  7. Initiate consult with , Psychosocial CNS, Spiritual Care as appropriate  2/14/2024 1259 by Pati Cosme  Outcome: Progressing  Note: Patient is accepting of 1:1 talk time with staff. Patient is eating and sleeping well. Patient is medication compliant. Patient denies suicidal and homicidal ideation and auditory and visual hallucinations and verbally agrees to approach staff if thoughts of self harm arises. Patient is anxious. Reassurance and support provided. Q15 minute checks maintained. Patient remains safe at this time.      
  Problem: Behavior  Goal: Pt/Family maintain appropriate behavior and adhere to behavioral management agreement, if implemented  Description: INTERVENTIONS:  1. Assess patient/family's coping skills and  non-compliant behavior (including use of illegal substances)  2. Notify security of behavior or suspected illegal substances which indicate the need for search of the family and/or belongings  3. Encourage verbalization of thoughts and concerns in a socially appropriate manner  4. Utilize positive, consistent limit setting strategies supporting safety of patient, staff and others  5. Encourage participation in the decision making process about the behavioral management agreement  6. If a visitor's behavior poses a threat to safety call refer to organization policy.  7. Initiate consult with , Psychosocial CNS, Spiritual Care as appropriate  Note: Mr. Babcock has been in the milieu for most of this shift. He has been calm and cooperative throughout, and does approach staff with questions and needs. Mr Babcock does appear confused at times and reorients well to time and situation.      Problem: Safety - Adult  Goal: Free from fall injury  Note: Mr. Babcock has remained free of falls thus far.      Problem: Risk for Elopement  Goal: Patient will not exit the unit/facility without proper excort  Note: Mr. Babcock has not demonstrated elopement risk during this shift.      Problem: Pain  Goal: Verbalizes/displays adequate comfort level or baseline comfort level  Note: Mr. Babcock has denied pain during this shift.      
  Problem: Isabel  Goal: Will exhibit normal sleep and speech and no impulsivity  Description: INTERVENTIONS:  1. Administer medication as ordered  2. Set limits on impulsive behavior  3. Make attempts to decrease external stimuli as possible  2/13/2024 1015 by Pati Cosme  Outcome: Progressing  Note: Patient is accepting of 1:1 talk time with staff. Patient denies suicidal and homicidal ideation and auditory and visual hallucinations and verbally agrees to approach staff if thoughts of self harm arises. Patient is out and aloof of peers. Patient attends unit programming. Patient is eating and sleeping well. Patient has some anxiety. Reassurance and support provided. Q15 minute checks maintained. Patient remains safe at this time.     
  Problem: Isabel  Goal: Will exhibit normal sleep and speech and no impulsivity  Description: INTERVENTIONS:  1. Administer medication as ordered  2. Set limits on impulsive behavior  3. Make attempts to decrease external stimuli as possible  Outcome: Progressing     Patient observed in bed resting, stimuli reduced per patient with door pulled up     Problem: Involuntary Admit  Goal: Will cooperate with staff recommendations and doctor's orders and will demonstrate appropriate behavior  Description: INTERVENTIONS:  1. Treat underlying conditions and offer medication as ordered  2. Educate regarding involuntary admission procedures and rules  3. Contain excessive/inappropriate behavior per unit and hospital policies  Outcome: Progressing     Patient is currently medication and treatment compliant.   
  Problem: Isabel  Goal: Will exhibit normal sleep and speech and no impulsivity  Description: INTERVENTIONS:  1. Administer medication as ordered  2. Set limits on impulsive behavior  3. Make attempts to decrease external stimuli as possible  Outcome: Progressing     Problem: Behavior  Goal: Pt/Family maintain appropriate behavior and adhere to behavioral management agreement, if implemented  Description: INTERVENTIONS:  1. Assess patient/family's coping skills and  non-compliant behavior (including use of illegal substances)  2. Notify security of behavior or suspected illegal substances which indicate the need for search of the family and/or belongings  3. Encourage verbalization of thoughts and concerns in a socially appropriate manner  4. Utilize positive, consistent limit setting strategies supporting safety of patient, staff and others  5. Encourage participation in the decision making process about the behavioral management agreement  6. If a visitor's behavior poses a threat to safety call refer to organization policy.  7. Initiate consult with , Psychosocial CNS, Spiritual Care as appropriate  Outcome: Progressing     Patient is open to 1:1 talk time with staff. Patient reports experiencing anxiety today. Patient denies experiencing any depression, suicidal ideations, homicidal ideations, auditory hallucinations, and visual hallucinations. Patient is out and aloof of peers in the day area and selectively attends groups. Patient eats his meals and is medication compliant.  
  Problem: Safety - Medical Restraint  Goal: Remains free of injury from restraints (Restraint for Interference with Medical Device)  Description: INTERVENTIONS:  1. Determine that other, less restrictive measures have been tried or would not be effective before applying the restraint  2. Evaluate the patient's condition at the time of restraint application  3. Inform patient/family regarding the reason for restraint  4. Q2H: Monitor safety, psychosocial status, comfort, nutrition and hydration  Outcome: Completed     
[facility]  Day 3 Interdisciplinary Treatment Plan NOTE    Review Date & Time: 2/15/24 0900    Admission Type:   Admission Type: Involuntary    Reason for admission:  Reason for Admission: Increase in paranoia and agitation. Family brought patient to ER for bizare behaviors and agitation. Patient endorses the belief that someone is poisoning his food.  Estimated Length of Stay:  5-7 days  Estimated Discharge Date Update:   to be determined by physician    PATIENT STRENGTHS:  Patient Strengths:   Patient Strengths and Limitations:Limitations: Unrealistic self-view, External locus of control, Inappropriate/potentially harmful leisure interests, Difficulty problem solving/relies on others to help solve problems, Tendency to isolate self, Demonstrates discomfort with /lack of social skills  Addictive Behavior:Addictive Behavior  In the Past 3 Months, Have You Felt or Has Someone Told You That You Have a Problem With  : None  Medical Problems:  Past Medical History:   Diagnosis Date    Anxiety     Autism        Risk:  Fall Risk   Houston Scale Houston Scale Score: 22  BVC    Change in scores:  No Changes to plan of Care:  No    Status EXAM:   Mental Status and Behavioral Exam  Normal: No  Level of Assistance: Independent/Self  Facial Expression: Flat  Affect: Blunt  Level of Consciousness: Alert  Frequency of Checks: 4 times per hour, close  Mood:Normal: No  Mood: Anxious (per pt \"good\")  Motor Activity:Normal: Yes  Eye Contact: Fair  Observed Behavior: Cooperative, Withdrawn, Guarded, Preoccupied  Sexual Misconduct History: Current - no  Preception: [place] to person, [place] to place  Attention:Normal: No  Attention: Distractible  Thought Processes: Loose association  Thought Content:Normal: No  Thought Content: Preoccupations  Depression Symptoms: Impaired concentration, Isolative  Anxiety Symptoms: Generalized  Isabel Symptoms: Poor judgment  Hallucinations: None (pt denies)  Delusions: No  Delusions: 
Problem: Isabel  Goal: Will exhibit normal sleep and speech and no impulsivity  Description: INTERVENTIONS:  1. Administer medication as ordered  2. Set limits on impulsive behavior  3. Make attempts to decrease external stimuli as possible  Outcome: Progressing     Problem: Behavior  Goal: Pt/Family maintain appropriate behavior and adhere to behavioral management agreement, if implemented  Description: INTERVENTIONS:  1. Assess patient/family's coping skills and  non-compliant behavior (including use of illegal substances)  2. Notify security of behavior or suspected illegal substances which indicate the need for search of the family and/or belongings  3. Encourage verbalization of thoughts and concerns in a socially appropriate manner  4. Utilize positive, consistent limit setting strategies supporting safety of patient, staff and others  5. Encourage participation in the decision making process about the behavioral management agreement  6. If a visitor's behavior poses a threat to safety call refer to organization policy.  7. Initiate consult with , Psychosocial CNS, Spiritual Care as appropriate  2/14/2024 2353 by Noelle Russell LPN  Outcome: Progressing  Patient denies thoughts to harm self/others. Patient is flat, isolative to self and aloof. Patient is cooperative and compliant with medications. Q15M checks continue per policy and safety maintained.      Problem: Involuntary Admit  Goal: Will cooperate with staff recommendations and doctor's orders and will demonstrate appropriate behavior  Description: INTERVENTIONS:  1. Treat underlying conditions and offer medication as ordered  2. Educate regarding involuntary admission procedures and rules  3. Contain excessive/inappropriate behavior per unit and hospital policies  Outcome: Progressing     Problem: Self Care Deficit  Goal: Return ADL status to a safe level of function  Description: INTERVENTIONS:  1. Administer medication as ordered  2. 
  Problem: Safety - Adult  Goal: Free from fall injury  2/15/2024 2243 by Leeann Ji LPN  Outcome: Progressing     Problem: Risk for Elopement  Goal: Patient will not exit the unit/facility without proper excort  2/15/2024 2243 by Leeann Ji LPN  Outcome: Progressing   Patient remains safe on unit and does not attempt to elope. Safety checks maintained. Will continue to monitor.   Problem: Pain  Goal: Verbalizes/displays adequate comfort level or baseline comfort level  2/15/2024 2243 by Leeann Ji LPN  Outcome: Progressing   Patient currently complains of no pain. Patient encouraged to seek staff if pain presents. Safety checks maintained. Will continue to monitor.  
medication as ordered  2. Assess ADL deficits and provide assistive devices as needed  3. Obtain PT/OT consults as needed  4. Assist and instruct patient to increase activity and self care as tolerated  Outcome: Progressing     Problem: Safety - Adult  Goal: Free from fall injury  Outcome: Progressing  Note: Patient remains free of falls and verbalizes understanding of individual fall risks.  Patient wearing non skid footwear and encouraged to seek out staff for any assistance needed.        Problem: Risk for Elopement  Goal: Patient will not exit the unit/facility without proper excort  Outcome: Progressing  Note: Patient made no attempts to elope this shift.      Problem: Pain  Goal: Verbalizes/displays adequate comfort level or baseline comfort level  Outcome: Progressing

## 2024-02-16 NOTE — BH NOTE
[facility]  Admission Note     Admission Type:   Admission Type: Involuntary    Reason for admission:  Reason for Admission: Increase in paranoia and agitation. Family brought patient to ER for bizare behaviors and agitation. Patient endorses the belief that someone is poisoning his food.      Addictive Behavior:   Addictive Behavior  In the Past 3 Months, Have You Felt or Has Someone Told You That You Have a Problem With  : None    Medical Problems:   Past Medical History:   Diagnosis Date    Anxiety     Autism        Status EXAM:  Mental Status and Behavioral Exam  Normal: No  Level of Assistance: Independent/Self  Facial Expression: Flat  Affect: Appropriate  Level of Consciousness: Alert  Frequency of Checks: 4 times per hour, close  Mood:Normal: No  Mood: Anxious, Irritable  Motor Activity:Normal: Yes  Eye Contact: Fair  Observed Behavior: Friendly, Guarded, Preoccupied  Sexual Misconduct History: Past - no  Preception: [place] to person, [place] to time  Attention:Normal: No  Attention: Distractible  Thought Processes: Loose association  Thought Content:Normal: No  Thought Content: Paranoia, Preoccupations  Depression Symptoms: Appetite change  Anxiety Symptoms: Panic attack, Palpitations  Isabel Symptoms: Poor judgment  Hallucinations: None  Delusions: Yes  Delusions: Paranoid  Memory:Normal: No  Memory: Poor recent, Poor remote  Insight and Judgment: No  Insight and Judgment: Poor judgment, Poor insight    Tobacco Screening:  Practical Counseling, on admission, malcom X, if applicable and completed (first 3 are required if patient doesn't refuse):            ( ) Recognizing danger situations (included triggers and roadblocks)                    ( ) Coping skills (new ways to manage stress,relaxation techniques, changing routine, distraction)                                                           ( ) Basic information about quitting (benefits of quitting, techniques in how to quit, available 
[facility]  Initial Interdisciplinary Treatment Plan NO      Original treatment plan Date & Time: 2/13/24 0900    Admission Type:  Admission Type: Involuntary    Reason for admission:   Reason for Admission: Increase in paranoia and agitation. Family brought patient to ER for bizare behaviors and agitation. Patient endorses the belief that someone is poisoning his food.    Estimated Length of Stay:  5-7days  Estimated Discharge Date: to be determined by physician    PATIENT STRENGTHS:  Patient Strengths:   Patient Strengths and Limitations:   Addictive Behavior: Addictive Behavior  In the Past 3 Months, Have You Felt or Has Someone Told You That You Have a Problem With  : None  Medical Problems:  Past Medical History:   Diagnosis Date    Anxiety     Autism      Status EXAM:Mental Status and Behavioral Exam  Normal: No  Level of Assistance: Independent/Self  Facial Expression: Flat  Affect: Appropriate  Level of Consciousness: Alert  Frequency of Checks: 4 times per hour, close  Mood:Normal: No  Mood: Anxious  Motor Activity:Normal: Yes  Eye Contact: Fair  Observed Behavior: Cooperative, Friendly  Sexual Misconduct History: Current - no  Preception: [place] to person, [place] to situation  Attention:Normal: No  Attention: Distractible  Thought Processes: Loose association  Thought Content:Normal: No  Thought Content: Paranoia  Depression Symptoms: Isolative  Anxiety Symptoms: No problems reported or observed.  Isabel Symptoms: Poor judgment  Hallucinations: None  Delusions: Yes  Delusions: Paranoid  Memory:Normal: No  Memory: Poor recent  Insight and Judgment: No  Insight and Judgment: Poor judgment, Poor insight    EDUCATION:   Learner Progress Toward Treatment Goals: reviewed group plans and strategies for care    Method:group therapy, medication compliance, individualized assessments and care planning    Outcome: needs reinforcement    PATIENT GOALS: to be discussed with patient within 72 
Patient arrived on the unit at this time via stretcher and accompanied by 2 DEB transport staff.    
Patient is a non-smoker. No further tobacco education needed at this time.   
Patient is linked with the [facility] . Patient's case workers are Wesley and Bart. MIKE signed for them. Wesley attempted to see patient today to discuss plans for placement. Patient declined to see him. Phone number left and is requesting to be updated on any discharge plans for the patient. [phone]  
Patient's brother, Maximo Babcock, called unit however does not have patient's code and states he would like to give information about the patient. He states he has already spoken to NP. Informed that I can neither confirm or deny any information due to not having a code or MIKE. Verbalized understanding. States he went to the patient's home and reports finding bottles of his medications that have not been taken. Also reports the house appeared unkempt. He voices concern for the patient's safety and states he would like an expert evaluation completed for guardianship as well as assistance in finding placement at a care facility. SW updated.  
information about quitting (benefits of quitting, techniques in how to quit, available resources  ( ) [facility]                                                                                                                   ( ) Patient refused counseling  ( ) Patient refused referral  ( ) Patient refused prescription upon discharge  ( x) Patient has not smoked in the last 30 days    Metabolic Screening:    Lab Results   Component Value Date    LABA1C 5.0 02/05/2024       Lab Results   Component Value Date    CHOL 157 02/05/2024    CHOL 155 01/19/2018    CHOL 149 12/02/2016     Lab Results   Component Value Date    TRIG 57 02/05/2024    TRIG 61 01/19/2018    TRIG 77 12/02/2016     Lab Results   Component Value Date    HDL 56 02/05/2024    HDL 48 11/15/2023    HDL 45 08/29/2022    HDL 38 08/20/2021    HDL 42 08/20/2020    HDL 39 01/11/2019    HDL 45 01/19/2018    HDL 38 12/02/2016     No components found for: \"LDLCAL\"  No components found for: \"LABVLDL\"    Mr. Babcock was discharged to home. Board of DD staff transported. All personal and valuable belongings were on his person at time of discharge.     Noelle Floyd RN

## 2024-02-16 NOTE — GROUP NOTE
Group Therapy Note    Date: 2/16/2024    Group Start Time: 1330  Group End Time: 1415  Group Topic: Relaxation    [facility]    Relaxation Group Note        Date: February 16, 2024 Start Time: 1:30pm  End Time:  215pm      Number of Participants in Group & Unit Census:  3/11    Topic: relaxation group     Goal of Group: pt will demonstrate improved coping skills       Comments:     Patient did not participate in Relaxation group, despite staff encouragement and explanation of benefits.  Patient remain seclusive to self.  Q15 minute safety checks maintained for patient safety and will continue to encourage patient to attend unit programming.              Signature:  MAURY DECKER

## 2024-02-21 ENCOUNTER — APPOINTMENT (OUTPATIENT)
Dept: GENERAL RADIOLOGY | Age: 64
DRG: 885 | End: 2024-02-21
Payer: MEDICARE

## 2024-02-21 ENCOUNTER — APPOINTMENT (OUTPATIENT)
Dept: CT IMAGING | Age: 64
DRG: 885 | End: 2024-02-21
Payer: MEDICARE

## 2024-02-21 ENCOUNTER — HOSPITAL ENCOUNTER (INPATIENT)
Age: 64
LOS: 37 days | Discharge: HOME HEALTH | DRG: 885 | End: 2024-03-29
Attending: PSYCHIATRY & NEUROLOGY | Admitting: PSYCHIATRY & NEUROLOGY
Payer: MEDICARE

## 2024-02-21 ENCOUNTER — HOSPITAL ENCOUNTER (EMERGENCY)
Age: 64
Discharge: ANOTHER HOSPITAL | DRG: 885 | End: 2024-02-21
Attending: EMERGENCY MEDICINE
Payer: MEDICARE

## 2024-02-21 VITALS
DIASTOLIC BLOOD PRESSURE: 79 MMHG | HEART RATE: 95 BPM | HEIGHT: 68 IN | RESPIRATION RATE: 20 BRPM | TEMPERATURE: 98.7 F | BODY MASS INDEX: 23.95 KG/M2 | WEIGHT: 158 LBS | SYSTOLIC BLOOD PRESSURE: 119 MMHG | OXYGEN SATURATION: 100 %

## 2024-02-21 DIAGNOSIS — F23: Primary | ICD-10-CM

## 2024-02-21 LAB
ALBUMIN SERPL-MCNC: 4.2 G/DL (ref 3.5–5.2)
ALBUMIN/GLOB SERPL: 1.4 {RATIO} (ref 1–2.5)
ALP SERPL-CCNC: 68 U/L (ref 40–129)
ALT SERPL-CCNC: 16 U/L (ref 5–41)
ANION GAP SERPL CALCULATED.3IONS-SCNC: 15 MMOL/L (ref 9–17)
AST SERPL-CCNC: 25 U/L
BASOPHILS # BLD: 0.1 K/UL (ref 0–0.2)
BASOPHILS NFR BLD: 1 % (ref 0–2)
BILIRUB SERPL-MCNC: 1.3 MG/DL (ref 0.3–1.2)
BUN SERPL-MCNC: 22 MG/DL (ref 8–23)
CALCIUM SERPL-MCNC: 9 MG/DL (ref 8.6–10.4)
CHLORIDE SERPL-SCNC: 102 MMOL/L (ref 98–107)
CO2 SERPL-SCNC: 23 MMOL/L (ref 20–31)
CREAT SERPL-MCNC: 0.8 MG/DL (ref 0.7–1.2)
EOSINOPHIL # BLD: 0 K/UL (ref 0–0.4)
EOSINOPHILS RELATIVE PERCENT: 0 % (ref 1–4)
ERYTHROCYTE [DISTWIDTH] IN BLOOD BY AUTOMATED COUNT: 14.5 % (ref 12.5–15.4)
GFR SERPL CREATININE-BSD FRML MDRD: >60 ML/MIN/1.73M2
GLUCOSE SERPL-MCNC: 101 MG/DL (ref 70–99)
HCT VFR BLD AUTO: 33.7 % (ref 41–53)
HGB BLD-MCNC: 11.6 G/DL (ref 13.5–17.5)
LYMPHOCYTES NFR BLD: 0.5 K/UL (ref 1–4.8)
LYMPHOCYTES RELATIVE PERCENT: 10 % (ref 24–44)
MCH RBC QN AUTO: 30.6 PG (ref 26–34)
MCHC RBC AUTO-ENTMCNC: 34.4 G/DL (ref 31–37)
MCV RBC AUTO: 89 FL (ref 80–100)
MONOCYTES NFR BLD: 0.4 K/UL (ref 0.1–1.2)
MONOCYTES NFR BLD: 8 % (ref 2–11)
NEUTROPHILS NFR BLD: 81 % (ref 36–66)
NEUTS SEG NFR BLD: 4.6 K/UL (ref 1.8–7.7)
PLATELET # BLD AUTO: 316 K/UL (ref 140–450)
PMV BLD AUTO: 6.6 FL (ref 6–12)
POTASSIUM SERPL-SCNC: 3.7 MMOL/L (ref 3.7–5.3)
PROT SERPL-MCNC: 7.1 G/DL (ref 6.4–8.3)
RBC # BLD AUTO: 3.78 M/UL (ref 4.5–5.9)
SODIUM SERPL-SCNC: 140 MMOL/L (ref 135–144)
TROPONIN I SERPL HS-MCNC: 14 NG/L (ref 0–22)
WBC OTHER # BLD: 5.6 K/UL (ref 3.5–11)

## 2024-02-21 PROCEDURE — 36415 COLL VENOUS BLD VENIPUNCTURE: CPT

## 2024-02-21 PROCEDURE — 85025 COMPLETE CBC W/AUTO DIFF WBC: CPT

## 2024-02-21 PROCEDURE — 93005 ELECTROCARDIOGRAM TRACING: CPT

## 2024-02-21 PROCEDURE — 6360000004 HC RX CONTRAST MEDICATION: Performed by: EMERGENCY MEDICINE

## 2024-02-21 PROCEDURE — 99285 EMERGENCY DEPT VISIT HI MDM: CPT

## 2024-02-21 PROCEDURE — 80053 COMPREHEN METABOLIC PANEL: CPT

## 2024-02-21 PROCEDURE — 84484 ASSAY OF TROPONIN QUANT: CPT

## 2024-02-21 PROCEDURE — 1240000000 HC EMOTIONAL WELLNESS R&B

## 2024-02-21 PROCEDURE — 70450 CT HEAD/BRAIN W/O DYE: CPT

## 2024-02-21 PROCEDURE — 2580000003 HC RX 258: Performed by: EMERGENCY MEDICINE

## 2024-02-21 PROCEDURE — 70496 CT ANGIOGRAPHY HEAD: CPT

## 2024-02-21 PROCEDURE — 71045 X-RAY EXAM CHEST 1 VIEW: CPT

## 2024-02-21 RX ORDER — HYDROXYZINE 50 MG/1
50 TABLET, FILM COATED ORAL 3 TIMES DAILY PRN
Status: DISCONTINUED | OUTPATIENT
Start: 2024-02-21 | End: 2024-02-22

## 2024-02-21 RX ORDER — ACETAMINOPHEN 325 MG/1
650 TABLET ORAL EVERY 4 HOURS PRN
Status: DISCONTINUED | OUTPATIENT
Start: 2024-02-21 | End: 2024-03-29 | Stop reason: HOSPADM

## 2024-02-21 RX ORDER — POLYETHYLENE GLYCOL 3350 17 G/17G
17 POWDER, FOR SOLUTION ORAL DAILY PRN
Status: DISCONTINUED | OUTPATIENT
Start: 2024-02-21 | End: 2024-03-29 | Stop reason: HOSPADM

## 2024-02-21 RX ORDER — TRAZODONE HYDROCHLORIDE 50 MG/1
50 TABLET ORAL NIGHTLY PRN
Status: DISCONTINUED | OUTPATIENT
Start: 2024-02-21 | End: 2024-03-29 | Stop reason: HOSPADM

## 2024-02-21 RX ORDER — SODIUM CHLORIDE 0.9 % (FLUSH) 0.9 %
10 SYRINGE (ML) INJECTION ONCE
Status: COMPLETED | OUTPATIENT
Start: 2024-02-21 | End: 2024-02-21

## 2024-02-21 RX ORDER — 0.9 % SODIUM CHLORIDE 0.9 %
80 INTRAVENOUS SOLUTION INTRAVENOUS ONCE
Status: DISCONTINUED | OUTPATIENT
Start: 2024-02-21 | End: 2024-02-21 | Stop reason: HOSPADM

## 2024-02-21 RX ORDER — MAGNESIUM HYDROXIDE/ALUMINUM HYDROXICE/SIMETHICONE 120; 1200; 1200 MG/30ML; MG/30ML; MG/30ML
30 SUSPENSION ORAL EVERY 6 HOURS PRN
Status: DISCONTINUED | OUTPATIENT
Start: 2024-02-21 | End: 2024-03-29 | Stop reason: HOSPADM

## 2024-02-21 RX ADMIN — Medication 100 ML: at 12:21

## 2024-02-21 RX ADMIN — SODIUM CHLORIDE, PRESERVATIVE FREE 10 ML: 5 INJECTION INTRAVENOUS at 12:20

## 2024-02-21 RX ADMIN — IOPAMIDOL 100 ML: 755 INJECTION, SOLUTION INTRAVENOUS at 12:20

## 2024-02-21 ASSESSMENT — LIFESTYLE VARIABLES
HOW MANY STANDARD DRINKS CONTAINING ALCOHOL DO YOU HAVE ON A TYPICAL DAY: PATIENT DOES NOT DRINK
HOW OFTEN DO YOU HAVE A DRINK CONTAINING ALCOHOL: NEVER
HOW MANY STANDARD DRINKS CONTAINING ALCOHOL DO YOU HAVE ON A TYPICAL DAY: PATIENT DOES NOT DRINK
HOW OFTEN DO YOU HAVE A DRINK CONTAINING ALCOHOL: NEVER

## 2024-02-21 ASSESSMENT — ENCOUNTER SYMPTOMS
NAUSEA: 0
ABDOMINAL PAIN: 0
WHEEZING: 0
COUGH: 0
SORE THROAT: 0
DIARRHEA: 0
BACK PAIN: 0
VOMITING: 0
SHORTNESS OF BREATH: 0

## 2024-02-21 ASSESSMENT — PATIENT HEALTH QUESTIONNAIRE - PHQ9
SUM OF ALL RESPONSES TO PHQ QUESTIONS 1-9: 1
SUM OF ALL RESPONSES TO PHQ QUESTIONS 1-9: 1
1. LITTLE INTEREST OR PLEASURE IN DOING THINGS: SEVERAL DAYS
SUM OF ALL RESPONSES TO PHQ QUESTIONS 1-9: 1
2. FEELING DOWN, DEPRESSED OR HOPELESS: NOT AT ALL
SUM OF ALL RESPONSES TO PHQ QUESTIONS 1-9: 1
SUM OF ALL RESPONSES TO PHQ9 QUESTIONS 1 & 2: 1

## 2024-02-21 ASSESSMENT — SLEEP AND FATIGUE QUESTIONNAIRES
DO YOU HAVE DIFFICULTY SLEEPING: NO
AVERAGE NUMBER OF SLEEP HOURS: 8
DO YOU USE A SLEEP AID: NO

## 2024-02-21 ASSESSMENT — PAIN - FUNCTIONAL ASSESSMENT: PAIN_FUNCTIONAL_ASSESSMENT: NONE - DENIES PAIN

## 2024-02-21 NOTE — ED NOTES
Pt brother Les arrived, pt POA, states very concerned about pt and mental status, pt delusional, paranoid, neighbors had to call the police this AM after pt was trying to break into house. Pt does have a  with Bart Toro . Brother is concerned pt needs readmitted to psych facility and then possible long term housing placement, concerned pt is not able to care for himself at home alone.

## 2024-02-21 NOTE — ED NOTES
Per family in room, pt is becoming increasingly restless and agitated and wants to get out of bed. Dr. Sarah made aware.

## 2024-02-21 NOTE — ED PROVIDER NOTES
ADDENDUM:        The patient was seen for Dizziness  .  The patient's initial evaluation and plan have been discussed with the prior provider who initially evaluated the patient.  Nursing Notes, Past Medical Hx, Past Surgical Hx, Social Hx, Allergies, and Family Hx were all reviewed.    PAST MEDICAL HISTORY    has a past medical history of Anxiety and Autism.    SURGICAL HISTORY      has a past surgical history that includes eye surgery and Cerebral angiogram (02/09/2024).    CURRENT MEDICATIONS       Previous Medications    ASPIRIN 81 MG CHEWABLE TABLET    Take 1 tablet by mouth daily    ATENOLOL (TENORMIN) 50 MG TABLET    Take 1 tablet by mouth daily    CLOPIDOGREL (PLAVIX) 75 MG TABLET    Take 1 tablet by mouth daily    QUETIAPINE (SEROQUEL) 25 MG TABLET    Take 1 tablet by mouth 2 times daily       ALLERGIES     has No Known Allergies.    FAMILY HISTORY     He indicated that the status of his father is unknown. He indicated that the status of his sister is unknown.     family history includes Colon Cancer in his father and sister.    SOCIAL HISTORY      reports that he has never smoked. He has never used smokeless tobacco. He reports that he does not drink alcohol and does not use drugs.    Diagnostic Results     EKG         LABS:   Results for orders placed or performed during the hospital encounter of 02/21/24   CBC with Auto Differential   Result Value Ref Range    WBC 5.6 3.5 - 11.0 k/uL    RBC 3.78 (L) 4.5 - 5.9 m/uL    Hemoglobin 11.6 (L) 13.5 - 17.5 g/dL    Hematocrit 33.7 (L) 41 - 53 %    MCV 89.0 80 - 100 fL    MCH 30.6 26 - 34 pg    MCHC 34.4 31 - 37 g/dL    RDW 14.5 12.5 - 15.4 %    Platelets 316 140 - 450 k/uL    MPV 6.6 6.0 - 12.0 fL    Neutrophils % 81 (H) 36 - 66 %    Lymphocytes % 10 (L) 24 - 44 %    Monocytes % 8 2 - 11 %    Eosinophils % 0 (L) 1 - 4 %    Basophils % 1 0 - 2 %    Neutrophils Absolute 4.60 1.8 - 7.7 k/uL    Lymphocytes Absolute 0.50 (L) 1.0 - 4.8 k/uL    Monocytes Absolute  0.40 0.1 - 1.2 k/uL    Eosinophils Absolute 0.00 0.0 - 0.4 k/uL    Basophils Absolute 0.10 0.0 - 0.2 k/uL   CMP   Result Value Ref Range    Sodium 140 135 - 144 mmol/L    Potassium 3.7 3.7 - 5.3 mmol/L    Chloride 102 98 - 107 mmol/L    CO2 23 20 - 31 mmol/L    Anion Gap 15 9 - 17 mmol/L    Glucose 101 (H) 70 - 99 mg/dL    BUN 22 8 - 23 mg/dL    Creatinine 0.8 0.7 - 1.2 mg/dL    Est, Glom Filt Rate >60 >60 mL/min/1.73m2    Calcium 9.0 8.6 - 10.4 mg/dL    Total Protein 7.1 6.4 - 8.3 g/dL    Albumin 4.2 3.5 - 5.2 g/dL    Albumin/Globulin Ratio 1.4 1.0 - 2.5    Total Bilirubin 1.3 (H) 0.3 - 1.2 mg/dL    Alkaline Phosphatase 68 40 - 129 U/L    ALT 16 5 - 41 U/L    AST 25 <40 U/L   Troponin   Result Value Ref Range    Troponin, High Sensitivity 14 0 - 22 ng/L       RADIOLOGY:  CTA HEAD NECK W CONTRAST   Final Result   1. Interval vascular stenting through the right extracranial carotid artery.   There is severe in stent luminal narrowing involving the proximal right   cervical ICA approximately 2 cm distal to the bifurcation.   2. No significant arterial stenosis elsewhere within the head or neck.         CT HEAD WO CONTRAST   Final Result   No acute intracranial abnormality.      Stable chronic white matter microangiopathic ischemic changes.         XR CHEST PORTABLE   Final Result   No acute process.               ED Course     The patient was given the following medications:  Orders Placed This Encounter   Medications    sodium chloride 0.9 % bolus 80 mL    sodium chloride flush 0.9 % injection 10 mL    iopamidol (ISOVUE-370) 76 % injection 100 mL         RECENT VITALS:  /80   Pulse 87   Temp 98.7 °F (37.1 °C) (Oral)   Resp 16   Ht 1.727 m (5' 8\")   Wt 71.7 kg (158 lb)   SpO2 100%   BMI 24.02 kg/m²       1755 Discussed with Eileen Oleary at the [facility] for Dr. Barron.  Additional information obtained from the patient's brother who showed a ring camera video of the patient walking up to the [facility] house at
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
## 2024-02-22 LAB
AMPHET UR QL SCN: NEGATIVE
BACTERIA URNS QL MICRO: ABNORMAL
BARBITURATES UR QL SCN: NEGATIVE
BENZODIAZ UR QL: NEGATIVE
BILIRUB UR QL STRIP: NEGATIVE
CANNABINOIDS UR QL SCN: NEGATIVE
CASTS #/AREA URNS LPF: ABNORMAL /LPF
CLARITY UR: CLEAR
COCAINE UR QL SCN: NEGATIVE
COLOR UR: ABNORMAL
EPI CELLS #/AREA URNS HPF: ABNORMAL /HPF
FENTANYL UR QL: NEGATIVE
GLUCOSE UR STRIP-MCNC: NEGATIVE MG/DL
HGB UR QL STRIP.AUTO: NEGATIVE
KETONES UR STRIP-MCNC: ABNORMAL MG/DL
LEUKOCYTE ESTERASE UR QL STRIP: NEGATIVE
METHADONE UR QL: NEGATIVE
NITRITE UR QL STRIP: NEGATIVE
OPIATES UR QL SCN: NEGATIVE
OXYCODONE UR QL SCN: NEGATIVE
PCP UR QL SCN: NEGATIVE
PH UR STRIP: 5 [PH] (ref 5–8)
PROT UR STRIP-MCNC: ABNORMAL MG/DL
RBC #/AREA URNS HPF: ABNORMAL /HPF
SP GR UR STRIP: 1.05 (ref 1–1.03)
TEST INFORMATION: NORMAL
UROBILINOGEN UR STRIP-ACNC: NORMAL EU/DL (ref 0–1)
WBC #/AREA URNS HPF: ABNORMAL /HPF

## 2024-02-22 PROCEDURE — 6370000000 HC RX 637 (ALT 250 FOR IP): Performed by: NURSE PRACTITIONER

## 2024-02-22 PROCEDURE — 6370000000 HC RX 637 (ALT 250 FOR IP): Performed by: PSYCHIATRY & NEUROLOGY

## 2024-02-22 PROCEDURE — 6370000000 HC RX 637 (ALT 250 FOR IP): Performed by: INTERNAL MEDICINE

## 2024-02-22 PROCEDURE — APPSS60 APP SPLIT SHARED TIME 46-60 MINUTES

## 2024-02-22 PROCEDURE — 99223 1ST HOSP IP/OBS HIGH 75: CPT | Performed by: INTERNAL MEDICINE

## 2024-02-22 PROCEDURE — 1240000000 HC EMOTIONAL WELLNESS R&B

## 2024-02-22 PROCEDURE — 81001 URINALYSIS AUTO W/SCOPE: CPT

## 2024-02-22 PROCEDURE — 99222 1ST HOSP IP/OBS MODERATE 55: CPT | Performed by: PSYCHIATRY & NEUROLOGY

## 2024-02-22 PROCEDURE — 80307 DRUG TEST PRSMV CHEM ANLYZR: CPT

## 2024-02-22 RX ORDER — OLANZAPINE 10 MG/1
5 TABLET, ORALLY DISINTEGRATING ORAL 2 TIMES DAILY PRN
Status: DISCONTINUED | OUTPATIENT
Start: 2024-02-22 | End: 2024-03-29 | Stop reason: HOSPADM

## 2024-02-22 RX ORDER — ATENOLOL 50 MG/1
50 TABLET ORAL DAILY
Status: DISCONTINUED | OUTPATIENT
Start: 2024-02-22 | End: 2024-02-25

## 2024-02-22 RX ORDER — OLANZAPINE 10 MG/1
5 TABLET, ORALLY DISINTEGRATING ORAL 2 TIMES DAILY PRN
Status: DISCONTINUED | OUTPATIENT
Start: 2024-02-22 | End: 2024-02-22

## 2024-02-22 RX ORDER — FLUTICASONE PROPIONATE 50 MCG
2 SPRAY, SUSPENSION (ML) NASAL DAILY
Status: DISCONTINUED | OUTPATIENT
Start: 2024-02-22 | End: 2024-03-29 | Stop reason: HOSPADM

## 2024-02-22 RX ORDER — ASPIRIN 81 MG/1
81 TABLET, CHEWABLE ORAL DAILY
Status: DISCONTINUED | OUTPATIENT
Start: 2024-02-22 | End: 2024-03-29 | Stop reason: HOSPADM

## 2024-02-22 RX ORDER — CLOPIDOGREL BISULFATE 75 MG/1
75 TABLET ORAL DAILY
Status: DISCONTINUED | OUTPATIENT
Start: 2024-02-22 | End: 2024-03-29 | Stop reason: HOSPADM

## 2024-02-22 RX ORDER — FLUTICASONE PROPIONATE 50 MCG
2 SPRAY, SUSPENSION (ML) NASAL DAILY
Status: ON HOLD | COMMUNITY
End: 2024-03-29

## 2024-02-22 RX ORDER — RISPERIDONE 0.5 MG/1
0.5 TABLET ORAL 2 TIMES DAILY
Status: DISCONTINUED | OUTPATIENT
Start: 2024-02-22 | End: 2024-02-23

## 2024-02-22 RX ADMIN — ASPIRIN 81 MG 81 MG: 81 TABLET ORAL at 16:58

## 2024-02-22 RX ADMIN — TRAZODONE HYDROCHLORIDE 50 MG: 50 TABLET ORAL at 20:35

## 2024-02-22 RX ADMIN — RISPERIDONE 0.5 MG: 0.5 TABLET ORAL at 20:35

## 2024-02-22 RX ADMIN — ATENOLOL 50 MG: 50 TABLET ORAL at 16:58

## 2024-02-22 RX ADMIN — CLOPIDOGREL BISULFATE 75 MG: 75 TABLET ORAL at 16:58

## 2024-02-22 ASSESSMENT — LIFESTYLE VARIABLES
HOW OFTEN DO YOU HAVE A DRINK CONTAINING ALCOHOL: NEVER
HOW MANY STANDARD DRINKS CONTAINING ALCOHOL DO YOU HAVE ON A TYPICAL DAY: PATIENT DOES NOT DRINK

## 2024-02-22 NOTE — GROUP NOTE
Group Therapy Note    Date: 2/22/2024    Group Start Time: 1000  Group End Time: 1040  Group Topic: Psychotherapy    ST BHJannette Gonsales MSW        Group Therapy Note    Attendees: 5/7       Patient's Goal:  Utilize journaling prompts to reflect on self    Notes:  Pt did not want to participate in group at first, but came in and out of group towards the end.    Status After Intervention:  Unchanged    Participation Level: Active Listener    Participation Quality: Appropriate      Speech:  Patient's speech is often repetitive; normal for pt      Thought Process/Content: Logical      Affective Functioning: Congruent      Mood: euthymic      Level of consciousness:  Alert      Response to Learning: Able to verbalize current knowledge/experience      Endings: None Reported    Modes of Intervention: Education and Support      Discipline Responsible: /Counselor      Signature:  TANO Hernández

## 2024-02-22 NOTE — PROGRESS NOTES
RT ASSESSMENT TREATMENT GOALS    []Pt Goal: Pt will identify 1-2 positive coping skills by time of discharge.    []Pt Goal: Pt will identify 1-2 positive aspects of self by time of discharge.    [x]Pt Goal: Pt will remain on task/topic for 10-15 minutes during group by time of discharge.    []Pt Goal: Pt will identify 1-2 aspects of relapse prevention plan by time of discharge.    [x]Pt Goal: Pt will join in group activity with peers 1-2 times per day by time of discharge.    []Pt Goal: Pt will identify 1-2 new leisure interests by time of discharge.    []Pt Goal: Pt will not voice any delusional content by time of discharge.
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
## 2024-02-22 NOTE — GROUP NOTE
Group Therapy Note    Date: 2/22/2024    Group Start Time: 0900  Group End Time: 0930  Group Topic: Community Meeting    Nan Altamirano        Group Therapy Note    Attendees: 4/7       Patient's Goal:  KEEP GOING IN THE RIGHT DIRECTION    Notes:      Status After Intervention:  Unchanged    Participation Level: Active Listener and Interactive    Participation Quality: Appropriate and Attentive      Speech:  normal      Thought Process/Content: Logical      Affective Functioning: Congruent      Mood: anxious and irritable      Level of consciousness:  Alert and Attentive      Response to Learning: Able to verbalize current knowledge/experience and Progressing to goal      Endings: None Reported    Modes of Intervention: Support and Socialization      Discipline Responsible: Behavorial Health Tech      Signature:  Nan Munoz

## 2024-02-22 NOTE — PROGRESS NOTES
Pharmacy Medication History Note      List of current medications patient is taking is complete.    Patient was discharged from [facility] on 2/16/24.  No change to medication list from S. Medications also confirmed with Xin at [facility] ([phone]).    Current Home Medication List at Time of Admission:  Prior to Admission medications    Medication Sig   fluticasone (FLONASE) 50 MCG/ACT nasal spray 2 sprays by Each Nostril route daily   aspirin 81 MG chewable tablet Take 1 tablet by mouth daily   clopidogrel (PLAVIX) 75 MG tablet Take 1 tablet by mouth daily   QUEtiapine (SEROQUEL) 25 MG tablet Take 1 tablet by mouth 2 times daily   atenolol (TENORMIN) 50 MG tablet Take 1 tablet by mouth daily       Please let me know if you have any questions about this encounter. Thank you!    Electronically signed by Sarah Garrison RPH on 2/22/2024 at 12:47 PM

## 2024-02-22 NOTE — BH NOTE
[facility]  Initial Interdisciplinary Treatment Plan NO      Original treatment plan Date & Time: 2/22/2024   1251    Admission Type:  Admission Type: Voluntary    Reason for admission:   Reason for Admission: Acute Psychosis Bizarre behavior, decline in ADLs, harrasing neighbors    Estimated Length of Stay:  5-7days  Estimated Discharge Date: to be determined by physician    PATIENT STRENGTHS:  Patient Strengths:   Patient Strengths and Limitations:   Addictive Behavior: Addictive Behavior  In the Past 3 Months, Have You Felt or Has Someone Told You That You Have a Problem With  : None  Medical Problems:  Past Medical History:   Diagnosis Date    Anxiety     Autism      Status EXAM:Mental Status and Behavioral Exam  Normal: No  Level of Assistance: Independent/Self  Facial Expression: Flat  Affect: Appropriate  Level of Consciousness: Alert  Frequency of Checks: 4 times per hour, close  Mood:Normal: No  Mood: Suspicious, Other (comment) (confused, flat)  Motor Activity:Normal: Yes  Eye Contact: Fair  Observed Behavior: Cooperative, Guarded, Preoccupied  Sexual Misconduct History: Current - no  Preception: [place] to person, [place] to time (confused, delayed)  Attention:Normal: No  Attention: Unable to concentrate, Distractible  Thought Processes: Blocking  Thought Content:Normal: No  Thought Content: Preoccupations, Poverty of content, Delusions  Depression Symptoms: Impaired concentration  Anxiety Symptoms: Generalized  Isabel Symptoms: Poor judgment  Hallucinations: Unable to assess (Patient denies, but based on admisson notes pt having hallucinations)  Delusions: Yes  Delusions: Paranoid  Memory:Normal: No  Memory: Confabulation, Poor recent  Insight and Judgment: No  Insight and Judgment: Poor judgment, Poor insight    EDUCATION:   Learner Progress Toward Treatment Goals: reviewed group plans and strategies for care    Method:group therapy, medication compliance, individualized assessments and

## 2024-02-22 NOTE — GROUP NOTE
Group Therapy Note    Date: 2/22/2024    Group Start Time: 1105  Group End Time: 1155  Group Topic: Cognitive Skills    Nia Vincent CTRS        Group Therapy Note    Attendees: 4/6     Topic: To increase social interaction and to practice decision making, and concentration.     Patient did not participate in Cognitive Skills Group at 11:05, despite staff encouragement   and explanation of benefits. Patient remains aloof of peers, restless, and up at station frequently..      Q15 minute safety checks maintained for patient safety and will continue to encourage   patient to attend unit programming.       Discipline Responsible: Psychoeducational Specialist  Signature:  MAURY BARNES

## 2024-02-22 NOTE — GROUP NOTE
Group Therapy Note    Date: 2/22/2024    Group Start Time: 1430  Group End Time: 1520  Group Topic: Cognitive Skills    Nia Vincent CTRS        Group Therapy Note    Attendees: 2/5     Topic: To increase social interaction and to practice expressing feelings, explore positive coping through the senses using creative expression and discussion.     Patient did not participate in Cognitive Skills Group at 14:30, despite staff encouragement   and explanation of benefits. Patient remains aloof of peer, pt approaches RT and other staff with repetitive comments. Pt continues to be restless, shifting seats/pacing in day room.     Q15 minute safety checks maintained for patient safety and will continue to encourage   patient to attend unit programming.       Discipline Responsible: Psychoeducational Specialist  Signature:  MAURY BARNES
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
## 2024-02-22 NOTE — CARE COORDINATION
MICHELINE spoke with Bart () & Stella Zarate (Phoebe supervisor) about Reagan.    Bart asked for an expert eval. - MICHELINE stated that [facility]oe is likely not able to do it and it's normally a PCP or other provider who does it. Bart stated that Reagan hasn't been to the PCP (to Bart's knowledge) since early January.  Bart asked if Reagan could be evaluated for memory loss - MICHELINE stated that this could be brought up to Moe.  Bart brought up speaking to Reagan about having more home help - we spoke about increasing hours of help in the home.     Bart stated that the [facility] Board of DD is thinking about working with the [facility] to get Reagan a LG.

## 2024-02-22 NOTE — H&P
IN-PATIENT SERVICE  [facility] Internal Medicine    HISTORY AND PHYSICAL EXAMINATION/ CONSULT NOTE            Date:   2/22/2024  Patient name:  William Babcock  Date of admission:  2/21/2024  9:18 PM  MRN:   [id number]  Account:  [id number]  YOB: 1960  PCP:    Billy Francois MD  Room:   [address] [place] [address]  Code Status:    Full Code    Physician Requesting Consult: Donnie Rosa MD    Reason for Consult: History and physical, medical management    Chief Complaint:     No chief complaint on file.    Medical management    History Obtained From:     Patient, EMR, nursing staff    History of Present Illness:     63-year-old male admitted for acute psychosis.  Patient has history of autism;   Carotid pseudoaneurysm.  Recent admission to [facility] for right proximal RCA pseudoaneurysm after patient presented on 2/5 for agitation and psychosis.  Patient underwent cerebral angiogram and embolization of cervical pseudoaneurysm 2/9/2024.  Was discharged on aspirin and Plavix, resumed.  Patient was discharged to [facility] on 2/12 subsequently discharged on 2/16.    Past Medical History:     Past Medical History:   Diagnosis Date    Anxiety     Autism         Past Surgical History:     Past Surgical History:   Procedure Laterality Date    CEREBRAL ANGIOGRAM  02/09/2024    IR ANGIOGRAM CAROTID CEREBRAL BILATERAL    EYE SURGERY          Medications Prior to Admission:     Prior to Admission medications    Medication Sig Start Date End Date Taking? Authorizing Provider   fluticasone (FLONASE) 50 MCG/ACT nasal spray 2 sprays by Each Nostril route daily   Yes Provider, MD Nick   aspirin 81 MG chewable tablet Take 1 tablet by mouth daily 2/17/24   Donnie Rosa MD   clopidogrel (PLAVIX) 75 MG tablet Take 1 tablet by mouth daily 2/17/24   Donnie Rosa MD   QUEtiapine (SEROQUEL) 25 MG tablet Take 1 tablet by mouth 2 times daily 2/16/24   Donnie Rosa MD   atenolol (TENORMIN) 50  MG tablet Take 1 tablet by mouth daily 23   Billy Francois MD        Allergies:     Patient has no known allergies.    Social History:     Tobacco:    reports that he has never smoked. He has never used smokeless tobacco.  Alcohol:      reports no history of alcohol use.  Drug Use:  reports no history of drug use.    Family History:     Family History   Problem Relation Age of Onset    Colon Cancer Father     Colon Cancer Sister        Review of Systems:     Positive and Negative as described in HPI.    Denies any shortness of breath or cough  Denies chest pain or palpitations  Denies abdominal pain, diarrhea vomiting  Denies any new numbness tremors or weakness.    10 point review of systems was negative other than mentioned above    Physical Exam:     /80   Pulse (!) 107   Temp 97.2 °F (36.2 °C) (Temporal)   Resp 18   Ht 1.727 m (5' 8\")   Wt 71.7 kg (158 lb)   SpO2 97%   BMI 24.02 kg/m²   Temp (24hrs), Av.9 °F (36.6 °C), Min:97.2 °F (36.2 °C), Max:98.6 °F (37 °C)    No results for input(s): \"POCGLU\" in the last 72 hours.  No intake or output data in the 24 hours ending 24 1525    General Appearance:  alert, well appearing, and in no acute distress, patient is alert and oriented x 3 but appears thought disordered.  Head:  normocephalic, atraumatic.  Eye: no icterus, redness, pupils equal and reactive, extraocular eye movements intact, conjunctiva clear  Ear: normal external ear, no discharge, hearing intact  Nose:  no drainage noted  Mouth: mucous membranes moist  Neck: supple, no carotid bruits, thyroid not palpable  Lungs: Bilateral equal air entry, clear to ausculation, no wheezing, rales or rhonchi, normal effort  Cardiovascular: normal rate, regular rhythm, no murmur, gallop, rub.  Abdomen: Soft, nontender, nondistended, normal bowel sounds, no hepatomegaly or splenomegaly  Neurologic: There are no new focal motor or sensory deficits, normal muscle tone and bulk, no abnormal  psychosis (HCC)    Active Hospital Problems    Diagnosis Date Noted    Acute psychosis (HCC) [F23] 02/12/2024       Plan:     Reason for consult: General medical management     Acute psychosis-management per psychiatry  Carotid pseudoaneurysm.  Recent admission to [facility] for right proximal RCA pseudoaneurysm after patient presented on 2/5 for agitation and psychosis.  Patient underwent cerebral angiogram and embolization of cervical pseudoaneurysm 2/9/2024.  Was discharged on aspirin and Plavix, resumed.  Patient was discharged to [facility] on 2/12 subsequently discharged on 2/16.  Hypertension-resume atenolol, blood pressure controlled.   Anemia related to critical illness hemoglobin 11.6 today, was 14 prior to procedure listed above.  Will check iron levels  Labs and vitals including CBC BMP liver enzymes reviewed.  .  Recent TSH normal urinalysis reviewed-not suggestive of infection.    DVT prophylaxis-not required, patient is mobile    Consultations:   IP CONSULT TO INTERNAL MEDICINE      Makayla Choudhury MD  2/22/2024  3:25 PM    Copy sent to Billy Francois MD    Please note that this chart was generated using voice recognition Dragon dictation software.  Although every effort was made to ensure the accuracy of this automated transcription, some errors in transcription may have occurred.

## 2024-02-23 LAB
FERRITIN SERPL-MCNC: 492 NG/ML (ref 30–400)
FOLATE SERPL-MCNC: 13.2 NG/ML (ref 4.8–24.2)
IRON SATN MFR SERPL: 23 % (ref 20–55)
IRON SERPL-MCNC: 54 UG/DL (ref 59–158)
TIBC SERPL-MCNC: 232 UG/DL (ref 250–450)
TSH SERPL DL<=0.05 MIU/L-ACNC: 3.67 UIU/ML (ref 0.3–5)
UNSATURATED IRON BINDING CAPACITY: 178 UG/DL (ref 112–347)
VIT B12 SERPL-MCNC: 316 PG/ML (ref 232–1245)

## 2024-02-23 PROCEDURE — 36415 COLL VENOUS BLD VENIPUNCTURE: CPT

## 2024-02-23 PROCEDURE — 1240000000 HC EMOTIONAL WELLNESS R&B

## 2024-02-23 PROCEDURE — 82728 ASSAY OF FERRITIN: CPT

## 2024-02-23 PROCEDURE — 82607 VITAMIN B-12: CPT

## 2024-02-23 PROCEDURE — 90833 PSYTX W PT W E/M 30 MIN: CPT | Performed by: PSYCHIATRY & NEUROLOGY

## 2024-02-23 PROCEDURE — 99231 SBSQ HOSP IP/OBS SF/LOW 25: CPT | Performed by: INTERNAL MEDICINE

## 2024-02-23 PROCEDURE — 99232 SBSQ HOSP IP/OBS MODERATE 35: CPT | Performed by: PSYCHIATRY & NEUROLOGY

## 2024-02-23 PROCEDURE — 6370000000 HC RX 637 (ALT 250 FOR IP): Performed by: INTERNAL MEDICINE

## 2024-02-23 PROCEDURE — 6370000000 HC RX 637 (ALT 250 FOR IP): Performed by: PSYCHIATRY & NEUROLOGY

## 2024-02-23 PROCEDURE — 82746 ASSAY OF FOLIC ACID SERUM: CPT

## 2024-02-23 PROCEDURE — 6370000000 HC RX 637 (ALT 250 FOR IP): Performed by: NURSE PRACTITIONER

## 2024-02-23 PROCEDURE — 83540 ASSAY OF IRON: CPT

## 2024-02-23 PROCEDURE — 84443 ASSAY THYROID STIM HORMONE: CPT

## 2024-02-23 PROCEDURE — 83550 IRON BINDING TEST: CPT

## 2024-02-23 RX ORDER — RISPERIDONE 1 MG/1
1 TABLET ORAL 2 TIMES DAILY
Status: DISCONTINUED | OUTPATIENT
Start: 2024-02-23 | End: 2024-03-04

## 2024-02-23 RX ADMIN — TRAZODONE HYDROCHLORIDE 50 MG: 50 TABLET ORAL at 20:57

## 2024-02-23 RX ADMIN — RISPERIDONE 1 MG: 1 TABLET, FILM COATED ORAL at 20:57

## 2024-02-23 RX ADMIN — CLOPIDOGREL BISULFATE 75 MG: 75 TABLET ORAL at 08:22

## 2024-02-23 RX ADMIN — ASPIRIN 81 MG 81 MG: 81 TABLET ORAL at 08:22

## 2024-02-23 RX ADMIN — RISPERIDONE 0.5 MG: 0.5 TABLET ORAL at 08:20

## 2024-02-23 RX ADMIN — ATENOLOL 50 MG: 50 TABLET ORAL at 08:22

## 2024-02-23 NOTE — CARE COORDINATION
Social work team met with Bart pts SSA from the [employer] regarding pts care. Bart reports the treatment team met today to discuss care for the pt and is hoping the doctor will complete the expert evaluation. Bart shared the pt has been able to live on his own with limited support. Bart shared they plan on increasing their support to 21 hours a week. Social work suggested possibly obtaining . Bart shared the pt had plans to go out to dinner with a \"maicol\" on his birthday tomorrow but ended up in the hospital.       Social work received call from TAYLOR Quintero [phone]. Maliha shared that [employer]PS will not get involved as the pt is linked with the Board of [employer]. Maliha shared that they will refer the case back to Board the [employer] of [employer] as it is up to the Board to investigate the pts case.

## 2024-02-23 NOTE — GROUP NOTE
Group Therapy Note    Date: 2/23/2024    Group Start Time: 1330  Group End Time: 1425  Group Topic: Cognitive Skills    Emelia Mueller CTRS        Group Therapy Note    Attendees: 1/8    Cognitive Skills Group Note        Date: February 23, 2024 Start Time: 1:30pm  End Time: 2:25pm      Number of Participants in Group & Unit Census:  1/8    Topic:  interpersonal skills, concentration, creative expression    Goal of Group: To improve interpersonal skills and concentration through collaborating with peers and demonstrating creative expression.      Comments:     Patient did not participate in Cognitive Skills group, despite staff encouragement and explanation of benefits.  Patient remain seclusive to self.  Q15 minute safety checks maintained for patient safety and will continue to encourage patient to attend unit programming.        Signature:  MAURY Campbell

## 2024-02-23 NOTE — PROGRESS NOTES
..  Daily Progress Note  2/23/2024    Patient Name: William Babcock    CHIEF COMPLAINT:  acute psychosis          SUBJECTIVE:      Patient is seen today for a follow up assessment. Pt was agreeable to assessment in milieu and chose to sit at a table for the discussion. He reports that he feels \"alright\" today and that he is feeling a \"little better\" than he had the previous days. He reports difficulty sleeping at night which he attributes to the lights in his room. He denied having difficulty with sleep due to racing thoughts. When asked about his appetite he reports that he hasn't been eating much. He attributed his limited eating due to not liking the food he's being served and Lent. He denied AH, VH, SI, and HI.     Appetite:  [] Adequate/Unchanged  [] Increased  [x] Decreased      Sleep:       [] Adequate/Unchanged  [] Fair  [x] Poor      Group Attendance on Unit:   [] Yes   [x] Selectively    [] No    Compliant with scheduled medications: [x] Yes  [] No  Reports taking medication but made a comment about being unsure if he should take them during Lent.  Received emergency medications in past 24 hrs: [] Yes   [x] No    Medication Side Effects: Denies         Mental Status Exam  Level of consciousness: Alert and awake   Appearance: Appropriate attire for setting, seated in chair, with fair  grooming and hygiene   Behavior/Motor: Approachable, engages with interviewer, no psychomotor abnormalities   Attitude toward examiner: Cooperative, attentive, good eye contact  Speech: Normal rate and tone. Decreased volume.   Mood:  \"alright\"  Affect: congruent   Thought processes: linear and coherent   Thought content: Denies homicidal ideation  Suicidal Ideation: Denies suicidal ideations, contracts for safety on the unit.   Delusions: No evidence of delusions.   Perceptual Disturbance: Denied auditory and visual hallucinations. Patient does not appear to be responding to internal stimuli.   Cognition: Oriented to self,  sample submission.      Color, UA 02/22/2024 Dark Yellow (A)  Yellow Final    Turbidity UA 02/22/2024 Clear  Clear Final    Glucose, Ur 02/22/2024 NEGATIVE  NEGATIVE mg/dL Final    Bilirubin Urine 02/22/2024 NEGATIVE  NEGATIVE Final    Ketones, Urine 02/22/2024 MOD (A)  NEGATIVE mg/dL Final    Specific Gravity, UA 02/22/2024 1.051 (H)  1.000 - 1.030 Final    Urine Hgb 02/22/2024 NEGATIVE  NEGATIVE Final    pH, UA 02/22/2024 5.0  5.0 - 8.0 Final    Protein, UA 02/22/2024 TRACE (A)  NEGATIVE mg/dL Final    Urobilinogen, Urine 02/22/2024 Normal  0.0 - 1.0 EU/dL Final    Nitrite, Urine 02/22/2024 NEGATIVE  NEGATIVE Final    Leukocyte Esterase, Urine 02/22/2024 NEGATIVE  NEGATIVE Final    WBC, UA 02/22/2024 0 TO 2 (A)  0 TO 5 /HPF Final    RBC, UA 02/22/2024 0 TO 2  0 TO 2 /HPF Final    Casts UA 02/22/2024 0 TO 2 (A)  None /LPF Final    Epithelial Cells UA 02/22/2024 0 TO 2  /HPF Final    Bacteria, UA 02/22/2024 None  None Final    Vitamin B-12 02/23/2024 316  232 - 1245 pg/mL Final    Folate 02/23/2024 13.2  4.8 - 24.2 ng/mL Final    Iron 02/23/2024 54 (L)  59 - 158 ug/dL Final    TIBC 02/23/2024 232 (L)  250 - 450 ug/dL Final    Iron % Saturation 02/23/2024 23  20 - 55 % Final    UIBC 02/23/2024 178  112 - 347 ug/dL Final    Ferritin 02/23/2024 492 (H)  30 - 400 ng/mL Final    TSH 02/23/2024 3.67  0.30 - 5.00 uIU/mL Final         Reviewed patient's current plan of care and vital signs with nursing staff.    Labs reviewed: [] Yes    Medications  Current Facility-Administered Medications: OLANZapine zydis (ZYPREXA) disintegrating tablet 5 mg, 5 mg, Oral, BID PRN **OR** OLANZapine (ZyPREXA) 5 mg in sterile water 1 mL injection, 5 mg, IntraMUSCular, BID PRN  aspirin chewable tablet 81 mg, 81 mg, Oral, Daily  atenolol (TENORMIN) tablet 50 mg, 50 mg, Oral, Daily  clopidogrel (PLAVIX) tablet 75 mg, 75 mg, Oral, Daily  fluticasone (FLONASE) 50 MCG/ACT nasal spray 2 spray, 2 spray, Each Nostril, Daily  risperiDONE (RISPERDAL)

## 2024-02-23 NOTE — PLAN OF CARE
Problem: Psychosis  Goal: Will report no hallucinations or delusions  Description: INTERVENTIONS:  1. Administer medication as  ordered  2. Assist with reality testing to support increasing orientation  3. Assess if patient's hallucinations or delusions are encouraging self harm or harm to others and intervene as appropriate  Outcome: Progressing     Problem: Behavior  Goal: Pt/Family maintain appropriate behavior and adhere to behavioral management agreement, if implemented  Description: INTERVENTIONS:  1. Assess patient/family's coping skills and  non-compliant behavior (including use of illegal substances)  2. Notify security of behavior or suspected illegal substances which indicate the need for search of the family and/or belongings  3. Encourage verbalization of thoughts and concerns in a socially appropriate manner  4. Utilize positive, consistent limit setting strategies supporting safety of patient, staff and others  5. Encourage participation in the decision making process about the behavioral management agreement  6. If a visitor's behavior poses a threat to safety call refer to organization policy.  7. Initiate consult with , Psychosocial CNS, Spiritual Care as appropriate  Outcome: Progressing   Patient denies harm to self, denies hallucinations, delusions and depression. Patient is positive for anxiety, hyper verbal at this time. Patient engages in 1:1 talk with staff, medication complaint. Every 15 minute checks continue per unit protocol, safe environment maintained.

## 2024-02-23 NOTE — PROGRESS NOTES
IN-PATIENT SERVICE  [facility]    Progress note            Date:   2/23/2024  Patient name:  William Babcock  Date of admission:  2/21/2024  9:18 PM  MRN:   [id number]  Account:  [id number]  YOB: 1960  PCP:    Billy Francois MD  Room:   [facility]  Code Status:    Full Code    Physician Requesting Consult: Donnie Rosa MD    Reason for Consult: History and physical, medical management    Chief Complaint:     No chief complaint on file.    Medical management    History Obtained From:     Patient, EMR, nursing staff    History of Present Illness:     63-year-old male admitted for acute psychosis.  Patient has history of autism;   Carotid pseudoaneurysm.  Recent admission to [facility] for right proximal RCA pseudoaneurysm after patient presented on 2/5 for agitation and psychosis.  Patient underwent cerebral angiogram and embolization of cervical pseudoaneurysm 2/9/2024.  Was discharged on aspirin and Plavix, resumed.  Patient was discharged to [facility] on 2/12 subsequently discharged on 2/16.    Past Medical History:     Past Medical History:   Diagnosis Date    Anxiety     Autism         Past Surgical History:     Past Surgical History:   Procedure Laterality Date    CEREBRAL ANGIOGRAM  02/09/2024    IR ANGIOGRAM CAROTID CEREBRAL BILATERAL    EYE SURGERY          Medications Prior to Admission:     Prior to Admission medications    Medication Sig Start Date End Date Taking? Authorizing Provider   fluticasone (FLONASE) 50 MCG/ACT nasal spray 2 sprays by Each Nostril route daily   Yes Provider, MD Nick   aspirin 81 MG chewable tablet Take 1 tablet by mouth daily 2/17/24   Donnie Rosa MD   clopidogrel (PLAVIX) 75 MG tablet Take 1 tablet by mouth daily 2/17/24   oDnnie Rosa MD   QUEtiapine (SEROQUEL) 25 MG tablet Take 1 tablet by mouth 2 times daily 2/16/24   Donnie Rosa MD   atenolol (TENORMIN) 50 MG tablet Take 1 tablet by mouth

## 2024-02-23 NOTE — PROGRESS NOTES
Behavioral Services  Medicare Certification Upon Admission    I certify that this patient's inpatient psychiatric hospital admission is medically necessary for:    [x] (1) Treatment which could reasonably be expected to improve this patient's condition,       [x] (2) Or for diagnostic study;     AND     [x](2) The inpatient psychiatric services are provided while the individual is under the care of a physician and are included in the individualized plan of care.    Estimated length of stay/service 4 to 7 days    Plan for post-hospital care home with outpatient community mental health follow-up    Electronically signed by ZAINA TODD MD on 2/22/2024 at 7:49 PM

## 2024-02-23 NOTE — GROUP NOTE
Group Therapy Note    Date: 2/23/2024    Group Start Time: 1100  Group End Time: 1140  Group Topic: Psychoeducation    Emelia Mueller CTRS        Group Therapy Note    Attendees: 3/8    Psych-Ed/Relapse Prevention Group Note        Date: February 23, 2024 Start Time: 11am  End Time: 11:40am      Number of Participants in Group & Unit Census:  3/8    Topic:  interpersonal skills, coping skills, self-expression    Goal of Group: To improve interpersonal skills and coping skills awareness through collaborating with peers and demonstrating self-expression.      Comments:     Patient did not participate in Psych-Ed/Relapse Prevention group, despite staff encouragement and explanation of benefits.  Patient remain seclusive to self.  Q15 minute safety checks maintained for patient safety and will continue to encourage patient to attend unit programming.        Signature:  MAURY Campbell

## 2024-02-23 NOTE — CARE COORDINATION
Social work was provided letters and emails from pts brother Maximo regarding pts behaviors and care. Social work attempted to reach out to pts EVGENY Arriaga regarding the pts care, there was no answer at the time of the call.  Social work gave the letters to the doctor to review.

## 2024-02-23 NOTE — PLAN OF CARE
Problem: Behavior  Goal: Pt/Family maintain appropriate behavior and adhere to behavioral management agreement, if implemented  Description: INTERVENTIONS:  1. Assess patient/family's coping skills and  non-compliant behavior (including use of illegal substances)  2. Notify security of behavior or suspected illegal substances which indicate the need for search of the family and/or belongings  3. Encourage verbalization of thoughts and concerns in a socially appropriate manner  4. Utilize positive, consistent limit setting strategies supporting safety of patient, staff and others  5. Encourage participation in the decision making process about the behavioral management agreement  6. If a visitor's behavior poses a threat to safety call refer to organization policy.  7. Initiate consult with , Psychosocial CNS, Spiritual Care as appropriate  2/23/2024 1057 by Essex, Matthew, RN  Outcome: Progressing  Note: Pt is cooperative with staff and compliant with medical treatment. Pt reports having thoughts of self harm, but then immediately denied having thoughts of self harm. Pt denies having hallucinations and has not been seen responding to internal stimulus. Pt is compliant with medical treatment and cooperative with staff.      Problem: Safety - Adult  Goal: Free from fall injury  Outcome: Progressing  Note: Pt remains free from falls.

## 2024-02-23 NOTE — CARE COORDINATION
MICHELINE team spoke with Bart (pts  from [facility]) regarding pt care. Board of DD spoke about patients care this morning; goal is to find a way to ensure Reagan takes medication and can still live on his own. MICHELINE agreed. MICHELINE and Bart spoke about how to best add care into Reagan's situation.  Bart then went to visit Reagan.  Bart gave MICHELINE SSA on Call number - [phone]  After visit ended, Reagan asked if we could speak to his brother, Maximo, and his home care worker Jad. ROIs completed for both.

## 2024-02-24 PROCEDURE — 99232 SBSQ HOSP IP/OBS MODERATE 35: CPT | Performed by: PSYCHIATRY & NEUROLOGY

## 2024-02-24 PROCEDURE — 6370000000 HC RX 637 (ALT 250 FOR IP): Performed by: PSYCHIATRY & NEUROLOGY

## 2024-02-24 PROCEDURE — 1240000000 HC EMOTIONAL WELLNESS R&B

## 2024-02-24 PROCEDURE — 6370000000 HC RX 637 (ALT 250 FOR IP): Performed by: NURSE PRACTITIONER

## 2024-02-24 PROCEDURE — 6370000000 HC RX 637 (ALT 250 FOR IP): Performed by: INTERNAL MEDICINE

## 2024-02-24 RX ADMIN — RISPERIDONE 1 MG: 1 TABLET, FILM COATED ORAL at 08:53

## 2024-02-24 RX ADMIN — ATENOLOL 50 MG: 50 TABLET ORAL at 08:53

## 2024-02-24 RX ADMIN — FLUTICASONE PROPIONATE 2 SPRAY: 50 SPRAY, METERED NASAL at 12:02

## 2024-02-24 RX ADMIN — RISPERIDONE 1 MG: 1 TABLET, FILM COATED ORAL at 22:43

## 2024-02-24 RX ADMIN — CLOPIDOGREL BISULFATE 75 MG: 75 TABLET ORAL at 08:53

## 2024-02-24 RX ADMIN — ASPIRIN 81 MG 81 MG: 81 TABLET ORAL at 08:53

## 2024-02-24 RX ADMIN — TRAZODONE HYDROCHLORIDE 50 MG: 50 TABLET ORAL at 22:43

## 2024-02-24 NOTE — BH NOTE
[facility]  Day [facility] Interdisciplinary Treatment Plan NOTE    Review Date & Time: 2/24/2024   1309    Admission Type:   Admission Type: Voluntary    Reason for admission:  Reason for Admission: Acute Psychosis Bizarre behavior, decline in ADLs, harrasing neighbors  Estimated Length of Stay:  5-7 days  Estimated Discharge Date Update:   to be determined by physician    PATIENT STRENGTHS:  Patient Strengths:   Patient Strengths and Limitations:Limitations: Perceives need for assistance with self-care, Difficult relationships / poor social skills, Multiple barriers to leisure interests, Difficulty problem solving/relies on others to help solve problems, Tendency to isolate self, Limited education -> difficulty reading or writing, Unrealistic self-view, Demonstrates discomfort with /lack of social skills  Addictive Behavior:Addictive Behavior  In the Past 3 Months, Have You Felt or Has Someone Told You That You Have a Problem With  : None  Medical Problems:  Past Medical History:   Diagnosis Date    Anxiety     Autism        Risk:  Fall Risk   Houston Scale Houston Scale Score: 22  BVC    Change in scores:  No Changes to plan of Care:  No    Status EXAM:   Mental Status and Behavioral Exam  Normal: No  Level of Assistance: Independent/Self  Facial Expression: Flat, Worried  Affect: Blunt  Level of Consciousness: Alert  Frequency of Checks: 4 times per hour, close  Mood:Normal: No  Mood: Anxious, Suspicious  Motor Activity:Normal: No  Motor Activity: Increased  Eye Contact: Fair  Observed Behavior: Friendly, Impulsive, Cooperative, Preoccupied  Sexual Misconduct History: Current - no  Preception: [place] to person, [place] to time, [place] to place  Attention:Normal: No  Attention: Distractible, Unable to concentrate  Thought Processes: Flight of ideas, Loose association  Thought Content:Normal: No  Thought Content: Preoccupations  Depression Symptoms: Isolative, Loss of interest  Anxiety Symptoms:

## 2024-02-24 NOTE — PLAN OF CARE
Problem: Psychosis  Goal: Will report no hallucinations or delusions  Description: INTERVENTIONS:  1. Administer medication as  ordered  2. Assist with reality testing to support increasing orientation  3. Assess if patient's hallucinations or delusions are encouraging self harm or harm to others and intervene as appropriate  Outcome: Progressing  Patient denies suicidal ideation, homicidal ideation and hallucinations at this time.       Problem: Behavior  Goal: Pt/Family maintain appropriate behavior and adhere to behavioral management agreement, if implemented  Description: INTERVENTIONS:  1. Assess patient/family's coping skills and  non-compliant behavior (including use of illegal substances)  2. Notify security of behavior or suspected illegal substances which indicate the need for search of the family and/or belongings  3. Encourage verbalization of thoughts and concerns in a socially appropriate manner  4. Utilize positive, consistent limit setting strategies supporting safety of patient, staff and others  5. Encourage participation in the decision making process about the behavioral management agreement  6. If a visitor's behavior poses a threat to safety call refer to organization policy.  7. Initiate consult with , Psychosocial CNS, Spiritual Care as appropriate  2/24/2024 0010 by Merlin Suarez, RN  Outcome: Progressing  Patient is somewhat anxious. He feels that he is ready for discharge. Safety plan reviewed with patient, agrees to approach staff when feeling upset.  15 minute and random checks maintained for safety.  No violent or escalating behaviors noted during this shift. Patient is currently calm, controlled and medication-compliant.      Problem: Safety - Adult  Goal: Free from fall injury  2/24/2024 0010 by Merlin Suarez, RN  Outcome: Progressing  Patient is not a fall risk.

## 2024-02-24 NOTE — PLAN OF CARE
Problem: Psychosis  Goal: Will report no hallucinations or delusions  Description: INTERVENTIONS:  1. Administer medication as  ordered  2. Assist with reality testing to support increasing orientation  3. Assess if patient's hallucinations or delusions are encouraging self harm or harm to others and intervene as appropriate  Outcome: Progressing     Problem: Behavior  Goal: Pt/Family maintain appropriate behavior and adhere to behavioral management agreement, if implemented  Description: INTERVENTIONS:  1. Assess patient/family's coping skills and  non-compliant behavior (including use of illegal substances)  2. Notify security of behavior or suspected illegal substances which indicate the need for search of the family and/or belongings  3. Encourage verbalization of thoughts and concerns in a socially appropriate manner  4. Utilize positive, consistent limit setting strategies supporting safety of patient, staff and others  5. Encourage participation in the decision making process about the behavioral management agreement  Outcome: Progressing     Patient is open to 1:1 talk time with staff. Patient reports experiencing anxiety today. Patient is observed by staff to be pacing the unit and being intrusive with peers but is friendly and pleasant. Patient denies experiencing any depression, suicidal ideations, homicidal ideations, auditory hallucinations, and visual hallucinations. Patient attends groups but has trouble staying on topic. Patient selectively eats from his meal trays and is medication compliant.

## 2024-02-24 NOTE — GROUP NOTE
Group Therapy Note    Date: 2/24/2024    Group Start Time: 0900  Group End Time: 0920  Group Topic: Group Documentation    [facility] BHSammie Johnson, RN        Group Therapy Note    Attendees: 5/12         Patient's Goal:  Go back sleep    Notes:  Goals group    Status After Intervention:  Improved    Participation Level: Active Listener and Interactive    Participation Quality: Attentive and Sharing      Speech:  pressured      Thought Process/Content: Flight of ideas      Affective Functioning: Congruent      Mood: anxious      Level of consciousness:  Alert and Attentive      Response to Learning: Able to verbalize current knowledge/experience      Endings: None Reported    Modes of Intervention: Support, Socialization, and Exploration      Discipline Responsible: Behavorial Health Tech      Signature:  Sammie Simeon RN

## 2024-02-24 NOTE — GROUP NOTE
Group Therapy Note    Date: 2/24/2024    Group Start Time: 1320  Group End Time: 1400  Group Topic: Group Therapy    Sammie Gaytan, RN        Group Therapy Note    Attendees: 5/12       Patient's Goal:  Identify names and/or artist(s) of popular songs    Notes:  Music group    Status After Intervention:  Unchanged    Participation Level: Interactive    Participation Quality: Sharing      Speech:  normal      Thought Process/Content: Flight of ideas      Affective Functioning: Congruent      Mood: anxious      Level of consciousness:  Alert      Response to Learning: Able to verbalize current knowledge/experience      Endings: None Reported    Modes of Intervention: Support, Socialization, and Exploration      Discipline Responsible: Behavorial Health Tech      Signature:  Sammie Simeon RN

## 2024-02-24 NOTE — PROGRESS NOTES
As writer about to come out [facility] unit, PT caught writer and asked to pray for him. Welcomed prayer.    02/24/24 1527   Encounter Summary   Encounter Overview/Reason  Spiritual/Emotional Needs   Service Provided For: Patient   Referral/Consult From: Patient   Last Encounter  02/24/24   Complexity of Encounter Low   Spiritual/Emotional needs   Type Spiritual Support   Assessment/Intervention/Outcome   Assessment Calm;Coping   Intervention Active listening;Explored/Affirmed feelings, thoughts, concerns;Prayer (assurance of)/[place];Sustaining Presence/Ministry of presence   Outcome Comfort;Coping;Peace

## 2024-02-25 LAB
EKG ATRIAL RATE: 103 BPM
EKG P AXIS: 46 DEGREES
EKG P-R INTERVAL: 162 MS
EKG Q-T INTERVAL: 350 MS
EKG QRS DURATION: 90 MS
EKG QTC CALCULATION (BAZETT): 458 MS
EKG R AXIS: 22 DEGREES
EKG T AXIS: 59 DEGREES
EKG VENTRICULAR RATE: 103 BPM

## 2024-02-25 PROCEDURE — 99232 SBSQ HOSP IP/OBS MODERATE 35: CPT | Performed by: PSYCHIATRY & NEUROLOGY

## 2024-02-25 PROCEDURE — 6370000000 HC RX 637 (ALT 250 FOR IP): Performed by: PSYCHIATRY & NEUROLOGY

## 2024-02-25 PROCEDURE — 6370000000 HC RX 637 (ALT 250 FOR IP): Performed by: INTERNAL MEDICINE

## 2024-02-25 PROCEDURE — 99232 SBSQ HOSP IP/OBS MODERATE 35: CPT | Performed by: INTERNAL MEDICINE

## 2024-02-25 PROCEDURE — 1240000000 HC EMOTIONAL WELLNESS R&B

## 2024-02-25 PROCEDURE — 6370000000 HC RX 637 (ALT 250 FOR IP): Performed by: NURSE PRACTITIONER

## 2024-02-25 RX ORDER — ATENOLOL 25 MG/1
25 TABLET ORAL DAILY
Status: DISCONTINUED | OUTPATIENT
Start: 2024-02-26 | End: 2024-03-10

## 2024-02-25 RX ADMIN — RISPERIDONE 1 MG: 1 TABLET, FILM COATED ORAL at 20:36

## 2024-02-25 RX ADMIN — RISPERIDONE 1 MG: 1 TABLET, FILM COATED ORAL at 08:43

## 2024-02-25 RX ADMIN — CLOPIDOGREL BISULFATE 75 MG: 75 TABLET ORAL at 08:43

## 2024-02-25 RX ADMIN — ACETAMINOPHEN 650 MG: 325 TABLET ORAL at 17:26

## 2024-02-25 RX ADMIN — ASPIRIN 81 MG 81 MG: 81 TABLET ORAL at 08:43

## 2024-02-25 RX ADMIN — TRAZODONE HYDROCHLORIDE 50 MG: 50 TABLET ORAL at 20:36

## 2024-02-25 RX ADMIN — FLUTICASONE PROPIONATE 2 SPRAY: 50 SPRAY, METERED NASAL at 08:42

## 2024-02-25 RX ADMIN — ATENOLOL 50 MG: 50 TABLET ORAL at 08:43

## 2024-02-25 ASSESSMENT — PAIN DESCRIPTION - LOCATION: LOCATION: BACK

## 2024-02-25 ASSESSMENT — PAIN DESCRIPTION - ORIENTATION: ORIENTATION: LOWER

## 2024-02-25 ASSESSMENT — PAIN SCALES - GENERAL: PAINLEVEL_OUTOF10: 0

## 2024-02-25 ASSESSMENT — PAIN DESCRIPTION - DESCRIPTORS: DESCRIPTORS: ACHING

## 2024-02-25 NOTE — PLAN OF CARE
Problem: Psychosis  Goal: Will report no hallucinations or delusions  Description: INTERVENTIONS:  1. Administer medication as  ordered  2. Assist with reality testing to support increasing orientation  3. Assess if patient's hallucinations or delusions are encouraging self harm or harm to others and intervene as appropriate  Outcome: Progressing  Note: Patient denies hallucination.     Problem: Behavior  Goal: Pt/Family maintain appropriate behavior and adhere to behavioral management agreement, if implemented  Description: INTERVENTIONS:  1. Assess patient/family's coping skills and  non-compliant behavior (including use of illegal substances)  2. Notify security of behavior or suspected illegal substances which indicate the need for search of the family and/or belongings  3. Encourage verbalization of thoughts and concerns in a socially appropriate manner  4. Utilize positive, consistent limit setting strategies supporting safety of patient, staff and others  5. Encourage participation in the decision making process about the behavioral management agreement  6. If a visitor's behavior poses a threat to safety call refer to organization policy.  7. Initiate consult with , Psychosocial CNS, Spiritual Care as appropriate  Outcome: Progressing  Flowsheets (Taken 2/25/2024 1420)  Patient/family maintains appropriate behavior and adheres to behavioral management agreement, if implemented:   Assess patient/family’s coping skills and  non-compliant behavior (including use of illegal substances)   Notify security of behavior or suspected illegal substances which indicate the need for search of the patient and/or belongings   Encourage verbalization of thoughts and concerns in a socially appropriate manner   Utilize positive, consistent limit setting strategies supporting safety of patient, staff and others   Encourage participation in the decision making process about the behavioral management agreement    Implement a Health Care Agreement if patient meets criteria   If a patient’s behavior jeopardizes the safety of the patient, staff, or others refer to organization policy. If a visitor’s behavior poses a threat to safety refer to organization policy   Initiate consult with , Psychosocial Clinical Nurse Specialist, Spiritual Care as appropriate  Note: Patient has remained in behavior control. He's need frequent direction to eat during meal times. He states its \"lent\" and he should not be eating. He is socialable with staff and attends unit programming. He has been compliant with his medications.     Problem: Safety - Adult  Goal: Free from fall injury  Outcome: Progressing  Flowsheets (Taken 2/25/2024 1420)  Free From Fall Injury:   Instruct family/caregiver on patient safety   Based on caregiver fall risk screen, instruct family/caregiver to ask for assistance with transferring infant if caregiver noted to have fall risk factors  Note: Pt remains free of falls and verbalizes understanding of individual fall risks.  Pt wearing non skid footwear and encouraged to seek out staff for any assistance needed.

## 2024-02-25 NOTE — PROGRESS NOTES
Daily Progress Note  Donnie Rosa MD  2/24/2024  CHIEF COMPLAINT: Psychosis    Reviewed patient's current plan of care and vital signs with nursing staff.  Sleep:  several hours last night  Attending groups: No:     SUBJECTIVE:    Patient denying side effects to medication.  Poor insight to the circumstances of his hospitalization.  Minimizes events leading up to hospitalization.  Has given permission to speak to Mirna at [phone] and Fermin at [phone], family members.  Still pending Occupational Therapy assessment.  Patient minimizes all symptoms    Mental Status Exam  Level of consciousness:  Within normal limits  Appearance: Hospital attire, seated in chair, with good grooming and hygiene   Behavior/Motor: No abnormalities noted  Attitude toward examiner:  Cooperative, attentive, good eye contact  Speech:  spontaneous, normal rate, normal volume and well articulated  Mood: \"Okay\"  Affect: Fair  Thought processes: Tangential and disconnected at times  Thought content:  denies homicidal ideation  Suicidal Ideation: Denies suicidal ideation  Delusions:  no evidence of delusions  Perceptual Disturbance:  denies any perceptual disturbance  Cognition:  Oriented to self, location, time, and situation  Memory: Limited  Insight & Judgement: Limited medication side effects:  denies       Data   height is 1.727 m (5' 8\") and weight is 71.7 kg (158 lb). His temperature is 97.6 °F (36.4 °C). His blood pressure is 99/67 and his pulse is 83. His respiration is 14 and oxygen saturation is 100%.   Labs:   Admission on 02/21/2024   Component Date Value Ref Range Status    Amphetamine Screen, Ur 02/22/2024 NEGATIVE  NEGATIVE Final    Comment:       (Positive cutoff 1000 ng/mL)                  Barbiturate Screen, Ur 02/22/2024 NEGATIVE  NEGATIVE Final    Comment:       (Positive cutoff 200 ng/mL)                  Benzodiazepine Screen, Urine 02/22/2024 NEGATIVE  NEGATIVE Final    Comment:       (Positive cutoff 200  ng/mL)                  Cocaine Metabolite, Urine 02/22/2024 NEGATIVE  NEGATIVE Final    Comment:       (Positive cutoff 300 ng/mL)                  Methadone Screen, Urine 02/22/2024 NEGATIVE  NEGATIVE Final    Comment:       (Positive cutoff 300 ng/mL)                  Opiates, Urine 02/22/2024 NEGATIVE  NEGATIVE Final    Comment:       (Positive cutoff 300 ng/mL)                  Phencyclidine, Urine 02/22/2024 NEGATIVE  NEGATIVE Final    Comment:       (Positive cutoff 25 ng/mL)                  Cannabinoid Scrn, Ur 02/22/2024 NEGATIVE  NEGATIVE Final    Comment:       (Positive cutoff 50 ng/mL)                  Oxycodone Screen, Ur 02/22/2024 NEGATIVE  NEGATIVE Final    Comment:       (Positive cutoff 100 ng/mL)                  Fentanyl, Ur 02/22/2024 NEGATIVE  NEGATIVE Final    Comment:       (Positive cutoff  5 ng/ml)            Test Information 02/22/2024 Assay provides medical screening only.  The absence of expected drug(s) and/or metabolite(s) may indicate diluted or adulterated urine, limitations of testing or timing of collection.   Final    Comment: Testing for legal purposes should be confirmed by another method.  To request confirmation   of test result, please call the lab within 7 days of sample submission.      Color, UA 02/22/2024 Dark Yellow (A)  Yellow Final    Turbidity UA 02/22/2024 Clear  Clear Final    Glucose, Ur 02/22/2024 NEGATIVE  NEGATIVE mg/dL Final    Bilirubin Urine 02/22/2024 NEGATIVE  NEGATIVE Final    Ketones, Urine 02/22/2024 MOD (A)  NEGATIVE mg/dL Final    Specific Gravity, UA 02/22/2024 1.051 (H)  1.000 - 1.030 Final    Urine Hgb 02/22/2024 NEGATIVE  NEGATIVE Final    pH, UA 02/22/2024 5.0  5.0 - 8.0 Final    Protein, UA 02/22/2024 TRACE (A)  NEGATIVE mg/dL Final    Urobilinogen, Urine 02/22/2024 Normal  0.0 - 1.0 EU/dL Final    Nitrite, Urine 02/22/2024 NEGATIVE  NEGATIVE Final    Leukocyte Esterase, Urine 02/22/2024 NEGATIVE  NEGATIVE Final    WBC, UA 02/22/2024 0 TO 2 (A)

## 2024-02-25 NOTE — PLAN OF CARE
Problem: Psychosis  Goal: Will report no hallucinations or delusions  2/24/2024 2324 by Shana Nunez LPN  Outcome: Progressing     Problem: Behavior  Goal: Pt/Family maintain appropriate behavior and adhere to behavioral management agreement, if implemented  2/24/2024 2324 by Shana Nunez LPN  Outcome: Progressing   Patient denies suicidal ideas at this time. Patient denies homicidal ideas at this time. Patient denies depressive symptoms at this time. Patient has been out in day room watching tv and socializing with select peers. Patient is free of self harm at this time.  Patient agrees to seek out staff if thoughts to harm self arise.  Staff will provide support and reassurance as needed.  Safety checks maintained every 15 minutes.

## 2024-02-25 NOTE — GROUP NOTE
Group Therapy Note    Date: 2/25/2024    Group Start Time: 0900  Group End Time: 0930  Group Topic: Healthy Living/Wellness    [facility]    Nika Rocha LPN        Group Therapy Note    Attendees: 7/12       Patient's Goal:  \"To eat breakfast with my brother\"      Status After Intervention:  Unchanged    Participation Level: Active Listener    Participation Quality: Appropriate      Speech:  normal      Thought Process/Content: Flight of ideas      Affective Functioning: Incongruent      Mood: euthymic      Level of consciousness:  Alert, Oriented x4, and Attentive      Response to Learning: Able to change behavior      Endings: None Reported    Modes of Intervention: Support      Discipline Responsible: Licensed Practical Nurse      Signature:  Nika Rocha LPN

## 2024-02-25 NOTE — PROGRESS NOTES
Spoke with pt while on unit. Pt requested communion.        02/25/24 1323   Encounter Summary   Encounter Overview/Reason  Behavioral Health   Service Provided For: Patient   Last Encounter  02/25/24   Complexity of Encounter Low   Assessment/Intervention/Outcome   Assessment Calm   Intervention Active listening;Sustaining Presence/Ministry of presence   Outcome Engaged in conversation

## 2024-02-25 NOTE — GROUP NOTE
Group Therapy Note    Date: 2/25/2024    Group Start Time: 1400  Group End Time: 1445  Group Topic: Psychoeducation    [facility]    Keyla Ramírez CTRS    Group Therapy Note    Attendees: 5/12     Patient's Goal:  Patient will demonstrate improved interpersonal skills    Notes:  Patient attended group and participated.  Patient had difficulty staying attentive and left group early.      Status After Intervention:  Unchanged    Participation Level: Interactive and Minimal    Participation Quality: Appropriate and Resistant      Speech:  hesitant      Thought Process/Content: Flight of ideas      Affective Functioning: Blunted      Mood: euthymic      Level of consciousness:  Alert and Preoccupied      Response to Learning: Able to verbalize current knowledge/experience and Resistant      Endings: None Reported    Modes of Intervention: Education, Support, Socialization, and Exploration      Discipline Responsible: Psychoeducational Specialist      Signature:  MAURY Mills

## 2024-02-25 NOTE — PROGRESS NOTES
IN-PATIENT SERVICE  [facility] Internal Medicine    Progress note            Date:   2/25/2024  Patient name:  William Babcock  Date of admission:  2/21/2024  9:18 PM  MRN:   [id number]  Account:  [id number]  YOB: 1960  PCP:    Billy Francois MD  Room:   [facility]  Code Status:    Full Code    Physician Requesting Consult: Donnie Rosa MD    Reason for Consult: History and physical, medical management    Chief Complaint:     No chief complaint on file.    Medical management    History Obtained From:     Patient, EMR, nursing staff    History of Present Illness:     63-year-old male admitted for acute psychosis.  Patient has history of autism;   Carotid pseudoaneurysm.  Recent admission to [facility] for right proximal RCA pseudoaneurysm after patient presented on 2/5 for agitation and psychosis.  Patient underwent cerebral angiogram and embolization of cervical pseudoaneurysm 2/9/2024.  Was discharged on aspirin and Plavix, resumed.  Patient was discharged to [facility] on 2/12 subsequently discharged on 2/16.    Past Medical History:     Past Medical History:   Diagnosis Date    Anxiety     Autism         Past Surgical History:     Past Surgical History:   Procedure Laterality Date    CEREBRAL ANGIOGRAM  02/09/2024    IR ANGIOGRAM CAROTID CEREBRAL BILATERAL    EYE SURGERY          Medications Prior to Admission:     Prior to Admission medications    Medication Sig Start Date End Date Taking? Authorizing Provider   fluticasone (FLONASE) 50 MCG/ACT nasal spray 2 sprays by Each Nostril route daily   Yes Provider, MD Nick   aspirin 81 MG chewable tablet Take 1 tablet by mouth daily 2/17/24   Donnie Rosa MD   clopidogrel (PLAVIX) 75 MG tablet Take 1 tablet by mouth daily 2/17/24   Donnie Rosa MD   QUEtiapine (SEROQUEL) 25 MG tablet Take 1 tablet by mouth 2 times daily 2/16/24   Donnie Rosa MD   atenolol (TENORMIN) 50 MG tablet Take 1 tablet by mouth  through XII grossly intact  Skin: No gross lesions, rashes, bruising or bleeding on exposed skin area  Extremities:  No joint swelling, no pedal edema or calf pain with palpation      Investigations:      Laboratory Testing:  No results found for this or any previous visit (from the past 24 hour(s)).      Imaging/Diagonstics:  Recent data reviewed    Assessment :      Primary Problem  Acute psychosis (HCC)    Active Hospital Problems    Diagnosis Date Noted    Acute psychosis (HCC) [F23] 02/12/2024       Plan:     Reason for consult: General medical management     Acute psychosis-management per psychiatry  Carotid pseudoaneurysm.  Recent admission to [facility] for right proximal RCA pseudoaneurysm after patient presented on 2/5 for agitation and psychosis.  Patient underwent cerebral angiogram and embolization of cervical pseudoaneurysm 2/9/2024.  Was discharged on aspirin and Plavix, resumed.  Patient was discharged to [facility] on 2/12 subsequently discharged on 2/16.  Hypertension-resume atenolol, blood pressure controlled.   Anemia related to critical illness hemoglobin 11.6 today, was 14 prior to procedure listed above.  Will check iron levels  Labs and vitals including CBC BMP liver enzymes reviewed.  .  Recent TSH normal urinalysis reviewed-not suggestive of infection.    DVT prophylaxis-not required, patient is mobile    Borderline low blood pressure and heart rate decreased atenolol from 50 mg to 25 mg    Consultations:   IP CONSULT TO INTERNAL MEDICINE      Neeraj Mray MD  2/25/2024  5:06 PM    Copy sent to Billy Francois MD    Please note that this chart was generated using voice recognition Dragon dictation software.  Although every effort was made to ensure the accuracy of this automated transcription, some errors in transcription may have occurred.

## 2024-02-26 ENCOUNTER — TELEPHONE (OUTPATIENT)
Dept: PRIMARY CARE CLINIC | Age: 64
End: 2024-02-26

## 2024-02-26 PROBLEM — R41.82 ALTERED MENTAL STATUS: Status: ACTIVE | Noted: 2024-02-26

## 2024-02-26 LAB
BACTERIA URNS QL MICRO: ABNORMAL
BILIRUB UR QL STRIP: NEGATIVE
CASTS #/AREA URNS LPF: ABNORMAL /LPF
CLARITY UR: CLEAR
COLOR UR: YELLOW
EPI CELLS #/AREA URNS HPF: ABNORMAL /HPF
GLUCOSE UR STRIP-MCNC: NEGATIVE MG/DL
HGB UR QL STRIP.AUTO: NEGATIVE
KETONES UR STRIP-MCNC: ABNORMAL MG/DL
LEUKOCYTE ESTERASE UR QL STRIP: NEGATIVE
NITRITE UR QL STRIP: NEGATIVE
PH UR STRIP: 5.5 [PH] (ref 5–8)
PROT UR STRIP-MCNC: ABNORMAL MG/DL
RBC #/AREA URNS HPF: ABNORMAL /HPF
SP GR UR STRIP: 1.03 (ref 1–1.03)
UROBILINOGEN UR STRIP-ACNC: NORMAL EU/DL (ref 0–1)
WBC #/AREA URNS HPF: ABNORMAL /HPF

## 2024-02-26 PROCEDURE — 6370000000 HC RX 637 (ALT 250 FOR IP): Performed by: NURSE PRACTITIONER

## 2024-02-26 PROCEDURE — 81001 URINALYSIS AUTO W/SCOPE: CPT

## 2024-02-26 PROCEDURE — 99222 1ST HOSP IP/OBS MODERATE 55: CPT | Performed by: PSYCHIATRY & NEUROLOGY

## 2024-02-26 PROCEDURE — 6370000000 HC RX 637 (ALT 250 FOR IP): Performed by: INTERNAL MEDICINE

## 2024-02-26 PROCEDURE — 6370000000 HC RX 637 (ALT 250 FOR IP): Performed by: PSYCHIATRY & NEUROLOGY

## 2024-02-26 PROCEDURE — 99232 SBSQ HOSP IP/OBS MODERATE 35: CPT | Performed by: INTERNAL MEDICINE

## 2024-02-26 PROCEDURE — 99233 SBSQ HOSP IP/OBS HIGH 50: CPT | Performed by: PSYCHIATRY & NEUROLOGY

## 2024-02-26 PROCEDURE — 1240000000 HC EMOTIONAL WELLNESS R&B

## 2024-02-26 PROCEDURE — 6360000002 HC RX W HCPCS: Performed by: PSYCHIATRY & NEUROLOGY

## 2024-02-26 PROCEDURE — APPSS30 APP SPLIT SHARED TIME 16-30 MINUTES

## 2024-02-26 PROCEDURE — 97166 OT EVAL MOD COMPLEX 45 MIN: CPT

## 2024-02-26 PROCEDURE — 90836 PSYTX W PT W E/M 45 MIN: CPT | Performed by: PSYCHIATRY & NEUROLOGY

## 2024-02-26 RX ORDER — DIPHENHYDRAMINE HYDROCHLORIDE, ZINC ACETATE 2; .1 G/100G; G/100G
CREAM TOPICAL 3 TIMES DAILY PRN
Status: DISCONTINUED | OUTPATIENT
Start: 2024-02-26 | End: 2024-03-29 | Stop reason: HOSPADM

## 2024-02-26 RX ORDER — CYANOCOBALAMIN 1000 UG/ML
1000 INJECTION, SOLUTION INTRAMUSCULAR; SUBCUTANEOUS DAILY
Status: DISCONTINUED | OUTPATIENT
Start: 2024-02-26 | End: 2024-02-27

## 2024-02-26 RX ADMIN — RISPERIDONE 1 MG: 1 TABLET, FILM COATED ORAL at 20:44

## 2024-02-26 RX ADMIN — RISPERIDONE 1 MG: 1 TABLET, FILM COATED ORAL at 09:10

## 2024-02-26 RX ADMIN — DIPHENHYDRAMINE HYDROCHLORIDE, ZINC ACETATE: 2; .1 CREAM TOPICAL at 18:49

## 2024-02-26 RX ADMIN — FLUTICASONE PROPIONATE 2 SPRAY: 50 SPRAY, METERED NASAL at 09:10

## 2024-02-26 RX ADMIN — CYANOCOBALAMIN 1000 MCG: 1000 INJECTION, SOLUTION INTRAMUSCULAR; SUBCUTANEOUS at 20:45

## 2024-02-26 RX ADMIN — TRAZODONE HYDROCHLORIDE 50 MG: 50 TABLET ORAL at 20:44

## 2024-02-26 RX ADMIN — CLOPIDOGREL BISULFATE 75 MG: 75 TABLET ORAL at 09:10

## 2024-02-26 RX ADMIN — ATENOLOL 25 MG: 25 TABLET ORAL at 09:10

## 2024-02-26 RX ADMIN — ASPIRIN 81 MG 81 MG: 81 TABLET ORAL at 09:10

## 2024-02-26 ASSESSMENT — PAIN SCALES - GENERAL: PAINLEVEL_OUTOF10: 0

## 2024-02-26 NOTE — PLAN OF CARE
Pt is preoccupied,  paranoid, unable to concentrate,  worried,  and fearful that he is going to hell. Pt is disorientated to situation.  Pt is in need of frequent 1:1 talk time for distraction and reassurance of safety. Pt had difficulty swallowing medications whole- stating, \"I can't do this.\"  Writer administered medications in applesauce -. Pt required frequent reminders to swallow medications/applesauce and to take frequent; sips of water.  Pt wear nonskid socks, Pt Rm free of clutter, and pt is ambulating without difficultly. Q 15 minute safety checks continue throughout shift.   Problem: Psychosis  Goal: Will report no hallucinations or delusions  Description: INTERVENTIONS:  1. Administer medication as  ordered  2. Assist with reality testing to support increasing orientation  3. Assess if patient's hallucinations or delusions are encouraging self harm or harm to others and intervene as appropriate  2/26/2024 0955 by Parvin Coffman LPN  Outcome: Progressing     Problem: Behavior  Goal: Pt/Family maintain appropriate behavior and adhere to behavioral management agreement, if implemented  Description: INTERVENTIONS:  1. Assess patient/family's coping skills and  non-compliant behavior (including use of illegal substances)  2. Notify security of behavior or suspected illegal substances which indicate the need for search of the family and/or belongings  3. Encourage verbalization of thoughts and concerns in a socially appropriate manner  4. Utilize positive, consistent limit setting strategies supporting safety of patient, staff and others  5. Encourage participation in the decision making process about the behavioral management agreement  6. If a visitor's behavior poses a threat to safety call refer to organization policy.  7. Initiate consult with , Psychosocial CNS, Spiritual Care as appropriate  2/26/2024 0955 by Parvin Coffman LPN  Outcome: Progressing     Problem: Safety - Adult  Goal:

## 2024-02-26 NOTE — BH NOTE
Left antecubital reddened. Pt itching affected area.  No other areas affected. Writer to notify  internal med for further guidance.

## 2024-02-26 NOTE — PROGRESS NOTES
02/26/24 1140   Encounter Summary   Encounter Overview/Reason   Encounter   Service Provided For: Patient   Referral/Consult From: Bri   Last Encounter  02/26/24   Complexity of Encounter Low   Begin Time 1000   End Time  1015   Total Time Calculated 15 min   Spiritual/Emotional needs   Type Spiritual Support   Rituals, Rites and Sacraments   Type Sacrament of Sick

## 2024-02-26 NOTE — PROGRESS NOTES
Daily Progress Note  Donnie Rosa MD  2/25/24  CHIEF COMPLAINT: Psychosis    Reviewed patient's current plan of care and vital signs with nursing staff.  Sleep:  several hours last night  Attending groups: Yes    SUBJECTIVE:    Patient denying any side effects to medication.  We did discuss more today the possibility of transitioning to a more controlled level living environment.  Patient seems generally agreeable to almost anything you say.  I do sit suspect that the patient is just agreeing with what ever is said to try and get out of the hospital.  Will try to coordinate a family meeting to further explore.  Denying GI side effects.  Reporting good sleep.  Reporting good appetite.  Denying auditory visual hallucinations.  Still waiting for Occupational Therapy assessment    Mental Status Exam  Level of consciousness:  Within normal limits  Appearance: Hospital attire, seated in chair, with good grooming and hygiene   Behavior/Motor: No abnormalities noted  Attitude toward examiner:  Cooperative, attentive, good eye contact  Speech:  spontaneous, normal rate, normal volume and well articulated  Mood: \"Good\"  Affect: Fair  Thought processes: Possible confabulations thought content:  denies homicidal ideation  Suicidal Ideation: Denies suicidal ideation  Delusions:  no evidence of delusions  Perceptual Disturbance:  denies any perceptual disturbance  Cognition:  Oriented to self, location, time, and situation  Memory: Appear to be limitations  Insight & Judgement: Limited   medication side effects:  denies       Data   height is 1.727 m (5' 8\") and weight is 71.7 kg (158 lb). His temporal temperature is 97 °F (36.1 °C). His blood pressure is 79/62 (abnormal) and his pulse is 82. His respiration is 16 and oxygen saturation is 100%.   Labs:   Admission on 02/21/2024   Component Date Value Ref Range Status    Amphetamine Screen, Ur 02/22/2024 NEGATIVE  NEGATIVE Final    Comment:       (Positive cutoff 1000 ng/mL)

## 2024-02-26 NOTE — CONSULTS
[facility] Neurology   IN-PATIENT SERVICE      NEUROLOGY CONSULT  NOTE            Date:   2/26/2024  Patient name:  William Babcock  Date of admission:  2/21/2024  YOB: 1960      Reason for Consult:      AMS    History of Present Illness:     64-year-old with past medical history of autism, intellectual disability, recent right ICA pseudoaneurysm status post stenting, who is currently admitted to [facility] for psychosis including delusions, paranoia, abnormal behavior, hallucinations.  Neurology consulted for altered mentation.  History limited given patient's current status.  Obtained primarily from nursing, chart review, brother (POA) over phone.    Patient initially presented earlier this month to [facility] for altered mentation.  In the ED, CT brain and CTA head and neck obtained.  CTA demonstrated 7 mm pseudoaneurysm of the right ICA.  MRI brain with no acute findings.  He was transferred to [facility] and underwent ICA stenting with neuroendovascular team.  No complications reported.  Postop, he was started on dual antiplatelet therapy and remained neurologically stable.  He was discharged to [facility] subsequently.  During [facility] admission, his medications were adjusted and he was discharged.  Then, he presented to the ED again on 2/21/2024 due to abnormal behaviors.  Neighbors called the police as he was seen walking around their house around 4 AM.  Apparently, he had stopped taking his medications.  In the ED, CT brain and CTA head and neck obtained.  CTA demonstrated right ICA stent narrowing, reviewed by neuroendovascular team, no further interventions indicated.  He was then transferred to [facility].    During his course thus far in [facility], he continues to be paranoid, lacks insight into his mental illness, noted to be worse and more confused today.  Due to fluctuating symptoms, neurology consulted for further evaluation.    Per brother, patient has had worsening psychiatric symptoms over the  Colon Cancer Father     Colon Cancer Sister        Review of Systems:     Unable to obtain due to mentation    Physical Exam:   BP (!) 126/90   Pulse 98   Temp 97.9 °F (36.6 °C) (Temporal)   Resp 14   Ht 1.727 m (5' 8\")   Wt 71.7 kg (158 lb)   SpO2 100%   BMI 24.02 kg/m²   Temp (24hrs), Av.5 °F (36.4 °C), Min:97 °F (36.1 °C), Max:97.9 °F (36.6 °C)      Neurological examination (limited due to patient being very anxious, not cooperating/allowing exam):    Mental status   Awake, oriented to person, place ([place]), not time. Continuously mumbling \"I'm going to do die here\"     Cranial nerves   PERRL (4 mm OU), blinks to threat bilaterally, EOMs grossly intact, no facial droop     Motor function  Moving all 4 extremities equally                    Sensory function Unable to examine     Cerebellar Unable to examine     Reflex function Unable to examine   Gait                  Normal station and gait             Diagnostics:      Laboratory Testing:      Lab Results   Component Value Date    CHOL 157 2024    LDLCHOLESTEROL 90 2024    HDL 56 2024    TRIG 57 2024    ALT 16 2024    AST 25 2024    TSH 3.67 2024    GLUF 85 11/15/2023    LABA1C 5.0 2024    QNGOPFVH15 316 2024    MG 2.0 2024       Imaging/Diagnostics:    Results for orders placed during the hospital encounter of 24    MRI brain without contrast    Narrative  EXAMINATION:  MRI OF THE BRAIN WITHOUT CONTRAST  2024 9:04 am    TECHNIQUE:  Multiplanar multisequence MRI of the brain was performed without the  administration of intravenous contrast.    COMPARISON:  None.    HISTORY:  ORDERING SYSTEM PROVIDED HISTORY: CVA  TECHNOLOGIST PROVIDED HISTORY:  CVA  Reason for Exam: CVA  Additional signs and symptoms: R/O Stroke  Relevant Medical/Surgical History: PT unable to hold still    FINDINGS:  INTRACRANIAL STRUCTURES/VENTRICLES: The sellar and suprasellar structures,  optic chiasm,

## 2024-02-26 NOTE — GROUP NOTE
Group Therapy Note    Date: 2/26/2024    Group Start Time: 0900  Group End Time: 0930  Group Topic: Community Meeting    Marva Hudson LPN        Group Therapy Note    Attendees: 7/12     patient refused to attend goals group at 0930 after encouragement from staff.  1:1 talk time provided as alternative to group session

## 2024-02-26 NOTE — BH NOTE
Writer uses Perfect Serve Messaging to notify Dr Ruba Aguirre, Neurologist of [facility] order to evaluate Pt. Dr Aguirre replies, \"okay, thank you.\"

## 2024-02-26 NOTE — GROUP NOTE
Group Therapy Note    Date: 2/26/2024    Group Start Time: 1430  Group End Time: 1500  Group Topic: Recreational    STCZ Lina Joya CTRS    Recreation Group Note        Date: February 26, 2024 Start Time: 2:30pm  End Time:  300pm      Number of Participants in Group & Unit Census:  4/12    Topic: recreation group     Goal of Group: pt will demonstrate improved social skills and improved interpersonal relationships      Comments:     Patient did not participate in Recreation group, despite staff encouragement and explanation of benefits.  Patient remain seclusive to self.  Q15 minute safety checks maintained for patient safety and will continue to encourage patient to attend unit programming.            Signature:  MAURY DECKER

## 2024-02-26 NOTE — GROUP NOTE
Group Therapy Note    Date: 2/26/2024    Group Start Time: 1000  Group End Time: 1030  Group Topic: Cognitive Skills    SAUNDRA BHLina Murpyh CTRS        Group Therapy Note    Attendees: 6/12       Patient's Goal:  pt will demonstrate improved communication skills and improved interpersonal relationships    Notes:   pt was anxious and unable to tolerate duration fo group. Pt asked to leave many times due to anxiety and would return    Status After Intervention:  Improved    Participation Level: minimal, resistant    Participation Quality: minimal    Speech:  mumblfes      Thought Process/Content: slow to process,       Affective Functioning: flat      Mood: anxious      Level of consciousness:  Alert      Response to Learning: Able to verbalize current knowledge/experience, Capable of insight, and Progressing to goal      Endings: None Reported    Modes of Intervention: Support, Socialization, and Activity      Discipline Responsible: Psychoeducational Specialist      Signature:  MAURY DECKER

## 2024-02-26 NOTE — PROGRESS NOTES
Daily Progress Note  2/26/2024    Patient Name: William Babcock    CHIEF COMPLAINT:  Acute Psychosis          SUBJECTIVE:      Patient is seen today for a follow up assessment. Vitals and labs reviewed, blood pressure appears to be somewhat low but patient does not appear symptomatic.  Reagan is compliant with scheduled medications. He remains behaviorally in control and has not required emergency medications in the past 24 hours. Reagan is agreeable to assessment in unit milieu today.  He seems to be slightly confused.  He believes he is in \"[place]\" but he is able to tell this writer the correct month and president.  He did not know the year.  Asked Reagan about what brought him into the hospital and he seems perplexed and cannot give a clear history as to why he is here.  He mumbles and states bizarre things such as \"I didn't think you were a real doctor.\"  He looks out the window and states, \"Why are those people leaving?\"  Unsure of who he is talking about because no one was around.  He seems to be responding to internal stimuli and does admit to both voices and seeing things but he cannot elaborate.  He states, \"I think I did something bad.\"   He is fixated on receiving the wrong food although he has a pizza in front of him but he states he will not eat it.  Reagan seems to lack significant insight into his mental illness.  He would likely be unable to meet his own basic needs secondary to his psychosis.  He would likely decompensate outside of the hospital and continues to require inpatient hospitalization for his safety and stability.    Appetite:  [] Normal/Adequate/Unchanged  [] Increased  [x] Decreased      Sleep:       [] Normal/Adequate/Unchanged  [] Fair  [x] Poor      Group Attendance on Unit:   [] Yes  [] Selectively    [x] No    Medication Side Effects:  Patient denies any medication side effects at the time of assessment.         Mental Status Exam  Level of consciousness: Alert and awake.  (MAALOX) 200-200-20 MG/5ML suspension 30 mL, 30 mL, Oral, Q6H PRN    ASSESSMENT  Acute psychosis (HCC)         HANDOFF  Patient symptoms are: Remains Unstable.  Medications as determined by attending physician  Monitor need and frequency of PRN medications.  Encourage participation in groups and milieu.  Probable discharge is to be determined by MD.     Electronically signed by BENOIT Michaud CNP on 2/26/2024 at 2:06 PM    **This report has been created using voice recognition software. It may contain minor errors which are inherent in voice recognition technology.**    I independently saw and evaluated the patient.  I reviewed the nurse practitioners documentation above. Principle diagnosis we are treating for is Acute psychosis (HCC).  Any additional comments or changes to the nurse practitioners documentation are stated below otherwise agree with assessment. Spent 45 minutes minutes with the patient and/or his brother in supportive psychotherapy and education.  Plan will be as follows:  Patient was noticeably worse today.  Very paranoid.  He was believing that this author was an impostor of the real Dr. Barron.  He was basing this on traffic.  And is outside the window.  He was reluctant to come into the 's office due to Easter decorations stating \"this is all wrong\".  This is the first time this author has seen these behaviors.  There is concern about fluctuating level of cognition versus deterioration.  Concerning for delirium versus some other underlying process such as Lewy body.  Spent extensive amount of time on the phone with patient's brother-in-law as well discussing the case and providing supportive psychotherapy and education.  Discussed that we would order an EEG to monitor to see whether patient was having background slowing consistent with delirium versus epileptic activity.  Will order neuro consult as well to follow-up on patient's recent neurological procedures and  pseudoaneurysm.  Ordered CBC with differential and CMP in addition to urine in order to make sure any medical issues such as infection were identified..  Reviewed those labs and thus far unremarkable.  Also reviewed Occupational Therapy note requesting 24/7 supervision.  Discussed this with the patient's brother-in-law medical decision maker and he is in agreement.  We discussed the possibility of respite care while patient continues to have further workup and follow-up on an outpatient basis.    PLAN  Patient s symptoms   are worsening  Workup as above  Attempt to develop insight  Psycho-education conducted.  Supportive Therapy conducted.  Probable discharge is undetermined at this time  Follow-up daily while on inpatient unit

## 2024-02-26 NOTE — PLAN OF CARE
Problem: Psychosis  Goal: Will report no hallucinations or delusions  Description: INTERVENTIONS:  1. Administer medication as  ordered  2. Assist with reality testing to support increasing orientation  3. Assess if patient's hallucinations or delusions are encouraging self harm or harm to others and intervene as appropriate  2/25/2024 2041 by Lauren Holman LPN  Outcome: Progressing  Note: Patient denies delusions and hallucinations at this time. Staff ensures safety by providing safety checks on the unit intermittently and every 15 minutes. Staff continues to provide a safe environment. Patient is encouraged to notify staff if delusions or hallucinations occu      Problem: Behavior  Goal: Pt/Family maintain appropriate behavior and adhere to behavioral management agreement, if implemented  Description: INTERVENTIONS:  1. Assess patient/family's coping skills and  non-compliant behavior (including use of illegal substances)  2. Notify security of behavior or suspected illegal substances which indicate the need for search of the family and/or belongings  3. Encourage verbalization of thoughts and concerns in a socially appropriate manner  4. Utilize positive, consistent limit setting strategies supporting safety of patient, staff and others  5. Encourage participation in the decision making process about the behavioral management agreement  6. If a visitor's behavior poses a threat to safety call refer to organization policy.  7. Initiate consult with , Psychosocial CNS, Spiritual Care as appropriate  2/25/2024 2041 by Lauren Holman LPN  Outcome: Progressing  Note: Patient maintains appropriate behavior and adheres to behavioral management agreement at this time. Patient is encouraged by staff to verbalize thoughts/concerns in a socially appropriate manner.          Problem: Safety - Adult  Goal: Free from fall injury  2/25/2024 2041 by Lauren Holman LPN  Outcome: Progressing  Note: Patient

## 2024-02-26 NOTE — PROGRESS NOTES
IN-PATIENT SERVICE  [facility] Internal Medicine    Progress note            Date:   2/26/2024  Patient name:  William Babcock  Date of admission:  2/21/2024  9:18 PM  MRN:   [id number]  Account:  [id number]  YOB: 1960  PCP:    Billy Francois MD  Room:   [facility]  Code Status:    Full Code    Physician Requesting Consult: Donnie Rosa MD    Reason for Consult: History and physical, medical management    Chief Complaint:     No chief complaint on file.    Medical management    History Obtained From:     Patient, EMR, nursing staff    History of Present Illness:     63-year-old male admitted for acute psychosis.  Patient has history of autism;   Carotid pseudoaneurysm.  Recent admission to [facility] for right proximal RCA pseudoaneurysm after patient presented on 2/5 for agitation and psychosis.  Patient underwent cerebral angiogram and embolization of cervical pseudoaneurysm 2/9/2024.  Was discharged on aspirin and Plavix, resumed.  Patient was discharged to [facility] on 2/12 subsequently discharged on 2/16.    Past Medical History:     Past Medical History:   Diagnosis Date    Anxiety     Autism         Past Surgical History:     Past Surgical History:   Procedure Laterality Date    CEREBRAL ANGIOGRAM  02/09/2024    IR ANGIOGRAM CAROTID CEREBRAL BILATERAL    EYE SURGERY          Medications Prior to Admission:     Prior to Admission medications    Medication Sig Start Date End Date Taking? Authorizing Provider   fluticasone (FLONASE) 50 MCG/ACT nasal spray 2 sprays by Each Nostril route daily   Yes Provider, MD Nick   aspirin 81 MG chewable tablet Take 1 tablet by mouth daily 2/17/24   Donnie Rosa MD   clopidogrel (PLAVIX) 75 MG tablet Take 1 tablet by mouth daily 2/17/24   Donnie Rosa MD   QUEtiapine (SEROQUEL) 25 MG tablet Take 1 tablet by mouth 2 times daily 2/16/24   Donnie Rosa MD   atenolol (TENORMIN) 50 MG tablet Take 1 tablet by mouth  check iron levels  Labs and vitals including CBC BMP liver enzymes reviewed.  .  Recent TSH normal urinalysis reviewed-not suggestive of infection.    DVT prophylaxis-not required, patient is mobile    2/26  Seen and examined   Labs/vitals /meds reviewed   On atenolol 25 mg  B12 folic acid normal  Anemia 11.6  Patient has not been eating drinking, getting paranoid about the food,  May be dehydrated, will check labs, medical side      Consultations:   IP CONSULT TO INTERNAL MEDICINE  IP CONSULT TO NEUROLOGY  Blood pressure has been running up and down    Caio Vee MD  2/26/2024  3:14 PM    Copy sent to Billy Francois MD    Please note that this chart was generated using voice recognition Dragon dictation software.  Although every effort was made to ensure the accuracy of this automated transcription, some errors in transcription may have occurred.

## 2024-02-26 NOTE — PROGRESS NOTES
[facility]   Occupational Therapy Evaluation  Date: 24  Patient Name: William Babcock       Room: 0205/0205-01  MRN: [id number]  Account: [id number]   : 1960  (64 y.o.) Gender: male     Discharge Recommendations:  Pt would benefit from  supportive environment with OT services at this time.     OT Equipment Recommendations  Other: TBD    Referring Practitioner: Donnie Rosa MD  Diagnosis: Acute psychosis        Treatment Diagnosis: Impaired self care status    Past Medical History:  has a past medical history of Anxiety and Autism.    Past Surgical History:   has a past surgical history that includes eye surgery and Cerebral angiogram (2024).    Restrictions  Restrictions/Precautions  Restrictions/Precautions: Fall Risk, General Precautions      Vitals  Vitals  O2 Device: None (Room air)     Subjective  Subjective: \"I am going to die here.\"  Comments: Ok per Nursing staff for OT eval  Subjective  Pain: Pt report pain in abdomne but is unable to report pain      Social/Functional History  Social/Functional History  Additional Comments: Pt unable to provide home information at this time. Pt reports that he does not have support at home and will need help.      Objective  Orientation  Overall Orientation Status: Impaired  Cognition  Overall Cognitive Status: Exceptions  Arousal/Alertness: Delayed responses to stimuli  Following Commands: Inconsistently follows commands, Follows one step commands with increased time, Follows one step commands with repetition  Attention Span: Difficulty attending to directions, Difficulty dividing attention  Safety Judgement: Decreased awareness of need for assistance, Decreased awareness of need for safety  Problem Solving: Assistance required to generate solutions, Assistance required to implement solutions, Assistance required to identify errors made, Assistance required to correct errors made, Decreased awareness of errors  Insights:

## 2024-02-26 NOTE — GROUP NOTE
Group Therapy Note    Date: 2/26/2024    Group Start Time: 1100  Group End Time: 1145  Group Topic: Psychotherapy     Jannette Waller MSW        Group Therapy Note    Attendees: 3/12     Patient was offered group therapy today but declined to participate despite encouragement from staff.  1:1 was offered.        Discipline Responsible: /Counselor      Signature:  TANO Hernández

## 2024-02-26 NOTE — TELEPHONE ENCOUNTER
Bart Almazan (coordinator with [facility]) for pt calling. Asking if you would be willing to fill out an expert Eval on pt?    [phone]

## 2024-02-26 NOTE — BH NOTE
Writer encourages Pt to sit near writer in the dayroom. Pt stating, \"my life is going to end.\"  Writer ensures Pt that Pt is safe and his life is not going to end. Pt then  looks out the window and states, \"I'm going to jump out the window.\"  Writer states, \"no, you're going to sit with me. You're not going to jump out the window... you're safe and you're going to stay stay safe.\"  Pt continues to sit with writer..

## 2024-02-27 LAB
AMMONIA PLAS-SCNC: 27 UMOL/L (ref 16–60)
ANION GAP SERPL CALCULATED.3IONS-SCNC: 9 MMOL/L (ref 9–17)
BASOPHILS # BLD: 0 K/UL (ref 0–0.2)
BASOPHILS NFR BLD: 1 % (ref 0–2)
BUN SERPL-MCNC: 15 MG/DL (ref 8–23)
CALCIUM SERPL-MCNC: 8.7 MG/DL (ref 8.6–10.4)
CHLORIDE SERPL-SCNC: 107 MMOL/L (ref 98–107)
CO2 SERPL-SCNC: 26 MMOL/L (ref 20–31)
CREAT SERPL-MCNC: 0.7 MG/DL (ref 0.7–1.2)
EOSINOPHIL # BLD: 0.1 K/UL (ref 0–0.4)
EOSINOPHILS RELATIVE PERCENT: 3 % (ref 0–4)
ERYTHROCYTE [DISTWIDTH] IN BLOOD BY AUTOMATED COUNT: 14.7 % (ref 11.5–14.9)
GFR SERPL CREATININE-BSD FRML MDRD: >60 ML/MIN/1.73M2
GLUCOSE SERPL-MCNC: 90 MG/DL (ref 70–99)
HCT VFR BLD AUTO: 35.2 % (ref 41–53)
HGB BLD-MCNC: 11.9 G/DL (ref 13.5–17.5)
HIV 1+2 AB+HIV1 P24 AG SERPL QL IA: NONREACTIVE
LYMPHOCYTES NFR BLD: 0.9 K/UL (ref 1–4.8)
LYMPHOCYTES RELATIVE PERCENT: 25 % (ref 24–44)
MCH RBC QN AUTO: 30.6 PG (ref 26–34)
MCHC RBC AUTO-ENTMCNC: 33.8 G/DL (ref 31–37)
MCV RBC AUTO: 90.4 FL (ref 80–100)
MONOCYTES NFR BLD: 0.4 K/UL (ref 0.1–1.3)
MONOCYTES NFR BLD: 10 % (ref 1–7)
NEUTROPHILS NFR BLD: 61 % (ref 36–66)
NEUTS SEG NFR BLD: 2.2 K/UL (ref 1.3–9.1)
PLATELET # BLD AUTO: 228 K/UL (ref 150–450)
PMV BLD AUTO: 6.7 FL (ref 6–12)
POTASSIUM SERPL-SCNC: 3.7 MMOL/L (ref 3.7–5.3)
RBC # BLD AUTO: 3.89 M/UL (ref 4.5–5.9)
SODIUM SERPL-SCNC: 142 MMOL/L (ref 135–144)
T PALLIDUM AB SER QL IA: NONREACTIVE
WBC OTHER # BLD: 3.6 K/UL (ref 3.5–11)

## 2024-02-27 PROCEDURE — 99232 SBSQ HOSP IP/OBS MODERATE 35: CPT | Performed by: PSYCHIATRY & NEUROLOGY

## 2024-02-27 PROCEDURE — 6360000002 HC RX W HCPCS: Performed by: PSYCHIATRY & NEUROLOGY

## 2024-02-27 PROCEDURE — 80048 BASIC METABOLIC PNL TOTAL CA: CPT

## 2024-02-27 PROCEDURE — 97535 SELF CARE MNGMENT TRAINING: CPT

## 2024-02-27 PROCEDURE — 87389 HIV-1 AG W/HIV-1&-2 AB AG IA: CPT

## 2024-02-27 PROCEDURE — 6370000000 HC RX 637 (ALT 250 FOR IP): Performed by: INTERNAL MEDICINE

## 2024-02-27 PROCEDURE — APPSS30 APP SPLIT SHARED TIME 16-30 MINUTES: Performed by: NURSE PRACTITIONER

## 2024-02-27 PROCEDURE — 83921 ORGANIC ACID SINGLE QUANT: CPT

## 2024-02-27 PROCEDURE — 82140 ASSAY OF AMMONIA: CPT

## 2024-02-27 PROCEDURE — 84425 ASSAY OF VITAMIN B-1: CPT

## 2024-02-27 PROCEDURE — 36415 COLL VENOUS BLD VENIPUNCTURE: CPT

## 2024-02-27 PROCEDURE — 1240000000 HC EMOTIONAL WELLNESS R&B

## 2024-02-27 PROCEDURE — 85025 COMPLETE CBC W/AUTO DIFF WBC: CPT

## 2024-02-27 PROCEDURE — 86780 TREPONEMA PALLIDUM: CPT

## 2024-02-27 PROCEDURE — 6370000000 HC RX 637 (ALT 250 FOR IP): Performed by: PSYCHIATRY & NEUROLOGY

## 2024-02-27 PROCEDURE — 6370000000 HC RX 637 (ALT 250 FOR IP): Performed by: NURSE PRACTITIONER

## 2024-02-27 RX ORDER — CYANOCOBALAMIN 1000 UG/ML
1000 INJECTION, SOLUTION INTRAMUSCULAR; SUBCUTANEOUS EVERY 24 HOURS
Status: DISCONTINUED | OUTPATIENT
Start: 2024-02-27 | End: 2024-02-29

## 2024-02-27 RX ADMIN — ASPIRIN 81 MG 81 MG: 81 TABLET ORAL at 08:30

## 2024-02-27 RX ADMIN — RISPERIDONE 1 MG: 1 TABLET, FILM COATED ORAL at 08:30

## 2024-02-27 RX ADMIN — RISPERIDONE 1 MG: 1 TABLET, FILM COATED ORAL at 20:37

## 2024-02-27 RX ADMIN — CLOPIDOGREL BISULFATE 75 MG: 75 TABLET ORAL at 08:30

## 2024-02-27 RX ADMIN — CYANOCOBALAMIN 1000 MCG: 1000 INJECTION, SOLUTION INTRAMUSCULAR; SUBCUTANEOUS at 21:42

## 2024-02-27 RX ADMIN — ATENOLOL 25 MG: 25 TABLET ORAL at 08:30

## 2024-02-27 RX ADMIN — FLUTICASONE PROPIONATE 2 SPRAY: 50 SPRAY, METERED NASAL at 08:30

## 2024-02-27 RX ADMIN — TRAZODONE HYDROCHLORIDE 50 MG: 50 TABLET ORAL at 20:37

## 2024-02-27 NOTE — GROUP NOTE
Group Therapy Note    Date: 2/27/2024    Group Start Time: 1000  Group End Time: 1045  Group Topic: Psychotherapy     Jannette Waller MSW        Group Therapy Note    Attendees: 3/10     Patient was offered group therapy today but declined to participate despite encouragement from staff.  1:1 was offered.    Discipline Responsible: /Counselor    Signature:  TANO Hernández

## 2024-02-27 NOTE — PLAN OF CARE
Problem: Psychosis  Goal: Will report no hallucinations or delusions  Description: INTERVENTIONS:  1. Administer medication as  ordered  2. Assist with reality testing to support increasing orientation  3. Assess if patient's hallucinations or delusions are encouraging self harm or harm to others and intervene as appropriate  Note: Patient continues to make statements regarding him going to hell and that he gave up food for lent, snacks and fluids encouraged. Patient believes he was sent here to die because he is autistic and retired. Patient requires frequent redirection.  Patient denies hallucinations at this time and does not appear to be responding to internal stimuli.      Problem: Behavior  Goal: Pt/Family maintain appropriate behavior and adhere to behavioral management agreement, if implemented  Description: INTERVENTIONS:  1. Assess patient/family's coping skills and  non-compliant behavior (including use of illegal substances)  2. Notify security of behavior or suspected illegal substances which indicate the need for search of the family and/or belongings  3. Encourage verbalization of thoughts and concerns in a socially appropriate manner  4. Utilize positive, consistent limit setting strategies supporting safety of patient, staff and others  5. Encourage participation in the decision making process about the behavioral management agreement  6. If a visitor's behavior poses a threat to safety call refer to organization policy.  7. Initiate consult with , Psychosocial CNS, Spiritual Care as appropriate  Note: Patient is complaint with medications this shift. Patient is observed playing cards with peers in dayroom. Patient accepting of 1:1 with staff and requires frequent redirection and cuing. Patient is cooperative with care.     Problem: Safety - Adult  Goal: Free from fall injury  Note: Patient is free from fall related injuries at this time. Patient is utilizing non skid socks for safety  and gait has been steady. 15 minute safety checks continued.

## 2024-02-27 NOTE — PROGRESS NOTES
Daily Progress Note  2/27/2024    Patient Name: William Babcock    CHIEF COMPLAINT:  Acute psychosis          SUBJECTIVE:      Patient is seen today for a follow up assessment.  Upon approach he is found in the day room, seated at a table, coloring.  He initially appears suspicious of writer and asks \"am I going to [facility]?\"  He was reoriented to situation and agreeable to assessment in unit milieu.  He was able to identify name, date of birth, place, month, year, and current president.  He describes his mood as \"good\", denies feeling depressed or anxious.  He denies suicidal or homicidal ideation.  Currently denies auditory or visual hallucinations.  He remains compliant with taking scheduled medication and denies adverse effects.  Per unit nursing staff patient maintains behavioral control with no need for emergency medication for the past 24 hours.  Staff notes he is making odd statements and intrusive into others personal space requiring frequent redirection.  Labs were reviewed.  Viral signs remain stable.        Appetite:  [] Adequate/Unchanged  [] Increased  [x] Decreased      Sleep:       [] Adequate/Unchanged  [x] Fair  [] Poor      Group Attendance on Unit:   [] Yes   [x] Selectively    [] No    Compliant with scheduled medications: [x] Yes  [] No    Received emergency medications in past 24 hrs: [] Yes   [x] No    Medication Side Effects: Denies         Mental Status Exam  Level of consciousness: Alert and awake.   Appearance: Appropriate attire for setting, seated in chair, with poor grooming and hygiene.   Behavior/Motor: Approachable, bizarre, appear suspicious  Attitude toward examiner: Cooperative, attentive, good eye contact.  Speech: Low volume, normal rate and tone, low output  Mood:  Patient reports \"good\".   Affect: Blunted  Thought processes: illogical, disorganized .   Thought content: Denies homicidal ideation.  Suicidal Ideation: Denies suicidal ideations  Delusions: Paranoid delusions

## 2024-02-27 NOTE — PROGRESS NOTES
Patient sitting at table with staff upon entry working on an activity, patient appeared to be very confused and anxious having difficulty remember the day of the week. Patient kept saying it's Sunday. Patient was asked if he wanted to received communion today. Patient conversation is allover the place.  Spiritual care will remain available as needed.     02/27/24 1130   Encounter Summary   Encounter Overview/Reason  Spiritual/Emotional Needs   Service Provided For: Patient   Referral/Consult From: Nurse   Support System Family members   Last Encounter  02/27/24   Complexity of Encounter Moderate   Begin Time 1130   End Time  1145   Total Time Calculated 15 min   Spiritual/Emotional needs   Type Spiritual Support   Assessment/Intervention/Outcome   Assessment Impaired resilience;Anxious   Intervention Active listening;Explored/Affirmed feelings, thoughts, concerns;Discussed belief system/[employer] practices/bre   Outcome Comfort;Engaged in conversation

## 2024-02-27 NOTE — GROUP NOTE
Group Therapy Note    Date: 2/27/2024    Group Start Time: 0900  Group End Time: 0910  Group Topic: Community Meeting    [facility]    Elana Mckeon RN; Marva Holman LPN; Yady Muniz RN        Group Therapy Note    Attendees: 4/11       Patient's Goal:  Per patient \"To shower\"    Status After Intervention:  Unchanged    Participation Level: Active Listener    Participation Quality: Appropriate      Speech:  normal      Thought Process/Content: Linear      Affective Functioning: Congruent      Mood: anxious      Level of consciousness:  Alert and Attentive      Response to Learning: Progressing to goal      Endings: None Reported    Modes of Intervention: Support, Socialization, and Exploration      Discipline Responsible: Registered Nurse      Signature:  Elana Mckeon RN

## 2024-02-27 NOTE — PROGRESS NOTES
[facility]   INPATIENT OCCUPATIONAL THERAPY  PROGRESS NOTE  Date: 2024  Patient Name: William Babcock       Room: 0205/0205-01  MRN: [id number]    : 1960  (64 y.o.)  Gender: male   Referring Practitioner: Donnie Rosa MD  Diagnosis: Acute psychosis      Discharge Recommendations:  Pt would benefit from  supportive environment with continued OT services.  OT Equipment Recommendations  Other: TBD    Restrictions/Precautions  Restrictions/Precautions  Restrictions/Precautions: Fall Risk;General Precautions    O2 Device: None (Room air)    Subjective  Subjective  Subjective: \"I am in trouble.\"  Subjective  Pain: No pain reported during session  Comments: Ok per Nursing staff for OT treatment    Objective  Orientation  Overall Orientation Status: Impaired  Cognition  Overall Cognitive Status: Exceptions  Arousal/Alertness: Delayed responses to stimuli  Following Commands: Inconsistently follows commands;Follows one step commands with increased time;Follows one step commands with repetition  Attention Span: Difficulty attending to directions;Difficulty dividing attention  Safety Judgement: Decreased awareness of need for assistance;Decreased awareness of need for safety  Problem Solving: Assistance required to generate solutions;Assistance required to implement solutions;Assistance required to identify errors made;Assistance required to correct errors made;Decreased awareness of errors  Insights: Not aware of deficits  Initiation: Requires cues for all  Sequencing: Requires cues for all  Cognition Comment: Pt demod tangential speech throughout session, difficult to understanding. Continued difficulty re-directing, following 1-step commands and engaging in functional tasks. Not appropriate to complete Robby assessment this date.    Activities of Daily Living  ADL  Feeding: Stand by assistance  Grooming Skilled Clinical Factors: Pt completed hygiene/grooming while standing at sink

## 2024-02-27 NOTE — BH NOTE
Patient is observed to invade others personal space in day area. Patient needs constant redirection, however redirection is not helpful. Patient carries around an empty cup and banana peel, patient shown multiple time where to place garbage. Patient states \"No I can't. I'm Bahai. I can't waste, its against my Bahai. I'll go to hell. I need help. I'm gonna die\". Patient reports that he would like to see spirituality. Ms. Betts, notified via [employer] messaging, per MS. Betts \"Ok someone will be there today.\". Patient informed.

## 2024-02-27 NOTE — GROUP NOTE
Group Therapy Note    Date: 2/27/2024    Group Start Time: 1100  Group End Time: 1145  Group Topic: Cognitive Skills    STEmelia Marrufo CTRS        Group Therapy Note    Attendees: 4/10       Patient's Goal:   To improve interpersonal skills and memory recall through collaborating with peers and demonstrating self-expression.    Notes:  Patient attended the first 10 minutes of group. Patient appeared anxious and was inattentive to group task. Patient was intrusive, as evidenced by, speaking over peers. Patient removed himself from group.    Status After Intervention:  Unchanged    Participation Level: Minimal. Inattentive. Interactive at times.     Participation Quality: Minimal. Intrusive at times.       Speech:  normal, mumbling      Thought Process/Content: Perseverating, Slow to process      Affective Functioning: Constricted       Mood: anxious      Level of consciousness:  Alert and Preoccupied      Response to Learning: Able to verbalize current knowledge/experience and Able to retain information      Endings: None Reported    Modes of Intervention: Socialization, Exploration, and Activity      Discipline Responsible: Psychoeducational Specialist      Signature:  MAURY Campbell

## 2024-02-27 NOTE — GROUP NOTE
Group Therapy Note    Date: 2/27/2024    Group Start Time: 1330  Group End Time: 1415  Group Topic: Recreational    STCZ Emelia Belcher CTRS        Group Therapy Note    Attendees: 3/10    Recreation Group Note        Date: February 27, 2024 Start Time: 1:30pm  End Time: 2:15pm      Number of Participants in Group & Unit Census:  3/10    Topic: interpersonal skills, leisure awareness, concentration     Goal of Group: To improve interpersonal skills and leisure awareness through collaborating with peers and concentrating on a presented task.       Comments:     Patient did not participate in Recreation group, despite staff encouragement and explanation of benefits.  Patient remain seclusive to self.  Q15 minute safety checks maintained for patient safety and will continue to encourage patient to attend unit programming.        Signature:  MAURY Campbell

## 2024-02-27 NOTE — PROGRESS NOTES
Called to set patient up to come at 2:30 PM  for his EEG and was told that they didn't have anyone to come with the patient.  Will try to get 2/28/24.

## 2024-02-27 NOTE — PLAN OF CARE
Problem: Psychosis  Goal: Will report no hallucinations or delusions  Description: INTERVENTIONS:  1. Administer medication as  ordered  2. Assist with reality testing to support increasing orientation  3. Assess if patient's hallucinations or delusions are encouraging self harm or harm to others and intervene as appropriate  2/27/2024 1438 by Yady Muniz, RN  Outcome: Progressing - patient continues to be religiously preoccupied, refusing to eat meals/drink anything because he \"gave it up for Lent\". Patient reminded multiple to eat/drink because it is important for his health     Problem: Behavior  Goal: Pt/Family maintain appropriate behavior and adhere to behavioral management agreement, if implemented  Description: INTERVENTIONS:  1. Assess patient/family's coping skills and  non-compliant behavior (including use of illegal substances)  2. Notify security of behavior or suspected illegal substances which indicate the need for search of the family and/or belongings  3. Encourage verbalization of thoughts and concerns in a socially appropriate manner  4. Utilize positive, consistent limit setting strategies supporting safety of patient, staff and others  5. Encourage participation in the decision making process about the behavioral management agreement  6. If a visitor's behavior poses a threat to safety call refer to organization policy.  7. Initiate consult with , Psychosocial CNS, Spiritual Care as appropriate  2/27/2024 1438 by Yady Muniz RN  Outcome: Progressing - patient continues to need frequent redirection throughout the shift, up at the station asking where he can sit, what can he do; patient medication compliant with medications and assessments with encouragement     Problem: Safety - Adult  Goal: Free from fall injury  2/27/2024 1438 by Yady Muniz, RN  Outcome: Progressing - patient remains free from fall related injuries and ambulates independently; safety checks

## 2024-02-27 NOTE — PROGRESS NOTES
[facility] Neurology   IN-PATIENT SERVICE      NEUROLOGY PROGRESS  NOTE            Date:   2/27/2024  Patient name:  William Babcock  Date of admission:  2/21/2024  YOB: 1960      Interval History:     No acute events.  No new neurological changes.  Today, appears somewhat improved, more directable with periods of coherent thought process. EEG delayed given limited nursing staff.    History of Present Illness:     64-year-old with past medical history of autism, intellectual disability, recent right ICA pseudoaneurysm status post stenting, who is currently admitted to [facility] for psychosis including delusions, paranoia, abnormal behavior, hallucinations.  Neurology consulted for altered mentation.  History limited given patient's current status.  Obtained primarily from nursing, chart review, brother (POA) over phone.     Patient initially presented earlier this month to [facility] for altered mentation.  In the ED, CT brain and CTA head and neck obtained.  CTA demonstrated 7 mm pseudoaneurysm of the right ICA.  MRI brain with no acute findings.  He was transferred to [facility] and underwent ICA stenting with neuroendovascular team.  No complications reported.  Postop, he was started on dual antiplatelet therapy and remained neurologically stable.  He was discharged to [facility] subsequently.  During [facility] admission, his medications were adjusted and he was discharged.  Then, he presented to the ED again on 2/21/2024 due to abnormal behaviors.  Neighbors called the police as he was seen walking around their house around 4 AM.  Apparently, he had stopped taking his medications.  In the ED, CT brain and CTA head and neck obtained.  CTA demonstrated right ICA stent narrowing, reviewed by neuroendovascular team, no further interventions indicated.  He was then transferred to [facility].     During his course thus far in [facility], he continues to be paranoid, lacks insight into his mental illness, noted to be  further CNS imaging at this time given recent scans and relatively nonfocal exam  TSH wnl. UA and UDS negative. Vitamin B12 borderline low at 316, started IM repletion and sent MMA to exclude underlying contribution. Plan to administer at least 3 doses this week, then can space out to once/week over next 3 weeks.  Ammonia wnl. HIV and syphillis screen nonreactive. F/u vitamin B1  Rest of recs pending above.        We will continue to follow.      Electronically signed by Ruab Aguirre DO on 2/27/2024 at 5:27 PM      Ruba Aguirre DO  [facility] Neuro[facility] [place]  Neurology

## 2024-02-28 ENCOUNTER — APPOINTMENT (OUTPATIENT)
Dept: NEUROLOGY | Age: 64
DRG: 885 | End: 2024-02-28
Attending: PSYCHIATRY & NEUROLOGY
Payer: MEDICARE

## 2024-02-28 PROCEDURE — 6370000000 HC RX 637 (ALT 250 FOR IP): Performed by: PSYCHIATRY & NEUROLOGY

## 2024-02-28 PROCEDURE — 6370000000 HC RX 637 (ALT 250 FOR IP): Performed by: NURSE PRACTITIONER

## 2024-02-28 PROCEDURE — 99232 SBSQ HOSP IP/OBS MODERATE 35: CPT | Performed by: PSYCHIATRY & NEUROLOGY

## 2024-02-28 PROCEDURE — 99232 SBSQ HOSP IP/OBS MODERATE 35: CPT | Performed by: INTERNAL MEDICINE

## 2024-02-28 PROCEDURE — 6360000002 HC RX W HCPCS: Performed by: PSYCHIATRY & NEUROLOGY

## 2024-02-28 PROCEDURE — 6370000000 HC RX 637 (ALT 250 FOR IP): Performed by: INTERNAL MEDICINE

## 2024-02-28 PROCEDURE — 90833 PSYTX W PT W E/M 30 MIN: CPT | Performed by: PSYCHIATRY & NEUROLOGY

## 2024-02-28 PROCEDURE — 95819 EEG AWAKE AND ASLEEP: CPT

## 2024-02-28 PROCEDURE — 95816 EEG AWAKE AND DROWSY: CPT | Performed by: PSYCHIATRY & NEUROLOGY

## 2024-02-28 PROCEDURE — 1240000000 HC EMOTIONAL WELLNESS R&B

## 2024-02-28 PROCEDURE — APPSS30 APP SPLIT SHARED TIME 16-30 MINUTES: Performed by: NURSE PRACTITIONER

## 2024-02-28 RX ADMIN — CLOPIDOGREL BISULFATE 75 MG: 75 TABLET ORAL at 07:49

## 2024-02-28 RX ADMIN — ASPIRIN 81 MG 81 MG: 81 TABLET ORAL at 07:49

## 2024-02-28 RX ADMIN — TRAZODONE HYDROCHLORIDE 50 MG: 50 TABLET ORAL at 20:37

## 2024-02-28 RX ADMIN — RISPERIDONE 1 MG: 1 TABLET, FILM COATED ORAL at 07:49

## 2024-02-28 RX ADMIN — ATENOLOL 25 MG: 25 TABLET ORAL at 07:49

## 2024-02-28 RX ADMIN — RISPERIDONE 1 MG: 1 TABLET, FILM COATED ORAL at 20:37

## 2024-02-28 RX ADMIN — ACETAMINOPHEN 650 MG: 325 TABLET ORAL at 14:47

## 2024-02-28 RX ADMIN — FLUTICASONE PROPIONATE 2 SPRAY: 50 SPRAY, METERED NASAL at 07:49

## 2024-02-28 RX ADMIN — CYANOCOBALAMIN 1000 MCG: 1000 INJECTION, SOLUTION INTRAMUSCULAR; SUBCUTANEOUS at 20:36

## 2024-02-28 ASSESSMENT — PAIN DESCRIPTION - LOCATION: LOCATION: ABDOMEN

## 2024-02-28 ASSESSMENT — PAIN DESCRIPTION - DESCRIPTORS: DESCRIPTORS: ACHING

## 2024-02-28 ASSESSMENT — PAIN SCALES - GENERAL
PAINLEVEL_OUTOF10: 3
PAINLEVEL_OUTOF10: 0

## 2024-02-28 NOTE — PROCEDURES
EEG REPORT     CLINICAL NEUROPHYSIOLOGY LABORATORY  DEPARTMENT OF NEUROLOGY  [facility]       Patient: William Babcock Age: 64 y.o.  MRN: [id number]      Referring Provider: Christine Sarah DO    History: This routine 30 minute scalp EEG was recorded with video- monitoring for a 64 y.o.. male  who presented with encephalopathy. This EEG was performed to evaluate for focal and epileptiform abnormalities.     William Babcock   Current Facility-Administered Medications   Medication Dose Route Frequency Provider Last Rate Last Admin    cyanocobalamin injection 1,000 mcg  1,000 mcg IntraMUSCular Q24H Ruba Aguirre DO   1,000 mcg at 02/27/24 2142    diphenhydrAMINE-zinc acetate 2-0.1 % cream   Topical TID PRN Neeraj Mary MD   Given at 02/26/24 1849    atenolol (TENORMIN) tablet 25 mg  25 mg Oral Daily Neeraj Mary MD   25 mg at 02/28/24 0749    risperiDONE (RISPERDAL) tablet 1 mg  1 mg Oral BID Donnie Rosa MD   1 mg at 02/28/24 0749    OLANZapine zydis (ZYPREXA) disintegrating tablet 5 mg  5 mg Oral BID PRN Aliya Ross APRN - CNP        Or    OLANZapine (ZyPREXA) 5 mg in sterile water 1 mL injection  5 mg IntraMUSCular BID PRN Aliya Ross APRN - CNP        aspirin chewable tablet 81 mg  81 mg Oral Daily Makayla Choudhury MD   81 mg at 02/28/24 0749    clopidogrel (PLAVIX) tablet 75 mg  75 mg Oral Daily Makayla Choudhury MD   75 mg at 02/28/24 0749    fluticasone (FLONASE) 50 MCG/ACT nasal spray 2 spray  2 spray Each Nostril Daily Makayla Choudhury MD   2 spray at 02/28/24 0749    acetaminophen (TYLENOL) tablet 650 mg  650 mg Oral Q4H PRN Michelle Lomas APRN - CNP   650 mg at 02/28/24 1447    polyethylene glycol (GLYCOLAX) packet 17 g  17 g Oral Daily PRN Michelle Lomas APRN - CNP        traZODone (DESYREL) tablet 50 mg  50 mg Oral Nightly PRN Michelle Lomas APRN - CNP   50 mg at 02/27/24 2037    aluminum & magnesium hydroxide-simethicone (MAALOX) 200-200-20 MG/5ML suspension 30

## 2024-02-28 NOTE — GROUP NOTE
Group Therapy Note    Date: 2/28/2024    Group Start Time: 1330  Group End Time: 1415  Group Topic: Recreational    STCZ BRYANI Lina Sevilla CTRS        Group Therapy Note    Attendees: 3/9       Patient's Goal:  pt will demonstrate improved coping skills and improved leisure awareness    Notes:   pt was anxious and delusional . Pt had minimal participation . Pt said he was dying and going to hell. Pt was not redirectable. Pt had anxiety and left group multiple times    Status After Intervention:  Improved    Participation Level: minimal     Participation Quality: minimal      Speech: mumbles, self talk      Thought Process/Content:slow to process, delusional      Affective Functioning: flat      Mood: anxious      Level of consciousness:  Alert      Response to Learning: Able to verbalize current knowledge/experience, Capable of insight, and Progressing to goal      Endings: None Reported    Modes of Intervention: Support, Socialization, and Activity      Discipline Responsible: Psychoeducational Specialist      Signature:  MAURY DECKER

## 2024-02-28 NOTE — GROUP NOTE
Group Therapy Note    Date: 2/28/2024    Group Start Time: 1000  Group End Time: 1045  Group Topic: Psychotherapy     Jannette Waller MSW        Group Therapy Note    Attendees: 2/9       Patient was offered group therapy today but declined to participate despite encouragement from staff.  1:1 was offered.      Discipline Responsible: /Counselor      Signature:  TANO Hernández

## 2024-02-28 NOTE — PLAN OF CARE
Problem: Psychosis  Goal: Will report no hallucinations or delusions  Description: INTERVENTIONS:  1. Administer medication as  ordered  2. Assist with reality testing to support increasing orientation  3. Assess if patient's hallucinations or delusions are encouraging self harm or harm to others and intervene as appropriate  2/28/2024 1341 by Nika Rocha LPN  Outcome: Progressing     Problem: Behavior  Goal: Pt/Family maintain appropriate behavior and adhere to behavioral management agreement, if implemented  Description: INTERVENTIONS:  1. Assess patient/family's coping skills and  non-compliant behavior (including use of illegal substances)  2. Notify security of behavior or suspected illegal substances which indicate the need for search of the family and/or belongings  3. Encourage verbalization of thoughts and concerns in a socially appropriate manner  4. Utilize positive, consistent limit setting strategies supporting safety of patient, staff and others  5. Encourage participation in the decision making process about the behavioral management agreement  6. If a visitor's behavior poses a threat to safety call refer to organization policy.  7. Initiate consult with , Psychosocial CNS, Spiritual Care as appropriate  2/28/2024 1341 by Nika Rocha LPN  Outcome: Progressing  Flowsheets (Taken 2/28/2024 1341)  Patient/family maintains appropriate behavior and adheres to behavioral management agreement, if implemented:   Assess patient/family’s coping skills and  non-compliant behavior (including use of illegal substances)   Notify security of behavior or suspected illegal substances which indicate the need for search of the patient and/or belongings   Encourage verbalization of thoughts and concerns in a socially appropriate manner   Utilize positive, consistent limit setting strategies supporting safety of patient, staff and others   Encourage participation in the decision making

## 2024-02-28 NOTE — PLAN OF CARE
Problem: Psychosis  Goal: Will report no hallucinations or delusions  Description: INTERVENTIONS:  1. Administer medication as  ordered  2. Assist with reality testing to support increasing orientation  3. Assess if patient's hallucinations or delusions are encouraging self harm or harm to others and intervene as appropriate  2/28/2024 0025 by Zainab Mcfarland RN  Outcome: Progressing     Problem: Behavior  Goal: Pt/Family maintain appropriate behavior and adhere to behavioral management agreement, if implemented  Description: INTERVENTIONS:  1. Assess patient/family's coping skills and  non-compliant behavior (including use of illegal substances)  2. Notify security of behavior or suspected illegal substances which indicate the need for search of the family and/or belongings  3. Encourage verbalization of thoughts and concerns in a socially appropriate manner  4. Utilize positive, consistent limit setting strategies supporting safety of patient, staff and others  5. Encourage participation in the decision making process about the behavioral management agreement  6. If a visitor's behavior poses a threat to safety call refer to organization policy.  7. Initiate consult with , Psychosocial CNS, Spiritual Care as appropriate  2/28/2024 0025 by Zainab Mcfarland RN  Outcome: Progressing     Patient alert and oriented. Patient denies any hallucinations at this time. Patient endorses paranoid and Religion delusions. Patient is medication compliant and behavior remains controlled at this time.  Safe environment provided. Q15 minute checks maintained.

## 2024-02-28 NOTE — GROUP NOTE
Group Therapy Note    Date: 2/28/2024    Group Start Time: 1100  Group End Time: 1130  Group Topic: Cognitive Skills    Lina Porras CTRS    Cognitive Skills Group Note        Date: February 28, 2024 Start Time: 11am  End Time: 11:30am      Number of Participants in Group & Unit Census:  2/9    Topic: cognitive skills     Goal of Group: pt will demonstrate improved coping skills and improved interpersonal relationships       Comments:     Patient did not participate in Cognitive Skills group, despite staff encouragement and explanation of benefits.  Patient remain seclusive to self.  Q15 minute safety checks maintained for patient safety and will continue to encourage patient to attend unit programming.            Signature:  MAURY DECKER

## 2024-02-28 NOTE — BH NOTE
Patient offered breakfast tray. Patient refused to eat. \"I can't eat because its Lent\". Writer and other staff tried to get him to eat and drink. He eventually took two bites of banana and sips of water.

## 2024-02-28 NOTE — PROGRESS NOTES
IN-PATIENT SERVICE  [facility] Internal Medicine    Progress note            Date:   2/28/2024  Patient name:  William Babcock  Date of admission:  2/21/2024  9:18 PM  MRN:   [id number]  Account:  [id number]  YOB: 1960  PCP:    Billy Francois MD  Room:   [facility]  Code Status:    Full Code    Physician Requesting Consult: Donnie Rosa MD    Reason for Consult: History and physical, medical management    Chief Complaint:     No chief complaint on file.    Medical management    History Obtained From:     Patient, EMR, nursing staff    History of Present Illness:     63-year-old male admitted for acute psychosis.  Patient has history of autism;   Carotid pseudoaneurysm.  Recent admission to [facility] for right proximal RCA pseudoaneurysm after patient presented on 2/5 for agitation and psychosis.  Patient underwent cerebral angiogram and embolization of cervical pseudoaneurysm 2/9/2024.  Was discharged on aspirin and Plavix, resumed.  Patient was discharged to [facility] on 2/12 subsequently discharged on 2/16.    Past Medical History:     Past Medical History:   Diagnosis Date    Anxiety     Autism         Past Surgical History:     Past Surgical History:   Procedure Laterality Date    CEREBRAL ANGIOGRAM  02/09/2024    IR ANGIOGRAM CAROTID CEREBRAL BILATERAL    EYE SURGERY          Medications Prior to Admission:     Prior to Admission medications    Medication Sig Start Date End Date Taking? Authorizing Provider   fluticasone (FLONASE) 50 MCG/ACT nasal spray 2 sprays by Each Nostril route daily   Yes Provider, MD Nick   aspirin 81 MG chewable tablet Take 1 tablet by mouth daily 2/17/24   Donnie Rosa MD   clopidogrel (PLAVIX) 75 MG tablet Take 1 tablet by mouth daily 2/17/24   Donnie Rosa MD   QUEtiapine (SEROQUEL) 25 MG tablet Take 1 tablet by mouth 2 times daily 2/16/24   Donnie Rosa MD   atenolol (TENORMIN) 50 MG tablet Take 1 tablet by mouth  daily 23   Billy Francois MD        Allergies:     Patient has no known allergies.    Social History:     Tobacco:    reports that he has never smoked. He has never used smokeless tobacco.  Alcohol:      reports no history of alcohol use.  Drug Use:  reports no history of drug use.    Family History:     Family History   Problem Relation Age of Onset    Colon Cancer Father     Colon Cancer Sister        Review of Systems:     Positive and Negative as described in HPI.    Denies any shortness of breath or cough  Denies chest pain or palpitations  Denies abdominal pain, diarrhea vomiting  Denies any new numbness tremors or weakness.    10 point review of systems was negative other than mentioned above    Physical Exam:     /77   Pulse (!) 112   Temp 97.9 °F (36.6 °C) (Temporal)   Resp 16   Ht 1.727 m (5' 8\")   Wt 71.7 kg (158 lb)   SpO2 100%   BMI 24.02 kg/m²   Temp (24hrs), Av.9 °F (36.6 °C), Min:97.9 °F (36.6 °C), Max:97.9 °F (36.6 °C)    No results for input(s): \"POCGLU\" in the last 72 hours.    Intake/Output Summary (Last 24 hours) at 2024 1659  Last data filed at 2024 1230  Gross per 24 hour   Intake 300 ml   Output --   Net 300 ml       General Appearance:  alert, well appearing, and in no acute distress, patient is alert and oriented x 3 but appears thought disordered.  Head:  normocephalic, atraumatic.  Eye: no icterus, redness, pupils equal and reactive, extraocular eye movements intact, conjunctiva clear  Ear: normal external ear, no discharge, hearing intact  Nose:  no drainage noted  Mouth: mucous membranes moist  Neck: supple, no carotid bruits, thyroid not palpable  Lungs: Bilateral equal air entry, clear to ausculation, no wheezing, rales or rhonchi, normal effort  Cardiovascular: normal rate, regular rhythm, no murmur, gallop, rub.  Abdomen: Soft, nontender, nondistended, normal bowel sounds, no hepatomegaly or splenomegaly  Neurologic: There are no new focal motor

## 2024-02-28 NOTE — BH NOTE
Patient offered lunch tray. Patient ate about 50% of his pizza, cookie, and drank half of his lemonade with lots of encouragement. Patient states \"I can't eat meat or I'll die\". writer reassured him that he did not have any meat on his tray and it was okay to eat his lunch.

## 2024-02-28 NOTE — PROGRESS NOTES
Daily Progress Note  2/28/2024    Patient Name: William Babcock    CHIEF COMPLAINT:  Acute psychosis          SUBJECTIVE:    Reagan was seen for follow-up assessment today.  He has been compliant with scheduled medications.  He has not required any emergency medication for agitation in the past 24 hours.  Nursing staff report patient remains frequently hyperverbal and got very little sleep last night.  He has been reporting that he gave up food and liquid for limited and requires much encouragement to finish his meals.  He was able to get some rest during the day today.    Patient was approached in the day area and he was agreeable to conversation over by the window.  There were no peers present at time of assessment.  Patient remains oriented to self, location, date, and president but presents with an unusual affect and was easily distracted during assessment.  He described his mood today as \"not good\" due to waking up with a stomachache.  He was encouraged to utilize as needed medication to help with his stomachache.  He reported that he was feeling sad and depressed and stated he was having suicidal thoughts today.  He was not able to engage in sustained, spontaneous, or linear conversation.  He made random unrelated statements and had difficulty concentrating.  At this time patient remains unable to care for himself independently and warrants further hospitalization for safety and stabilization.    Labs reviewed: CMP WNL, CBC with differential shows below range RBC, hemoglobin, and hematocrit; this is chronic for patient; HIV nonreactive    EEG was completed but results not yet available    Vitals:    02/27/24 0747 02/27/24 0830 02/27/24 1953 02/28/24 0749   BP: 107/76 107/76 96/66 104/77   Pulse: 100 100 61 (!) 112   Resp: 14  16    Temp: 97.6 °F (36.4 °C)  97.9 °F (36.6 °C)    TempSrc: Temporal  Temporal    SpO2: 100%      Weight:       Height:            Appetite:  [] Adequate/Unchanged  [] Increased  [x]  patient's current plan of care and vital signs with nursing staff.    Labs reviewed: [x] Yes    Medications  Current Facility-Administered Medications: cyanocobalamin injection 1,000 mcg, 1,000 mcg, IntraMUSCular, Q24H  diphenhydrAMINE-zinc acetate 2-0.1 % cream, , Topical, TID PRN  atenolol (TENORMIN) tablet 25 mg, 25 mg, Oral, Daily  risperiDONE (RISPERDAL) tablet 1 mg, 1 mg, Oral, BID  OLANZapine zydis (ZYPREXA) disintegrating tablet 5 mg, 5 mg, Oral, BID PRN **OR** OLANZapine (ZyPREXA) 5 mg in sterile water 1 mL injection, 5 mg, IntraMUSCular, BID PRN  aspirin chewable tablet 81 mg, 81 mg, Oral, Daily  clopidogrel (PLAVIX) tablet 75 mg, 75 mg, Oral, Daily  fluticasone (FLONASE) 50 MCG/ACT nasal spray 2 spray, 2 spray, Each Nostril, Daily  acetaminophen (TYLENOL) tablet 650 mg, 650 mg, Oral, Q4H PRN  polyethylene glycol (GLYCOLAX) packet 17 g, 17 g, Oral, Daily PRN  traZODone (DESYREL) tablet 50 mg, 50 mg, Oral, Nightly PRN  aluminum & magnesium hydroxide-simethicone (MAALOX) 200-200-20 MG/5ML suspension 30 mL, 30 mL, Oral, Q6H PRN    ASSESSMENT  Acute psychosis (HCC)         Patient handoff:  Patient symptoms are: unstable for discharge  Monitor need and frequency of PRN medications.  Encourage participation in groups and milieu.  Attempt to develop insight.  Psycho-education conducted.  Medication changes and discharge plan per attending physician.  Follow-up daily while inpatient.     Patient continues to be monitored in the inpatient psychiatric facility at [facility] for safety and stabilization. Patient continues to need, on a daily basis, active treatment furnished directly by or requiring the supervision of inpatient psychiatric personnel.    Electronically signed by BENOIT Galindo CNP on 2/28/2024 at 2:05 PM    **This report has been created using voice recognition software. It may contain minor errors which are inherent in voice recognition technology.**     I independently saw and evaluated the

## 2024-02-28 NOTE — GROUP NOTE
Group Therapy Note    Date: 2/28/2024    Group Start Time: 0900  Group End Time: 0930  Group Topic: Nursing    [facility] LPN        Group Therapy Note    Attendees: 5/9       Patient's Goal:  \"to go to appointment\"    Status After Intervention:  Unchanged    Participation Level: Active Listener    Participation Quality: Attentive      Speech:  normal      Thought Process/Content: Linear      Affective Functioning: Congruent      Mood: euthymic      Level of consciousness:  Alert, Oriented x4, and Attentive      Response to Learning: Progressing to goal      Endings: None Reported    Modes of Intervention: Support      Discipline Responsible: Licensed Practical Nurse      Signature:  Nika Rocha LPN

## 2024-02-29 PROCEDURE — 99232 SBSQ HOSP IP/OBS MODERATE 35: CPT | Performed by: PSYCHIATRY & NEUROLOGY

## 2024-02-29 PROCEDURE — 6370000000 HC RX 637 (ALT 250 FOR IP): Performed by: NURSE PRACTITIONER

## 2024-02-29 PROCEDURE — APPSS30 APP SPLIT SHARED TIME 16-30 MINUTES: Performed by: NURSE PRACTITIONER

## 2024-02-29 PROCEDURE — 2580000003 HC RX 258

## 2024-02-29 PROCEDURE — 99232 SBSQ HOSP IP/OBS MODERATE 35: CPT | Performed by: INTERNAL MEDICINE

## 2024-02-29 PROCEDURE — 1240000000 HC EMOTIONAL WELLNESS R&B

## 2024-02-29 PROCEDURE — 6360000002 HC RX W HCPCS

## 2024-02-29 RX ORDER — CYANOCOBALAMIN 1000 UG/ML
1000 INJECTION, SOLUTION INTRAMUSCULAR; SUBCUTANEOUS WEEKLY
Status: COMPLETED | OUTPATIENT
Start: 2024-03-06 | End: 2024-03-20

## 2024-02-29 RX ORDER — DONEPEZIL HYDROCHLORIDE 5 MG/1
5 TABLET, FILM COATED ORAL NIGHTLY
Status: DISCONTINUED | OUTPATIENT
Start: 2024-02-29 | End: 2024-03-29 | Stop reason: HOSPADM

## 2024-02-29 RX ADMIN — WATER 5 MG: 1 INJECTION INTRAMUSCULAR; INTRAVENOUS; SUBCUTANEOUS at 09:31

## 2024-02-29 RX ADMIN — TRAZODONE HYDROCHLORIDE 50 MG: 50 TABLET ORAL at 21:04

## 2024-02-29 NOTE — PLAN OF CARE
Problem: Behavior  Goal: Pt/Family maintain appropriate behavior and adhere to behavioral management agreement, if implemented  Description: INTERVENTIONS:  1. Assess patient/family's coping skills and  non-compliant behavior (including use of illegal substances)  2. Notify security of behavior or suspected illegal substances which indicate the need for search of the family and/or belongings  3. Encourage verbalization of thoughts and concerns in a socially appropriate manner  4. Utilize positive, consistent limit setting strategies supporting safety of patient, staff and others  5. Encourage participation in the decision making process about the behavioral management agreement  6. If a visitor's behavior poses a threat to safety call refer to organization policy.  7. Initiate consult with , Psychosocial CNS, Spiritual Care as appropriate  2/28/2024 2209 by Zhanna Black, RN  Outcome: Progressing       Problem: Safety - Adult  Goal: Free from fall injury  2/28/2024 2209 by Zhanna Black, RN  Outcome: Progressing

## 2024-02-29 NOTE — PROGRESS NOTES
Occupational Therapy  [facility]   OCCUPATIONAL THERAPY MISSED TREATMENT NOTE   INPATIENT   Date: 24  Patient Name: William Babcock       Room: 0205/0205-01  MRN: [id number]   Account #: [id number]    : 1960  (64 y.o.)  Gender: male   Referring Practitioner: Donnie Rosa MD  Diagnosis: Acute psychosis             REASON FOR MISSED TREATMENT:  Hold treatment per nursing request. Will continue to follow for OT needs.      Electronically signed by DIANNE Romero on 24 at 2:43 PM EST

## 2024-02-29 NOTE — PROGRESS NOTES
IN-PATIENT SERVICE  [facility]    Progress note            Date:   2/29/2024  Patient name:  William Babcock  Date of admission:  2/21/2024  9:18 PM  MRN:   [id number]  Account:  [id number]  YOB: 1960  PCP:    Billy Francois MD  Room:   [facility]  Code Status:    Full Code    Physician Requesting Consult: Donnie Rosa MD    Reason for Consult: History and physical, medical management    Chief Complaint:     No chief complaint on file.    Medical management    History Obtained From:     Patient, EMR, nursing staff    History of Present Illness:     63-year-old male admitted for acute psychosis.  Patient has history of autism;   Carotid pseudoaneurysm.  Recent admission to [facility] for right proximal RCA pseudoaneurysm after patient presented on 2/5 for agitation and psychosis.  Patient underwent cerebral angiogram and embolization of cervical pseudoaneurysm 2/9/2024.  Was discharged on aspirin and Plavix, resumed.  Patient was discharged to [facility] on 2/12 subsequently discharged on 2/16.    Past Medical History:     Past Medical History:   Diagnosis Date    Anxiety     Autism         Past Surgical History:     Past Surgical History:   Procedure Laterality Date    CEREBRAL ANGIOGRAM  02/09/2024    IR ANGIOGRAM CAROTID CEREBRAL BILATERAL    EYE SURGERY          Medications Prior to Admission:     Prior to Admission medications    Medication Sig Start Date End Date Taking? Authorizing Provider   fluticasone (FLONASE) 50 MCG/ACT nasal spray 2 sprays by Each Nostril route daily   Yes Provider, MD Nick   aspirin 81 MG chewable tablet Take 1 tablet by mouth daily 2/17/24   Donnie Rosa MD   clopidogrel (PLAVIX) 75 MG tablet Take 1 tablet by mouth daily 2/17/24   Donnie Rosa MD   QUEtiapine (SEROQUEL) 25 MG tablet Take 1 tablet by mouth 2 times daily 2/16/24   Donnie Rosa MD   atenolol (TENORMIN) 50 MG tablet Take 1 tablet by mouth

## 2024-02-29 NOTE — GROUP NOTE
Group Therapy Note    Date: 2/29/2024    Group Start Time: 1100  Group End Time: 1150  Group Topic: Relaxation    [facility] Emelia Belcher CTRS        Group Therapy Note    Attendees: 4/6    Relaxation Group Note        Date: February 29, 2024 Start Time: 11am  End Time: 11:50am      Number of Participants in Group & Unit Census:  4/6    Topic: interpersonal skills, leisure awareness, creative expression    Goal of Group: To improve interpersonal skills and leisure awareness through collaborating with peers and demonstrating creative expression.       Comments:     Patient did not participate in Relaxation group, despite staff encouragement and explanation of benefits.  Patient remain seclusive to self.  Q15 minute safety checks maintained for patient safety and will continue to encourage patient to attend unit programming.        Signature:  MAURY Campbell

## 2024-02-29 NOTE — PLAN OF CARE
Problem: Psychosis  Goal: Will report no hallucinations or delusions  Description: INTERVENTIONS:  1. Administer medication as  ordered  2. Assist with reality testing to support increasing orientation  3. Assess if patient's hallucinations or delusions are encouraging self harm or harm to others and intervene as appropriate  Outcome: Progressing  Note: Pt was reporting that he wanted to die and was going to throw himself out of the window. Pt needed frequent redirection which wasn't effective. Pt cycled between stating he was going to hell, needed to be arrested, and was ready to get better. Pt denies having homicidal ideations. Pt refused his morning medication and meal stating he wasn't well enough for it. Pt has been isolative to self most of shift.      Problem: Safety - Adult  Goal: Free from fall injury  2/29/2024 1153 by Essex, Matthew, RN  Outcome: Progressing  Note: Pt remains free from falls.

## 2024-02-29 NOTE — BH NOTE
PRN note    Pt was showing an increase in agitation as evidence by rapid cycling, increased gross motor activity, and stating he was going to jump out of the window. Pt was offered 1:1 time, reduced stimulus, and redirection. Pt showed no signs of improvement. Pt was offered IM zyprexa 5mg. Pt was accepting and is resting in his room.

## 2024-02-29 NOTE — GROUP NOTE
Group Therapy Note    Date: 2/29/2024    Group Start Time: 1330  Group End Time: 1410  Group Topic: Psychoeducation    Emelia Mueller CTRS        Group Therapy Note    Attendees: 3/6    Psych-Ed/Relapse Prevention Group Note        Date: February 29, 2024 Start Time: 1:30pm  End Time: 2:10pm      Number of Participants in Group & Unit Census:  3/6    Topic: interpersonal skills, coping skills, self-expression    Goal of Group: To improve interpersonal skills and coping skills awareness through collaborating with peers and demonstrating self-expression.      Comments:     Patient did not participate in Psych-Ed/Relapse Prevention group, despite staff encouragement and explanation of benefits.  Patient remain seclusive to self.  Q15 minute safety checks maintained for patient safety and will continue to encourage patient to attend unit programming.        Signature:  MAURY Campbell

## 2024-02-29 NOTE — CARE COORDINATION
DC PLANNING    MICHELINE spoke with Bart to get update on finding options for Reagan for respite and/or nursing placement.   Bart stated that they want to wait on PASRR results due to not wanting to transition him from hospital to respite to facility (and do one transition instead). MICHELINE informed him might not be possible; to try and reach out to  to get better idea of discharge date  Informed Bart we are still awaiting PASSR results.

## 2024-02-29 NOTE — PROGRESS NOTES
Behavioral Services                                              Medicare Re-Certification    I certify that the inpatient psychiatric hospital services furnished since the previous certification/re-certification were, and continue to be, medically necessary for;    [x] (1) Treatment which could reasonably be expected to improve the patient's condition,    [x] (2) Or for diagnostic study.    Estimated length of stay/service 2 to 5 days    Plan for post-hospital care respite care    This patient continues to need, on a daily basis, active treatment furnished directly by or requiring the supervision of inpatient psychiatric personnel.    Electronically signed by ZAINA TODD MD on 2/29/2024 at 6:44 AM

## 2024-02-29 NOTE — PROGRESS NOTES
[facility] Neurology   IN-PATIENT SERVICE      NEUROLOGY PROGRESS  NOTE            Date:   2/29/2024  Patient name:  William Babcock  Date of admission:  2/21/2024  YOB: 1960      Interval History:     2/29: No new neurological changes.  Continues to have some waxing and waning.  This morning, was refusing medications, fixated on suicidal ideations.  EEG was done-normal.    2/27: No acute events.  No new neurological changes.  Today, appears somewhat improved, more directable with periods of coherent thought process. EEG delayed given limited nursing staff.    History of Present Illness:     64-year-old with past medical history of autism, intellectual disability, recent right ICA pseudoaneurysm status post stenting, who is currently admitted to [facility] for psychosis including delusions, paranoia, abnormal behavior, hallucinations.  Neurology consulted for altered mentation.  History limited given patient's current status.  Obtained primarily from nursing, chart review, brother (POA) over phone.     Patient initially presented earlier this month to [facility] for altered mentation.  In the ED, CT brain and CTA head and neck obtained.  CTA demonstrated 7 mm pseudoaneurysm of the right ICA.  MRI brain with no acute findings.  He was transferred to [facility] and underwent ICA stenting with neuroendovascular team.  No complications reported.  Postop, he was started on dual antiplatelet therapy and remained neurologically stable.  He was discharged to [facility] subsequently.  During [facility] admission, his medications were adjusted and he was discharged.  Then, he presented to the ED again on 2/21/2024 due to abnormal behaviors.  Neighbors called the police as he was seen walking around their house around 4 AM.  Apparently, he had stopped taking his medications.  In the ED, CT brain and CTA head and neck obtained.  CTA demonstrated right ICA stent narrowing, reviewed by neuroendovascular team, no

## 2024-02-29 NOTE — PROGRESS NOTES
Daily Progress Note  2/29/2024    Patient Name: William Babcock    CHIEF COMPLAINT:  Acute psychosis          SUBJECTIVE:    Reagan was seen for follow-up assessment today.  He has been compliant with scheduled medications.  Patient required emergency medications today due to increased agitation and rapid cycling of mood.  Patient was restless and physically agitated in the day area.  He made statements of wanting to jump out of the window.  Staff attempted multiple diversional activities and redirection with no improvement.  He received Zyprexa 5 mg IM, which was therapeutic.  Patient was able to regain behavioral control and went to his room to lie down.  Patient was seen by writer for assessment approximately 1 hour from receiving emergency medications.  He was at that time very somnolent and sleeping soundly.  Breathing was even and unlabored.  Nursing staff report patient slept very poorly overnight so writer determined that allowing patient to rest would be the most beneficial option at time of assessment.  Chart reviewed, updates provided by staff.    Labs reviewed: CMP WNL, CBC with differential shows below range RBC, hemoglobin, and hematocrit; this is chronic for patient; HIV nonreactive    EEG 2/28/2024 was WNL    Vitals:    02/27/24 0830 02/27/24 1953 02/28/24 0749 02/28/24 2031   BP: 107/76 96/66 104/77 110/78   Pulse: 100 61 (!) 112 85   Resp:  16  16   Temp:  97.9 °F (36.6 °C)  97.7 °F (36.5 °C)   TempSrc:  Temporal  Temporal   SpO2:       Weight:       Height:            Appetite:  [] Adequate/Unchanged  [] Increased  [x] Decreased      Sleep:       [] Adequate/Unchanged  [x] Fair  [] Poor      Group Attendance on Unit:   [] Yes   [x] Selectively    [] No    Compliant with scheduled medications: [x] Yes  [] No    Received emergency medications in past 24 hrs: [x] Yes   [] No    Medication Side Effects: Denies         Mental Status Exam  Level of consciousness: Somnolent  Appearance: Appropriate  Appreciate discussion with neurology.  No suspicion at this time for delirium.  Raises in the differential major neurocognitive impairment (possible Lewy body type) versus Alzheimer's type.  Will add acetylcholinesterase inhibitor to see if symptoms improving may consider NMDA receptor antagonist pending observation  PLAN  Patient s symptoms   show no change  Add donepezil 5 mg daily  Attempt to develop insight  Psycho-education conducted.  Supportive Therapy conducted.  Probable discharge is undetermined at this time  Follow-up daily while on inpatient unit

## 2024-02-29 NOTE — GROUP NOTE
Group Therapy Note    Date: 2/29/2024    Group Start Time: 1000  Group End Time: 1030  Group Topic: Psychotherapy     Jannette Waller MSW        Group Therapy Note    Attendees: 1/6       Patient was offered group therapy today but declined to participate despite encouragement from staff.  1:1 was offered.      Discipline Responsible: /Counselor      Signature:  TANO Hernández

## 2024-03-01 PROCEDURE — 6370000000 HC RX 637 (ALT 250 FOR IP): Performed by: INTERNAL MEDICINE

## 2024-03-01 PROCEDURE — 1240000000 HC EMOTIONAL WELLNESS R&B

## 2024-03-01 PROCEDURE — 99232 SBSQ HOSP IP/OBS MODERATE 35: CPT | Performed by: INTERNAL MEDICINE

## 2024-03-01 PROCEDURE — 99232 SBSQ HOSP IP/OBS MODERATE 35: CPT | Performed by: PSYCHIATRY & NEUROLOGY

## 2024-03-01 PROCEDURE — APPSS30 APP SPLIT SHARED TIME 16-30 MINUTES

## 2024-03-01 PROCEDURE — 6370000000 HC RX 637 (ALT 250 FOR IP): Performed by: PSYCHIATRY & NEUROLOGY

## 2024-03-01 RX ORDER — POLYETHYLENE GLYCOL 3350 17 G/17G
17 POWDER, FOR SOLUTION ORAL DAILY
Status: DISCONTINUED | OUTPATIENT
Start: 2024-03-01 | End: 2024-03-29 | Stop reason: HOSPADM

## 2024-03-01 RX ADMIN — ATENOLOL 25 MG: 25 TABLET ORAL at 12:15

## 2024-03-01 RX ADMIN — RISPERIDONE 1 MG: 1 TABLET, FILM COATED ORAL at 09:23

## 2024-03-01 RX ADMIN — ASPIRIN 81 MG 81 MG: 81 TABLET ORAL at 09:23

## 2024-03-01 RX ADMIN — CLOPIDOGREL BISULFATE 75 MG: 75 TABLET ORAL at 12:15

## 2024-03-01 RX ADMIN — POLYETHYLENE GLYCOL 3350 17 G: 17 POWDER, FOR SOLUTION ORAL at 17:25

## 2024-03-01 NOTE — PLAN OF CARE
Problem: Psychosis  Goal: Will report no hallucinations or delusions  Description: INTERVENTIONS:  1. Administer medication as  ordered  2. Assist with reality testing to support increasing orientation  3. Assess if patient's hallucinations or delusions are encouraging self harm or harm to others and intervene as appropriate  2/29/2024 2328 by Zhanna Black, RN  Outcome: Progressing     Problem: Safety - Adult  Goal: Free from fall injury  2/29/2024 2328 by Zhanna Black, RN  Outcome: Progressing

## 2024-03-01 NOTE — GROUP NOTE
Group Therapy Note    Date: 3/1/2024    Group Start Time: 1100  Group End Time: 1140  Group Topic: Cognitive Skills    Emelia Mueller CTRS        Group Therapy Note    Attendees: 1/6    Cognitive Skills Group Note        Date: March 1, 2024       Start Time: 11am  End Time: 11:40am      Number of Participants in Group & Unit Census:  1/6    Topic:  interpersonal skills, memory recall, concentration     Goal of Group: To improve interpersonal skills and memory recall through collaborating with peers and concentrating on a presented task.       Comments:     Patient did not participate in Cognitive Skills group, despite staff encouragement and explanation of benefits.  Patient remain seclusive to self.  Q15 minute safety checks maintained for patient safety and will continue to encourage patient to attend unit programming.      Signature:  MAURY Campbell

## 2024-03-01 NOTE — PROGRESS NOTES
IN-PATIENT SERVICE  [facility]    Progress note            Date:   3/1/2024  Patient name:  William Babcock  Date of admission:  2/21/2024  9:18 PM  MRN:   [id number]  Account:  [id number]  YOB: 1960  PCP:    Billy Francois MD  Room:   [facility]  Code Status:    Full Code    Physician Requesting Consult: Donnie Rosa MD    Reason for Consult: History and physical, medical management    Chief Complaint:     No chief complaint on file.    Medical management    History Obtained From:     Patient, EMR, nursing staff    History of Present Illness:     63-year-old male admitted for acute psychosis.  Patient has history of autism;   Carotid pseudoaneurysm.  Recent admission to [facility] for right proximal RCA pseudoaneurysm after patient presented on 2/5 for agitation and psychosis.  Patient underwent cerebral angiogram and embolization of cervical pseudoaneurysm 2/9/2024.  Was discharged on aspirin and Plavix, resumed.  Patient was discharged to [facility] on 2/12 subsequently discharged on 2/16.    Past Medical History:     Past Medical History:   Diagnosis Date    Anxiety     Autism         Past Surgical History:     Past Surgical History:   Procedure Laterality Date    CEREBRAL ANGIOGRAM  02/09/2024    IR ANGIOGRAM CAROTID CEREBRAL BILATERAL    EYE SURGERY          Medications Prior to Admission:     Prior to Admission medications    Medication Sig Start Date End Date Taking? Authorizing Provider   fluticasone (FLONASE) 50 MCG/ACT nasal spray 2 sprays by Each Nostril route daily   Yes Provider, MD Nick   aspirin 81 MG chewable tablet Take 1 tablet by mouth daily 2/17/24   Donnie Rosa MD   clopidogrel (PLAVIX) 75 MG tablet Take 1 tablet by mouth daily 2/17/24   Donnie Rosa MD   QUEtiapine (SEROQUEL) 25 MG tablet Take 1 tablet by mouth 2 times daily 2/16/24   Donnie Rosa MD   atenolol (TENORMIN) 50 MG tablet Take 1 tablet by mouth  daily 23   Billy Francois MD        Allergies:     Patient has no known allergies.    Social History:     Tobacco:    reports that he has never smoked. He has never used smokeless tobacco.  Alcohol:      reports no history of alcohol use.  Drug Use:  reports no history of drug use.    Family History:     Family History   Problem Relation Age of Onset    Colon Cancer Father     Colon Cancer Sister        Review of Systems:     Positive and Negative as described in HPI.    Denies any shortness of breath or cough  Denies chest pain or palpitations  Denies abdominal pain, diarrhea vomiting  Denies any new numbness tremors or weakness.    10 point review of systems was negative other than mentioned above    Physical Exam:     /68   Pulse (!) 117   Temp 98.4 °F (36.9 °C) (Temporal)   Resp 16   Ht 1.727 m (5' 8\")   Wt 71.7 kg (158 lb)   SpO2 96%   BMI 24.02 kg/m²   Temp (24hrs), Av.2 °F (36.8 °C), Min:97.9 °F (36.6 °C), Max:98.4 °F (36.9 °C)    No results for input(s): \"POCGLU\" in the last 72 hours.    Intake/Output Summary (Last 24 hours) at 3/1/2024 1717  Last data filed at 3/1/2024 0900  Gross per 24 hour   Intake 90 ml   Output --   Net 90 ml         General Appearance:  alert, well appearing, and in no acute distress, patient is alert and oriented x 3 but appears thought disordered.  Head:  normocephalic, atraumatic.  Eye: no icterus, redness, pupils equal and reactive, extraocular eye movements intact, conjunctiva clear  Ear: normal external ear, no discharge, hearing intact  Nose:  no drainage noted  Mouth: mucous membranes moist  Neck: supple, no carotid bruits, thyroid not palpable  Lungs: Bilateral equal air entry, clear to ausculation, no wheezing, rales or rhonchi, normal effort  Cardiovascular: normal rate, regular rhythm, no murmur, gallop, rub.  Abdomen: Soft, nontender, nondistended, normal bowel sounds, no hepatomegaly or splenomegaly  Neurologic: There are no new focal motor or

## 2024-03-01 NOTE — PLAN OF CARE
Problem: Psychosis  Goal: Will report no hallucinations or delusions  Description: INTERVENTIONS:  1. Administer medication as  ordered  2. Assist with reality testing to support increasing orientation  3. Assess if patient's hallucinations or delusions are encouraging self harm or harm to others and intervene as appropriate  3/1/2024 1009 by Yady Muniz, RN  Outcome: Progressing - patient continues to be religiously preoccupied, continuously stating he is \"going to die and go to hell\", patient obsesses about \"going in a police car\" and that he's \"bad\"; patient took risperdal with encouragement    Problem: Behavior  Goal: Pt/Family maintain appropriate behavior and adhere to behavioral management agreement, if implemented  Description: INTERVENTIONS:  1. Assess patient/family's coping skills and  non-compliant behavior (including use of illegal substances)  2. Notify security of behavior or suspected illegal substances which indicate the need for search of the family and/or belongings  3. Encourage verbalization of thoughts and concerns in a socially appropriate manner  4. Utilize positive, consistent limit setting strategies supporting safety of patient, staff and others  5. Encourage participation in the decision making process about the behavioral management agreement  6. If a visitor's behavior poses a threat to safety call refer to organization policy.  7. Initiate consult with , Psychosocial CNS, Spiritual Care as appropriate  Outcome: Progressing - patient remains in control of his behaviors; patient needs much encouragement to take medications and eat meals--patient took 2/4 of his morning medications and did not eat any of his breakfast despite encouragement from multiple staff; patient continues to pace the halls, can be intrusive to peers at times     Problem: Safety - Adult  Goal: Free from fall injury  3/1/2024 1009 by Yady Muniz, RN  Outcome: Progressing - patient remains free

## 2024-03-01 NOTE — PROGRESS NOTES
Daily Progress Note  3/1/2024    Patient Name: William Babcock    CHIEF COMPLAINT:  Acute psychosis          SUBJECTIVE:    Reagan was seen for follow-up assessment today while standing in room, declines need for privacy.  Patient then goes on to walking in milieu.  He presented as restless, preoccupied, disorganized and struggles to engage in coherent conversation throughout assessment.  Patient endorses having suicidal ideation, unable to share active plan or intent however states not feeling safe from self on the community.  Patient makes statements about \"I am going to hell\".  He denies any thoughts of harming others.  Patient endorses having auditory and visual hallucinations when states to be somewhat distressing.  When asked about mood patient states feeling \"not good\" however unable to elaborate due to presenting as disorganized and paranoid.  Patient refused all scheduled medication last night.  He was accepting to only risperidone and aspirin this morning.  Patient was started on donepezil last night however noncompliant.  He received emergency Zyprexa 5 mg IM yesterday due to agitation in the day area and restlessness.  Patient does report to have had poor sleep last night and endorses poor oral intake.  Patient has an order for nutritional supplement with meals, however nutritional supplements not seen on lunch tray.  Writer discussed with nursing staff and patient, agreeable to vanilla flavor nutritional supplement along with increasing fluid intake after much encouragement and education.  Labs and vital signs reviewed.  Patient most recent /83, heart rate 120, potentially secondary to underlying anxiety.  On approach patient presents as anxious, restless as evident by pacing and complaining of chest pain/pressure. It was noted to not being compliant with atenolol and Plavix.  Writer discussed with nursing staff to reassess vitals and patient agreeable to taking scheduled medication with     Color, UA 02/22/2024 Dark Yellow (A)  Yellow Final    Turbidity UA 02/22/2024 Clear  Clear Final    Glucose, Ur 02/22/2024 NEGATIVE  NEGATIVE mg/dL Final    Bilirubin Urine 02/22/2024 NEGATIVE  NEGATIVE Final    Ketones, Urine 02/22/2024 MOD (A)  NEGATIVE mg/dL Final    Specific Gravity, UA 02/22/2024 1.051 (H)  1.000 - 1.030 Final    Urine Hgb 02/22/2024 NEGATIVE  NEGATIVE Final    pH, UA 02/22/2024 5.0  5.0 - 8.0 Final    Protein, UA 02/22/2024 TRACE (A)  NEGATIVE mg/dL Final    Urobilinogen, Urine 02/22/2024 Normal  0.0 - 1.0 EU/dL Final    Nitrite, Urine 02/22/2024 NEGATIVE  NEGATIVE Final    Leukocyte Esterase, Urine 02/22/2024 NEGATIVE  NEGATIVE Final    WBC, UA 02/22/2024 0 TO 2 (A)  0 TO 5 /HPF Final    RBC, UA 02/22/2024 0 TO 2  0 TO 2 /HPF Final    Casts UA 02/22/2024 0 TO 2 (A)  None /LPF Final    Epithelial Cells UA 02/22/2024 0 TO 2  /HPF Final    Bacteria, UA 02/22/2024 None  None Final    Vitamin B-12 02/23/2024 316  232 - 1245 pg/mL Final    Folate 02/23/2024 13.2  4.8 - 24.2 ng/mL Final    Iron 02/23/2024 54 (L)  59 - 158 ug/dL Final    TIBC 02/23/2024 232 (L)  250 - 450 ug/dL Final    Iron % Saturation 02/23/2024 23  20 - 55 % Final    UIBC 02/23/2024 178  112 - 347 ug/dL Final    Ferritin 02/23/2024 492 (H)  30 - 400 ng/mL Final    TSH 02/23/2024 3.67  0.30 - 5.00 uIU/mL Final    Color, UA 02/26/2024 Yellow  Yellow Final    Turbidity UA 02/26/2024 Clear  Clear Final    Glucose, Ur 02/26/2024 NEGATIVE  NEGATIVE mg/dL Final    Bilirubin Urine 02/26/2024 NEGATIVE  NEGATIVE Final    Ketones, Urine 02/26/2024 TRACE (A)  NEGATIVE mg/dL Final    Specific Gravity, UA 02/26/2024 1.031 (H)  1.000 - 1.030 Final    Urine Hgb 02/26/2024 NEGATIVE  NEGATIVE Final    pH, UA 02/26/2024 5.5  5.0 - 8.0 Final    Protein, UA 02/26/2024 TRACE (A)  NEGATIVE mg/dL Final    Urobilinogen, Urine 02/26/2024 Normal  0.0 - 1.0 EU/dL Final    Nitrite, Urine 02/26/2024 NEGATIVE  NEGATIVE Final    Leukocyte Esterase, Urine

## 2024-03-01 NOTE — CARE COORDINATION
Social work received call from Yoanna Grace RN at the [facility] asking for updated clinicals to be faxed to [phone].

## 2024-03-01 NOTE — GROUP NOTE
Group Therapy Note    Date: 3/1/2024    Group Start Time: 1000  Group End Time: 1015  Group Topic: Psychoeducation    Babar Roberts    Patient declined to attend psychotherapy group after encouragement from staff.  1:1 talk time offered but refused.      Signature:  Babar Medina

## 2024-03-02 LAB — METHYLMALONATE SERPL-SCNC: 0.15 UMOL/L (ref 0–0.4)

## 2024-03-02 PROCEDURE — 6370000000 HC RX 637 (ALT 250 FOR IP): Performed by: INTERNAL MEDICINE

## 2024-03-02 PROCEDURE — 6370000000 HC RX 637 (ALT 250 FOR IP): Performed by: PSYCHIATRY & NEUROLOGY

## 2024-03-02 PROCEDURE — 6370000000 HC RX 637 (ALT 250 FOR IP): Performed by: NURSE PRACTITIONER

## 2024-03-02 PROCEDURE — 99232 SBSQ HOSP IP/OBS MODERATE 35: CPT | Performed by: PSYCHIATRY & NEUROLOGY

## 2024-03-02 PROCEDURE — 1240000000 HC EMOTIONAL WELLNESS R&B

## 2024-03-02 PROCEDURE — 99232 SBSQ HOSP IP/OBS MODERATE 35: CPT | Performed by: INTERNAL MEDICINE

## 2024-03-02 PROCEDURE — APPSS30 APP SPLIT SHARED TIME 16-30 MINUTES

## 2024-03-02 RX ADMIN — FLUTICASONE PROPIONATE 2 SPRAY: 50 SPRAY, METERED NASAL at 09:05

## 2024-03-02 RX ADMIN — RISPERIDONE 1 MG: 1 TABLET, FILM COATED ORAL at 09:05

## 2024-03-02 RX ADMIN — CLOPIDOGREL BISULFATE 75 MG: 75 TABLET ORAL at 09:05

## 2024-03-02 RX ADMIN — DONEPEZIL HYDROCHLORIDE 5 MG: 5 TABLET, FILM COATED ORAL at 20:27

## 2024-03-02 RX ADMIN — ATENOLOL 25 MG: 25 TABLET ORAL at 09:05

## 2024-03-02 RX ADMIN — ASPIRIN 81 MG 81 MG: 81 TABLET ORAL at 09:05

## 2024-03-02 RX ADMIN — TRAZODONE HYDROCHLORIDE 50 MG: 50 TABLET ORAL at 20:27

## 2024-03-02 RX ADMIN — RISPERIDONE 1 MG: 1 TABLET, FILM COATED ORAL at 20:27

## 2024-03-02 NOTE — PLAN OF CARE
Problem: Psychosis  Goal: Will report no hallucinations or delusions  Description: INTERVENTIONS:  1. Administer medication as  ordered  2. Assist with reality testing to support increasing orientation  3. Assess if patient's hallucinations or delusions are encouraging self harm or harm to others and intervene as appropriate  Outcome: Not Progressing  Note: Patient is unable to answer if he is having hallucinations. Patient not seen responding to internal stimuli by writer. Patient was severely fixated on the delusion he is going to hell now. Patient will not elaborate on why he believes this, simply states  he is convinced he is going to hell currently. Patient approaches staff repeatedly for reassurance that he is not going to hell/ going to die at this exact moment. Patient not accepting of reassurance and continues to ask for said reassurance. Q 15 minute checks done on pt for safety      Problem: Behavior  Goal: Pt/Family maintain appropriate behavior and adhere to behavioral management agreement, if implemented  Description: INTERVENTIONS:  1. Assess patient/family's coping skills and  non-compliant behavior (including use of illegal substances)  2. Notify security of behavior or suspected illegal substances which indicate the need for search of the family and/or belongings  3. Encourage verbalization of thoughts and concerns in a socially appropriate manner  4. Utilize positive, consistent limit setting strategies supporting safety of patient, staff and others  5. Encourage participation in the decision making process about the behavioral management agreement  6. If a visitor's behavior poses a threat to safety call refer to organization policy.  7. Initiate consult with , Psychosocial CNS, Spiritual Care as appropriate  Outcome: Progressing  Note: Patient was accepting of staff redirection while he was awake during this shift. Patient laid down in bed around 8 pm and has been resting soundly

## 2024-03-02 NOTE — PLAN OF CARE
Problem: Behavior  Goal: Pt/Family maintain appropriate behavior and adhere to behavioral management agreement, if implemented  Description: INTERVENTIONS:  1. Assess patient/family's coping skills and  non-compliant behavior (including use of illegal substances)  2. Notify security of behavior or suspected illegal substances which indicate the need for search of the family and/or belongings  3. Encourage verbalization of thoughts and concerns in a socially appropriate manner  4. Utilize positive, consistent limit setting strategies supporting safety of patient, staff and others  5. Encourage participation in the decision making process about the behavioral management agreement  6. If a visitor's behavior poses a threat to safety call refer to organization policy.  7. Initiate consult with , Psychosocial CNS, Spiritual Care as appropriate  3/2/2024 1157 by Yady Muniz RN  Outcome: Progressing - patient medication compliant and in control of his behaviors     Problem: Safety - Adult  Goal: Free from fall injury  3/2/2024 1157 by Yady Muniz RN  Outcome: Progressing - patient remains free from fall related injuries and ambulates independently; safety checks maintained and continue every 15 minutes and at random intervals     Problem: Psychosis  Goal: Will report no hallucinations or delusions  Description: INTERVENTIONS:  1. Administer medication as  ordered  2. Assist with reality testing to support increasing orientation  3. Assess if patient's hallucinations or delusions are encouraging self harm or harm to others and intervene as appropriate  3/2/2024 1157 by Yady Muniz, RN  Outcome: Not Progressing - patient fixated on the thought that he is going to hell, not seen responding to internal stimuli; patient paces unit, frequently approaches desk stating he's going to hell

## 2024-03-02 NOTE — GROUP NOTE
Group Therapy Note    Date: 3/2/2024    Group Start Time: 1100  Group End Time: 1145  Group Topic: Cognitive Skills    Emelia Mueller CTRS        Group Therapy Note    Attendees: 2/7    Cognitive Skills Group Note        Date: March 2, 2024              Start Time: 11am  End Time: 11:45am      Number of Participants in Group & Unit Census:  2/7    Topic:  interpersonal skills, memory recall, self-expression    Goal of Group: To improve interpersonal skills and memory recall through collaborating with peers and demonstrating self-expression.       Comments:     Patient did not participate in Cognitive Skills group, despite staff encouragement and explanation of benefits.  Patient remain seclusive to self.  Q15 minute safety checks maintained for patient safety and will continue to encourage patient to attend unit programming.        Signature:  MAURY Campbell

## 2024-03-02 NOTE — PROGRESS NOTES
IN-PATIENT SERVICE  [facility] Internal Medicine    Progress note            Date:   3/2/2024  Patient name:  William Babcock  Date of admission:  2/21/2024  9:18 PM  MRN:   [id number]  Account:  [id number]  YOB: 1960  PCP:    Billy Francois MD  Room:   [facility]  Code Status:    Full Code    Physician Requesting Consult: Donnie Rosa MD    Reason for Consult: History and physical, medical management    Chief Complaint:     No chief complaint on file.    Medical management    History Obtained From:     Patient, EMR, nursing staff    History of Present Illness:     63-year-old male admitted for acute psychosis.  Patient has history of autism;   Carotid pseudoaneurysm.  Recent admission to [facility] for right proximal RCA pseudoaneurysm after patient presented on 2/5 for agitation and psychosis.  Patient underwent cerebral angiogram and embolization of cervical pseudoaneurysm 2/9/2024.  Was discharged on aspirin and Plavix, resumed.  Patient was discharged to [facility] on 2/12 subsequently discharged on 2/16.    Past Medical History:     Past Medical History:   Diagnosis Date    Anxiety     Autism         Past Surgical History:     Past Surgical History:   Procedure Laterality Date    CEREBRAL ANGIOGRAM  02/09/2024    IR ANGIOGRAM CAROTID CEREBRAL BILATERAL    EYE SURGERY          Medications Prior to Admission:     Prior to Admission medications    Medication Sig Start Date End Date Taking? Authorizing Provider   fluticasone (FLONASE) 50 MCG/ACT nasal spray 2 sprays by Each Nostril route daily   Yes Provider, MD Nick   aspirin 81 MG chewable tablet Take 1 tablet by mouth daily 2/17/24   Donnie Rsoa MD   clopidogrel (PLAVIX) 75 MG tablet Take 1 tablet by mouth daily 2/17/24   Donnie Rosa MD   QUEtiapine (SEROQUEL) 25 MG tablet Take 1 tablet by mouth 2 times daily 2/16/24   Donnie Rosa MD   atenolol (TENORMIN) 50 MG tablet Take 1 tablet by mouth  borderline low, started on vitamin B12 supplement  Follow-up with vitamin B-1 level  As per neurology to follow-up in outpatient in 2 to 4 weeks.  3/1  Patient, complaining abdominal distention, has not passed stools and long time  Will order x-ray KUB  GlycoLax daily  3/2  Patient refused to take GlycoLax  X-ray KUB could not be done  No BM  Consultations:   IP CONSULT TO INTERNAL MEDICINE  IP CONSULT TO NEUROLOGY  Blood pressure has been running up and down    Bahman Cordova MD  3/2/2024  3:45 PM    Copy sent to Billy Francois MD    Please note that this chart was generated using voice recognition Dragon dictation software.  Although every effort was made to ensure the accuracy of this automated transcription, some errors in transcription may have occurred.

## 2024-03-02 NOTE — PROGRESS NOTES
Daily Progress Note  3/2/2024    Patient Name: William Babcock    CHIEF COMPLAINT:  Acute Psychosis          SUBJECTIVE:      Patient is seen today for a follow up assessment. Vitals and labs reviewed. Yesterday patient was tachycardic however this seems to have improved today.  His blood pressure is borderline low however right now does not appear symptomatic. CBC and CMP have been ordered for tomorrow morning. He is selectively compliant with medications. He remains behaviorally in control and has not required emergency medications in the past 24 hours.  Reagan is seen on milieu.  He is wandering around aimlessly and somewhat overactive and over familiar with staff and other patients. Reagan comes up to this writer, very close.  When asked how he is doing he states, \"not good.\"  When asked to elaborate on this he states, \"Because I'm doing things wrong.\"  Again asked patient to clarify what he means and he states, \"I'm not fast enough, people are leaving here.\"  He states \"I have to go to [place] for rock n roll.\"  Patient clarifies that he is talking about the [employer] festival in [place] tomorrow.  He states he needs to leave the hospital so that he can attend.  He tells this writer he is in [place] but with further prompting he is able to state that he is in [place].  He believes he is at [facility] but we clarified he is at [place].  He is able to tell me the month is March but does state it is 2025.  He knows [facility] is the current president.  Reagan does seem confused at times.  He lacks significant insight into his mental illness. He has been noncompliant with his medications but cannot give a reason why.  He is not eating or drinking and there is concern his medical health is declining.  Lab work ordered for tomorrow will give us better insight.  He is disorganized and bizarre.  He would not be able to meet his own basic needs outside of the hospital at this time and would very much

## 2024-03-02 NOTE — PROGRESS NOTES
The patient was seen in his room.  He is pacing in his room and going outside.  He wants to follow me.  He states he is not taking his medications.  He is complaining of pain all over.  He is disorganized in his thought and behavior.  He is perplexed and thought disordered

## 2024-03-03 LAB — VIT B1 PYROPHOSHATE BLD-SCNC: 98 NMOL/L (ref 70–180)

## 2024-03-03 PROCEDURE — 6370000000 HC RX 637 (ALT 250 FOR IP): Performed by: INTERNAL MEDICINE

## 2024-03-03 PROCEDURE — 6370000000 HC RX 637 (ALT 250 FOR IP): Performed by: PSYCHIATRY & NEUROLOGY

## 2024-03-03 PROCEDURE — APPSS30 APP SPLIT SHARED TIME 16-30 MINUTES

## 2024-03-03 PROCEDURE — 2580000003 HC RX 258

## 2024-03-03 PROCEDURE — 6360000002 HC RX W HCPCS

## 2024-03-03 PROCEDURE — 99232 SBSQ HOSP IP/OBS MODERATE 35: CPT | Performed by: PSYCHIATRY & NEUROLOGY

## 2024-03-03 PROCEDURE — 1240000000 HC EMOTIONAL WELLNESS R&B

## 2024-03-03 PROCEDURE — 99231 SBSQ HOSP IP/OBS SF/LOW 25: CPT | Performed by: INTERNAL MEDICINE

## 2024-03-03 RX ADMIN — WATER 5 MG: 1 INJECTION INTRAMUSCULAR; INTRAVENOUS; SUBCUTANEOUS at 00:50

## 2024-03-03 RX ADMIN — FLUTICASONE PROPIONATE 2 SPRAY: 50 SPRAY, METERED NASAL at 09:49

## 2024-03-03 RX ADMIN — CLOPIDOGREL BISULFATE 75 MG: 75 TABLET ORAL at 09:49

## 2024-03-03 RX ADMIN — ATENOLOL 25 MG: 25 TABLET ORAL at 09:49

## 2024-03-03 RX ADMIN — ASPIRIN 81 MG 81 MG: 81 TABLET ORAL at 09:49

## 2024-03-03 RX ADMIN — RISPERIDONE 1 MG: 1 TABLET, FILM COATED ORAL at 09:49

## 2024-03-03 NOTE — BH NOTE
Emergency Medication Follow-Up Note:    PRN medication of Zyprexa 5mg IM was effective as evidence by absence of behavior warranting emergency medication, resting quietly in bed. Patient denies medication side effects. Will continue to monitor and provide support as needed.

## 2024-03-03 NOTE — PROGRESS NOTES
IN-PATIENT SERVICE  [facility] Internal Medicine    Progress note            Date:   3/3/2024  Patient name:  William Babcock  Date of admission:  2/21/2024  9:18 PM  MRN:   [id number]  Account:  [id number]  YOB: 1960  PCP:    Billy Francois MD  Room:   [facility]  Code Status:    Full Code    Physician Requesting Consult: Donnie Rosa MD    Reason for Consult: History and physical, medical management    Chief Complaint:     No chief complaint on file.    Medical management    History Obtained From:     Patient, EMR, nursing staff    History of Present Illness:     63-year-old male admitted for acute psychosis.  Patient has history of autism;   Carotid pseudoaneurysm.  Recent admission to [facility] for right proximal RCA pseudoaneurysm after patient presented on 2/5 for agitation and psychosis.  Patient underwent cerebral angiogram and embolization of cervical pseudoaneurysm 2/9/2024.  Was discharged on aspirin and Plavix, resumed.  Patient was discharged to [facility] on 2/12 subsequently discharged on 2/16.    Past Medical History:     Past Medical History:   Diagnosis Date    Anxiety     Autism         Past Surgical History:     Past Surgical History:   Procedure Laterality Date    CEREBRAL ANGIOGRAM  02/09/2024    IR ANGIOGRAM CAROTID CEREBRAL BILATERAL    EYE SURGERY          Medications Prior to Admission:     Prior to Admission medications    Medication Sig Start Date End Date Taking? Authorizing Provider   fluticasone (FLONASE) 50 MCG/ACT nasal spray 2 sprays by Each Nostril route daily   Yes Provider, MD Nick   aspirin 81 MG chewable tablet Take 1 tablet by mouth daily 2/17/24   Donnie Rosa MD   clopidogrel (PLAVIX) 75 MG tablet Take 1 tablet by mouth daily 2/17/24   Donnie Rosa MD   QUEtiapine (SEROQUEL) 25 MG tablet Take 1 tablet by mouth 2 times daily 2/16/24   Donnie Rosa MD   atenolol (TENORMIN) 50 MG tablet Take 1 tablet by mouth

## 2024-03-03 NOTE — PLAN OF CARE
Problem: Behavior  Goal: Pt/Family maintain appropriate behavior and adhere to behavioral management agreement, if implemented  Description: INTERVENTIONS:  1. Assess patient/family's coping skills and  non-compliant behavior (including use of illegal substances)  2. Notify security of behavior or suspected illegal substances which indicate the need for search of the family and/or belongings  3. Encourage verbalization of thoughts and concerns in a socially appropriate manner  4. Utilize positive, consistent limit setting strategies supporting safety of patient, staff and others  5. Encourage participation in the decision making process about the behavioral management agreement  6. If a visitor's behavior poses a threat to safety call refer to organization policy.  7. Initiate consult with , Psychosocial CNS, Spiritual Care as appropriate  Outcome: Not Progressing     Problem: Psychosis  Goal: Will report no hallucinations or delusions  Description: INTERVENTIONS:  1. Administer medication as  ordered  2. Assist with reality testing to support increasing orientation  3. Assess if patient's hallucinations or delusions are encouraging self harm or harm to others and intervene as appropriate  Outcome: Progressing       Pt denies any SI/HI. Pt denies hallucinations but does appear preoccupied at times. Positive for increased anxiety. 15 minute checks and safe environment maintained. Writer discussed boundaries and the importance of completing ADLs. Educated patient on medication compliance and therapeutic regimen.

## 2024-03-03 NOTE — GROUP NOTE
Group Therapy Note    Date: 3/3/2024    Group Start Time: 0815  Group End Time: 0824  Group Topic: Daily Inventory Group    [facility]    Parvin Coffman LPN        Group Therapy Note    Attendees: 3/6     Comments:     Patient did not participate in Community Meeting /goals group, despite staff encouragement and explanation of benefits.  Patient remain seclusive to self.  Q15 minute safety checks maintained for patient safety and will continue to encourage patient to attend unit programming.      Discipline Responsible: Licensed Practical Nurse      Signature:  Parvin Coffman LPN

## 2024-03-03 NOTE — BH NOTE
Emergency PRN Medication Administration Note:      Patient is Agitated and Disruptive as evidence by restless, agitated, crying, sitting on floor, pacing the unit, physically aggressive with staff - grabbing and pushing staff. Stating he is very anxious and cannot sleep. Staff attempted to find and relieve the distress by Talking to patient, Refocusing on new activity, Offering suggestions, and Administer PRN medications Patient is currently accepted PRN medications. Medication Administered as prescribed: Zyprexa 5mg IM. Patient Tolerated medication administration.   Will continue to monitor, offer support, and reassess.

## 2024-03-03 NOTE — PROGRESS NOTES
Daily Progress Note  3/3/2024    Patient Name: William Babcock    CHIEF COMPLAINT:  Acute Psychosis          SUBJECTIVE:      Patient is seen today for a follow up assessment. Vitals and labs reviewed. Patient continues to be tachycardic today with a pulse of 113.  Labs were ordered for today however it seems the timing for labs wasn't until 915 PM, so they will be drawn tonight.  Patient may be dehydrated as nursing staff report that he is not eating or drinking at all.  He was seen by internal medicine today, however they signed off.  Will make sure to follow up with lab work tonight.  Reagan has did take his Aricept last night for the first time in a couple of days.  He has also been compliant with risperdal.  He did require IM emergency Zyprexa early this morning around 3 AM.  Per nursing staff patient is agitated and disruptive as evidence by restlessness, agitation, crying, sitting on floor, pacing and being physically aggressive with staff by grabbing and pushing.  He is struggling with his sleep as evidence by this.  Reagan is seen on the milieu today where he is pacing.  He comes up to writer and states, \"I've killed a lot of people today.\"  He then states, \"I killed my nephew.\"  He states, \"I'm out of control.\"  He asks to see the \"psychiatrist\" because he needs \"chemicals.\" Reagan has no insight into his mental illness. He is confused and overactive on the unit.  He is disorganized and it does not appear he really knows what he is saying.  He would not be able to function on his own outside of the hospital.  At this time, Reagan continues to require inpatient hospitalizaton for his safety and stability.  He would very much decompensate outside of the hospital due to his lack of insight and severity of his psychosis.  Waiting to see results of PASRR to see if patient would qualify for nursing home placement.    Appetite:  [] Normal/Adequate/Unchanged  [] Increased  [x] Decreased      Sleep:       []  conducted.  Follow-up daily while on inpatient unit    Electronically signed by DONALDO PENN MD on 3/3/24 at 5:23 PM EST

## 2024-03-03 NOTE — GROUP NOTE
Group Therapy Note    Date: 3/3/2024    Group Start Time: 1100  Group End Time: 1130  Group Topic: Cognitive Skills    Emelia Mueller CTRS        Group Therapy Note    Attendees: 3/7    Cognitive Skills Group Note        Date: March 3, 2024       Start Time: 11am  End Time: 11:30am      Number of Participants in Group & Unit Census:  3/7    Topic:  interpersonal skills, decision-making, concentration     Goal of Group: To improve interpersonal skills and decision-making through collaborating with peers and concentrating on a presented task.       Comments:     Patient did not participate in Cognitive Skills group, despite staff encouragement and explanation of benefits.  Patient remain seclusive to self.  Q15 minute safety checks maintained for patient safety and will continue to encourage patient to attend unit programming.        Signature:  MAURY Campbell

## 2024-03-03 NOTE — GROUP NOTE
Group Therapy Note    Date: 3/3/2024    Group Start Time: 1000  Group End Time: 1030  Group Topic: Psychoeducation    Jannette Fu MSW        Group Therapy Note    Attendees: 2/6       Patient was offered group therapy today but declined to participate despite encouragement from staff.  1:1 was offered.      Discipline Responsible: /Counselor      Signature:  TANO Hernández

## 2024-03-03 NOTE — PROGRESS NOTES
Pt both in room and pacing the unit during visit  Pt repeated the same statements over and over  Pt welcomed prayer   Chaplains remain available for emotional and spiritual support        03/03/24 1022   Encounter Summary   Encounter Overview/Reason  Spiritual/Emotional Needs   Service Provided For: Patient   Referral/Consult From: Family  ([place]- [place])   Support System Family members   Last Encounter  03/03/24   Complexity of Encounter Moderate   Spiritual/Emotional needs   Type Spiritual Distress;Emotional Distress   Assessment/Intervention/Outcome   Assessment Anxious;Fearful   Intervention Active listening;Prayer (assurance of)/Urania;Nurtured Hope;Sustaining Presence/Ministry of presence   Outcome Comfort;Engaged in conversation;Expressed feelings, needs, and concerns

## 2024-03-03 NOTE — PLAN OF CARE
Problem: Psychosis  Goal: Will report no hallucinations or delusions  Description: INTERVENTIONS:  1. Administer medication as  ordered  2. Assist with reality testing to support increasing orientation  3. Assess if patient's hallucinations or delusions are encouraging self harm or harm to others and intervene as appropriate  3/3/2024 1324 by Yady Muniz, RN  Outcome: Progressing - patient continues to be religiously preoccupied that he is going to die and go to hell-patient continues to be reassured that he is safe where he is; patient paces the milieu at times     Problem: Behavior  Goal: Pt/Family maintain appropriate behavior and adhere to behavioral management agreement, if implemented  Description: INTERVENTIONS:  1. Assess patient/family's coping skills and  non-compliant behavior (including use of illegal substances)  2. Notify security of behavior or suspected illegal substances which indicate the need for search of the family and/or belongings  3. Encourage verbalization of thoughts and concerns in a socially appropriate manner  4. Utilize positive, consistent limit setting strategies supporting safety of patient, staff and others  5. Encourage participation in the decision making process about the behavioral management agreement  6. If a visitor's behavior poses a threat to safety call refer to organization policy.  7. Initiate consult with , Psychosocial CNS, Spiritual Care as appropriate  3/3/2024 1324 by Yady Muniz, RN  Outcome: Progressing - patient is medication compliant and in control of his behaviors; patient periodically naps in his room and comes out to milieu to pace     Problem: Safety - Adult  Goal: Free from fall injury  Outcome: Progressing - patient remains free from fall related injuries; safety checks maintained and continue every 15 minutes and at random intervals

## 2024-03-04 LAB
ALBUMIN SERPL-MCNC: 4 G/DL (ref 3.5–5.2)
ALP SERPL-CCNC: 61 U/L (ref 40–129)
ALT SERPL-CCNC: 16 U/L (ref 5–41)
ANION GAP SERPL CALCULATED.3IONS-SCNC: 11 MMOL/L (ref 9–17)
AST SERPL-CCNC: 22 U/L
BASOPHILS # BLD: 0.1 K/UL (ref 0–0.2)
BASOPHILS NFR BLD: 2 % (ref 0–2)
BILIRUB SERPL-MCNC: 0.9 MG/DL (ref 0.3–1.2)
BUN SERPL-MCNC: 19 MG/DL (ref 8–23)
CALCIUM SERPL-MCNC: 9.1 MG/DL (ref 8.6–10.4)
CHLORIDE SERPL-SCNC: 105 MMOL/L (ref 98–107)
CO2 SERPL-SCNC: 24 MMOL/L (ref 20–31)
CREAT SERPL-MCNC: 0.9 MG/DL (ref 0.7–1.2)
EOSINOPHIL # BLD: 0.1 K/UL (ref 0–0.4)
EOSINOPHILS RELATIVE PERCENT: 2 % (ref 0–4)
ERYTHROCYTE [DISTWIDTH] IN BLOOD BY AUTOMATED COUNT: 14.8 % (ref 11.5–14.9)
GFR SERPL CREATININE-BSD FRML MDRD: >60 ML/MIN/1.73M2
GLUCOSE SERPL-MCNC: 95 MG/DL (ref 70–99)
HCT VFR BLD AUTO: 41.1 % (ref 41–53)
HGB BLD-MCNC: 13.4 G/DL (ref 13.5–17.5)
LYMPHOCYTES NFR BLD: 0.8 K/UL (ref 1–4.8)
LYMPHOCYTES RELATIVE PERCENT: 21 % (ref 24–44)
MCH RBC QN AUTO: 29.8 PG (ref 26–34)
MCHC RBC AUTO-ENTMCNC: 32.6 G/DL (ref 31–37)
MCV RBC AUTO: 91.5 FL (ref 80–100)
MONOCYTES NFR BLD: 0.4 K/UL (ref 0.1–1.3)
MONOCYTES NFR BLD: 11 % (ref 1–7)
NEUTROPHILS NFR BLD: 64 % (ref 36–66)
NEUTS SEG NFR BLD: 2.4 K/UL (ref 1.3–9.1)
PLATELET # BLD AUTO: 247 K/UL (ref 150–450)
PMV BLD AUTO: 7 FL (ref 6–12)
POTASSIUM SERPL-SCNC: 4.7 MMOL/L (ref 3.7–5.3)
PROT SERPL-MCNC: 6.9 G/DL (ref 6.4–8.3)
RBC # BLD AUTO: 4.49 M/UL (ref 4.5–5.9)
SODIUM SERPL-SCNC: 140 MMOL/L (ref 135–144)
WBC OTHER # BLD: 3.8 K/UL (ref 3.5–11)

## 2024-03-04 PROCEDURE — 80053 COMPREHEN METABOLIC PANEL: CPT

## 2024-03-04 PROCEDURE — 36415 COLL VENOUS BLD VENIPUNCTURE: CPT

## 2024-03-04 PROCEDURE — 6370000000 HC RX 637 (ALT 250 FOR IP): Performed by: PSYCHIATRY & NEUROLOGY

## 2024-03-04 PROCEDURE — 99232 SBSQ HOSP IP/OBS MODERATE 35: CPT | Performed by: PSYCHIATRY & NEUROLOGY

## 2024-03-04 PROCEDURE — APPSS30 APP SPLIT SHARED TIME 16-30 MINUTES: Performed by: NURSE PRACTITIONER

## 2024-03-04 PROCEDURE — 6360000002 HC RX W HCPCS

## 2024-03-04 PROCEDURE — 2580000003 HC RX 258

## 2024-03-04 PROCEDURE — 6370000000 HC RX 637 (ALT 250 FOR IP): Performed by: INTERNAL MEDICINE

## 2024-03-04 PROCEDURE — 1240000000 HC EMOTIONAL WELLNESS R&B

## 2024-03-04 PROCEDURE — 85025 COMPLETE CBC W/AUTO DIFF WBC: CPT

## 2024-03-04 RX ORDER — OLANZAPINE 5 MG/1
5 TABLET ORAL NIGHTLY
Status: DISCONTINUED | OUTPATIENT
Start: 2024-03-04 | End: 2024-03-05

## 2024-03-04 RX ADMIN — ASPIRIN 81 MG 81 MG: 81 TABLET ORAL at 08:45

## 2024-03-04 RX ADMIN — CLOPIDOGREL BISULFATE 75 MG: 75 TABLET ORAL at 08:45

## 2024-03-04 RX ADMIN — OLANZAPINE 5 MG: 5 TABLET, FILM COATED ORAL at 20:20

## 2024-03-04 RX ADMIN — ATENOLOL 25 MG: 25 TABLET ORAL at 08:45

## 2024-03-04 RX ADMIN — RISPERIDONE 1 MG: 1 TABLET, FILM COATED ORAL at 08:45

## 2024-03-04 RX ADMIN — WATER 5 MG: 1 INJECTION INTRAMUSCULAR; INTRAVENOUS; SUBCUTANEOUS at 12:40

## 2024-03-04 RX ADMIN — FLUTICASONE PROPIONATE 2 SPRAY: 50 SPRAY, METERED NASAL at 08:45

## 2024-03-04 RX ADMIN — DONEPEZIL HYDROCHLORIDE 5 MG: 5 TABLET, FILM COATED ORAL at 20:20

## 2024-03-04 NOTE — PROGRESS NOTES
Occupational Therapy  [facility]   OCCUPATIONAL THERAPY MISSED TREATMENT NOTE   [facility]  Date: 3/4/24  Patient Name: William Babcock       Room: 0205/0205-01  MRN: [id number]   Account #: [id number]    : 1960  (64 y.o.)  Gender: male   Referring Practitioner: Donnie Rosa MD  Diagnosis: Acute psychosis        REASON FOR MISSED TREATMENT:  Hold treatment per nursing request   - LPN Marva requests therapy to hold at this time as pt is agitated and disruptive.  OT will continue to follow for needs.    1328    Electronically signed by RICK Garcia/L on 3/4/24 at 1:28 PM EST

## 2024-03-04 NOTE — CARE COORDINATION
Social work spoke with Bart at the [facility] Board of  regarding pt. Bart does not feel that their respite care will be able to provide care for him, and if they could it would be only for short term, Bart feels that the pt should discharge to a nursing facility from the hospital.  Bart questioned pts discharge several times. Social work attempted to explain to Bart that the Doctor asked this morning what they were doing for respite care for the pt as not all pt are PASRR approved, and some pts can transition to the a facility after discharge. Bart does not feel like this is something the Board should be responsible for as the pt could decide not to go. Social work shared the pt could disagree with the plan from the hospital also. Bart is requesting to speak with the Doctor regarding the pts care as aBrt feels he is getting \"conflicting\" information from the social work team. Bart reports the pt is not ready for discharge, social work attempted to explain that a discharge plan is needed for when it is time to discharge.

## 2024-03-04 NOTE — BH NOTE
Emergency PRN Medication Administration Note:      Patient is Agitated and Disruptive as evidence by patient attempting to take others belongings, being aggressive with staff, attempting to exit. Staff attempted to find and relieve the distress by Talking to patient, Refocusing on new activity, Offering suggestions, Checking for undiagnosed pain, and Administer PRN medications Patient is currently continuing to escalate, accepted PRN medications. Medication Administered as prescribed:Zyprexa 5 mg IM. Patient Tolerated medication administration.Will continue to monitor, offer support, and reassess.

## 2024-03-04 NOTE — GROUP NOTE
Group Therapy Note    Date: 3/4/2024    Group Start Time: 1000  Group End Time: 1015  Group Topic: Psychoeducation    Babar Roberts        Group Therapy Note    Attendees: 2/9       Patient declined to attend psychotherapy group after encouragement from staff.  1:1 talk time offered but refused.   Signature:  Babar Medina

## 2024-03-04 NOTE — GROUP NOTE
Group Therapy Note    Date: 3/4/2024    Group Start Time: 1100  Group End Time: 1145  Group Topic: Psychoeducation    [facility]    Fritsch, Heidi, CTRS        Group Therapy Note    Attendees: 3/9       Patient's Goal:  To increase interpersonal interactions with peers. To socialize appropriately with peers. To increase reminiscing about past memories.     Notes:  Patient attended group and participated. Patient required redirection throughout group. Patient was able to be redirected and continue the group session. Patient was able to reminisce about past experiences when prompted. Patient communicated when prompted or when asked a question by peers. Patient was pleasant.    Status After Intervention:  Improved    Participation Level: Interactive    Participation Quality: Appropriate      Speech:  normal      Thought Process/Content: Logical      Affective Functioning: Flat      Mood: depressed      Level of consciousness:  Alert, occasionally inattentive      Response to Learning: Progressing to goal      Endings: None Reported    Modes of Intervention: Socialization, Exploration, Problem-solving, and Reality-testing      Discipline Responsible: Psychoeducational Specialist      Signature:  Heidi Fritsch, CTRS

## 2024-03-04 NOTE — CARE COORDINATION
Social work updated the doctor on phone call with Bart at the [facility] Board of DD. Doctor asked to have a team meeting with pts brother and the Board of DD set up via phone to discuss the pts care.    Social work contacted Bart and arranged to have the call at 10:00 am on 3/5/24. Bart shared he will reach out to pts brother today regarding the meeting. Perfect serve sent to doctor to update on the meeting.

## 2024-03-04 NOTE — PLAN OF CARE
Problem: Behavior  Goal: Pt/Family maintain appropriate behavior and adhere to behavioral management agreement, if implemented  Description: INTERVENTIONS:  1. Assess patient/family's coping skills and  non-compliant behavior (including use of illegal substances)  2. Notify security of behavior or suspected illegal substances which indicate the need for search of the family and/or belongings  3. Encourage verbalization of thoughts and concerns in a socially appropriate manner  4. Utilize positive, consistent limit setting strategies supporting safety of patient, staff and others  5. Encourage participation in the decision making process about the behavioral management agreement  6. If a visitor's behavior poses a threat to safety call refer to organization policy.  7. Initiate consult with , Psychosocial CNS, Spiritual Care as appropriate  3/4/2024 1826 by Marva Holman LPN  Outcome: Not Progressing  Note: Patient continues to pace and make bizarre statement. Patient is religiously preocupied stating that he cannot eat due to lent. Patient encouraged to eat and drink fluids. Patient required emergency medications due to aggressive behavior and attempts to elope from unit.      Problem: Psychosis  Goal: Will report no hallucinations or delusions  Description: INTERVENTIONS:  1. Administer medication as  ordered  2. Assist with reality testing to support increasing orientation  3. Assess if patient's hallucinations or delusions are encouraging self harm or harm to others and intervene as appropriate  3/4/2024 1826 by Marva Holman LPN  Outcome: Progressing     Problem: Safety - Adult  Goal: Free from fall injury  3/4/2024 1826 by Marva Holman LPN  Outcome: Progressing     Problem: Behavior  Goal: Pt/Family maintain appropriate behavior and adhere to behavioral management agreement, if implemented  Description: INTERVENTIONS:  1. Assess patient/family's coping skills and  non-compliant behavior

## 2024-03-04 NOTE — PROGRESS NOTES
Daily Progress Note  3/4/2024    Patient Name: William Babcock    CHIEF COMPLAINT:  Acute Psychosis          SUBJECTIVE:    Reagan was seen for follow-up assessment today.  He was compliant with some of his medication today and refused Aricept.  He continues to receive regular injections of IM Zyprexa for agitation with most recent administration today at 12:40 PM.  According to nursing documentation patient became aggressive in the day area and was exit seeking.  He attempted to take the belongings of other patients.  Medication had a therapeutic effect and patient was able to regain behavioral control.  Patient has been eating meals with encouragement.    Patient was approached in the day area where he was seated with one-to-one safety sitter.  He reported that his mood was \"not too good\" due to still being admitted at this facility.  He was focused on discharge and wanting to \"get out of here\".  He remains distracted and disengaged from much of conversation.  He was difficult to engage in sustained conversation.  Patient is denying auditory and visual hallucinations to this writer but continues to make delusional statements.  Patient appears to be at his current baseline but is unable to care for himself independently and warrants further hospitalization until appropriate placement can be secured.    Appetite:  [] Normal/Adequate/Unchanged  [] Increased  [x] Decreased      Sleep:       [] Normal/Adequate/Unchanged  [] Fair  [x] Poor      Group Attendance on Unit:   [] Yes  [] Selectively    [x] No    Medication Side Effects:  Patient denies any medication side effects at the time of assessment.         Mental Status Exam  Level of consciousness: Alert and awake.   Appearance: Appropriate attire for setting, seated on chair, with poor grooming and hygiene.   Behavior/Motor: Overactive, bizarre  Attitude toward examiner: Attempts to be cooperative, seems distracted, good eye contact.  Speech: Latent, mumbled, low  Bilirubin Urine 02/26/2024 NEGATIVE  NEGATIVE Final    Ketones, Urine 02/26/2024 TRACE (A)  NEGATIVE mg/dL Final    Specific Gravity, UA 02/26/2024 1.031 (H)  1.000 - 1.030 Final    Urine Hgb 02/26/2024 NEGATIVE  NEGATIVE Final    pH, UA 02/26/2024 5.5  5.0 - 8.0 Final    Protein, UA 02/26/2024 TRACE (A)  NEGATIVE mg/dL Final    Urobilinogen, Urine 02/26/2024 Normal  0.0 - 1.0 EU/dL Final    Nitrite, Urine 02/26/2024 NEGATIVE  NEGATIVE Final    Leukocyte Esterase, Urine 02/26/2024 NEGATIVE  NEGATIVE Final    WBC, UA 02/26/2024 6 TO 9 (A)  0 TO 5 /HPF Final    RBC, UA 02/26/2024 3 to 5 (A)  0 TO 2 /HPF Final    Casts UA 02/26/2024 0 TO 2 (A)  None /LPF Final    Epithelial Cells UA 02/26/2024 0 TO 2  /HPF Final    Bacteria, UA 02/26/2024 None  None Final    Sodium 02/27/2024 142  135 - 144 mmol/L Final    Potassium 02/27/2024 3.7  3.7 - 5.3 mmol/L Final    Chloride 02/27/2024 107  98 - 107 mmol/L Final    CO2 02/27/2024 26  20 - 31 mmol/L Final    Anion Gap 02/27/2024 9  9 - 17 mmol/L Final    Glucose 02/27/2024 90  70 - 99 mg/dL Final    BUN 02/27/2024 15  8 - 23 mg/dL Final    Creatinine 02/27/2024 0.7  0.7 - 1.2 mg/dL Final    Est, Glom Filt Rate 02/27/2024 >60  >60 mL/min/1.73m2 Final    Comment:       These results are not intended for use in patients <18 years of age.        eGFR results are calculated without a race factor using the 2021 CKD-EPI equation.  Careful clinical correlation is recommended, particularly when comparing to results   calculated using previous equations.  The CKD-EPI equation is less accurate in patients with extremes of muscle mass, extra-renal   metabolism of creatine, excessive creatine ingestion, or following therapy that affects   renal tubular secretion.      Calcium 02/27/2024 8.7  8.6 - 10.4 mg/dL Final    WBC 02/27/2024 3.6  3.5 - 11.0 k/uL Final    RBC 02/27/2024 3.89 (L)  4.5 - 5.9 m/uL Final    Hemoglobin 02/27/2024 11.9 (L)  13.5 - 17.5 g/dL Final    Hematocrit 02/27/2024  5.3 mmol/L Final    SPECIMEN MODERATELY HEMOLYZED, RESULTS MAY BE ADVERSELY AFFECTED    Chloride 03/04/2024 105  98 - 107 mmol/L Final    CO2 03/04/2024 24  20 - 31 mmol/L Final    Anion Gap 03/04/2024 11  9 - 17 mmol/L Final    Glucose 03/04/2024 95  70 - 99 mg/dL Final    BUN 03/04/2024 19  8 - 23 mg/dL Final    Creatinine 03/04/2024 0.9  0.7 - 1.2 mg/dL Final    Est, Glom Filt Rate 03/04/2024 >60  >60 mL/min/1.73m2 Final    Comment:       These results are not intended for use in patients <18 years of age.        eGFR results are calculated without a race factor using the 2021 CKD-EPI equation.  Careful clinical correlation is recommended, particularly when comparing to results   calculated using previous equations.  The CKD-EPI equation is less accurate in patients with extremes of muscle mass, extra-renal   metabolism of creatine, excessive creatine ingestion, or following therapy that affects   renal tubular secretion.      Calcium 03/04/2024 9.1  8.6 - 10.4 mg/dL Final    Total Protein 03/04/2024 6.9  6.4 - 8.3 g/dL Final    Albumin 03/04/2024 4.0  3.5 - 5.2 g/dL Final    Total Bilirubin 03/04/2024 0.9  0.3 - 1.2 mg/dL Final    Alkaline Phosphatase 03/04/2024 61  40 - 129 U/L Final    ALT 03/04/2024 16  5 - 41 U/L Final    AST 03/04/2024 22  <40 U/L Final    SPECIMEN MODERATELY HEMOLYZED, RESULTS MAY BE ADVERSELY AFFECTED         Reviewed patient's current plan of care and vital signs with nursing staff.    Labs reviewed: [x] Yes  Last EKG in EMR reviewed: [x] Yes  QTC: 458    Medications  Current Facility-Administered Medications: polyethylene glycol (GLYCOLAX) packet 17 g, 17 g, Oral, Daily  [START ON 3/6/2024] cyanocobalamin injection 1,000 mcg, 1,000 mcg, IntraMUSCular, Weekly  donepezil (ARICEPT) tablet 5 mg, 5 mg, Oral, Nightly  diphenhydrAMINE-zinc acetate 2-0.1 % cream, , Topical, TID PRN  atenolol (TENORMIN) tablet 25 mg, 25 mg, Oral, Daily  risperiDONE (RISPERDAL) tablet 1 mg, 1 mg, Oral,

## 2024-03-05 PROCEDURE — 6370000000 HC RX 637 (ALT 250 FOR IP): Performed by: PSYCHIATRY & NEUROLOGY

## 2024-03-05 PROCEDURE — 97530 THERAPEUTIC ACTIVITIES: CPT

## 2024-03-05 PROCEDURE — 99232 SBSQ HOSP IP/OBS MODERATE 35: CPT | Performed by: PSYCHIATRY & NEUROLOGY

## 2024-03-05 PROCEDURE — 99232 SBSQ HOSP IP/OBS MODERATE 35: CPT | Performed by: INTERNAL MEDICINE

## 2024-03-05 PROCEDURE — 1240000000 HC EMOTIONAL WELLNESS R&B

## 2024-03-05 PROCEDURE — 6370000000 HC RX 637 (ALT 250 FOR IP): Performed by: INTERNAL MEDICINE

## 2024-03-05 PROCEDURE — APPSS30 APP SPLIT SHARED TIME 16-30 MINUTES: Performed by: NURSE PRACTITIONER

## 2024-03-05 PROCEDURE — 90836 PSYTX W PT W E/M 45 MIN: CPT | Performed by: PSYCHIATRY & NEUROLOGY

## 2024-03-05 PROCEDURE — 6370000000 HC RX 637 (ALT 250 FOR IP): Performed by: NURSE PRACTITIONER

## 2024-03-05 RX ORDER — OLANZAPINE 7.5 MG/1
7.5 TABLET, FILM COATED ORAL NIGHTLY
Status: DISCONTINUED | OUTPATIENT
Start: 2024-03-05 | End: 2024-03-29 | Stop reason: HOSPADM

## 2024-03-05 RX ADMIN — DONEPEZIL HYDROCHLORIDE 5 MG: 5 TABLET, FILM COATED ORAL at 21:21

## 2024-03-05 RX ADMIN — OLANZAPINE 7.5 MG: 7.5 TABLET, FILM COATED ORAL at 21:21

## 2024-03-05 RX ADMIN — TRAZODONE HYDROCHLORIDE 50 MG: 50 TABLET ORAL at 21:21

## 2024-03-05 NOTE — BH NOTE
With  much, much encouragement  and direction.  Pt has eaten 3/4 of lunch tray without difficulty swallowing.

## 2024-03-05 NOTE — PROGRESS NOTES
ng/mL Final    TSH 02/23/2024 3.67  0.30 - 5.00 uIU/mL Final    Color, UA 02/26/2024 Yellow  Yellow Final    Turbidity UA 02/26/2024 Clear  Clear Final    Glucose, Ur 02/26/2024 NEGATIVE  NEGATIVE mg/dL Final    Bilirubin Urine 02/26/2024 NEGATIVE  NEGATIVE Final    Ketones, Urine 02/26/2024 TRACE (A)  NEGATIVE mg/dL Final    Specific Gravity, UA 02/26/2024 1.031 (H)  1.000 - 1.030 Final    Urine Hgb 02/26/2024 NEGATIVE  NEGATIVE Final    pH, UA 02/26/2024 5.5  5.0 - 8.0 Final    Protein, UA 02/26/2024 TRACE (A)  NEGATIVE mg/dL Final    Urobilinogen, Urine 02/26/2024 Normal  0.0 - 1.0 EU/dL Final    Nitrite, Urine 02/26/2024 NEGATIVE  NEGATIVE Final    Leukocyte Esterase, Urine 02/26/2024 NEGATIVE  NEGATIVE Final    WBC, UA 02/26/2024 6 TO 9 (A)  0 TO 5 /HPF Final    RBC, UA 02/26/2024 3 to 5 (A)  0 TO 2 /HPF Final    Casts UA 02/26/2024 0 TO 2 (A)  None /LPF Final    Epithelial Cells UA 02/26/2024 0 TO 2  /HPF Final    Bacteria, UA 02/26/2024 None  None Final    Sodium 02/27/2024 142  135 - 144 mmol/L Final    Potassium 02/27/2024 3.7  3.7 - 5.3 mmol/L Final    Chloride 02/27/2024 107  98 - 107 mmol/L Final    CO2 02/27/2024 26  20 - 31 mmol/L Final    Anion Gap 02/27/2024 9  9 - 17 mmol/L Final    Glucose 02/27/2024 90  70 - 99 mg/dL Final    BUN 02/27/2024 15  8 - 23 mg/dL Final    Creatinine 02/27/2024 0.7  0.7 - 1.2 mg/dL Final    Est, Glom Filt Rate 02/27/2024 >60  >60 mL/min/1.73m2 Final    Comment:       These results are not intended for use in patients <18 years of age.        eGFR results are calculated without a race factor using the 2021 CKD-EPI equation.  Careful clinical correlation is recommended, particularly when comparing to results   calculated using previous equations.  The CKD-EPI equation is less accurate in patients with extremes of muscle mass, extra-renal   metabolism of creatine, excessive creatine ingestion, or following therapy that affects   renal tubular secretion.      Calcium  02/27/2024 8.7  8.6 - 10.4 mg/dL Final    WBC 02/27/2024 3.6  3.5 - 11.0 k/uL Final    RBC 02/27/2024 3.89 (L)  4.5 - 5.9 m/uL Final    Hemoglobin 02/27/2024 11.9 (L)  13.5 - 17.5 g/dL Final    Hematocrit 02/27/2024 35.2 (L)  41 - 53 % Final    MCV 02/27/2024 90.4  80 - 100 fL Final    MCH 02/27/2024 30.6  26 - 34 pg Final    MCHC 02/27/2024 33.8  31 - 37 g/dL Final    RDW 02/27/2024 14.7  11.5 - 14.9 % Final    Platelets 02/27/2024 228  150 - 450 k/uL Final    MPV 02/27/2024 6.7  6.0 - 12.0 fL Final    Neutrophils % 02/27/2024 61  36 - 66 % Final    Lymphocytes % 02/27/2024 25  24 - 44 % Final    Monocytes % 02/27/2024 10 (H)  1 - 7 % Final    Eosinophils % 02/27/2024 3  0 - 4 % Final    Basophils % 02/27/2024 1  0 - 2 % Final    Neutrophils Absolute 02/27/2024 2.20  1.3 - 9.1 k/uL Final    Lymphocytes Absolute 02/27/2024 0.90 (L)  1.0 - 4.8 k/uL Final    Monocytes Absolute 02/27/2024 0.40  0.1 - 1.3 k/uL Final    Eosinophils Absolute 02/27/2024 0.10  0.0 - 0.4 k/uL Final    Basophils Absolute 02/27/2024 0.00  0.0 - 0.2 k/uL Final    Ammonia 02/27/2024 27  16 - 60 umol/L Final    T. pallidum, IgG 02/27/2024 NONREACTIVE  NONREACTIVE Final    Comment:       T. pallidum antibodies are not detected.  There is no serological evidence of infection with T. pallidum (early primary syphilis   cannot be excluded).  Retest in 2-4 weeks if syphilis is clinically suspect.            HIV Ag/Ab 02/27/2024 NONREACTIVE  NONREACTIVE Final    Comment: No laboratory evidence of HIV infection.  If acute HIV infection is suspected, consider   testing for HIV-1 RNA.      Methylmalonic Acid 02/27/2024 0.15  0.00 - 0.40 umol/L Final    Comment: (NOTE)  INTERPRETIVE INFORMATION: MMA Serum/Plasma,                            Vitamin B12 Status  This test was developed and its performance characteristics   determined by [facility]. It has not been cleared or   approved by the US Food and Drug Administration. This test was   performed in  a CLIA certified laboratory and is intended for   clinical purposes.  Performed By: [facility]  [address] [place], [place] [id number]  : Carroll Chester MD, PhD  CLIA Number: [id number]      Vitamin B1,Whole Blood 02/27/2024 98  70 - 180 nmol/L Final    Comment: (NOTE)  INTERPRETIVE INFORMATION: Vitamin B1, Whole Blood  This assay measures the concentration of thiamine diphosphate   (TDP), the primary active form of vitamin B1. Approximately 90   percent of vitamin B1 present in whole blood is TDP. Thiamine and   thiamine monophosphate, which comprise the remaining 10 percent,   are not measured.  This test was developed and its performance characteristics   determined by [facility]. It has not been cleared or   approved by the US Food and Drug Administration. This test was   performed in a CLIA certified laboratory and is intended for   clinical purposes.  Performed By: [facility]  [address] [place] [address]  : Carroll Chester MD, PhD  CLIA Number: [id number]      WBC 03/04/2024 3.8  3.5 - 11.0 k/uL Final    RBC 03/04/2024 4.49 (L)  4.5 - 5.9 m/uL Final    Hemoglobin 03/04/2024 13.4 (L)  13.5 - 17.5 g/dL Final    Hematocrit 03/04/2024 41.1  41 - 53 % Final    MCV 03/04/2024 91.5  80 - 100 fL Final    MCH 03/04/2024 29.8  26 - 34 pg Final    MCHC 03/04/2024 32.6  31 - 37 g/dL Final    RDW 03/04/2024 14.8  11.5 - 14.9 % Final    Platelets 03/04/2024 247  150 - 450 k/uL Final    MPV 03/04/2024 7.0  6.0 - 12.0 fL Final    Neutrophils % 03/04/2024 64  36 - 66 % Final    Lymphocytes % 03/04/2024 21 (L)  24 - 44 % Final    Monocytes % 03/04/2024 11 (H)  1 - 7 % Final    Eosinophils % 03/04/2024 2  0 - 4 % Final    Basophils % 03/04/2024 2  0 - 2 % Final    Neutrophils Absolute 03/04/2024 2.40  1.3 - 9.1 k/uL Final    Lymphocytes Absolute 03/04/2024 0.80 (L)  1.0 - 4.8 k/uL Final    Monocytes Absolute 03/04/2024 0.40  0.1 - 1.3 k/uL Final

## 2024-03-05 NOTE — BH NOTE
Father Sy on the unit to see Pts.  Writer asks  to see Pt to and talk to the Pt about not having to fast.  Father Sy tells Pt that Pt does not have to fast while in hospital and encourages Pt to eat meals/snack.

## 2024-03-05 NOTE — PROGRESS NOTES
[facility]   INPATIENT OCCUPATIONAL THERAPY  PROGRESS NOTE  Date: 3/5/2024  Patient Name: William Babcock       Room: 0205/0205-01  MRN: [id number]    : 1960  (64 y.o.)  Gender: male   Referring Practitioner: Donnie Rosa MD  Diagnosis: Acute psychosis      Discharge Recommendations:  Further Occupational Therapy is recommended upon facility discharge.    OT Equipment Recommendations  Other: TBD    Restrictions/Precautions  Restrictions/Precautions  Restrictions/Precautions: Fall Risk;General Precautions    O2 Device: None (Room air)    Subjective  Subjective  Subjective: \"I'm going to die here.\" Pt agreeable to OT session; however put demonstrated difficutly with redirection to participate in functional task  Subjective  Pain: Pt does not report pain       Objective  Orientation  Overall Orientation Status: Impaired  Cognition  Overall Cognitive Status: Exceptions  Arousal/Alertness: Delayed responses to stimuli  Following Commands: Inconsistently follows commands;Follows one step commands with increased time;Follows one step commands with repetition  Attention Span: Difficulty attending to directions;Difficulty dividing attention  Safety Judgement: Decreased awareness of need for assistance;Decreased awareness of need for safety  Problem Solving: Assistance required to generate solutions;Assistance required to implement solutions;Assistance required to identify errors made;Assistance required to correct errors made;Decreased awareness of errors  Insights: Not aware of deficits  Initiation: Requires cues for all  Sequencing: Requires cues for all  Cognition Comment: Pt continued to demonstrate tangential and parinoid speech with difficulty to understand. Pt unable to be redirected at time. Unable to participate in further therapy at this time due to perservation with inability to be redirected    Activities of Daily Living  ADL  Feeding: Stand by assistance  Grooming: Maximum  self care tasks with Supervision with Good safety and attention to task  Short Term Goal 3: Pt will follow simple 1-2 step commands during self care tasks with no more than 3 verbal cues to increase participation and safety with daily activities  Short Term Goal 4: Pt will participate in self care routine 2-3 times a week with no more than 3 verbal cues to initiate task  Short Term Goal 5: Pt will participate in 15+ minutes of therapeutic exercises/functional activities/social participation to increase safety and independence with daily activities and improve overall quality of life  Additional Goals?: Yes  Short Term Goal 6: OT to complete Robby when appropriate  Occupational Therapy Plan  Times Per Week: 2-3  Current Treatment Recommendations: Self-Care / ADL, Strengthening, ROM, Balance training, Functional mobility training, Endurance training, Cognitive reorientation, Pain management, Safety education & training, Patient/Caregiver education & training, Home management training, Cognitive/Perceptual training    Assessment  Activity Tolerance  Activity Tolerance: Treatment limited secondary to decreased cognition  Assessment  Performance deficits / Impairments: Decreased ADL status, Decreased functional mobility , Decreased ROM, Decreased strength, Decreased safe awareness, Decreased cognition, Decreased endurance, Decreased balance, Decreased high-level IADLs, Decreased fine motor control, Decreased coordination  Treatment Diagnosis: Impaired self care status  Prognosis: Fair  Decision Making: Medium Complexity  Discharge Recommendations: 24 hour supervision or assist, Continue to assess pending progress  OT Equipment Recommendations  Other: TBD  Safety Devices  Type of Devices: All fall risk precautions in place, Patient at risk for falls, Nurse notified, Left in bed    AM-PAC Daily Activities Inpatient  AM-PAC Daily Activity - Inpatient   How much help is needed for putting on and taking off regular lower body

## 2024-03-05 NOTE — BH NOTE
Pt did not eat breakfast despite education and encouragement from multiple [facility] staff members. Pt continues to be unrealistic, paranoid, and religiously preoccupied. Pt refusing to take medications despite staff encouragement/eductation.    never intubated

## 2024-03-05 NOTE — BH NOTE
Pt brother here.  Writer had saved Pt lunch. Pt brother attempting to have  Pt eat lunch. With frequent redirection- Pt is eating without difficulty swallowing. Pt continues to get up and walk away. Writer is able to redirect to table to eat. Dr Rosa and Parvin COCHRAN NP notified via [employer].

## 2024-03-05 NOTE — GROUP NOTE
Group Therapy Note    Date: 3/5/2024    Group Start Time: 1000  Group End Time: 1020  Group Topic: Psychotherapy     Jannette Waller MSW        Group Therapy Note    Attendees: 1/7       Patient was offered group therapy today but declined to participate despite encouragement from staff.  1:1 was offered.      Discipline Responsible: /Counselor      Signature:  TANO Hernández

## 2024-03-05 NOTE — BH NOTE
Daily weight obtained on patient this morning, weighing 60.782 kg. Last weight recorded on patient was done on 2/21/2024, weighing 71.7 kg. Assigned nurse notified of patient's weight loss.

## 2024-03-05 NOTE — BH NOTE
Pt refuses to eat lunch despite multiple attempt made by [facility] staff to encourage Pt to eat.

## 2024-03-05 NOTE — GROUP NOTE
Group Therapy Note    Date: 3/5/2024    Group Start Time: 1330  Group End Time: 1420  Group Topic: Relaxation    [facility] Emelia Belcher CTRS        Group Therapy Note    Attendees: 2/8    Relaxation Group Note        Date: March 5, 2024           Start Time: 1:30pm  End Time: 2:20pm      Number of Participants in Group & Unit Census:  2/8    Topic: interpersonal skills, leisure awareness, creative expression    Goal of Group: To improve interpersonal skills and leisure awareness through collaborating with peers and demonstrating creative expression.      Comments:     Patient did not participate in Relaxation group, despite staff encouragement and explanation of benefits.  Patient remain seclusive to self.  Q15 minute safety checks maintained for patient safety and will continue to encourage patient to attend unit programming.        Signature:  MAURY Campbell

## 2024-03-05 NOTE — BH NOTE
Dr Cordova on unit.  Writer informs Dr Cordova of Pt current weight of 134 Lbs and Pt last weight of 158 Lbs on on 2/21/24.

## 2024-03-05 NOTE — PLAN OF CARE
Pt unrealistic, paranoid, preoccupied, noncompliant due to extreme paranoia. Pt is religiously preoccupied.   Pt continues to refuse to eat, continues to state he is excluding from fast while hospitalized despite staff/ educating Pt.  visited Pt today to remind Pt that he is excluded from fasting.  Pt is free of falls, pt is wearing non skid slippers, pt's room is clutter free.  Problem: Psychosis  Goal: Will report no hallucinations or delusions  Description: INTERVENTIONS:  1. Administer medication as  ordered  2. Assist with reality testing to support increasing orientation  3. Assess if patient's hallucinations or delusions are encouraging self harm or harm to others and intervene as appropriate  3/5/2024 1054 by Parvni Coffman LPN  Outcome: Progressing     Problem: Behavior  Goal: Pt/Family maintain appropriate behavior and adhere to behavioral management agreement, if implemented  Description: INTERVENTIONS:  1. Assess patient/family's coping skills and  non-compliant behavior (including use of illegal substances)  2. Notify security of behavior or suspected illegal substances which indicate the need for search of the family and/or belongings  3. Encourage verbalization of thoughts and concerns in a socially appropriate manner  4. Utilize positive, consistent limit setting strategies supporting safety of patient, staff and others  5. Encourage participation in the decision making process about the behavioral management agreement  6. If a visitor's behavior poses a threat to safety call refer to organization policy.  7. Initiate consult with , Psychosocial CNS, Spiritual Care as appropriate  3/5/2024 1054 by Parvin Coffman LPN  Outcome: Progressing     Problem: Safety - Adult  Goal: Free from fall injury  3/5/2024 1054 by Parvin Coffman LPN  Outcome: Progressing

## 2024-03-05 NOTE — PLAN OF CARE
Problem: Psychosis  Goal: Will report no hallucinations or delusions  Description: INTERVENTIONS:  1. Administer medication as  ordered  2. Assist with reality testing to support increasing orientation  3. Assess if patient's hallucinations or delusions are encouraging self harm or harm to others and intervene as appropriate  3/5/2024 0232 by Zainab Mcfarland RN  Outcome: Progressing     Problem: Behavior  Goal: Pt/Family maintain appropriate behavior and adhere to behavioral management agreement, if implemented  Description: INTERVENTIONS:  1. Assess patient/family's coping skills and  non-compliant behavior (including use of illegal substances)  2. Notify security of behavior or suspected illegal substances which indicate the need for search of the family and/or belongings  3. Encourage verbalization of thoughts and concerns in a socially appropriate manner  4. Utilize positive, consistent limit setting strategies supporting safety of patient, staff and others  5. Encourage participation in the decision making process about the behavioral management agreement  6. If a visitor's behavior poses a threat to safety call refer to organization policy.  7. Initiate consult with , Psychosocial CNS, Spiritual Care as appropriate  3/5/2024 0232 by Zainab Mcfarland RN  Outcome: Progressing     Problem: Safety - Adult  Goal: Free from fall injury  3/5/2024 0232 by Zainab Mcfarland RN  Outcome: Progressing    Patient is alert and oriented to self only. Patient denies any hallucinations at this time. Patient endorses paranoid and Jew delusions. Patient remains free from fall injury throughout this shift and agrees to seek out staff for any assistance required.  Patient is medication compliant with medications in applesauce and behavior remains controlled at this time.  Safe environment provided. Q15 minute checks maintained.

## 2024-03-05 NOTE — BH NOTE
Patient did not participate in Community Meeting/goals group, despite staff encouragement and explanation of benefits.  Patient remain seclusive to self.  Q15 minute safety checks maintained for patient safety and will continue to encourage patient to attend unit programming.                 Pt is too restless/paranoid, unable to concentrate to attend goals group

## 2024-03-05 NOTE — BH NOTE
Writer attempts to give Pt scheduled medications whole in applesauce.  Pt refusing to take meds and continues to try to get away from writer. Pt continuously states, \"I'm going to die, I'm going to die..\"  Additionally [facility] staff tried to administer medications.. Pt continues to walk away from staff. And continues to state... I'm going to die... I'm going to die.\"  Pt too paranoid to take scheduled medications.

## 2024-03-05 NOTE — PROGRESS NOTES
IN-PATIENT SERVICE  [facility]    Progress note            Date:   3/5/2024  Patient name:  William Babcock  Date of admission:  2/21/2024  9:18 PM  MRN:   [id number]  Account:  [id number]  YOB: 1960  PCP:    Billy Francois MD  Room:   [facility]  Code Status:    Full Code    Physician Requesting Consult: Donnie Rosa MD    Reason for Consult: History and physical, medical management    Chief Complaint:     No chief complaint on file.    Medical management    History Obtained From:     Patient, EMR, nursing staff    History of Present Illness:     63-year-old male admitted for acute psychosis.  Patient has history of autism;   Carotid pseudoaneurysm.  Recent admission to [facility] for right proximal RCA pseudoaneurysm after patient presented on 2/5 for agitation and psychosis.  Patient underwent cerebral angiogram and embolization of cervical pseudoaneurysm 2/9/2024.  Was discharged on aspirin and Plavix, resumed.  Patient was discharged to [facility] on 2/12 subsequently discharged on 2/16.    Past Medical History:     Past Medical History:   Diagnosis Date    Anxiety     Autism         Past Surgical History:     Past Surgical History:   Procedure Laterality Date    CEREBRAL ANGIOGRAM  02/09/2024    IR ANGIOGRAM CAROTID CEREBRAL BILATERAL    EYE SURGERY          Medications Prior to Admission:     Prior to Admission medications    Medication Sig Start Date End Date Taking? Authorizing Provider   fluticasone (FLONASE) 50 MCG/ACT nasal spray 2 sprays by Each Nostril route daily   Yes Provider, MD Nick   aspirin 81 MG chewable tablet Take 1 tablet by mouth daily 2/17/24   Donnie Rosa MD   clopidogrel (PLAVIX) 75 MG tablet Take 1 tablet by mouth daily 2/17/24   Donnie Rosa MD   QUEtiapine (SEROQUEL) 25 MG tablet Take 1 tablet by mouth 2 times daily 2/16/24   Donnie Rosa MD   atenolol (TENORMIN) 50 MG tablet Take 1 tablet by mouth  cranial nerves II through XII grossly intact  Skin: No gross lesions, rashes, bruising or bleeding on exposed skin area  Extremities:  No joint swelling, no pedal edema or calf pain with palpation      Investigations:      Laboratory Testing:  No results found for this or any previous visit (from the past 24 hour(s)).      Imaging/Diagonstics:  Recent data reviewed    Assessment :      Primary Problem  Acute psychosis (HCC)    Active Hospital Problems    Diagnosis Date Noted    Altered mental status [R41.82] 02/26/2024    Acute psychosis (HCC) [F23] 02/12/2024       Plan:     Reason for consult: General medical management     Acute psychosis-management per psychiatry  Carotid pseudoaneurysm.  Recent admission to [facility] for right proximal RCA pseudoaneurysm after patient presented on 2/5 for agitation and psychosis.  Patient underwent cerebral angiogram and embolization of cervical pseudoaneurysm 2/9/2024.  Was discharged on aspirin and Plavix, resumed.  Patient was discharged to [facility] on 2/12 subsequently discharged on 2/16.  Hypertension-resume atenolol, blood pressure controlled.   Anemia related to critical illness hemoglobin 11.6 today, was 14 prior to procedure listed above.  Will check iron levels  Labs and vitals including CBC BMP liver enzymes reviewed.  .  Recent TSH normal urinalysis reviewed-not suggestive of infection.    DVT prophylaxis-not required, patient is mobile    2/26  Seen and examined   Labs/vitals /meds reviewed   On atenolol 25 mg  B12 folic acid normal  Anemia 11.6  Patient has not been eating drinking, getting paranoid about the food,  May be dehydrated, will check labs, medical side  2/29  Patient seen and examined  Her vitals have been stable  Intermittently tachycardic likely second underlying anxiety  On aspirin Plavix  Labs reviewed, hemoglobin is 11.9, likely anemia of chronic disease  HIV syphilis negative   UA negative for UTI  Continue to monitor  Patient vitamin B12

## 2024-03-05 NOTE — BH NOTE
Sammie ARREAGA notifies this writer of Pt current weight of 134 Lbs.  Pt last weight on 2/21/24 was 158 Lbs. Dr Rosa notified via [employer]- will further assess when rounding today.

## 2024-03-06 PROCEDURE — 99232 SBSQ HOSP IP/OBS MODERATE 35: CPT | Performed by: PSYCHIATRY & NEUROLOGY

## 2024-03-06 PROCEDURE — 6370000000 HC RX 637 (ALT 250 FOR IP): Performed by: INTERNAL MEDICINE

## 2024-03-06 PROCEDURE — 6370000000 HC RX 637 (ALT 250 FOR IP): Performed by: NURSE PRACTITIONER

## 2024-03-06 PROCEDURE — 97535 SELF CARE MNGMENT TRAINING: CPT

## 2024-03-06 PROCEDURE — 99232 SBSQ HOSP IP/OBS MODERATE 35: CPT | Performed by: INTERNAL MEDICINE

## 2024-03-06 PROCEDURE — 1240000000 HC EMOTIONAL WELLNESS R&B

## 2024-03-06 PROCEDURE — 6370000000 HC RX 637 (ALT 250 FOR IP): Performed by: PSYCHIATRY & NEUROLOGY

## 2024-03-06 PROCEDURE — APPSS30 APP SPLIT SHARED TIME 16-30 MINUTES

## 2024-03-06 PROCEDURE — 6360000002 HC RX W HCPCS: Performed by: PSYCHIATRY & NEUROLOGY

## 2024-03-06 RX ORDER — VENLAFAXINE HYDROCHLORIDE 37.5 MG/1
37.5 CAPSULE, EXTENDED RELEASE ORAL
Status: DISCONTINUED | OUTPATIENT
Start: 2024-03-07 | End: 2024-03-29 | Stop reason: HOSPADM

## 2024-03-06 RX ADMIN — CLOPIDOGREL BISULFATE 75 MG: 75 TABLET ORAL at 09:22

## 2024-03-06 RX ADMIN — ATENOLOL 25 MG: 25 TABLET ORAL at 09:23

## 2024-03-06 RX ADMIN — POLYETHYLENE GLYCOL 3350 17 G: 17 POWDER, FOR SOLUTION ORAL at 09:22

## 2024-03-06 RX ADMIN — ASPIRIN 81 MG 81 MG: 81 TABLET ORAL at 09:23

## 2024-03-06 RX ADMIN — OLANZAPINE 7.5 MG: 7.5 TABLET, FILM COATED ORAL at 20:52

## 2024-03-06 RX ADMIN — TRAZODONE HYDROCHLORIDE 50 MG: 50 TABLET ORAL at 20:52

## 2024-03-06 RX ADMIN — DONEPEZIL HYDROCHLORIDE 5 MG: 5 TABLET, FILM COATED ORAL at 20:52

## 2024-03-06 RX ADMIN — CYANOCOBALAMIN 1000 MCG: 1000 INJECTION, SOLUTION INTRAMUSCULAR; SUBCUTANEOUS at 09:22

## 2024-03-06 ASSESSMENT — PAIN SCALES - WONG BAKER: WONGBAKER_NUMERICALRESPONSE: NO HURT

## 2024-03-06 NOTE — PROGRESS NOTES
IN-PATIENT SERVICE  [facility]    Progress note            Date:   3/6/2024  Patient name:  William Babcock  Date of admission:  2/21/2024  9:18 PM  MRN:   [id number]  Account:  [id number]  YOB: 1960  PCP:    Billy Francois MD  Room:   [facility]  Code Status:    Full Code    Physician Requesting Consult: Donnie Rosa MD    Reason for Consult: History and physical, medical management    Chief Complaint:     No chief complaint on file.    Medical management    History Obtained From:     Patient, EMR, nursing staff    History of Present Illness:     63-year-old male admitted for acute psychosis.  Patient has history of autism;   Carotid pseudoaneurysm.  Recent admission to [facility] for right proximal RCA pseudoaneurysm after patient presented on 2/5 for agitation and psychosis.  Patient underwent cerebral angiogram and embolization of cervical pseudoaneurysm 2/9/2024.  Was discharged on aspirin and Plavix, resumed.  Patient was discharged to [facility] on 2/12 subsequently discharged on 2/16.    Past Medical History:     Past Medical History:   Diagnosis Date    Anxiety     Autism         Past Surgical History:     Past Surgical History:   Procedure Laterality Date    CEREBRAL ANGIOGRAM  02/09/2024    IR ANGIOGRAM CAROTID CEREBRAL BILATERAL    EYE SURGERY          Medications Prior to Admission:     Prior to Admission medications    Medication Sig Start Date End Date Taking? Authorizing Provider   fluticasone (FLONASE) 50 MCG/ACT nasal spray 2 sprays by Each Nostril route daily   Yes Provider, MD Nick   aspirin 81 MG chewable tablet Take 1 tablet by mouth daily 2/17/24   Donnie Rosa MD   clopidogrel (PLAVIX) 75 MG tablet Take 1 tablet by mouth daily 2/17/24   Donnie Rosa MD   QUEtiapine (SEROQUEL) 25 MG tablet Take 1 tablet by mouth 2 times daily 2/16/24   Donnie Rosa MD   atenolol (TENORMIN) 50 MG tablet Take 1 tablet by mouth

## 2024-03-06 NOTE — PLAN OF CARE
Problem: Psychosis  Goal: Will report no hallucinations or delusions  Description: INTERVENTIONS:  1. Administer medication as  ordered  2. Assist with reality testing to support increasing orientation  3. Assess if patient's hallucinations or delusions are encouraging self harm or harm to others and intervene as appropriate  3/5/2024 2204 by Jacquelin Rodriguez LPN  Note: Patient denies any suicidal thoughts at this time. Patient agrees to come talk with staff if having any thoughts to harm himself this shift. 15 min rounds continued for patient safety.     Problem: Behavior  Goal: Pt/Family maintain appropriate behavior and adhere to behavioral management agreement, if implemented  Description: INTERVENTIONS:  1. Assess patient/family's coping skills and  non-compliant behavior (including use of illegal substances)  2. Notify security of behavior or suspected illegal substances which indicate the need for search of the family and/or belongings  3. Encourage verbalization of thoughts and concerns in a socially appropriate manner  4. Utilize positive, consistent limit setting strategies supporting safety of patient, staff and others  5. Encourage participation in the decision making process about the behavioral management agreement  6. If a visitor's behavior poses a threat to safety call refer to organization policy.  7. Initiate consult with , Psychosocial CNS, Spiritual Care as appropriate  3/5/2024 2205 by Jacquelin Rodriguez LPN  Note: Patient has been calm and cooperative this shift.

## 2024-03-06 NOTE — PLAN OF CARE
Pt continues to have ritualistic behavior, religiously preoccupied, impaired concentration. Pt dependent on others for daily needs and requires constant encouragement and direction to eat/drink. Pt has been eating and drinking more as staff have been offering frequent snacks and cups of water. Pt remains free of falls and verbalizes understanding of individual fall risks.  Pt wearing non skid footwear and encouraged to seek out staff for any assistance needed.  Pt compliant with medications.   Problem: Psychosis  Goal: Will report no hallucinations or delusions  Description: INTERVENTIONS:  1. Administer medication as  ordered  2. Assist with reality testing to support increasing orientation  3. Assess if patient's hallucinations or delusions are encouraging self harm or harm to others and intervene as appropriate  Outcome: Progressing     Problem: Behavior  Goal: Pt/Family maintain appropriate behavior and adhere to behavioral management agreement, if implemented  Description: INTERVENTIONS:  1. Assess patient/family's coping skills and  non-compliant behavior (including use of illegal substances)  2. Notify security of behavior or suspected illegal substances which indicate the need for search of the family and/or belongings  3. Encourage verbalization of thoughts and concerns in a socially appropriate manner  4. Utilize positive, consistent limit setting strategies supporting safety of patient, staff and others  5. Encourage participation in the decision making process about the behavioral management agreement  6. If a visitor's behavior poses a threat to safety call refer to organization policy.  7. Initiate consult with , Psychosocial CNS, Spiritual Care as appropriate  Outcome: Progressing     Problem: Safety - Adult  Goal: Free from fall injury  Outcome: Progressing

## 2024-03-06 NOTE — GROUP NOTE
Group Therapy Note    Date: 3/6/2024    Group Start Time: 1100  Group End Time: 1155  Group Topic: Cognitive Skills    Nia Vincent CTRS        Group Therapy Note    Attendees: 5/11     Topic: To increase social interaction, self expression, explore preferences and values, and   communication skills.      Patient did not participate in Cognitive Skills Group at 11:00, despite staff encouragement   and explanation of benefits. Patient is seclusive to self around peers but does talk to RT for   a few moments when invited to group, and brightens briefly on approach..     Q15 minute safety checks maintained for patient safety and will continue to encourage   patient to attend unit programming.       Discipline Responsible: Psychoeducational Specialist  Signature:  MAURY BARNES

## 2024-03-06 NOTE — PROGRESS NOTES
Daily Progress Note  3/6/2024    Patient Name: William Babcock    CHIEF COMPLAINT:  Acute Psychosis          SUBJECTIVE:      Patient is seen today for a follow up assessment. Labs and vitals reviewed and appear to be within normal limits. Reagan is compliant with scheduled medications with encouragement from nursing. He is in control of his behavior and has not required emergency medications within the past 24 hours.    Reagan is agreeable to assessment in his room today. Reagan is orientated to self, location and situation. Throughout the interview, Reagan was perseverative and kept repeating the statement \"I'm gonna die in 1000 years\". He is unable to engage in spontaneous, sustained conversation.  Reagan continues to report low mood rating it as an 8 out of 10 (0 being high and 10 being the lowest) in severity today. Reagan continues to have a decreased appetite. Since his admission on 2/21/24, he has lost 24 pounds per nursing staff. Reagan's brother visited yesterday and encouraged Reagan to eat 3/4 of his lunch. Other than this, Reagan only consumes a few small bites from each meal with encouragement.  A  came and spoke with Reagan yesterday due to his previous statements of needing to fast for lent. The  told Reagan that he is exempt from lent due to his hospitalization and that he is allowed to eat meals and consume meat. Reagan continues to report difficulty sleeping at night. Reagan reports active suicidal ideations and denies homicidal ideations. Reagan reports hearing voices that tell him \"don't go back\" and seeing \"people that don't belong here\". When asked to describe the people Reagan is referring to, he pointed at other patients and staff members. Reagan would be unable to meet his basic needs outside of the hospital at this time. He would very much decompensate on his own. He would continue to benefit from inpatient psychiatric hospitalization at this time.      Appetite:  []

## 2024-03-06 NOTE — BH NOTE
saw Pt today and informed Pt that he is excluded from fasting due to being hospitalized. Encouraged Pt to eat all foods. Pt in agreement to change to regular diet and to eat meat to  increase protein intake.      N.O. for Dietician consult due to significant weight loss.

## 2024-03-06 NOTE — GROUP NOTE
Group Therapy Note    Date: 3/6/2024    Group Start Time: 1430  Group End Time: 1530  Group Topic: Relaxation    [facility]        Group Therapy Note    Attendees: 6/11     Topic: To increase social interaction, self expression, explore calming people, places and things   using discussion and art work, and communication skills.      Patient did not participate in Relaxation Group at 14:30, despite staff encouragement   and explanation of benefits. Patient is seclusive to self and room but asked to talk and walk with   RT 1:1 prior to group starting, which RT did. Pt did not have a specific topic or need at that time but   did brighten and was more calm, and expressed appreciation of 1:1 time..     Q15 minute safety checks maintained for patient safety and will continue to encourage   patient to attend unit programming.       Discipline Responsible: Psychoeducational Specialist  Signature:  MAURY BARNES

## 2024-03-06 NOTE — PROGRESS NOTES
[facility]   INPATIENT OCCUPATIONAL THERAPY  PROGRESS NOTE  Date: 3/6/2024  Patient Name: William Babcock       Room: 0205/0205-01  MRN: [id number]    : 1960  (64 y.o.)  Gender: male   Referring Practitioner: Donnie Rosa MD  Diagnosis: Acute psychosis      Discharge Recommendations:  Further Occupational Therapy is recommended upon facility discharge.    OT Equipment Recommendations  Other: TBD    Restrictions/Precautions  Restrictions/Precautions  Restrictions/Precautions: Fall Risk;General Precautions    O2 Device: None (Room air)    Subjective  Subjective  Subjective: \"Am I going to die because I didn't do what you asked?\" Pt was agreeable to OT session  Subjective  Pain: Pt does not report pain  Comments: Ok per Nursing staff for OT session    Objective  Orientation  Overall Orientation Status: Impaired  Cognition  Overall Cognitive Status: Exceptions  Arousal/Alertness: Delayed responses to stimuli  Following Commands: Inconsistently follows commands;Follows one step commands with increased time;Follows one step commands with repetition  Attention Span: Difficulty attending to directions;Difficulty dividing attention  Safety Judgement: Decreased awareness of need for assistance;Decreased awareness of need for safety  Problem Solving: Assistance required to generate solutions;Assistance required to implement solutions;Assistance required to identify errors made;Assistance required to correct errors made;Decreased awareness of errors  Insights: Not aware of deficits  Initiation: Requires cues for all  Sequencing: Requires cues for all  Cognition Comment: Pt continued to demonstrate tangential and parinoid speech with difficulty to understand. Pt unable to be redirected at time. Unable to participate in further therapy at this time due to perservation with inability to be redirected    Activities of Daily Living  ADL  Feeding: Stand by assistance  Grooming: Moderate

## 2024-03-06 NOTE — BH NOTE
Writer has made multiple attempts to have Pt shower.  Pt is refusing and stating, \"I'm going to worley in here. Writer even put hand under running water to demonstrate that the water will not worley him. Additional  [facility] staff attempt to have Pt shower. Pt continues to refuse.     Occupational Therapy successful at getting Pt to brush teeth.

## 2024-03-06 NOTE — BH NOTE
Staff have been placing  frequent cups of water and snacks within Pt's reach. Pt has been drinking and snacking on food. Dietician followed up - plan is to offer Pt packaged food.

## 2024-03-06 NOTE — PROGRESS NOTES
Comprehensive Nutrition Assessment    Type and Reason for Visit:  Consult (wt loss, refusing to eat)    Nutrition Recommendations/Plan:   Will continue Ensure all trays  Will attempt to provide pt with packaged food only to see if this will decrease paranoia and encourage him to eat more  Would an appetite stimulant like Marinol be appropriate for this pt?     Malnutrition Assessment:  Malnutrition Status:  Severe malnutrition (03/06/24 1413)    Context:  Acute Illness     Findings of the 6 clinical characteristics of malnutrition:  Energy Intake:  75% or less of estimated energy requirements for 7 or more days  Weight Loss:  5% over 1 month (15% in 2 weeks)     Body Fat Loss:   (Severe) Orbital   Muscle Mass Loss:   (Severe) Temples (temporalis), Hand (interosseous)  Fluid Accumulation:  No significant fluid accumulation     Strength:  Not Performed    Nutrition Assessment:    Pt admitted to [facility] due to Acute/Transient Psychotic Disorder. Pt known to this service due to previous admits. He states that he is a vegetarian and refuses to eat the food he selects for himself on his menu. Supplements have been ordered but pt needs much encouragement to eat. Over the past 14 days pt has lost 24# due to minimal intake. Pt shows fat wasting in the orbital region and muscle wasting in the temples and interosseous regions. Discussed with nursing.    Nutrition Related Findings:    no edema, Labs/Meds: Reviewed, PMH: 2/10/24 pseudoaneurysm dissection, Autism Wound Type: None       Current Nutrition Intake & Therapies:    Average Meal Intake: 0%, 1-25%     ADULT ORAL NUTRITION SUPPLEMENT; Breakfast, Lunch, Dinner; Standard High Calorie/High Protein Oral Supplement  ADULT DIET; Regular    Anthropometric Measures:  Height: 172.7 cm (5' 8\")  Ideal Body Weight (IBW): 154 lbs (70 kg)    Admission Body Weight: 71.7 kg (158 lb)  Current Body Weight: 60.8 kg (134 lb), 87 % IBW. Weight Source: Not Specified  Current BMI (kg/m2):

## 2024-03-06 NOTE — GROUP NOTE
Group Therapy Note    Date: 3/6/2024    Group Start Time: 1000  Group End Time: 1040  Group Topic: Psychotherapy     Jannette Waller MSW        Group Therapy Note    Attendees: 4/12     Patient was offered group therapy today but declined to participate despite encouragement from staff.  1:1 was offered.    Discipline Responsible: /Counselor      Signature:  TANO Hernández

## 2024-03-07 PROCEDURE — 99232 SBSQ HOSP IP/OBS MODERATE 35: CPT | Performed by: PSYCHIATRY & NEUROLOGY

## 2024-03-07 PROCEDURE — 99232 SBSQ HOSP IP/OBS MODERATE 35: CPT | Performed by: INTERNAL MEDICINE

## 2024-03-07 PROCEDURE — 6360000002 HC RX W HCPCS

## 2024-03-07 PROCEDURE — 2580000003 HC RX 258

## 2024-03-07 PROCEDURE — APPSS30 APP SPLIT SHARED TIME 16-30 MINUTES

## 2024-03-07 PROCEDURE — 1240000000 HC EMOTIONAL WELLNESS R&B

## 2024-03-07 RX ADMIN — WATER 5 MG: 1 INJECTION INTRAMUSCULAR; INTRAVENOUS; SUBCUTANEOUS at 08:33

## 2024-03-07 NOTE — PROGRESS NOTES
Occupational Therapy  [facility]   OCCUPATIONAL THERAPY MISSED TREATMENT NOTE   INPATIENT   Date: 3/7/24  Patient Name: William Babcock       Room: 0205/0205-01  MRN: [id number]   Account #: [id number]    : 1960  (64 y.o.)  Gender: male   Referring Practitioner: Donnie Rosa MD  Diagnosis: Acute psychosis             REASON FOR MISSED TREATMENT:  Patient declined   -    pt sleeping upon arrival. Pt shakes his head no when asked if he would like to participate in therapy. Will cntinue to follow.         Electronically signed by DIANNE Romero on 3/7/24 at 4:46 PM EST

## 2024-03-07 NOTE — PROGRESS NOTES
Daily Progress Note  3/7/2024    Patient Name: William Babcock    CHIEF COMPLAINT:  Acute psychosis           SUBJECTIVE:      Patient is seen today for a follow up assessment. Labs and vitals reviewed and appear to be within normal limits. Reagan is refusing scheduled medications. Per nursing documentation, a male nurse attempted to give Reagan his medications as he is typically more receptive to males but Reagan continued to refuse. Reagan was given emergency medications today at 0840. Per EMR, Reagan became agitated when asked to take his medications and started entering other patients rooms. Redirection was unsuccessful so Reagan was given PO Zyprexa which he spit out. He was then given Zyprexa 6 mg IM which was successful.     Reagan is agreeable to assessment in his room today. He is unable to engage in spontaneous and linear conversation. Reagan continued to express the statement \"I'm going to die a virgin\" many times throughout the assessment. He stated that he is \"feeling depressed.\" When asked why he is feeling this way, he stated that he feels like has to be stuck in his room all day. Writer explained to patient that he is able to leave his room and engage in activities in the milieu. When asked about his noncompliance with scheduled medications, Reagan stated that he feels like he is going to die from his medications but then stated that he is going to die without his medications. He is very bizarre and seems extremely confused. Reagan reports poor sleep and a poor appetite. He has only been eating small amounts of packaged foods. Concern that Reagan is getting worse rather than improving. Reagan reports active suicidal ideations but is unable to describe a plan and denies homicidal ideations. Reagan reports visual hallucinations but is unable to describe them and denies auditory hallucinations.     Reagan would be unable to meet his basic needs out of the hospital at this time. He would continue to benefit from  03/04/2024 22  <40 U/L Final    SPECIMEN MODERATELY HEMOLYZED, RESULTS MAY BE ADVERSELY AFFECTED         Reviewed patient's current plan of care and vital signs with nursing staff.    Labs reviewed: [x] Yes  Last EKG in EMR reviewed: [x] Yes  QTC: 458    Medications  Current Facility-Administered Medications: venlafaxine (EFFEXOR XR) extended release capsule 37.5 mg, 37.5 mg, Oral, Daily with breakfast  OLANZapine (ZYPREXA) tablet 7.5 mg, 7.5 mg, Oral, Nightly  polyethylene glycol (GLYCOLAX) packet 17 g, 17 g, Oral, Daily  cyanocobalamin injection 1,000 mcg, 1,000 mcg, IntraMUSCular, Weekly  donepezil (ARICEPT) tablet 5 mg, 5 mg, Oral, Nightly  diphenhydrAMINE-zinc acetate 2-0.1 % cream, , Topical, TID PRN  atenolol (TENORMIN) tablet 25 mg, 25 mg, Oral, Daily  OLANZapine zydis (ZYPREXA) disintegrating tablet 5 mg, 5 mg, Oral, BID PRN **OR** OLANZapine (ZyPREXA) 5 mg in sterile water 1 mL injection, 5 mg, IntraMUSCular, BID PRN  aspirin chewable tablet 81 mg, 81 mg, Oral, Daily  clopidogrel (PLAVIX) tablet 75 mg, 75 mg, Oral, Daily  fluticasone (FLONASE) 50 MCG/ACT nasal spray 2 spray, 2 spray, Each Nostril, Daily  acetaminophen (TYLENOL) tablet 650 mg, 650 mg, Oral, Q4H PRN  polyethylene glycol (GLYCOLAX) packet 17 g, 17 g, Oral, Daily PRN  traZODone (DESYREL) tablet 50 mg, 50 mg, Oral, Nightly PRN  aluminum & magnesium hydroxide-simethicone (MAALOX) 200-200-20 MG/5ML suspension 30 mL, 30 mL, Oral, Q6H PRN    ASSESSMENT  Acute psychosis (HCC)         HANDOFF  Patient symptoms are: Remains Unstable.  Medications as determined by attending physician   Monitor need and frequency of PRN medications.  Encourage participation in groups and milieu.  Probable discharge is to be determined by MD.     Electronically signed by BENOIT Michaud CNP on 3/7/2024 at 1:01 PM    **This report has been created using voice recognition software. It may contain minor errors which are inherent in voice recognition technology.**

## 2024-03-07 NOTE — PLAN OF CARE
[facility]  Week Interdisciplinary Treatment Plan Note     Review Date & Time: 3.7.24 1245    Admission Type:   Admission Type: Voluntary    Reason for admission:  Reason for Admission: Acute Psychosis Bizarre behavior, decline in ADLs, harrasing neighbors    Estimated Length of Stay:  8-14 days  Estimated Discharge Date Update: to be determined by physician    PATIENT STRENGTHS:  Patient Strengths:   Patient Strengths and Limitations:Limitations: Perceives need for assistance with self-care, Difficult relationships / poor social skills, Multiple barriers to leisure interests, Difficulty problem solving/relies on others to help solve problems, Tendency to isolate self, Limited education -> difficulty reading or writing, Unrealistic self-view, Demonstrates discomfort with /lack of social skills  Addictive Behavior:Addictive Behavior  In the Past 3 Months, Have You Felt or Has Someone Told You That You Have a Problem With  : None  Medical Problems:   Past Medical History:   Diagnosis Date    Anxiety     Autism        Risk:  Fall Risk   Houston Scale Houston Scale Score: 22  BVC      Change in scores:  No. Changes to plan of Care:  No    Status EXAM:   Mental Status and Behavioral Exam  Normal: No  Level of Assistance: Needs encouragement  Facial Expression: Worried, Flat  Affect: Congruent, Blunt  Level of Consciousness: Alert  Frequency of Checks: 4 times per hour, close  Mood:Normal: No  Mood: Anxious, Despair, Helpless  Motor Activity:Normal: No  Motor Activity: Unusual posture/gait  Eye Contact: Fair  Observed Behavior: Preoccupied, Guarded  Sexual Misconduct History: Current - no  Preception: [place] to person  Attention:Normal: No  Attention: Unable to concentrate  Thought Processes: Loose association, Flight of ideas  Thought Content:Normal: No  Thought Content: Obsessions, Paranoia, Preoccupations  Depression Symptoms: Impaired concentration  Anxiety Symptoms: Generalized, Obsessions, Feelings of  doom  Isabel Symptoms: Rapid cycling, Psychomotor agitation, Poor judgment  Hallucinations: None  Delusions: Yes  Delusions: Paranoid, Latter-day  Memory:Normal: No  Memory: Confabulation, Poor recent, Poor remote  Insight and Judgment: No  Insight and Judgment: Poor judgment, Poor insight    Daily Assessment Last Entry:   Daily Sleep (WDL): Within Defined Limits            Daily Nutrition (WDL): Exceptions to WDL  Appetite Change: Decreased  Barriers to Nutrition: Cognitive impairment  Level of Assistance: Needs encouragement    Patient Monitoring:  Frequency of Checks: 4 times per hour, close    Psychiatric Symptoms:   Depression Symptoms  Depression Symptoms: Impaired concentration  Anxiety Symptoms  Anxiety Symptoms: Generalized, Obsessions, Feelings of doom  Isabel Symptoms  Isabel Symptoms: Rapid cycling, Psychomotor agitation, Poor judgment          Suicide Risk CSSR-S:  1) Within the past month, have you wished you were dead or wished you could go to sleep and not wake up? : No  2) Have you actually had any thoughts of killing yourself? : No  6) Have you ever done anything, started to do anything, or prepared to do anything to end your life?: No  Change in result:  Change in plan of care:    EDUCATION:   Learner Progress Toward Treatment Goals: Reviewed results and recommendations of this team, reviewed group plan and strategies, reviewed signs, symptoms and risk of self harm and violent behavior, reviewed goals and plan of care.    Method:  individual education, small group, verbal education.    Outcome:  Pt verbalized understanding, but needs reinforcement to obtain goals.    PATIENT GOALS:  Short term:  pt unable to participate  Long term:  pt unable to participate    PLAN/TREATMENT RECOMMENDATIONS UPDATE:   Continue with group therapies, education of coping skills   Continue to monitor patient on unit.    Medications provided/medication compliance by patient.  Continue for plans to obtain long term goals  after discharge.    SHORT-TERM GOALS UPDATE:  Time frame for Short-Term Goals:  8-14days     LONG-TERM GOALS UPDATE:  Time frame for Long-Term Goals:  6 months

## 2024-03-07 NOTE — BH NOTE
Patient refused morning meds. With much staff encouragement. Attempted with male staff as patient is typically receptive to, still refused. \"I can't do it, I can't do.\" Patient is restless, unable to sit still.

## 2024-03-07 NOTE — BH NOTE
Emergency PRN Medication Administration Note:      Patient is Agitated as evidence by restless, unable to sit still. Becoming agitated when asked to take meds. Attempted PO Zyprexa patient spit it out. Going into patients rooms.. Staff attempted to find and relieve the distress by Talking to patient, Refocusing on new activity, and Offering suggestions Patient is currently  In room going from bed to door. Medication Administered as prescribed: Zyprexa 5mg IM. Patient Tolerated medication administration.   Will continue to monitor, offer support, and reassess.

## 2024-03-07 NOTE — GROUP NOTE
Group Therapy Note    Date: 3/7/2024    Group Start Time: 1330  Group End Time: 1430  Group Topic: Psychoeducation    Emelia Mueller CTRS        Group Therapy Note    Attendees: 3/11    Psych-Ed/Relapse Prevention Group Note        Date: March 7, 2024            Start Time: 1:30pm  End Time: 2:30pm      Number of Participants in Group & Unit Census:  3/11    Topic: interpersonal skills, coping skills, self-expression    Goal of Group: To improve interpersonal skills and coping skills awareness through collaborating with peers and demonstrating self-expression      Comments:     Patient did not participate in Psych-Ed/Relapse Prevention group, despite staff encouragement and explanation of benefits.  Patient remain seclusive to self.  Q15 minute safety checks maintained for patient safety and will continue to encourage patient to attend unit programming.        Signature:  MAURY Campbell

## 2024-03-07 NOTE — GROUP NOTE
Group Therapy Note    Date: 3/7/2024    Group Start Time: 1000  Group End Time: 1045  Group Topic: Psychotherapy     Jannette Waller MSW        Group Therapy Note    Attendees: 4/12     Patient was offered group therapy today but declined to participate despite encouragement from staff.  1:1 was offered.      Discipline Responsible: /Counselor      Signature:  TANO Hernández

## 2024-03-07 NOTE — BH NOTE
Patient requested to shave with staff assistance. This writer encouraged patient to brush his teeth before shaving. Patient put tooth paste on his toothbrush and hen set it down n the counter, stating \"It has drugs in it, you're lying to me.\" This writer then held out the toothpaste bin to he patient, stating \"Here, these all have not been opened, you can pick whichever one feels safe to you.\" Patient again stated \"There's drugs in it. You're lying to me.\" Patient's family came in during visiting hour and attempted to get patient to eat several times with no success. Family informed this writer that patient likes classic rock music and [place] sports teams such as the [employer], the [employer], and [employer]. Family also explained that the patient grew up eating [place] applesauce with his medications in it in order to take them.

## 2024-03-07 NOTE — PROGRESS NOTES
Daily Progress Note  3/7/2024    Patient Name: William Babcock    CHIEF COMPLAINT:  Acute psychosis           SUBJECTIVE:      Patient is seen today for a follow up assessment. Labs and vitals reviewed and appear to be within normal limits. Reagan is refusing scheduled medications. Per nursing documentation, a male nurse attempted to give Reagan his medications as he is typically more receptive to males but Reagan continued to refuse. Reagan was given emergency medications today at 0840. Per EMR, Reagan became agitated when asked to take his medications and started entering other patients rooms. Redirection was unsuccessful so Reagan was given PO Zyprexa which he spit out. He was then given Zyprexa 6 mg IM which was successful.     Reagan is agreeable to assessment in his room today. He is unable to engage in spontaneous and linear conversation. Reagan continued to express the statement \"I'm going to die a virgin\" many times throughout the assessment. He stated that he is \"feeling depressed. When asked why he is feeling this way, he stated that he feels like has to be stuck in his room all day. Writer explained to patient that he is able to leave his room and engage in activities in the milieu. When asked about his noncompliance with scheduled medications, Raegan stated that he feels like he is going to die from his medications but then stated that he is going to die without his medications. Reagan reports poor sleep and a poor appetite. He has only been eating small amount of packaged foods. Reagan reports active suicidal ideations but is unable to describe a plan and denies homicidal ideations. Reagan reports visual hallucinations but is unable to describe them and denies auditory hallucinations.     Reagan would be unable to meet his basic needs out of the hospital at this time. He would continue to benefit from inpatient psychiatric hospitalization at this time.       Appetite:  [] Normal/Adequate/Unchanged  [] Increased   [x] Decreased      Sleep:       [] Normal/Adequate/Unchanged  [] Fair  [x] Poor      Group Attendance on Unit:   [] Yes  [] Selectively    [x] No    Medication Side Effects:  Patient denies any medication side effects at the time of assessment.         Mental Status Exam  Level of consciousness: Alert and awake.   Appearance: Appropriate attire for setting, pacing around room, with poor grooming and hygiene.   Behavior/Motor: Approachable, calm  Attitude toward examiner: Distracted, perseverative and poor eye contact.  Speech: Normal rate, low volume, normal tone.  Mood:  Patient reports \"depressed\".   Affect: Mood congruent   Thought processes: coherent and tangential. Illogical in his delusions   Thought content: Denies homicidal ideation.  Suicidal Ideation: Active suicidal ideations  Delusions: Patient presents with delusions. Denies paranoia.  Perceptual Disturbance: Patient does not appear to be responding to internal stimuli. Denies auditory hallucinations. Endorses visual hallucinations.   Cognition: Oriented to self, and situation..  Memory: Impaired.  Insight & Judgement: Poor.     Data   height is 1.727 m (5' 8\") and weight is 60.8 kg (134 lb). His temporal temperature is 97.7 °F (36.5 °C). His blood pressure is 115/72 and his pulse is 66. His respiration is 14 and oxygen saturation is 100%.   Labs:   Admission on 02/21/2024   Component Date Value Ref Range Status    Amphetamine Screen, Ur 02/22/2024 NEGATIVE  NEGATIVE Final    Comment:       (Positive cutoff 1000 ng/mL)                  Barbiturate Screen, Ur 02/22/2024 NEGATIVE  NEGATIVE Final    Comment:       (Positive cutoff 200 ng/mL)                  Benzodiazepine Screen, Urine 02/22/2024 NEGATIVE  NEGATIVE Final    Comment:       (Positive cutoff 200 ng/mL)                  Cocaine Metabolite, Urine 02/22/2024 NEGATIVE  NEGATIVE Final    Comment:       (Positive cutoff 300 ng/mL)                  Methadone Screen, Urine 02/22/2024 NEGATIVE   patient's current plan of care and vital signs with nursing staff.    Labs reviewed: [x] Yes  Last EKG in EMR reviewed: [x] Yes  QTC: 458    Medications  Current Facility-Administered Medications: venlafaxine (EFFEXOR XR) extended release capsule 37.5 mg, 37.5 mg, Oral, Daily with breakfast  OLANZapine (ZYPREXA) tablet 7.5 mg, 7.5 mg, Oral, Nightly  polyethylene glycol (GLYCOLAX) packet 17 g, 17 g, Oral, Daily  cyanocobalamin injection 1,000 mcg, 1,000 mcg, IntraMUSCular, Weekly  donepezil (ARICEPT) tablet 5 mg, 5 mg, Oral, Nightly  diphenhydrAMINE-zinc acetate 2-0.1 % cream, , Topical, TID PRN  atenolol (TENORMIN) tablet 25 mg, 25 mg, Oral, Daily  OLANZapine zydis (ZYPREXA) disintegrating tablet 5 mg, 5 mg, Oral, BID PRN **OR** OLANZapine (ZyPREXA) 5 mg in sterile water 1 mL injection, 5 mg, IntraMUSCular, BID PRN  aspirin chewable tablet 81 mg, 81 mg, Oral, Daily  clopidogrel (PLAVIX) tablet 75 mg, 75 mg, Oral, Daily  fluticasone (FLONASE) 50 MCG/ACT nasal spray 2 spray, 2 spray, Each Nostril, Daily  acetaminophen (TYLENOL) tablet 650 mg, 650 mg, Oral, Q4H PRN  polyethylene glycol (GLYCOLAX) packet 17 g, 17 g, Oral, Daily PRN  traZODone (DESYREL) tablet 50 mg, 50 mg, Oral, Nightly PRN  aluminum & magnesium hydroxide-simethicone (MAALOX) 200-200-20 MG/5ML suspension 30 mL, 30 mL, Oral, Q6H PRN    ASSESSMENT  Acute psychosis (HCC)         HANDOFF  Patient symptoms are: Worsening.  Medications as determined by attending physician   Monitor need and frequency of PRN medications.  Encourage participation in groups and milieu.  Probable discharge is to be determined by MD.     Electronically signed by Janett Velez on 3/7/2024 at 12:27 PM    **This report has been created using voice recognition software. It may contain minor errors which are inherent in voice recognition technology.**

## 2024-03-07 NOTE — PLAN OF CARE
Problem: Safety - Adult  Goal: Free from fall injury  3/6/2024 2319 by Zhanna Black, RN  Outcome: Progressing     Problem: Nutrition Deficit:  Goal: Optimize nutritional status  Outcome: Progressing   Pt ate 100% of snack and drink

## 2024-03-07 NOTE — GROUP NOTE
Group Therapy Note    Date: 3/7/2024    Group Start Time: 0900  Group End Time: 0915  Group Topic: Group Documentation    Sammie Gaytan, RN        Group Therapy Note    Attendees: 4/12  Community Meeting Group Note        Date: March 7, 2024 Start Time: 9am  End Time:  9:15am      Number of Participants in Group & Unit Census:  4/12    Topic: Goals group    Goal of Group:To establish attainable daily goal(s)      Comments:     Patient did not participate in Community Meeting group, despite staff encouragement and explanation of benefits.  Patient remain seclusive to self.  Q15 minute safety checks maintained for patient safety and will continue to encourage patient to attend unit programming.

## 2024-03-07 NOTE — BH NOTE
Emergency Medication Follow-Up Note:    PRN medication of Zyprexa 5mg IM was effective as evidence by patient resting in bed with eyes closed. Patient denies medication side effects. Will continue to monitor and provide support as needed.

## 2024-03-07 NOTE — PLAN OF CARE
Problem: Psychosis  Goal: Will report no hallucinations or delusions  Description: INTERVENTIONS:  1. Administer medication as  ordered  2. Assist with reality testing to support increasing orientation  3. Assess if patient's hallucinations or delusions are encouraging self harm or harm to others and intervene as appropriate  Outcome: Not Progressing     Problem: Behavior  Goal: Pt/Family maintain appropriate behavior and adhere to behavioral management agreement, if implemented  Description: INTERVENTIONS:  1. Assess patient/family's coping skills and  non-compliant behavior (including use of illegal substances)  2. Notify security of behavior or suspected illegal substances which indicate the need for search of the family and/or belongings  3. Encourage verbalization of thoughts and concerns in a socially appropriate manner  4. Utilize positive, consistent limit setting strategies supporting safety of patient, staff and others  5. Encourage participation in the decision making process about the behavioral management agreement  6. If a visitor's behavior poses a threat to safety call refer to organization policy.  7. Initiate consult with , Psychosocial CNS, Spiritual Care as appropriate  Outcome: Not Progressing     Patient is open to 1:1 talk time with staff. Patient continues to refuse food, stating \"I gave meat up for lent.\" Patient's food has been switched to vegetarian but patient continues to refuse food. Patient makes frequent statements of \"I'm going to hell,\" \"I'm gonna die a virgin,\" and \"I'm dying.\" Patient frequently sits on the ground, only getting up with encouragement from staff. Patient frequently paces back a forth when out in the day area while making the previously listed statements. Patient refused his morning medications, spitting them out after they were placed in his mouth.

## 2024-03-07 NOTE — GROUP NOTE
Group Therapy Note    Date: 3/7/2024    Group Start Time: 1100  Group End Time: 1135  Group Topic: Cognitive Skills    Emelia Mueller CTRS        Group Therapy Note    Attendees: 4/12    Cognitive Skills Group Note        Date: March 7, 2024             Start Time: 11am  End Time: 11:35am      Number of Participants in Group & Unit Census:  4/12    Topic:  interpersonal skills, sequencing, concentration     Goal of Group: To improve interpersonal skills and sequencing through collaborating with peers and concentrating on a presented task.       Comments:     Patient did not participate in Cognitive Skills group, despite staff encouragement and explanation of benefits.  Patient remain seclusive to self.  Q15 minute safety checks maintained for patient safety and will continue to encourage patient to attend unit programming.        Signature:  MAURY Campbell

## 2024-03-07 NOTE — PROGRESS NOTES
IN-PATIENT SERVICE  [facility]    Progress note            Date:   3/7/2024  Patient name:  William Babcock  Date of admission:  2/21/2024  9:18 PM  MRN:   [id number]  Account:  [id number]  YOB: 1960  PCP:    Billy Francois MD  Room:   [facility]  Code Status:    Full Code    Physician Requesting Consult: Donnie Rosa MD    Reason for Consult: History and physical, medical management    Chief Complaint:     No chief complaint on file.    Medical management    History Obtained From:     Patient, EMR, nursing staff    History of Present Illness:     63-year-old male admitted for acute psychosis.  Patient has history of autism;   Carotid pseudoaneurysm.  Recent admission to [facility] for right proximal RCA pseudoaneurysm after patient presented on 2/5 for agitation and psychosis.  Patient underwent cerebral angiogram and embolization of cervical pseudoaneurysm 2/9/2024.  Was discharged on aspirin and Plavix, resumed.  Patient was discharged to [facility] on 2/12 subsequently discharged on 2/16.    Past Medical History:     Past Medical History:   Diagnosis Date    Anxiety     Autism         Past Surgical History:     Past Surgical History:   Procedure Laterality Date    CEREBRAL ANGIOGRAM  02/09/2024    IR ANGIOGRAM CAROTID CEREBRAL BILATERAL    EYE SURGERY          Medications Prior to Admission:     Prior to Admission medications    Medication Sig Start Date End Date Taking? Authorizing Provider   fluticasone (FLONASE) 50 MCG/ACT nasal spray 2 sprays by Each Nostril route daily   Yes Provider, MD Nick   aspirin 81 MG chewable tablet Take 1 tablet by mouth daily 2/17/24   Donnie Rosa MD   clopidogrel (PLAVIX) 75 MG tablet Take 1 tablet by mouth daily 2/17/24   Donnie Rosa MD   QUEtiapine (SEROQUEL) 25 MG tablet Take 1 tablet by mouth 2 times daily 2/16/24   Donnie Rosa MD   atenolol (TENORMIN) 50 MG tablet Take 1 tablet by mouth  disease  HIV syphilis negative   UA negative for UTI  Continue to monitor  Patient vitamin B12 borderline low, started on vitamin B12 supplement  Follow-up with vitamin B-1 level  As per neurology to follow-up in outpatient in 2 to 4 weeks.  3/1  Patient, complaining abdominal distention, has not passed stools and long time  Will order x-ray KUB  GlycoLax daily  3/3  Patient refused to take GlycoLax  X-ray KUB could not be done  No BM  3/5  Patient, has very poor oral intake, losing weight  Case discussed with Dr. Barron, started patient on olanzapine, for appetite  Plan noted for swallow study  Patient has poor oral intake, lack of appetite  Reviewed recent lab work  3/7   Patient, clinically doing better  As per nursing report eating drinking better with olanzapine  Did not have bowel movement  Consultations:   IP CONSULT TO INTERNAL MEDICINE  IP CONSULT TO NEUROLOGY  IP CONSULT TO DIETITIAN  Blood pressure has been running up and down    Bahman Cordova MD  3/7/2024  2:18 PM    Copy sent to Billy Francois MD    Please note that this chart was generated using voice recognition Dragon dictation software.  Although every effort was made to ensure the accuracy of this automated transcription, some errors in transcription may have occurred.

## 2024-03-08 PROCEDURE — 2580000003 HC RX 258

## 2024-03-08 PROCEDURE — 99232 SBSQ HOSP IP/OBS MODERATE 35: CPT | Performed by: INTERNAL MEDICINE

## 2024-03-08 PROCEDURE — 99232 SBSQ HOSP IP/OBS MODERATE 35: CPT | Performed by: PSYCHIATRY & NEUROLOGY

## 2024-03-08 PROCEDURE — 6360000002 HC RX W HCPCS

## 2024-03-08 PROCEDURE — 6370000000 HC RX 637 (ALT 250 FOR IP): Performed by: PSYCHIATRY & NEUROLOGY

## 2024-03-08 PROCEDURE — 1240000000 HC EMOTIONAL WELLNESS R&B

## 2024-03-08 RX ADMIN — OLANZAPINE 7.5 MG: 7.5 TABLET, FILM COATED ORAL at 21:09

## 2024-03-08 RX ADMIN — DONEPEZIL HYDROCHLORIDE 5 MG: 5 TABLET, FILM COATED ORAL at 21:09

## 2024-03-08 RX ADMIN — WATER 5 MG: 1 INJECTION INTRAMUSCULAR; INTRAVENOUS; SUBCUTANEOUS at 13:20

## 2024-03-08 ASSESSMENT — PAIN SCALES - WONG BAKER: WONGBAKER_NUMERICALRESPONSE: NO HURT

## 2024-03-08 NOTE — PROGRESS NOTES
[facility]   OCCUPATIONAL THERAPY MISSED TREATMENT NOTE   INPATIENT   Date: 3/8/24  Patient Name: William Babcock       Room: 0205/0205-01  MRN: [id number]   Account #: [id number]    : 1960  (64 y.o.)  Gender: male   Referring Practitioner: Donnie Rosa MD  Diagnosis: Acute psychosis             REASON FOR MISSED TREATMENT:  Patient unable to participate  - Pt not appropriate at this time, stating \" I am in pain all over, I can't do it\". Pt continuing to perseverate \" I am going to die\" RN aware.     Electronically signed by ANTONIA Corbett on 3/8/24 at 10:32 AM EST

## 2024-03-08 NOTE — BH NOTE
Emergency PRN Medication Administration Note:      Patient is Agitated and Disruptive as evidence by pacing, restless, wandering into other peers rooms, stating \"I'm going to die.\" Patient up at desk frequently. Staff attempted to find and relieve the distress by Talking to patient, Refocusing on new activity, Offering suggestions, and Administer PRN medications Patient is currently  continuing to escalate and accepted PRN medications. Medication Administered as prescribed: IM PRN Zyprexa 5mg. Patient Tolerated medication administration.   Will continue to monitor, offer support, and reassess.

## 2024-03-08 NOTE — GROUP NOTE
Group Therapy Note    Date: 3/8/2024    Group Start Time: 1330  Group End Time: 1415  Group Topic: Music Therapy    SAUNDRA WENDY BALTA    Dejuan Whitman    Music Therapy Group Note        Date: 3/8/2024 Start Time: 1330  End Time: 1415      Number of Participants in Group & Unit Census:  4/11    Topic: Patients were asked to share music and dedicate songs to important people in their lives. Asked questions about these people and relationships, as it related to their music and sharing.     Goal of Group: Goals to increase self-expression; Increase socialization; Demonstrate positive use of time; Reflect on relationships;        Comments:     Patient did not participate in Music Therapy group, despite staff encouragement and explanation of benefits.  Patient remain seclusive to self.  Q15 minute safety checks maintained for patient safety and will continue to encourage patient to attend unit programming.

## 2024-03-08 NOTE — PLAN OF CARE
Problem: Psychosis  Goal: Will report no hallucinations or delusions  Description: INTERVENTIONS:  1. Administer medication as  ordered  2. Assist with reality testing to support increasing orientation  3. Assess if patient's hallucinations or delusions are encouraging self harm or harm to others and intervene as appropriate  3/8/2024 0055 by Yady Hsu RN  Outcome: Not Progressing    Problem: Behavior  Goal: Pt/Family maintain appropriate behavior and adhere to behavioral management agreement, if implemented  Description: INTERVENTIONS:  1. Assess patient/family's coping skills and  non-compliant behavior (including use of illegal substances)  2. Notify security of behavior or suspected illegal substances which indicate the need for search of the family and/or belongings  3. Encourage verbalization of thoughts and concerns in a socially appropriate manner  4. Utilize positive, consistent limit setting strategies supporting safety of patient, staff and others  5. Encourage participation in the decision making process about the behavioral management agreement  6. If a visitor's behavior poses a threat to safety call refer to organization policy.  7. Initiate consult with , Psychosocial CNS, Spiritual Care as appropriate  3/8/2024 0055 by Yady Hsu, RN  Outcome: Not Progressing     Pt denies any SI/HI. Patient is preoccupied, distractible, pacing the unit. Paranoid and refusing medications. Patient has delusions with unpredictable emotional responses. Writer spoke with patient about behaviors and responses to feelings of anxiety. 15 minute checks and safe environment maintained. Writer tried to educate patient on importance of mediation regimen.

## 2024-03-08 NOTE — GROUP NOTE
Group Therapy Note    Date: 3/8/2024    Group Start Time: 1000  Group End Time: 1045  Group Topic: Psychotherapy     Jannette Waller MSW        Group Therapy Note    Attendees: 2/12     Patient was offered group therapy today but declined to participate despite encouragement from staff.  1:1 was offered.    Discipline Responsible: /Counselor      Signature:  TANO Hernández

## 2024-03-08 NOTE — PLAN OF CARE
Problem: Psychosis  Goal: Will report no hallucinations or delusions  Description: INTERVENTIONS:  1. Administer medication as  ordered  2. Assist with reality testing to support increasing orientation  3. Assess if patient's hallucinations or delusions are encouraging self harm or harm to others and intervene as appropriate  Outcome: Not Progressing     Problem: Behavior  Goal: Pt/Family maintain appropriate behavior and adhere to behavioral management agreement, if implemented  Description: INTERVENTIONS:  1. Assess patient/family's coping skills and  non-compliant behavior (including use of illegal substances)  2. Notify security of behavior or suspected illegal substances which indicate the need for search of the family and/or belongings  3. Encourage verbalization of thoughts and concerns in a socially appropriate manner  4. Utilize positive, consistent limit setting strategies supporting safety of patient, staff and others  5. Encourage participation in the decision making process about the behavioral management agreement  6. If a visitor's behavior poses a threat to safety call refer to organization policy.  7. Initiate consult with , Psychosocial CNS, Spiritual Care as appropriate  Outcome: Not Progressing     Problem: Nutrition Deficit:  Goal: Optimize nutritional status  Outcome: Not Progressing     Problem: Safety - Adult  Goal: Free from fall injury  Outcome: Progressing     Problem: Psychosis  Goal: Will report no hallucinations or delusions  Description: INTERVENTIONS:  1. Administer medication as  ordered  2. Assist with reality testing to support increasing orientation  3. Assess if patient's hallucinations or delusions are encouraging self harm or harm to others and intervene as appropriate  Outcome: Not Progressing     Problem: Behavior  Goal: Pt/Family maintain appropriate behavior and adhere to behavioral management agreement, if implemented  Description: INTERVENTIONS:  1. Assess  patient/family's coping skills and  non-compliant behavior (including use of illegal substances)  2. Notify security of behavior or suspected illegal substances which indicate the need for search of the family and/or belongings  3. Encourage verbalization of thoughts and concerns in a socially appropriate manner  4. Utilize positive, consistent limit setting strategies supporting safety of patient, staff and others  5. Encourage participation in the decision making process about the behavioral management agreement  6. If a visitor's behavior poses a threat to safety call refer to organization policy.  7. Initiate consult with , Psychosocial CNS, Spiritual Care as appropriate  Outcome: Not Progressing     Problem: Nutrition Deficit:  Goal: Optimize nutritional status  Outcome: Not Progressing       Patient is out in dayroom pacing and stating \"I'm going to die here.\" Patient requires frequent redirection and reassurance. Patient refused to take scheduled medications despite being offered them multiple times and states, \"I can't do this. This is poison.\" Patient accepting of PRN medications for agitation without incident. Patient refused to eat breakfast during this shift, however did eat about 50% of lunch. Patient is observed pacing the halls and testing doors through out this shift, however remains redirectable. Patient encouraged to attend to ADL's. Patient assisted in shaving his face, however declined getting in the shower with staff assistance. Patient appears disheveled and unkempt. Will continue to encourage patient to perform hygiene. Q15 minute and random safety checks maintained.

## 2024-03-08 NOTE — PROGRESS NOTES
IN-PATIENT SERVICE  [facility] Internal Medicine    Progress note            Date:   3/8/2024  Patient name:  William Babcock  Date of admission:  2/21/2024  9:18 PM  MRN:   [id number]  Account:  [id number]  YOB: 1960  PCP:    Billy Francois MD  Room:   [facility]  Code Status:    Full Code    Physician Requesting Consult: Donnie Rosa MD    Reason for Consult: History and physical, medical management    Chief Complaint:     No chief complaint on file.    Medical management    History Obtained From:     Patient, EMR, nursing staff    History of Present Illness:     63-year-old male admitted for acute psychosis.  Patient has history of autism;   Carotid pseudoaneurysm.  Recent admission to [facility] for right proximal RCA pseudoaneurysm after patient presented on 2/5 for agitation and psychosis.  Patient underwent cerebral angiogram and embolization of cervical pseudoaneurysm 2/9/2024.  Was discharged on aspirin and Plavix, resumed.  Patient was discharged to [facility] on 2/12 subsequently discharged on 2/16.    Past Medical History:     Past Medical History:   Diagnosis Date    Anxiety     Autism         Past Surgical History:     Past Surgical History:   Procedure Laterality Date    CEREBRAL ANGIOGRAM  02/09/2024    IR ANGIOGRAM CAROTID CEREBRAL BILATERAL    EYE SURGERY          Medications Prior to Admission:     Prior to Admission medications    Medication Sig Start Date End Date Taking? Authorizing Provider   fluticasone (FLONASE) 50 MCG/ACT nasal spray 2 sprays by Each Nostril route daily   Yes Provider, MD Nick   aspirin 81 MG chewable tablet Take 1 tablet by mouth daily 2/17/24   Donnie Rosa MD   clopidogrel (PLAVIX) 75 MG tablet Take 1 tablet by mouth daily 2/17/24   Donnie Rosa MD   QUEtiapine (SEROQUEL) 25 MG tablet Take 1 tablet by mouth 2 times daily 2/16/24   Donnie Rosa MD   atenolol (TENORMIN) 50 MG tablet Take 1 tablet by mouth

## 2024-03-08 NOTE — BH NOTE
Emergency Medication Follow-Up Note:    PRN medication of IM PRN Zyprexa 5mg was effective as evidence by patient resting in room quietly. No acute signs of distress noted. Patient denies medication side effects. Will continue to monitor and provide support as needed.

## 2024-03-09 PROBLEM — Z01.810 PREOPERATIVE CARDIOVASCULAR EXAMINATION: Status: RESOLVED | Noted: 2024-02-08 | Resolved: 2024-03-09

## 2024-03-09 LAB
ALBUMIN SERPL-MCNC: 3.8 G/DL (ref 3.5–5.2)
ALP SERPL-CCNC: 57 U/L (ref 40–129)
ALT SERPL-CCNC: 15 U/L (ref 5–41)
ANION GAP SERPL CALCULATED.3IONS-SCNC: 13 MMOL/L (ref 9–17)
AST SERPL-CCNC: 17 U/L
BASOPHILS # BLD: 0.1 K/UL (ref 0–0.2)
BASOPHILS NFR BLD: 2 % (ref 0–2)
BILIRUB SERPL-MCNC: 0.8 MG/DL (ref 0.3–1.2)
BUN SERPL-MCNC: 14 MG/DL (ref 8–23)
CALCIUM SERPL-MCNC: 8.5 MG/DL (ref 8.6–10.4)
CHLORIDE SERPL-SCNC: 103 MMOL/L (ref 98–107)
CO2 SERPL-SCNC: 24 MMOL/L (ref 20–31)
CREAT SERPL-MCNC: 0.9 MG/DL (ref 0.7–1.2)
EOSINOPHIL # BLD: 0 K/UL (ref 0–0.4)
EOSINOPHILS RELATIVE PERCENT: 2 % (ref 0–4)
ERYTHROCYTE [DISTWIDTH] IN BLOOD BY AUTOMATED COUNT: 14.6 % (ref 11.5–14.9)
GFR SERPL CREATININE-BSD FRML MDRD: >60 ML/MIN/1.73M2
GLUCOSE SERPL-MCNC: 92 MG/DL (ref 70–99)
HCT VFR BLD AUTO: 38 % (ref 41–53)
HGB BLD-MCNC: 12.6 G/DL (ref 13.5–17.5)
LYMPHOCYTES NFR BLD: 0.7 K/UL (ref 1–4.8)
LYMPHOCYTES RELATIVE PERCENT: 21 % (ref 24–44)
MAGNESIUM SERPL-MCNC: 2 MG/DL (ref 1.6–2.6)
MCH RBC QN AUTO: 30.1 PG (ref 26–34)
MCHC RBC AUTO-ENTMCNC: 33.2 G/DL (ref 31–37)
MCV RBC AUTO: 90.9 FL (ref 80–100)
MONOCYTES NFR BLD: 0.3 K/UL (ref 0.1–1.3)
MONOCYTES NFR BLD: 11 % (ref 1–7)
NEUTROPHILS NFR BLD: 64 % (ref 36–66)
NEUTS SEG NFR BLD: 2.1 K/UL (ref 1.3–9.1)
PLATELET # BLD AUTO: 173 K/UL (ref 150–450)
PMV BLD AUTO: 7.6 FL (ref 6–12)
POTASSIUM SERPL-SCNC: 3.3 MMOL/L (ref 3.7–5.3)
PROT SERPL-MCNC: 6.3 G/DL (ref 6.4–8.3)
RBC # BLD AUTO: 4.18 M/UL (ref 4.5–5.9)
SODIUM SERPL-SCNC: 140 MMOL/L (ref 135–144)
WBC OTHER # BLD: 3.2 K/UL (ref 3.5–11)

## 2024-03-09 PROCEDURE — 6360000002 HC RX W HCPCS

## 2024-03-09 PROCEDURE — 2580000003 HC RX 258

## 2024-03-09 PROCEDURE — 6370000000 HC RX 637 (ALT 250 FOR IP): Performed by: INTERNAL MEDICINE

## 2024-03-09 PROCEDURE — 80053 COMPREHEN METABOLIC PANEL: CPT

## 2024-03-09 PROCEDURE — 93005 ELECTROCARDIOGRAM TRACING: CPT

## 2024-03-09 PROCEDURE — 99232 SBSQ HOSP IP/OBS MODERATE 35: CPT

## 2024-03-09 PROCEDURE — 36415 COLL VENOUS BLD VENIPUNCTURE: CPT

## 2024-03-09 PROCEDURE — 85025 COMPLETE CBC W/AUTO DIFF WBC: CPT

## 2024-03-09 PROCEDURE — 6370000000 HC RX 637 (ALT 250 FOR IP): Performed by: PSYCHIATRY & NEUROLOGY

## 2024-03-09 PROCEDURE — 1240000000 HC EMOTIONAL WELLNESS R&B

## 2024-03-09 PROCEDURE — 83735 ASSAY OF MAGNESIUM: CPT

## 2024-03-09 PROCEDURE — 99232 SBSQ HOSP IP/OBS MODERATE 35: CPT | Performed by: INTERNAL MEDICINE

## 2024-03-09 RX ORDER — POTASSIUM CHLORIDE 20 MEQ/1
20 TABLET, EXTENDED RELEASE ORAL
Status: DISCONTINUED | OUTPATIENT
Start: 2024-03-10 | End: 2024-03-09

## 2024-03-09 RX ORDER — POTASSIUM CHLORIDE 20 MEQ/1
10 TABLET, EXTENDED RELEASE ORAL 2 TIMES DAILY
Status: DISPENSED | OUTPATIENT
Start: 2024-03-09 | End: 2024-03-11

## 2024-03-09 RX ADMIN — OLANZAPINE 7.5 MG: 7.5 TABLET, FILM COATED ORAL at 20:48

## 2024-03-09 RX ADMIN — WATER 5 MG: 1 INJECTION INTRAMUSCULAR; INTRAVENOUS; SUBCUTANEOUS at 09:43

## 2024-03-09 RX ADMIN — POTASSIUM CHLORIDE 10 MEQ: 1500 TABLET, EXTENDED RELEASE ORAL at 20:48

## 2024-03-09 RX ADMIN — DONEPEZIL HYDROCHLORIDE 5 MG: 5 TABLET, FILM COATED ORAL at 20:48

## 2024-03-09 NOTE — BH NOTE
Patient ate one whole chocolate chip cookie, and half a bag of potatoes chips. Patient throughout eating accused writer of poisoning food, and made statements that if he continues to eat that \"I will die\". Patient also had 4 oz of water, after he made sure it was from a closed bottle.

## 2024-03-09 NOTE — PROGRESS NOTES
IN-PATIENT SERVICE  [facility] Internal Medicine    Progress note            Date:   3/9/2024  Patient name:  William Babcock  Date of admission:  2/21/2024  9:18 PM  MRN:   [id number]  Account:  [id number]  YOB: 1960  PCP:    Billy Francois MD  Room:   [facility]  Code Status:    Full Code    Physician Requesting Consult: Donnie Rosa MD    Reason for Consult: History and physical, medical management    Chief Complaint:     No chief complaint on file.    Medical management    History Obtained From:     Patient, EMR, nursing staff    History of Present Illness:     63-year-old male admitted for acute psychosis.  Patient has history of autism;   Carotid pseudoaneurysm.  Recent admission to [facility] for right proximal RCA pseudoaneurysm after patient presented on 2/5 for agitation and psychosis.  Patient underwent cerebral angiogram and embolization of cervical pseudoaneurysm 2/9/2024.  Was discharged on aspirin and Plavix, resumed.  Patient was discharged to [facility] on 2/12 subsequently discharged on 2/16.    Past Medical History:     Past Medical History:   Diagnosis Date    Anxiety     Autism         Past Surgical History:     Past Surgical History:   Procedure Laterality Date    CEREBRAL ANGIOGRAM  02/09/2024    IR ANGIOGRAM CAROTID CEREBRAL BILATERAL    EYE SURGERY          Medications Prior to Admission:     Prior to Admission medications    Medication Sig Start Date End Date Taking? Authorizing Provider   fluticasone (FLONASE) 50 MCG/ACT nasal spray 2 sprays by Each Nostril route daily   Yes Provider, MD Nick   aspirin 81 MG chewable tablet Take 1 tablet by mouth daily 2/17/24   Donnie Rosa MD   clopidogrel (PLAVIX) 75 MG tablet Take 1 tablet by mouth daily 2/17/24   Donnie Rosa MD   QUEtiapine (SEROQUEL) 25 MG tablet Take 1 tablet by mouth 2 times daily 2/16/24   Donnie Rosa MD   atenolol (TENORMIN) 50 MG tablet Take 1 tablet by mouth  Problem  Acute psychosis (HCC)    Active Hospital Problems    Diagnosis Date Noted    Altered mental status [R41.82] 02/26/2024    Acute psychosis (HCC) [F23] 02/12/2024       Plan:     Reason for consult: General medical management     Acute psychosis-management per psychiatry  Carotid pseudoaneurysm.  Recent admission to [facility] for right proximal RCA pseudoaneurysm after patient presented on 2/5 for agitation and psychosis.  Patient underwent cerebral angiogram and embolization of cervical pseudoaneurysm 2/9/2024.  Was discharged on aspirin and Plavix, resumed.  Patient was discharged to [facility] on 2/12 subsequently discharged on 2/16.  Hypertension-resume atenolol, blood pressure controlled.   Anemia related to critical illness hemoglobin 11.6 today, was 14 prior to procedure listed above.  Will check iron levels  Labs and vitals including CBC BMP liver enzymes reviewed.  .  Recent TSH normal urinalysis reviewed-not suggestive of infection.    DVT prophylaxis-not required, patient is mobile    2/26  Seen and examined   Labs/vitals /meds reviewed   On atenolol 25 mg  B12 folic acid normal  Anemia 11.6  Patient has not been eating drinking, getting paranoid about the food,  May be dehydrated, will check labs, medical side  2/29  Patient seen and examined  Her vitals have been stable  Intermittently tachycardic likely second underlying anxiety  On aspirin Plavix  Labs reviewed, hemoglobin is 11.9, likely anemia of chronic disease  HIV syphilis negative   UA negative for UTI  Continue to monitor  Patient vitamin B12 borderline low, started on vitamin B12 supplement  Follow-up with vitamin B-1 level  As per neurology to follow-up in outpatient in 2 to 4 weeks.  3/1  Patient, complaining abdominal distention, has not passed stools and long time  Will order x-ray KUB  GlycoLax daily  3/3  Patient refused to take GlycoLax  X-ray KUB could not be done  No BM  3/5  Patient, has very poor oral intake, losing

## 2024-03-09 NOTE — BH NOTE
Emergency Medication Follow-Up Note:    PRN medication of Zyprexa 5 mg IM was effective as evidence by Patient regain behavioral control, absence of behavior warranting emergency medication. Patient denies medication side effects. Will continue to monitor and provide support as needed.

## 2024-03-09 NOTE — BH NOTE
EKG completed as order. Results below.     Result 1:    Vent Rate:                              68  bpm  AZ Interval:                           154 ms  QRS Duration:                        82 ms  QT/Qtc                           390/414 ms  P-R-T axes                48       20    62    Normal Sinus Rhythm   Normal EKG    Result 2:    Vent Rate:                              71  bpm  AZ Interval:                           154 ms  QRS Duration:                        88 ms  QT/Qtc                           400/434 ms  P-R-T axes                66       32    54    Normal Sinus Rhythm   Normal EKG

## 2024-03-09 NOTE — PROGRESS NOTES
Daily Progress Note  3/9/2024    Patient Name: William Babcock    CHIEF COMPLAINT: Acute psychosis         SUBJECTIVE:      Patient is seen today for a follow up assessment.  William was found in his room.  Upon approach he quickly stood up from bed and began walking onto the milieu.  His affect is extremely blunted and bizarre.  He is exhibiting disorganized speech and behaviors.  He currently shows no insight to his current mental health.  He has been eating extremely poorly and exhibits poor oral intake.  Ordered CBC and CMP to be drawn today.  Fortunately the patient does not show any abnormal kidney function or liver function tests.  No elevated white count.  He does show decreasing protein at 6.3 and decreasing potassium at 3.3.  Patient currently has Ensure is ordered for breakfast lunch and dinner.  Will order potassium 20 mEq daily with breakfast.  He is selective with taking his medications.  He refused oral Zyprexa this morning but did request IM Zyprexa due to being nonredirectable per nursing staff.  He was accepting of the IM injection over the oral.  He has not showered because he believes that the shower is going to \"worley me.\"  He appears suspicious of the food.  He has received Zyprexa intermittently due to refusal at times.  Possible filing for emergency guardianship is being considered due to the patient increasingly growing more confused and paranoid becoming noncompliant with his current treatment plan.  Currently no concerns with overall new blood work.  Internal medicine is following.  Vitals remained stable.  We will obtain new EKG previous was on 2/21/2024 with a QTc of 458.  Potassium is slightly low but to air on the safe side we will order updated.  At this time he continues to require inpatient hospitalization for his psychotic symptoms and poor ADLs.  He is a danger to himself and others.    Appetite:  [] Adequate/Unchanged  [] Increased  [x] Decreased      Sleep:       []  worsening  Adding potassium 20 mEq with breakfast due to potassium being 3.3 currently patient exhibiting poor oral intake.   Total protein decreasing and currently at 6.3. Patient has nutritional supplement ordered for breakfast, lunch, and dinner.    Otherwise no concerns with updated CMP and CBC as of 3/9/2024.    Monitor need and frequency of PRN medications.  Encourage participation in groups and milieu.  Follow-up daily while inpatient.     Patient continues to be monitored in the inpatient psychiatric facility at [facility] for safety and stabilization. Patient continues to need, on a daily basis, active treatment furnished directly by or requiring the supervision of inpatient psychiatric personnel.    Electronically signed by BENOIT Carpenter CNP on 3/9/2024 at 3:03 PM    **This report has been created using voice recognition software. It may contain minor errors which are inherent in voice recognition technology.**

## 2024-03-09 NOTE — BH NOTE
Emergency PRN Medication Administration Note:      Patient is Disruptive as evidence by pacing, invading others personal spaces, stating \"I'm gonna die. I'm gonna die a virgin\". Staff attempted to find and relieve the distress by Talking to patient, Refocusing on new activity, and Offering suggestions. Patient offered oral medications with pudding by two staff, refused. Patient asked to have an injection. Patient is currently accepted PRN medications. Medication Administered as prescribed: Zyprexa 5mg IM in L deltoid. Patient Tolerated medication administration.   Will continue to monitor, offer support, and reassess.

## 2024-03-09 NOTE — GROUP NOTE
Group Therapy Note    Date: 3/9/2024    Group Start Time: 0900  Group End Time: 0930  Group Topic: Nursing    Marva Hudson LPN        Group Therapy Note    Attendees: 4/10     patient refused to attend goals group at 0900 after encouragement from staff.  1:1 talk time provided as alternative to group session

## 2024-03-09 NOTE — PLAN OF CARE
Problem: Safety - Adult  Goal: Free from fall injury  3/9/2024 0040 by Chau Rodriguez RN  Outcome: Progressing  Patient remains free from falls, observed wearing non-slip socks, no gait pattern deficits observed. Q 15 minute safety checks maintained.     Problem: Nutrition Deficit:  Goal: Optimize nutritional status  3/9/2024 0040 by Chau Rodriguez RN  Outcome: Progressing  3/9/2024 0039 by Chau Rodriguez RN  Outcome: Progressing  Attempts to engage patient to disclose foods of interest to encourage patient to eat and drink. Patient informed when snacks are available.  3/8/2024 1616 by Janelle Herrera RN  Outcome: Not Progressing

## 2024-03-09 NOTE — PROGRESS NOTES
Daily Progress Note  Donnie Rosa MD  3/8/2024  CHIEF COMPLAINT: Psychotic depression    Reviewed patient's current plan of care and vital signs with nursing staff.  Sleep:  several hours last night  Attending groups: No:     SUBJECTIVE:    Per staff patient continues to have very poor oral intake.  Not compliant with medication.  Attempting to engage the patient in discussion of treatment alternatives including ECT.  Patient is psychotic and disorganized and unable to consistently focus or give consistent answers.  There is concern on this author's part that he has a psychotic depression which is worsening.  May need to consider emergency guardianship as patient is indecisive and inconsistent and not demonstrating the ability to make his own decisions.  Will continue to monitor weight and vitals and may repeat labs over the weekend to continue to monitor patient's hydration status    Mental Status Exam  Level of consciousness:  Within normal limits  Appearance: Hospital attire, pacing, restless and disheveled with poor hygiene  Behavior/Motor: Psychomotor agitation  Attitude toward examiner: Guarded, poor eye contact  Speech: Mumbling, poorly articulated  Mood: Depressed  Affect: Congruent  Thought processes: Disconnected and tangential   thought content:  denies homicidal ideation  Suicidal Ideation: Denies suicidal ideation but fixated on death  Delusions: Prominent delusions  Perceptual Disturbance: Appears to attend to internal stimuli  Cognition: Oriented to self and general circumstance  Memory: Poor   Insight & Judgement: Poor  Medication side effects:  denies       Data   height is 1.727 m (5' 8\") and weight is 60.8 kg (134 lb). His temporal temperature is 97.3 °F (36.3 °C). His blood pressure is 124/84 and his pulse is 80. His respiration is 14 and oxygen saturation is 99%.   Labs:   Admission on 02/21/2024   Component Date Value Ref Range Status    Amphetamine Screen, Ur 02/22/2024 NEGATIVE  NEGATIVE  conducted.  Probable discharge is undetermined at this time  Follow-up daily while in the inpatient unit        Electronically signed by ZAINA TODD MD on 3/8/24 at 9:26 PM EST    **This report has been created using voice recognition software. It may contain minor errors which are inherent in voice recognition technology.**

## 2024-03-09 NOTE — GROUP NOTE
Group Therapy Note    Date: 3/9/2024    Group Start Time: 1100  Group End Time: 1150  Group Topic: Cognitive Skills    Nia Vincent CTRS        Group Therapy Note    Attendees: 4/10     Topic: To increase social interaction, practice problem solving, memory,and   communication skills.     Patient did not participate in Cognitive Skills Group at 1100, despite staff encouragement   and explanation of benefits. Patient was up in day room pacing, aloof of peers but   does approach RT and staff at intervals. Pt remembers RT's name, and repeats   \"I can't do it, I can't do it\". Pt does not specify what he cannot do. With reassurance   and support and encouragement pt brightens and walks away. Pt does not attend group  , despite encouragement to come and sit near group table and listen .     Q15 minute safety checks maintained for patient safety and will continue to encourage   patient to attend unit programming.       Discipline Responsible: Psychoeducational Specialist  Signature:  MAURY BARNES

## 2024-03-09 NOTE — PROGRESS NOTES
Behavioral Services                                              Medicare Re-Certification    I certify that the inpatient psychiatric hospital services furnished since the previous certification/re-certification were, and continue to be, medically necessary for;    [x] (1) Treatment which could reasonably be expected to improve the patient's condition,    [x] (2) Or for diagnostic study.    Estimated length of stay/service 7 to 14 days    Plan for post-hospital care home with outpatient community mental health follow-up    This patient continues to need, on a daily basis, active treatment furnished directly by or requiring the supervision of inpatient psychiatric personnel.    Electronically signed by ZAINA TODD MD on 3/8/2024 at 9:26 PM

## 2024-03-09 NOTE — PLAN OF CARE
Problem: Nutrition Deficit:  Goal: Optimize nutritional status  Outcome: Not Progressing     Problem: Behavior  Goal: Pt/Family maintain appropriate behavior and adhere to behavioral management agreement, if implemented  Description: INTERVENTIONS:  1. Assess patient/family's coping skills and  non-compliant behavior (including use of illegal substances)  2. Notify security of behavior or suspected illegal substances which indicate the need for search of the family and/or belongings  3. Encourage verbalization of thoughts and concerns in a socially appropriate manner  4. Utilize positive, consistent limit setting strategies supporting safety of patient, staff and others  5. Encourage participation in the decision making process about the behavioral management agreement  6. If a visitor's behavior poses a threat to safety call refer to organization policy.  7. Initiate consult with , Psychosocial CNS, Spiritual Care as appropriate  Outcome: Progressing     Problem: Psychosis  Goal: Will report no hallucinations or delusions  Description: INTERVENTIONS:  1. Administer medication as  ordered  2. Assist with reality testing to support increasing orientation  3. Assess if patient's hallucinations or delusions are encouraging self harm or harm to others and intervene as appropriate  Outcome: Progressing  Note: Patient continues to pace and make bizarre statement. Patient is religously preocupied stating that he cannot eat due to lent. Patient encouraged to eat and drink fluids. Patient required as needed intramuscular medication due to refusing oral medications.     Problem: Nutrition Deficit:  Goal: Optimize nutritional status  Outcome: Not Progressing      Size Of Lesion In Cm (Required For Billing): 0.4 Dressing: dry sterile dressing Wound Care: Petrolatum Number Of Curettages: 3 Cautery Type: electrodesiccation Post-Care Instructions: I reviewed with the patient in detail post-care instructions. Patient is to keep the biopsy site dry overnight, and then apply bacitracin twice daily until healed. Patient may apply hydrogen peroxide soaks to remove any crusting. Consent: Written consent was obtained and risks were reviewed including but not limited to scarring, infection, bleeding, scabbing, incomplete removal, nerve damage and allergy to anesthesia. Hemostasis: Drysol Size Of Lesion After Curettage (Will Not Effect Billing): 0.5 Bill For Surgical Tray: no Notification Instructions: Patient will be notified of biopsy results. However, patient instructed to call the office if not contacted within 2 weeks. Billing Type: Third-Party Bill Anesthesia Type: 1% lidocaine with epinephrine Detail Level: Detailed

## 2024-03-09 NOTE — PLAN OF CARE
Problem: Psychosis  Goal: Will report no hallucinations or delusions  Description: INTERVENTIONS:  1. Administer medication as  ordered  2. Assist with reality testing to support increasing orientation  3. Assess if patient's hallucinations or delusions are encouraging self harm or harm to others and intervene as appropriate  3/8/2024 1616 by Janelle Herrera RN  Outcome: Not Progressing     Problem: Behavior  Goal: Pt/Family maintain appropriate behavior and adhere to behavioral management agreement, if implemented  Description: INTERVENTIONS:  1. Assess patient/family's coping skills and  non-compliant behavior (including use of illegal substances)  2. Notify security of behavior or suspected illegal substances which indicate the need for search of the family and/or belongings  3. Encourage verbalization of thoughts and concerns in a socially appropriate manner  4. Utilize positive, consistent limit setting strategies supporting safety of patient, staff and others  5. Encourage participation in the decision making process about the behavioral management agreement  6. If a visitor's behavior poses a threat to safety call refer to organization policy.  7. Initiate consult with , Psychosocial CNS, Spiritual Care as appropriate  3/8/2024 1616 by Janelle Herrera RN  Outcome: Not Progressing     Problem: Nutrition Deficit:  Goal: Optimize nutritional status  3/9/2024 0039 by Chau Rodriguez RN  Outcome: Progressing  3/8/2024 1616 by Janelle Herrera RN  Outcome: Not Progressing

## 2024-03-10 PROCEDURE — 6370000000 HC RX 637 (ALT 250 FOR IP): Performed by: INTERNAL MEDICINE

## 2024-03-10 PROCEDURE — 99232 SBSQ HOSP IP/OBS MODERATE 35: CPT | Performed by: INTERNAL MEDICINE

## 2024-03-10 PROCEDURE — 99232 SBSQ HOSP IP/OBS MODERATE 35: CPT

## 2024-03-10 PROCEDURE — 6370000000 HC RX 637 (ALT 250 FOR IP): Performed by: PSYCHIATRY & NEUROLOGY

## 2024-03-10 PROCEDURE — 1240000000 HC EMOTIONAL WELLNESS R&B

## 2024-03-10 RX ORDER — ATENOLOL 25 MG/1
25 TABLET ORAL DAILY
Status: DISCONTINUED | OUTPATIENT
Start: 2024-03-11 | End: 2024-03-17

## 2024-03-10 RX ADMIN — VENLAFAXINE HYDROCHLORIDE 37.5 MG: 75 CAPSULE, EXTENDED RELEASE ORAL at 08:46

## 2024-03-10 RX ADMIN — POTASSIUM CHLORIDE 10 MEQ: 1500 TABLET, EXTENDED RELEASE ORAL at 08:47

## 2024-03-10 RX ADMIN — ASPIRIN 81 MG 81 MG: 81 TABLET ORAL at 08:50

## 2024-03-10 RX ADMIN — CLOPIDOGREL BISULFATE 75 MG: 75 TABLET ORAL at 08:49

## 2024-03-10 NOTE — GROUP NOTE
Group Therapy Note    Date: 3/10/2024    Group Start Time: 1100  Group End Time: 1150  Group Topic: Cognitive Skills    Nia Vincent CTRS        Group Therapy Note    Attendees: 5/10     Topic: To increase social interaction, practice decision making, and communication   skills.      Patient did not participate in Cognitive Skills Group at 1100, despite staff encouragement   and explanation of benefits. Patient is seclusive to self and room.     Q15 minute safety checks maintained for patient safety and will continue to encourage   patient to attend unit programming.       Discipline Responsible: Psychoeducational Specialist  Signature:  MAURY BARNES

## 2024-03-10 NOTE — PROGRESS NOTES
Daily Progress Note  3/10/2024    Patient Name: William Babcock    CHIEF COMPLAINT:  acute psychosis          SUBJECTIVE:      Patient is seen today for a follow up assessment. Reagan was found on in his room. He immediately jumps up and walks quickly towards this author stating \"I'm gonna die.\" He then walked into the milieu and then abruptly back to his room. His behaviors continue to be bizarre, impulsive, and erratic. He has no understanding of personal space but is not aggressive.  He is selectively taking medications depending on his current mood and delusion he is exhibiting.  He does take medications with significant prompting from nursing staff.  He has been eating pudding and cookies that are prepackaged but continues to be suspicious of the hospital food.  His sleep is somewhat fair but broken.  He has been improving on his oral fluids intake and appetite appears to be improved.  Nursing staff states he did eat his evening meal last night and appeared very hungry.  He continues to be extremely delusional, paranoid, and responding to internal stimuli.  He has been receiving his Zyprexa for the past couple days pretty consistently.  He is somewhat redirectable and appears very anxious.  No changes to medications at this time he is just beginning to show some consistency with intake.  Internal medicine monitoring patient closely.  No concerns with labs he is currently on potassium replacement.  He continues to require inpatient hospitalization and still will need considered for an emergent guardianship.      Appetite:  [x] Poor  [] Increased  [] Decreased      Sleep:       [] Adequate/Unchanged  [x] Fair  [] Poor      Group Attendance on Unit:   [] Yes   [] Selectively    [x] No    Compliant with scheduled medications: [x] selectively    Received emergency medications in past 24 hrs: [x] Yes   [] No    Medication Side Effects: Denies         Mental Status Exam  Level of consciousness: Alert and awake  Patient continues to need, on a daily basis, active treatment furnished directly by or requiring the supervision of inpatient psychiatric personnel.    Electronically signed by BENOIT Carpenter CNP on 3/10/2024 at 1:15 PM    **This report has been created using voice recognition software. It may contain minor errors which are inherent in voice recognition technology.**

## 2024-03-10 NOTE — PROGRESS NOTES
IN-PATIENT SERVICE  [facility]    Progress note            Date:   3/10/2024  Patient name:  William Babcock  Date of admission:  2/21/2024  9:18 PM  MRN:   [id number]  Account:  [id number]  YOB: 1960  PCP:    Billy Francois MD  Room:   [facility]  Code Status:    Full Code    Physician Requesting Consult: Donnie Rosa MD    Reason for Consult: History and physical, medical management    Chief Complaint:     No chief complaint on file.    Medical management    History Obtained From:     Patient, EMR, nursing staff    History of Present Illness:     63-year-old male admitted for acute psychosis.  Patient has history of autism;   Carotid pseudoaneurysm.  Recent admission to [facility] for right proximal RCA pseudoaneurysm after patient presented on 2/5 for agitation and psychosis.  Patient underwent cerebral angiogram and embolization of cervical pseudoaneurysm 2/9/2024.  Was discharged on aspirin and Plavix, resumed.  Patient was discharged to [facility] on 2/12 subsequently discharged on 2/16.    Past Medical History:     Past Medical History:   Diagnosis Date    Anxiety     Autism         Past Surgical History:     Past Surgical History:   Procedure Laterality Date    CEREBRAL ANGIOGRAM  02/09/2024    IR ANGIOGRAM CAROTID CEREBRAL BILATERAL    EYE SURGERY          Medications Prior to Admission:     Prior to Admission medications    Medication Sig Start Date End Date Taking? Authorizing Provider   fluticasone (FLONASE) 50 MCG/ACT nasal spray 2 sprays by Each Nostril route daily   Yes Provider, MD Nick   aspirin 81 MG chewable tablet Take 1 tablet by mouth daily 2/17/24   Donnie Rosa MD   clopidogrel (PLAVIX) 75 MG tablet Take 1 tablet by mouth daily 2/17/24   Donnie Rosa MD   QUEtiapine (SEROQUEL) 25 MG tablet Take 1 tablet by mouth 2 times daily 2/16/24   Donnie Rosa MD   atenolol (TENORMIN) 50 MG tablet Take 1 tablet by mouth  cranial nerves II through XII grossly intact  Skin: No gross lesions, rashes, bruising or bleeding on exposed skin area  Extremities:  No joint swelling, no pedal edema or calf pain with palpation      Investigations:      Laboratory Testing:  No results found for this or any previous visit (from the past 24 hour(s)).        Imaging/Diagonstics:  Recent data reviewed    Assessment :      Primary Problem  Acute psychosis (HCC)    Active Hospital Problems    Diagnosis Date Noted    Altered mental status [R41.82] 02/26/2024    Acute psychosis (HCC) [F23] 02/12/2024       Plan:     Reason for consult: General medical management     Acute psychosis-management per psychiatry  Carotid pseudoaneurysm.  Recent admission to [facility] for right proximal RCA pseudoaneurysm after patient presented on 2/5 for agitation and psychosis.  Patient underwent cerebral angiogram and embolization of cervical pseudoaneurysm 2/9/2024.  Was discharged on aspirin and Plavix, resumed.  Patient was discharged to [facility] on 2/12 subsequently discharged on 2/16.  Hypertension-resume atenolol, blood pressure controlled.   Anemia related to critical illness hemoglobin 11.6 today, was 14 prior to procedure listed above.  Will check iron levels  Labs and vitals including CBC BMP liver enzymes reviewed.  .  Recent TSH normal urinalysis reviewed-not suggestive of infection.    DVT prophylaxis-not required, patient is mobile    2/26  Seen and examined   Labs/vitals /meds reviewed   On atenolol 25 mg  B12 folic acid normal  Anemia 11.6  Patient has not been eating drinking, getting paranoid about the food,  May be dehydrated, will check labs, medical side  2/29  Patient seen and examined  Her vitals have been stable  Intermittently tachycardic likely second underlying anxiety  On aspirin Plavix  Labs reviewed, hemoglobin is 11.9, likely anemia of chronic disease  HIV syphilis negative   UA negative for UTI  Continue to monitor  Patient vitamin B12  borderline low, started on vitamin B12 supplement  Follow-up with vitamin B-1 level  As per neurology to follow-up in outpatient in 2 to 4 weeks.  3/1  Patient, complaining abdominal distention, has not passed stools and long time  Will order x-ray KUB  GlycoLax daily  3/3  Patient refused to take GlycoLax  X-ray KUB could not be done  No BM  3/5  Patient, has very poor oral intake, losing weight  Case discussed with Dr. Barron, started patient on olanzapine, for appetite  Plan noted for swallow study  Patient has poor oral intake, lack of appetite  Reviewed recent lab work      3/10  Seen again face-to-face,  Labs, vitals, meds reviewed,  Blood pressure running low today,  Reviewed the blood pressure medication,  On atenolol 25 mg will place holding parameters    Consultations:   IP CONSULT TO INTERNAL MEDICINE  IP CONSULT TO NEUROLOGY  IP CONSULT TO DIETITIAN  Blood pressure has been running up and down    Caio Vee MD  3/10/2024  4:46 PM    Copy sent to Billy Francois MD    Please note that this chart was generated using voice recognition Dragon dictation software.  Although every effort was made to ensure the accuracy of this automated transcription, some errors in transcription may have occurred.

## 2024-03-10 NOTE — PLAN OF CARE
Problem: Behavior  Goal: Pt/Family maintain appropriate behavior and adhere to behavioral management agreement, if implemented  Description: INTERVENTIONS:  1. Assess patient/family's coping skills and  non-compliant behavior (including use of illegal substances)  2. Notify security of behavior or suspected illegal substances which indicate the need for search of the family and/or belongings  3. Encourage verbalization of thoughts and concerns in a socially appropriate manner  4. Utilize positive, consistent limit setting strategies supporting safety of patient, staff and others  5. Encourage participation in the decision making process about the behavioral management agreement  6. If a visitor's behavior poses a threat to safety call refer to organization policy.  7. Initiate consult with , Psychosocial CNS, Spiritual Care as appropriate  3/10/2024 0510 by See Mojica, RN  Outcome: Progressing    Patient made statements that he is \"gonna die a virgin.\" He was able to be redirected from that rhetoric via distraction and attempts at rationalizing comments. Patient cooperated with medication administration and went back to bed when told it was not time to get up yet. Patient stated he was nervous in the day room, but was able to be distracted by coloring.     Problem: Nutrition Deficit:  Goal: Optimize nutritional status  3/10/2024 0510 by See Mojica, RN  Outcome: Progressing     Patient ate 1.5 packs of cookies during snack time and drank about 2 cups full of water.

## 2024-03-10 NOTE — BH NOTE
Patient ate 1.5 packages of cookies, drank about 16 oz of water, and took evening medications. He chewed his medications after instructions not to do so. Colored [employer] pictures for about 1/2 hour.

## 2024-03-11 PROCEDURE — APPSS30 APP SPLIT SHARED TIME 16-30 MINUTES

## 2024-03-11 PROCEDURE — 1240000000 HC EMOTIONAL WELLNESS R&B

## 2024-03-11 PROCEDURE — 99232 SBSQ HOSP IP/OBS MODERATE 35: CPT | Performed by: PSYCHIATRY & NEUROLOGY

## 2024-03-11 PROCEDURE — 6370000000 HC RX 637 (ALT 250 FOR IP): Performed by: INTERNAL MEDICINE

## 2024-03-11 PROCEDURE — 6370000000 HC RX 637 (ALT 250 FOR IP): Performed by: PSYCHIATRY & NEUROLOGY

## 2024-03-11 RX ADMIN — ATENOLOL 25 MG: 25 TABLET ORAL at 09:28

## 2024-03-11 RX ADMIN — ASPIRIN 81 MG 81 MG: 81 TABLET ORAL at 09:29

## 2024-03-11 RX ADMIN — VENLAFAXINE HYDROCHLORIDE 37.5 MG: 75 CAPSULE, EXTENDED RELEASE ORAL at 09:28

## 2024-03-11 RX ADMIN — POTASSIUM CHLORIDE 10 MEQ: 1500 TABLET, EXTENDED RELEASE ORAL at 09:26

## 2024-03-11 RX ADMIN — CLOPIDOGREL BISULFATE 75 MG: 75 TABLET ORAL at 09:27

## 2024-03-11 NOTE — GROUP NOTE
Group Therapy Note    Date: 3/11/2024    Group Start Time: 1000  Group End Time: 1045  Group Topic: Psychotherapy    [facility] Jannette Waller MSW        Group Therapy Note    Attendees: 4/12       Patient's Goal:  Learn the steps of problem solving and practice them using scenarios    Notes:  Pt sat in group for the first few minutes and did not participate; left shortly after    Status After Intervention:  Unchanged    Participation Level: None    Participation Quality: Pt sat in group for the first few minutes and did not participate; left shortly after      Speech:  mute      Thought Process/Content: Pt sat in group for the first few minutes and did not participate; left shortly after      Affective Functioning: Pt sat in group for the first few minutes and did not participate; left shortly after      Mood:  Pt sat in group for the first few minutes and did not participate; left shortly after      Level of consciousness:  Preoccupied and Inattentive      Response to Learning: Pt sat in group for the first few minutes and did not participate; left shortly after      Endings: None Reported    Modes of Intervention: Education and Problem-solving      Discipline Responsible: /Counselor      Signature:  TANO Hernández

## 2024-03-11 NOTE — PLAN OF CARE
Problem: Behavior  Goal: Pt/Family maintain appropriate behavior and adhere to behavioral management agreement, if implemented  Description: INTERVENTIONS:  1. Assess patient/family's coping skills and  non-compliant behavior (including use of illegal substances)  2. Notify security of behavior or suspected illegal substances which indicate the need for search of the family and/or belongings  3. Encourage verbalization of thoughts and concerns in a socially appropriate manner  4. Utilize positive, consistent limit setting strategies supporting safety of patient, staff and others  5. Encourage participation in the decision making process about the behavioral management agreement  6. If a visitor's behavior poses a threat to safety call refer to organization policy.  7. Initiate consult with , Psychosocial CNS, Spiritual Care as appropriate  Outcome: Progressing  Note: Pt denies having homicidal ideations, but reports saying that he is going to die. Pt was offered a shower, but pt refused saying the water was too hot and cold. Pt was accepting of a quick bed bath. Pt is encouraged to eat his meals, but is only eats small amounts from it. Pt is social with select peers.     Problem: Safety - Adult  Goal: Free from fall injury  Outcome: Progressing  Note: Pt remains free from falls.

## 2024-03-11 NOTE — PLAN OF CARE
Problem: Safety - Adult  Goal: Free from fall injury  Outcome: Progressing     Problem: Nutrition Deficit:  Goal: Optimize nutritional status  Outcome: Not Progressing  Patient has been encouraged to eat throughout the shift by offering food, and juice.

## 2024-03-11 NOTE — PROGRESS NOTES
Daily Progress Note  3/11/2024    Patient Name: William Babcock    CHIEF COMPLAINT: Acute psychosis         SUBJECTIVE:      Patient is seen today for a follow up assessment.  Willima was found resting in his room.  He presents the same as he has the past 3 days.  When his name is called he will jump out of bed and repeat that he is going to die.  He continues to be very paranoid, bizarre, and delusional.  He at times is responding to internal stimuli.  He continues to be suspicious of the food and refuses to take showers and fear of what he might do to him.  Nursing staff has to work extremely hard to get him to take his medications.  He appears to be more compliant in the morning with his meds than in the evening.  Last night he refused to take Zyprexa.  He continues to attempt to chew his pills and has to take it with applesauce.  He continues to be disorganized.  As noted he attempted to sit on a group but was moved the whole time and left in the middle.  His sleep remains somewhat fair and appetite poor.  He will eat snacks and drink fluids when prompted sometimes.  He continues to be unsafe and unable to care for himself outside of the hospital.  Social work working on establishing emergent guardianship.  Attending to manage medications and planning.        Appetite:  [x] Adequate/Unchanged  [] Increased  [] Decreased      Sleep:       [] Adequate/Unchanged  [x] Fair  [] Poor      Group Attendance on Unit:   [] Yes   [x] Selectively    [] No    Compliant with scheduled medications: [x] Sometimes [] No    Received emergency medications in past 24 hrs: [] Yes   [x] No    Medication Side Effects: Denies         Mental Status Exam  Level of consciousness: Alert and awake   Appearance: Appropriate attire for setting, standing, with poor grooming and hygiene   Behavior/Motor: Approachable, engages with interviewer, no psychomotor abnormalities   Attitude toward examiner: Cooperative, attentive, good eye

## 2024-03-12 LAB
ANION GAP SERPL CALCULATED.3IONS-SCNC: 14 MMOL/L (ref 9–17)
BUN SERPL-MCNC: 16 MG/DL (ref 8–23)
CALCIUM SERPL-MCNC: 8.8 MG/DL (ref 8.6–10.4)
CHLORIDE SERPL-SCNC: 103 MMOL/L (ref 98–107)
CO2 SERPL-SCNC: 22 MMOL/L (ref 20–31)
CREAT SERPL-MCNC: 0.9 MG/DL (ref 0.7–1.2)
EKG ATRIAL RATE: 68 BPM
EKG P AXIS: 48 DEGREES
EKG P-R INTERVAL: 154 MS
EKG Q-T INTERVAL: 390 MS
EKG QRS DURATION: 82 MS
EKG QTC CALCULATION (BAZETT): 414 MS
EKG R AXIS: 20 DEGREES
EKG T AXIS: 62 DEGREES
EKG VENTRICULAR RATE: 68 BPM
GFR SERPL CREATININE-BSD FRML MDRD: >60 ML/MIN/1.73M2
GLUCOSE SERPL-MCNC: 96 MG/DL (ref 70–99)
POTASSIUM SERPL-SCNC: 3.7 MMOL/L (ref 3.7–5.3)
SODIUM SERPL-SCNC: 139 MMOL/L (ref 135–144)

## 2024-03-12 PROCEDURE — 6370000000 HC RX 637 (ALT 250 FOR IP): Performed by: NURSE PRACTITIONER

## 2024-03-12 PROCEDURE — 99232 SBSQ HOSP IP/OBS MODERATE 35: CPT | Performed by: INTERNAL MEDICINE

## 2024-03-12 PROCEDURE — 6370000000 HC RX 637 (ALT 250 FOR IP): Performed by: PSYCHIATRY & NEUROLOGY

## 2024-03-12 PROCEDURE — 99232 SBSQ HOSP IP/OBS MODERATE 35: CPT | Performed by: PSYCHIATRY & NEUROLOGY

## 2024-03-12 PROCEDURE — 97535 SELF CARE MNGMENT TRAINING: CPT

## 2024-03-12 PROCEDURE — 80048 BASIC METABOLIC PNL TOTAL CA: CPT

## 2024-03-12 PROCEDURE — 6370000000 HC RX 637 (ALT 250 FOR IP): Performed by: INTERNAL MEDICINE

## 2024-03-12 PROCEDURE — 36415 COLL VENOUS BLD VENIPUNCTURE: CPT

## 2024-03-12 PROCEDURE — APPSS30 APP SPLIT SHARED TIME 16-30 MINUTES

## 2024-03-12 PROCEDURE — 1240000000 HC EMOTIONAL WELLNESS R&B

## 2024-03-12 PROCEDURE — 90833 PSYTX W PT W E/M 30 MIN: CPT | Performed by: PSYCHIATRY & NEUROLOGY

## 2024-03-12 PROCEDURE — 93010 ELECTROCARDIOGRAM REPORT: CPT | Performed by: INTERNAL MEDICINE

## 2024-03-12 RX ADMIN — CLOPIDOGREL BISULFATE 75 MG: 75 TABLET ORAL at 10:48

## 2024-03-12 RX ADMIN — DONEPEZIL HYDROCHLORIDE 5 MG: 5 TABLET, FILM COATED ORAL at 21:52

## 2024-03-12 RX ADMIN — ASPIRIN 81 MG 81 MG: 81 TABLET ORAL at 10:48

## 2024-03-12 RX ADMIN — TRAZODONE HYDROCHLORIDE 50 MG: 50 TABLET ORAL at 21:52

## 2024-03-12 RX ADMIN — OLANZAPINE 7.5 MG: 7.5 TABLET, FILM COATED ORAL at 21:52

## 2024-03-12 RX ADMIN — VENLAFAXINE HYDROCHLORIDE 37.5 MG: 75 CAPSULE, EXTENDED RELEASE ORAL at 10:48

## 2024-03-12 RX ADMIN — ATENOLOL 25 MG: 25 TABLET ORAL at 10:48

## 2024-03-12 ASSESSMENT — PAIN SCALES - GENERAL: PAINLEVEL_OUTOF10: 0

## 2024-03-12 ASSESSMENT — LIFESTYLE VARIABLES: HOW OFTEN DO YOU HAVE A DRINK CONTAINING ALCOHOL: NEVER

## 2024-03-12 NOTE — PROGRESS NOTES
IN-PATIENT SERVICE  [facility] Internal Medicine    HISTORY AND PHYSICAL EXAMINATION/ CONSULT NOTE            Date:   3/12/2024  Patient name:  William Babcock  Date of admission:  2/21/2024  9:18 PM  MRN:   [id number]  Account:  [id number]  YOB: 1960  PCP:    Billy Francois MD  Room:   [address] [place] [address]  Code Status:    Full Code    Physician Requesting Consult: Donnie Rosa MD    Reason for Consult: History and physical, medical management    Chief Complaint:     No chief complaint on file.    Medical management    History Obtained From:     Patient, EMR, nursing staff    History of Present Illness:     63-year-old male admitted for acute psychosis.  Patient has history of autism;   Carotid pseudoaneurysm.  Recent admission to [facility] for right proximal RCA pseudoaneurysm after patient presented on 2/5 for agitation and psychosis.  Patient underwent cerebral angiogram and embolization of cervical pseudoaneurysm 2/9/2024.  Was discharged on aspirin and Plavix, resumed.  Patient was discharged to [facility] on 2/12 subsequently discharged on 2/16.    Past Medical History:     Past Medical History:   Diagnosis Date    Anxiety     Autism         Past Surgical History:     Past Surgical History:   Procedure Laterality Date    CEREBRAL ANGIOGRAM  02/09/2024    IR ANGIOGRAM CAROTID CEREBRAL BILATERAL    EYE SURGERY          Medications Prior to Admission:     Prior to Admission medications    Medication Sig Start Date End Date Taking? Authorizing Provider   fluticasone (FLONASE) 50 MCG/ACT nasal spray 2 sprays by Each Nostril route daily   Yes Provider, MD Nick   aspirin 81 MG chewable tablet Take 1 tablet by mouth daily 2/17/24   Donnie Rosa MD   clopidogrel (PLAVIX) 75 MG tablet Take 1 tablet by mouth daily 2/17/24   Donnie Rosa MD   QUEtiapine (SEROQUEL) 25 MG tablet Take 1 tablet by mouth 2 times daily 2/16/24   Donnie Rosa MD   atenolol (TENORMIN) 50  Product 6 Refills: 6

## 2024-03-12 NOTE — PROGRESS NOTES
[facility]   INPATIENT OCCUPATIONAL THERAPY  PROGRESS NOTE  Date: 3/12/2024  Patient Name: William Babcock       Room: 0205/0205-01  MRN: [id number]    : 1960  (64 y.o.)  Gender: male   Referring Practitioner: Donnie Rosa MD  Diagnosis: Acute psychosis      Discharge Recommendations:  Further Occupational Therapy is recommended upon facility discharge.    OT Equipment Recommendations  Other: TBD    Restrictions/Precautions  Restrictions/Precautions  Restrictions/Precautions: Fall Risk;General Precautions    O2 Device: None (Room air)    Subjective  Subjective  Subjective: \"I can't do that. I'm going to die a virgin.\" Pt was agreeable to OT session with max encouragement to participate  Subjective  Pain: Pt does not report pain  Comments: Ok per Nursing staff for OT session    Objective  Orientation  Overall Orientation Status: Impaired  Cognition  Overall Cognitive Status: Exceptions  Arousal/Alertness: Delayed responses to stimuli  Following Commands: Inconsistently follows commands;Follows one step commands with increased time;Follows one step commands with repetition  Attention Span: Difficulty attending to directions;Difficulty dividing attention  Safety Judgement: Decreased awareness of need for assistance;Decreased awareness of need for safety  Problem Solving: Assistance required to generate solutions;Assistance required to implement solutions;Assistance required to identify errors made;Assistance required to correct errors made;Decreased awareness of errors  Insights: Not aware of deficits  Initiation: Requires cues for all  Sequencing: Requires cues for all  Cognition Comment: Pt continued to demonstrate tangential and parinoid speech with difficulty to understand.    Activities of Daily Living  ADL  Feeding: Stand by assistance  Grooming: Minimal assistance  Grooming Skilled Clinical Factors: OT facilitated pts engagement in oral hygiene on this date while standing sink  Supervision  Transfer Comments: Verbal cues for safety throughout    Functional Mobility  Functional - Mobility Device: No device  Activity: To/from bathroom;Other;Retrieve items;Transport items (in common area)  Assist Level: Stand by assistance  Functional Mobility Comments: Verbal cues for safety       Patient Education  Patient Education  Education Given To: Patient  Education Provided: Role of Therapy, Plan of Care, Transfer Training  Education Method: Verbal  Barriers to Learning: Cognition  Education Outcome: Continued education needed    Goals  Short Term Goals  Time Frame for Short Term Goals: By discharge  Short Term Goal 1: Pt will complete BADLs with SBA with Good safety and attention to task  Short Term Goal 2: Pt will complete functional transfers/mobility during self care tasks with Supervision with Good safety and attention to task  Short Term Goal 3: Pt will follow simple 1-2 step commands during self care tasks with no more than 3 verbal cues to increase participation and safety with daily activities  Short Term Goal 4: Pt will participate in self care routine 2-3 times a week with no more than 3 verbal cues to initiate task  Short Term Goal 5: Pt will participate in 15+ minutes of therapeutic exercises/functional activities/social participation to increase safety and independence with daily activities and improve overall quality of life  Additional Goals?: Yes  Short Term Goal 6: OT to complete Robby when appropriate  Occupational Therapy Plan  Times Per Week: 2-3  Current Treatment Recommendations: Self-Care / ADL, Strengthening, ROM, Balance training, Functional mobility training, Endurance training, Cognitive reorientation, Pain management, Safety education & training, Patient/Caregiver education & training, Home management training, Cognitive/Perceptual training    Assessment  Activity Tolerance  Activity Tolerance: Treatment limited secondary to decreased cognition  Assessment  Performance  deficits / Impairments: Decreased ADL status, Decreased functional mobility , Decreased ROM, Decreased strength, Decreased safe awareness, Decreased cognition, Decreased endurance, Decreased balance, Decreased high-level IADLs, Decreased fine motor control, Decreased coordination  Treatment Diagnosis: Impaired self care status  Prognosis: Fair  Decision Making: Medium Complexity  Discharge Recommendations: 24 hour supervision or assist, Continue to assess pending progress  OT Equipment Recommendations  Other: TBD  Safety Devices  Type of Devices: All fall risk precautions in place, Patient at risk for falls, Nurse notified, Left in chair    AM-PAC Daily Activities Inpatient  AM-PAC Daily Activity - Inpatient   How much help is needed for putting on and taking off regular lower body clothing?: A Lot  How much help is needed for bathing (which includes washing, rinsing, drying)?: A Lot  How much help is needed for toileting (which includes using toilet, bedpan, or urinal)?: A Lot  How much help is needed for putting on and taking off regular upper body clothing?: A Little  How much help is needed for taking care of personal grooming?: A Little  How much help for eating meals?: A Little  AM-[facility] Inpatient Daily Activity Raw Score: 15  AM-PAC Inpatient ADL T-Scale Score : 34.69  ADL Inpatient CMS 0-100% Score: 56.46  ADL Inpatient CMS G-Code Modifier : CK    OT Minutes  OT Individual Minutes  Time In: 1322  Time Out: 1348  Minutes: 26  Time Code Minutes   Timed Code Treatment Minutes: 26 Minutes      Electronically signed by CHLOÉ Suazo on 3/12/24 at 2:09 PM EDT

## 2024-03-12 NOTE — PROGRESS NOTES
Daily Progress Note  3/12/2024    Patient Name: William Babcock    CHIEF COMPLAINT:  Acute Psychosis          SUBJECTIVE:      Patient is seen today for a follow up assessment. Vitals and labs reviewed. It does not appear vitals have been entered for today 3/12 at this time.  Vitals from last night however appear to be within normal limits.  It does not appear Reagan has been compliant with Aricept or Zyprexa the past couple of nights but it does look like he has taken his Effexor the past 2 days.  Reagan remains behaviorally in control and has not required emergency medications in the past couple of days.  Reagan is seen at bedside today.  Did see nursing staff earlier in the morning attempting to get Reagan to eat his breakfast which he continued to refuse.  Reagan is lying in bed.  He continues to be bizarre however his thoughts seem slightly more linear today.  He reports that he is \"better.\"  He reports he did not sleep well last night and right now he feels \"tired.\"  He continues to refuse meals and has not been eating or drinking much.    Reagan at first states he is suicidal but when asked to elaborate he states, \"because I don't want to be here anymore.\"  This writer than asked Reagan if he understands what it means to be suicidal and he states \"to kill myself.\"  This writer then asks Reagan if he wants to kill himself and he states \"no.\"  He denies hearing any voices.  Reagan does continue to seem extremely bizarre.  He would likely not be able to meet his own basic needs outside of the hospital.  He lacks significant insight into his mental illness as evidence by refusing his medications.  He would not be able to complete his own ADLs as he needs significant encouragement here.  At this time, emergency guardianship for Reagan has been filed and we are awaiting to hear outcome.  For now he continues to require inpatient hospitalization for his safety and stability      Appetite:  [] Normal/Adequate/Unchanged  []  tubular secretion.      Calcium 03/09/2024 8.5 (L)  8.6 - 10.4 mg/dL Final    Total Protein 03/09/2024 6.3 (L)  6.4 - 8.3 g/dL Final    Albumin 03/09/2024 3.8  3.5 - 5.2 g/dL Final    Total Bilirubin 03/09/2024 0.8  0.3 - 1.2 mg/dL Final    Alkaline Phosphatase 03/09/2024 57  40 - 129 U/L Final    ALT 03/09/2024 15  5 - 41 U/L Final    AST 03/09/2024 17  <40 U/L Final    Magnesium 03/09/2024 2.0  1.6 - 2.6 mg/dL Final    Ventricular Rate 03/09/2024 68  BPM Final    Atrial Rate 03/09/2024 68  BPM Final    P-R Interval 03/09/2024 154  ms Final    QRS Duration 03/09/2024 82  ms Final    Q-T Interval 03/09/2024 390  ms Final    QTc Calculation (Bazett) 03/09/2024 414  ms Final    P Axis 03/09/2024 48  degrees Final    R Axis 03/09/2024 20  degrees Final    T Axis 03/09/2024 62  degrees Final    Sodium 03/12/2024 139  135 - 144 mmol/L Final    Potassium 03/12/2024 3.7  3.7 - 5.3 mmol/L Final    Chloride 03/12/2024 103  98 - 107 mmol/L Final    CO2 03/12/2024 22  20 - 31 mmol/L Final    Anion Gap 03/12/2024 14  9 - 17 mmol/L Final    Glucose 03/12/2024 96  70 - 99 mg/dL Final    BUN 03/12/2024 16  8 - 23 mg/dL Final    Creatinine 03/12/2024 0.9  0.7 - 1.2 mg/dL Final    Est, Glom Filt Rate 03/12/2024 >60  >60 mL/min/1.73m2 Final    Comment:       These results are not intended for use in patients <18 years of age.        eGFR results are calculated without a race factor using the 2021 CKD-EPI equation.  Careful clinical correlation is recommended, particularly when comparing to results   calculated using previous equations.  The CKD-EPI equation is less accurate in patients with extremes of muscle mass, extra-renal   metabolism of creatine, excessive creatine ingestion, or following therapy that affects   renal tubular secretion.      Calcium 03/12/2024 8.8  8.6 - 10.4 mg/dL Final         Reviewed patient's current plan of care and vital signs with nursing staff.    Labs reviewed: [x] Yes  Last EKG in EMR reviewed: [x]  documentation are stated below otherwise agree with assessment. Spent 17 minutes with the patient in supportive psychotherapy.  Plan will be as follows:  Spent time in supportive psychotherapy and education with both Reagan and his brother daniel.  Reagan has verbally agreed to proceed with ECT on numerous occasions.  I did discuss this with his brother last who is labeled as his medical decision-maker but not legal guardian.  Wanted to make sure that family was on board.  After discussion of risk benefits and alternatives both Reagan and royal are in agreement to try ECT.  I did discuss that there is a high probability that Reagan may waffle or change his mind with regards to the ECT but better to move forward and see if we can engage him in this treatment.  If we are unable we may need to move forward with guardianship as the patient continues to show very little improvement with substantial weight loss and concerns for medical decline.    PLAN  Patient s symptoms   show no change  Move forward with ECT in the morning  Attempt to develop insight  Psycho-education conducted.  Supportive Therapy conducted.  Probable discharge is undetermined at this time  Follow-up daily while on inpatient unit

## 2024-03-12 NOTE — GROUP NOTE
Group Therapy Note    Date: 3/12/2024    Group Start Time: 1100  Group End Time: 1130  Group Topic: Cognitive Skills    Lina Porras CTRS      Cognitive Skills Group Note        Date: March 12, 2024 Start Time: 11am  End Time: 11:30am      Number of Participants in Group & Unit Census:  5/12    Topic: cognitive skills     Goal of Group: pt will demonstrate improved coping skills and improved communication skills       Comments:     Patient did not participate in Cognitive Skills group, despite staff encouragement and explanation of benefits.  Patient remain seclusive to self.  Q15 minute safety checks maintained for patient safety and will continue to encourage patient to attend unit programming.              Signature:  MAURY DECKER

## 2024-03-12 NOTE — GROUP NOTE
Group Therapy Note    Date: 3/12/2024    Group Start Time: 1330  Group End Time: 1400  Group Topic: Cognitive Skills    Lina Porras CTRS    Cognitive Skills Group Note        Date: March 12, 2024 Start Time: 1:30pm  End Time:  200pm      Number of Participants in Group & Unit Census:  2/8    Topic: cognitive skills     Goal of Group: pt will demonstrate improved coping skills and improved interpersonal relationships       Comments:     Patient did not participate in Cognitive Skills group, despite staff encouragement and explanation of benefits.  Patient remain seclusive to self.  Q15 minute safety checks maintained for patient safety and will continue to encourage patient to attend unit programming.              Signature:  MAURY DECKER

## 2024-03-12 NOTE — GROUP NOTE
Group Therapy Note    Date: 3/12/2024    Group Start Time: 1000  Group End Time: 1050  Group Topic: Psychotherapy     Jannette Waller MSW        Group Therapy Note    Attendees: 4/12     Patient was offered group therapy today but declined to participate despite encouragement from staff.  1:1 was offered.    Discipline Responsible: /Counselor      Signature:  TANO Hernández

## 2024-03-12 NOTE — PLAN OF CARE
Problem: Nutrition Deficit:  Goal: Optimize nutritional status  Outcome: Not Progressing     Problem: Psychosis  Goal: Will report no hallucinations or delusions  Description: INTERVENTIONS:  1. Administer medication as  ordered  2. Assist with reality testing to support increasing orientation  3. Assess if patient's hallucinations or delusions are encouraging self harm or harm to others and intervene as appropriate  Outcome: Progressing     Problem: Behavior  Goal: Pt/Family maintain appropriate behavior and adhere to behavioral management agreement, if implemented  Description: INTERVENTIONS:  1. Assess patient/family's coping skills and  non-compliant behavior (including use of illegal substances)  2. Notify security of behavior or suspected illegal substances which indicate the need for search of the family and/or belongings  3. Encourage verbalization of thoughts and concerns in a socially appropriate manner  4. Utilize positive, consistent limit setting strategies supporting safety of patient, staff and others  5. Encourage participation in the decision making process about the behavioral management agreement  6. If a visitor's behavior poses a threat to safety call refer to organization policy.  7. Initiate consult with , Psychosocial CNS, Spiritual Care as appropriate  Outcome: Progressing     Problem: Safety - Adult  Goal: Free from fall injury  Outcome: Progressing    Patient was unable to participate in daily assessment. Patient is alert and oriented to person. Patient is disoriented to  place, time, and situation. Patient remains behaviorally controlled, but remains hyperactive, impulsive, intrusive, guarded, and restless. Patient paces the unit. Patient is unable to properly communicate their feelings with staff due to cognitive impairment.    Based on patient's responses, their thoughts are disorganized, and impacted by delusions, preoccupations, confusion, obsessions, and thought  blocking. Patient appears flat and fearful, congruent with their stated thoughts. Patient is suffering from a self-care deficit at this time, and has been refusing to perform self-care tasks. Staff will continue to encourage the performance of these tasks. Patient did manage to change his clothing after persistent encouragement by his occupational therapist.  Patient has been having difficulty concentrating, has a lack of appetite, and has been making statements of helplessness, hopelessness, and worthlessness, all indicating that they are likely experiencing feelings of depression. These indicators have worsened since patient was admitted.  Patient has been observed acting compulsively, repeating statements of doom and other obsessions, all indicating that they are experiencing feelings of anxiety. These indicators have worsened since patient was admitted.  Patient is exhibiting less need to sleep and poor judgement, indicating that they may be experiencing paige at this time.   Patient has not been observed responding to any internal stimulus at this time.    Patient has been making statements that he is suspicious of his unit peers and staff at times, indicating that he may be experiencing delusions of paranoia. These delusions are causing the patient to not want to eat any of the food provided here.   Patient usually experiences difficulty fall asleep and staying asleep due to restlessness and impaired cognition, but it was reported that he slept well last night.      Patient remains free of new physical injury at this time.      Patient remains free of falls at this time.       Patient has not been observed watching the doors and following staff to the exits when they are open, this would indicate they are a risk for elopement.       Patient is compliant with vitals checks, lab draws, and medications after extensive encouragement.   Patient has not been attending group therapy sessions or unit programming, but

## 2024-03-13 ENCOUNTER — ANESTHESIA EVENT (OUTPATIENT)
Dept: POSTOP/PACU | Age: 64
End: 2024-03-13
Payer: MEDICARE

## 2024-03-13 ENCOUNTER — APPOINTMENT (OUTPATIENT)
Dept: POSTOP/PACU | Age: 64
DRG: 885 | End: 2024-03-13
Payer: MEDICARE

## 2024-03-13 ENCOUNTER — ANESTHESIA (OUTPATIENT)
Dept: POSTOP/PACU | Age: 64
End: 2024-03-13
Payer: MEDICARE

## 2024-03-13 DIAGNOSIS — F33.3 SEVERE RECURRENT MAJOR DEPRESSION WITH PSYCHOTIC FEATURES (HCC): Primary | ICD-10-CM

## 2024-03-13 LAB — GLUCOSE BLD-MCNC: 93 MG/DL (ref 75–110)

## 2024-03-13 PROCEDURE — 6370000000 HC RX 637 (ALT 250 FOR IP): Performed by: INTERNAL MEDICINE

## 2024-03-13 PROCEDURE — 99232 SBSQ HOSP IP/OBS MODERATE 35: CPT | Performed by: PSYCHIATRY & NEUROLOGY

## 2024-03-13 PROCEDURE — 90870 ELECTROCONVULSIVE THERAPY: CPT | Performed by: ANESTHESIOLOGY

## 2024-03-13 PROCEDURE — 3700000000 HC ANESTHESIA ATTENDED CARE: Performed by: ANESTHESIOLOGY

## 2024-03-13 PROCEDURE — 1240000000 HC EMOTIONAL WELLNESS R&B

## 2024-03-13 PROCEDURE — GZB4ZZZ OTHER ELECTROCONVULSIVE THERAPY: ICD-10-PCS | Performed by: PSYCHIATRY & NEUROLOGY

## 2024-03-13 PROCEDURE — 82947 ASSAY GLUCOSE BLOOD QUANT: CPT

## 2024-03-13 PROCEDURE — APPSS30 APP SPLIT SHARED TIME 16-30 MINUTES

## 2024-03-13 PROCEDURE — 2500000003 HC RX 250 WO HCPCS: Performed by: NURSE ANESTHETIST, CERTIFIED REGISTERED

## 2024-03-13 PROCEDURE — 7100000001 HC PACU RECOVERY - ADDTL 15 MIN: Performed by: ANESTHESIOLOGY

## 2024-03-13 PROCEDURE — 6370000000 HC RX 637 (ALT 250 FOR IP): Performed by: NURSE PRACTITIONER

## 2024-03-13 PROCEDURE — 90870 ELECTROCONVULSIVE THERAPY: CPT | Performed by: PSYCHIATRY & NEUROLOGY

## 2024-03-13 PROCEDURE — 2580000003 HC RX 258: Performed by: ANESTHESIOLOGY

## 2024-03-13 PROCEDURE — 99232 SBSQ HOSP IP/OBS MODERATE 35: CPT | Performed by: INTERNAL MEDICINE

## 2024-03-13 PROCEDURE — 6360000002 HC RX W HCPCS: Performed by: PSYCHIATRY & NEUROLOGY

## 2024-03-13 PROCEDURE — 7100000000 HC PACU RECOVERY - FIRST 15 MIN: Performed by: ANESTHESIOLOGY

## 2024-03-13 PROCEDURE — 6370000000 HC RX 637 (ALT 250 FOR IP): Performed by: PSYCHIATRY & NEUROLOGY

## 2024-03-13 RX ORDER — SUCCINYLCHOLINE/SOD CL,ISO/PF 200MG/10ML
SYRINGE (ML) INTRAVENOUS PRN
Status: DISCONTINUED | OUTPATIENT
Start: 2024-03-13 | End: 2024-03-13 | Stop reason: SDUPTHER

## 2024-03-13 RX ORDER — LORAZEPAM 2 MG/ML
INJECTION INTRAMUSCULAR
Status: DISCONTINUED
Start: 2024-03-13 | End: 2024-03-13 | Stop reason: WASHOUT

## 2024-03-13 RX ORDER — METHOHEXITAL IN WATER/PF 100MG/10ML
SYRINGE (ML) INTRAVENOUS PRN
Status: DISCONTINUED | OUTPATIENT
Start: 2024-03-13 | End: 2024-03-13 | Stop reason: SDUPTHER

## 2024-03-13 RX ORDER — METHOHEXITAL IN WATER/PF 100MG/10ML
SYRINGE (ML) INTRAVENOUS
Status: COMPLETED
Start: 2024-03-13 | End: 2024-03-13

## 2024-03-13 RX ORDER — SODIUM CHLORIDE, SODIUM LACTATE, POTASSIUM CHLORIDE, CALCIUM CHLORIDE 600; 310; 30; 20 MG/100ML; MG/100ML; MG/100ML; MG/100ML
INJECTION, SOLUTION INTRAVENOUS CONTINUOUS
Status: DISCONTINUED | OUTPATIENT
Start: 2024-03-13 | End: 2024-03-23

## 2024-03-13 RX ORDER — SUCCINYLCHOLINE/SOD CL,ISO/PF 200MG/10ML
SYRINGE (ML) INTRAVENOUS
Status: COMPLETED
Start: 2024-03-13 | End: 2024-03-13

## 2024-03-13 RX ADMIN — ACETAMINOPHEN 650 MG: 325 TABLET ORAL at 06:32

## 2024-03-13 RX ADMIN — TRAZODONE HYDROCHLORIDE 50 MG: 50 TABLET ORAL at 20:26

## 2024-03-13 RX ADMIN — SODIUM CHLORIDE, POTASSIUM CHLORIDE, SODIUM LACTATE AND CALCIUM CHLORIDE: 600; 310; 30; 20 INJECTION, SOLUTION INTRAVENOUS at 07:35

## 2024-03-13 RX ADMIN — CYANOCOBALAMIN 1000 MCG: 1000 INJECTION, SOLUTION INTRAMUSCULAR; SUBCUTANEOUS at 09:48

## 2024-03-13 RX ADMIN — ASPIRIN 81 MG 81 MG: 81 TABLET ORAL at 10:21

## 2024-03-13 RX ADMIN — Medication 70 MG: at 08:20

## 2024-03-13 RX ADMIN — VENLAFAXINE HYDROCHLORIDE 37.5 MG: 75 CAPSULE, EXTENDED RELEASE ORAL at 10:21

## 2024-03-13 RX ADMIN — CLOPIDOGREL BISULFATE 75 MG: 75 TABLET ORAL at 10:21

## 2024-03-13 RX ADMIN — OLANZAPINE 7.5 MG: 7.5 TABLET, FILM COATED ORAL at 20:26

## 2024-03-13 RX ADMIN — ATENOLOL 25 MG: 25 TABLET ORAL at 10:21

## 2024-03-13 RX ADMIN — DONEPEZIL HYDROCHLORIDE 5 MG: 5 TABLET, FILM COATED ORAL at 20:26

## 2024-03-13 ASSESSMENT — PAIN - FUNCTIONAL ASSESSMENT
PAIN_FUNCTIONAL_ASSESSMENT: 0-10
PAIN_FUNCTIONAL_ASSESSMENT: NONE - DENIES PAIN

## 2024-03-13 ASSESSMENT — PAIN SCALES - GENERAL
PAINLEVEL_OUTOF10: 6
PAINLEVEL_OUTOF10: 0

## 2024-03-13 ASSESSMENT — PAIN DESCRIPTION - DESCRIPTORS: DESCRIPTORS: ACHING

## 2024-03-13 ASSESSMENT — PAIN DESCRIPTION - LOCATION: LOCATION: GENERALIZED

## 2024-03-13 ASSESSMENT — PAIN SCALES - WONG BAKER: WONGBAKER_NUMERICALRESPONSE: NO HURT

## 2024-03-13 NOTE — BH NOTE
Arrival from ECT to [facility] Unit:    Patient arrived to the unit Time: 0912 and [address] Wheelchair. Patient is person. Patient is confused and impulsive, attempts multiple times to transfer self from sitting to standing during transportation.      Physical and Mental assessment, Vitals x 2, and fall risk assessment completed and documented.     Post-op ECT checklist completed and placed in patients chart.

## 2024-03-13 NOTE — CARE COORDINATION
Social work received call from Wesley, pts  at the [facility]. Wesley is reporting that Les the pts brother spoke with the Doctor and was told the pt needs to go to a locked unit, Wesley wanted to make sure that the social work team understood the Board is a \"voluntary service\" and they are unable to provide or assist with a locked unit. Social work updated Wesley on pt receiving ECT treatments, and we are not looking at discharge today. Social work encouraged Wesley to bring in the home providers to meet with the pt incase the pt does discharge home in the future.

## 2024-03-13 NOTE — CARE COORDINATION
Social work spoke with Bart Carbajal, pt PATRICK [phone]. Bart shared they did a provider search to staff pt at his home, and they have gotten a good response and would like to bring some of the providers in to meet with the pt. Bart reports that respite care with them is not an option due to there being a protection order in place. Bart is asking for referral be sent to [facility] services.

## 2024-03-13 NOTE — PROGRESS NOTES
IN-PATIENT SERVICE  [facility] Internal Medicine    HISTORY AND PHYSICAL EXAMINATION/ CONSULT NOTE            Date:   3/13/2024  Patient name:  William Babcock  Date of admission:  2/21/2024  9:18 PM  MRN:   [id number]  Account:  [id number]  YOB: 1960  PCP:    Billy Francois MD  Room:   [address] [place] [address]  Code Status:    Full Code    Physician Requesting Consult: Donnie Rosa MD    Reason for Consult: History and physical, medical management    Chief Complaint:     No chief complaint on file.    Medical management    History Obtained From:     Patient, EMR, nursing staff    History of Present Illness:     63-year-old male admitted for acute psychosis.  Patient has history of autism;   Carotid pseudoaneurysm.  Recent admission to [facility] for right proximal RCA pseudoaneurysm after patient presented on 2/5 for agitation and psychosis.  Patient underwent cerebral angiogram and embolization of cervical pseudoaneurysm 2/9/2024.  Was discharged on aspirin and Plavix, resumed.  Patient was discharged to [facility] on 2/12 subsequently discharged on 2/16.    Past Medical History:     Past Medical History:   Diagnosis Date    Anxiety     Autism         Past Surgical History:     Past Surgical History:   Procedure Laterality Date    CEREBRAL ANGIOGRAM  02/09/2024    IR ANGIOGRAM CAROTID CEREBRAL BILATERAL    EYE SURGERY          Medications Prior to Admission:     Prior to Admission medications    Medication Sig Start Date End Date Taking? Authorizing Provider   fluticasone (FLONASE) 50 MCG/ACT nasal spray 2 sprays by Each Nostril route daily   Yes Provider, MD Nick   aspirin 81 MG chewable tablet Take 1 tablet by mouth daily 2/17/24   Donnie Rosa MD   clopidogrel (PLAVIX) 75 MG tablet Take 1 tablet by mouth daily 2/17/24   Donnie Rosa MD   QUEtiapine (SEROQUEL) 25 MG tablet Take 1 tablet by mouth 2 times daily 2/16/24   Donnie Rosa MD   atenolol (TENORMIN) 50

## 2024-03-13 NOTE — GROUP NOTE
Group Therapy Note    Date: 3/13/2024    Group Start Time: 1000  Group End Time: 1030  Group Topic: Psychotherapy     Jannette Waller MSW        Group Therapy Note    Attendees: 2/11     Patient was offered group therapy today but declined to participate despite encouragement from staff.  1:1 was offered.      Discipline Responsible: /Counselor      Signature:  TANO Hernández

## 2024-03-13 NOTE — PLAN OF CARE
Problem: Psychosis  Goal: Will report no hallucinations or delusions  Description: INTERVENTIONS:  1. Administer medication as  ordered  2. Assist with reality testing to support increasing orientation  3. Assess if patient's hallucinations or delusions are encouraging self harm or harm to others and intervene as appropriate  3/12/2024 2322 by Zhanna Black, RN  Outcome: Progressing     Problem: Safety - Adult  Goal: Free from fall injury  3/12/2024 2322 by Zhanna Black, RN  Outcome: Progressing     Problem: Nutrition Deficit:  Goal: Optimize nutritional status  3/12/2024 1448 by Krysta Harvey  Outcome: Not Progressing

## 2024-03-13 NOTE — GROUP NOTE
Group Therapy Note    Date: 3/13/2024    Group Start Time: 1100  Group End Time: 1140  Group Topic: Cognitive Skills    Emelia Mueller CTRS        Group Therapy Note    Attendees: 3/10    Cognitive Skills Group Note        Date: March 13, 2024       Start Time: 11am  End Time: 11:40am      Number of Participants in Group & Unit Census:  3/10    Topic:  interpersonal skills, memory recall, self-expression    Goal of Group: To improve interpersonal skills and memory recall through collaborating with peers and demonstrating self-expression.      Comments:     Patient did not participate in Cognitive Skills group, despite staff encouragement and explanation of benefits.  Patient remain seclusive to self.  Q15 minute safety checks maintained for patient safety and will continue to encourage patient to attend unit programming.        Signature:  MAURY Campbell

## 2024-03-13 NOTE — PROGRESS NOTES
Pre ECT Assessment Note  Psychiatry  3/13/2024      William Babcock  1960  [id number]      Subjective:     Patient is a 64 y.o.  male seen for an evaluation prior to today's electroconvulsive therapy treatment. Today is treatment number 1, utilizing bilateral.  This is course number 1.  The patient has previously never completed a course of ECT.    Today is Reagan's first treatment, informed consent was obtained by the attending.  The attending also spoke with Reagan's brother about the treatment yesterday evening.  Both are agreeable given the decline in his physical health related to mood.  He has had a decreased appetite, poor motivation and energy.  He has expressed depressive symptoms.  We will continue to monitor for improvement with treatment.    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[facility]) 02/09/2024    Agitation 02/09/2024    Stenosis of right carotid artery greater than 50% 02/09/2024    Psychosis, paranoid ([facility]) 02/06/2024    Abnormal CT of the head 02/06/2024    Psychosis ([facility]) 02/06/2024    Pseudoaneurysm ([facility]) 02/06/2024    Essential hypertension 12/01/2016    Primary insomnia 12/01/2016    Influenza vaccine refused 12/01/2016    Refused Streptococcus pneumoniae vaccination 12/01/2016     Past Medical History:   Diagnosis Date    Anxiety     Autism       Past Surgical History:   Procedure Laterality Date    CEREBRAL ANGIOGRAM  02/09/2024    IR ANGIOGRAM CAROTID CEREBRAL BILATERAL    EYE SURGERY        Medications Prior to Admission: fluticasone (FLONASE) 50 MCG/ACT nasal spray, 2 sprays by Each Nostril route daily  aspirin 81 MG chewable tablet, Take 1 tablet by mouth daily  clopidogrel (PLAVIX) 75 MG tablet, Take 1 tablet by mouth daily  QUEtiapine (SEROQUEL) 25 MG

## 2024-03-13 NOTE — BH NOTE
Departure from I Unit to ECT:    Pre-op ECT Checklist completed and placed by front of patients chart. Patient departed unit Time: 0705 and Via Wheelchair, accompanied by [facility] staff. Patient is alert and orientated at this time, as well as Calm, cooperative, .

## 2024-03-13 NOTE — PROCEDURES
Bilateral ECT Procedure Report  William Babcock   3/13/2024  1960         Attending:Donnie Rosa MD  Preprocedure diagnosis: Major depressive disorder, recurrent, severe with psychotic symptoms (F33.3)  Postprocedure diagnosis: SAME AS PRE-PROCEDURE DIAGNOSIS  Treatment Number: This is treatment # 1   Patient Status: BEHAVIOR IP   Type of ECT: Bilateral Brief Pulse  Medications  Preprocedure: Tylenol 650mg  Anesthetic: Methohexital 70 mg  Paralytic: Succinylcholine 70 mg  Post procedure: none needed   Cuff placement: Left Lower Extremity    MECTA Settings 1st Stimulus:     Pulse width: 1.0 ms   Frequency:  40 hz   Duration:   2.0 seconds   Static Impedance: 284 ohms             Dynamic Impedance: 168 ohms             Motor Seizure Duration: 30 seconds  EEG Seizure Duration: 133 seconds             The patient's ECT treatment was performed using MECTA machine  .  Timeout:  Time out was performed using two patient identifiers and confirmation of procedure.     Patient Preparation: Preprocedure documentation, labs, H&P were all verified and reviewed.  The patient was placed in supine position.  EEG leads were placed for monitoring of seizure.   Hinduism areas bilaterally cleaned and prepped.  Conductive gel  was applied over stimulus electrode site.   The patient was pre-oxygenated.       Procedure in detail: Medications were administered by anesthesia using intravenous access at the doses listed previously in this summary.  Anesthetic administered and once confirmation the patient is anesthetized, the patient's blood pressure cuff on lower extremity was inflated to 100mmHg in excess of patient's blood pressure.  At this time, the neuromuscular blockade was administered intravenously by anesthesia.  Upper extremity fasciculation were verified followed by lower extremity fasciculation.  Once fasciculations terminated, confirmation of affective neuromuscular blockade demonstrated by absence of withdrawal reflex.

## 2024-03-13 NOTE — ANESTHESIA PRE PROCEDURE
\"PREGTESTUR\", \"PREGSERUM\", \"HCG\", \"HCGQUANT\"     ABGs: No results found for: \"PHART\", \"PO2ART\", \"QLS9KVD\", \"EQS3KWB\", \"BEART\", \"P6VFCBYQ\"     Type & Screen (If Applicable):  No results found for: \"LABABO\", \"LABRH\"    Drug/Infectious Status (If Applicable):  No results found for: \"HIV\", \"HEPCAB\"    COVID-19 Screening (If Applicable): No results found for: \"COVID19\"        Anesthesia Evaluation  Patient summary reviewed and Nursing notes reviewed   no history of anesthetic complications:   Airway: Mallampati: II  TM distance: >3 FB   Neck ROM: full  Mouth opening: > = 3 FB   Dental:    (+) poor dentition  Comment: Multiple missing teeth    Pulmonary:Negative Pulmonary ROS breath sounds clear to auscultation                             Cardiovascular:    (+) hypertension: no interval change      ECG reviewed  Rhythm: regular  Rate: normal  Echocardiogram reviewed               ROS comment: Left Ventricle: Mildly reduced left ventricular systolic function with a visually estimated EF of 45 - 50%     Neuro/Psych:   (+) psychiatric history:            GI/Hepatic/Renal: Neg GI/Hepatic/Renal ROS            Endo/Other: Negative Endo/Other ROS                    Abdominal:             Vascular: negative vascular ROS.         Other Findings:         Anesthesia Plan      general     ASA 3       Induction: intravenous.    MIPS: Prophylactic antiemetics administered.  Anesthetic plan and risks discussed with patient.      Plan discussed with CRNA.                Aris Nichole MD   3/13/2024

## 2024-03-13 NOTE — PROGRESS NOTES
Daily Progress Note  3/13/2024    Patient Name: William Babcock    CHIEF COMPLAINT:  Acute Psychosis          SUBJECTIVE:      Patient is seen today for a follow up assessment. Vitals and labs reviewed and appear to be within normal limits.  Reagan was compliant with scheduled medications last night.  He remains behaviorally in control and has not required emergency medications in the past 24 hours. Reagan did receive his first round of Electroconvulsive therapy this morning.  He will continue this therapy on Friday.  Reagan is seen on the unit milieu today, pacing, following ECT.  This writer approached him and asked if went down for ECT today, he states, \"no when?\"  Reagan continues to be overactive and bizarre. He states he has not been eating and unknown if he has been sleeping well.  He continues to appear disheveled and needs frequent redirection.  He has significant lack of insight into his mental health.  He states he is doing \"fine\" but then states that he wants to harm himself.  It is unclear if Reagan even understands what he is talking about.  He is disorganized and bizarre.  At this time, Reagan has yet to show stability of symptoms.  He would be very unsafe out of the hospital at this time and would be unable to meet his own basic needs.  He continues to require inpatient hospitalization for his safety and stability.     Appetite:  [] Normal/Adequate/Unchanged  [] Increased  [x] Decreased      Sleep:       [] Normal/Adequate/Unchanged  [] Fair  [x] Poor      Group Attendance on Unit:   [] Yes  [] Selectively    [x] No    Medication Side Effects:  Patient denies any medication side effects at the time of assessment.         Mental Status Exam  Level of consciousness: Alert and awake.   Appearance: Appropriate attire for setting, pacing on milieu, with poor grooming and hygiene, disheveled.   Behavior/Motor: Bizarre, restless, pacing  Attitude toward examiner: Attempts to be cooperative, distracted, poor eye     Q-T Interval 03/09/2024 390  ms Final    QTc Calculation (Bazett) 03/09/2024 414  ms Final    P Axis 03/09/2024 48  degrees Final    R Axis 03/09/2024 20  degrees Final    T Axis 03/09/2024 62  degrees Final    Sodium 03/12/2024 139  135 - 144 mmol/L Final    Potassium 03/12/2024 3.7  3.7 - 5.3 mmol/L Final    Chloride 03/12/2024 103  98 - 107 mmol/L Final    CO2 03/12/2024 22  20 - 31 mmol/L Final    Anion Gap 03/12/2024 14  9 - 17 mmol/L Final    Glucose 03/12/2024 96  70 - 99 mg/dL Final    BUN 03/12/2024 16  8 - 23 mg/dL Final    Creatinine 03/12/2024 0.9  0.7 - 1.2 mg/dL Final    Est, Glom Filt Rate 03/12/2024 >60  >60 mL/min/1.73m2 Final    Comment:       These results are not intended for use in patients <18 years of age.        eGFR results are calculated without a race factor using the 2021 CKD-EPI equation.  Careful clinical correlation is recommended, particularly when comparing to results   calculated using previous equations.  The CKD-EPI equation is less accurate in patients with extremes of muscle mass, extra-renal   metabolism of creatine, excessive creatine ingestion, or following therapy that affects   renal tubular secretion.      Calcium 03/12/2024 8.8  8.6 - 10.4 mg/dL Final    POC Glucose 03/13/2024 93  75 - 110 mg/dL Final         Reviewed patient's current plan of care and vital signs with nursing staff.    Labs reviewed: [x] Yes  Last EKG in EMR reviewed: [x] Yes  QTC: 414    Medications  Current Facility-Administered Medications: lactated ringers IV soln infusion, , IntraVENous, Continuous  atenolol (TENORMIN) tablet 25 mg, 25 mg, Oral, Daily  venlafaxine (EFFEXOR XR) extended release capsule 37.5 mg, 37.5 mg, Oral, Daily with breakfast  OLANZapine (ZYPREXA) tablet 7.5 mg, 7.5 mg, Oral, Nightly  polyethylene glycol (GLYCOLAX) packet 17 g, 17 g, Oral, Daily  cyanocobalamin injection 1,000 mcg, 1,000 mcg, IntraMUSCular, Weekly  donepezil (ARICEPT) tablet 5 mg, 5 mg, Oral,  Nightly  diphenhydrAMINE-zinc acetate 2-0.1 % cream, , Topical, TID PRN  OLANZapine zydis (ZYPREXA) disintegrating tablet 5 mg, 5 mg, Oral, BID PRN **OR** OLANZapine (ZyPREXA) 5 mg in sterile water 1 mL injection, 5 mg, IntraMUSCular, BID PRN  aspirin chewable tablet 81 mg, 81 mg, Oral, Daily  clopidogrel (PLAVIX) tablet 75 mg, 75 mg, Oral, Daily  fluticasone (FLONASE) 50 MCG/ACT nasal spray 2 spray, 2 spray, Each Nostril, Daily  acetaminophen (TYLENOL) tablet 650 mg, 650 mg, Oral, Q4H PRN  polyethylene glycol (GLYCOLAX) packet 17 g, 17 g, Oral, Daily PRN  traZODone (DESYREL) tablet 50 mg, 50 mg, Oral, Nightly PRN  aluminum & magnesium hydroxide-simethicone (MAALOX) 200-200-20 MG/5ML suspension 30 mL, 30 mL, Oral, Q6H PRN    ASSESSMENT  Severe recurrent major depression with psychotic features (HCC)         HANDOFF  Patient symptoms are: Remains Unstable.  Medications as determined by attending physician  Will continue ECT treatments on Monday, Wednesday and Fridays  Monitor need and frequency of PRN medications.  Encourage participation in groups and milieu.  Probable discharge is to be determined by MD.     Electronically signed by BENOIT Michaud CNP on 3/13/2024 at 11:49 AM    **This report has been created using voice recognition software. It may contain minor errors which are inherent in voice recognition technology.**      I independently saw and evaluated the patient.  I reviewed the nurse practitioners documentation above.  Principle diagnosis we are treating for is Severe recurrent major depression with psychotic features (HCC). Any additional comments or changes to the nurse practitioners documentation are stated below otherwise agree with assessment.  Plan will be as follows:  Little change today after ECT.  Staff is reporting that he is eating better at nighttime.  Will monitor and continue with ECT for now  PLAN  Patient s symptoms   show no change  Continue with current course of treatment and

## 2024-03-13 NOTE — PLAN OF CARE
Problem: Psychosis  Goal: Will report no hallucinations or delusions  Description: INTERVENTIONS:  1. Administer medication as  ordered  2. Assist with reality testing to support increasing orientation  3. Assess if patient's hallucinations or delusions are encouraging self harm or harm to others and intervene as appropriate  3/13/2024 1224 by Nika Rocha LPN  Outcome: Progressing     Problem: Behavior  Goal: Pt/Family maintain appropriate behavior and adhere to behavioral management agreement, if implemented  Description: INTERVENTIONS:  1. Assess patient/family's coping skills and  non-compliant behavior (including use of illegal substances)  2. Notify security of behavior or suspected illegal substances which indicate the need for search of the family and/or belongings  3. Encourage verbalization of thoughts and concerns in a socially appropriate manner  4. Utilize positive, consistent limit setting strategies supporting safety of patient, staff and others  5. Encourage participation in the decision making process about the behavioral management agreement  6. If a visitor's behavior poses a threat to safety call refer to organization policy.  7. Initiate consult with , Psychosocial CNS, Spiritual Care as appropriate  Outcome: Progressing     Problem: Safety - Adult  Goal: Free from fall injury  3/13/2024 1224 by Nika Rocha LPN  Outcome: Progressing     Problem: Nutrition Deficit:  Goal: Optimize nutritional status  Outcome: Progressing     Problem: Pain  Goal: Verbalizes/displays adequate comfort level or baseline comfort level  Outcome: Progressing   Pt remains free of falls and verbalizes understanding of individual fall risks.  Pt wearing non skid footwear and encouraged to seek out staff for any assistance needed. Patient is paranoid that he will die a virgin, Patient states \"I can't die a virgin, I'm going to die a virgin.\" He needs lots of encouragement to eat his meals and

## 2024-03-13 NOTE — GROUP NOTE
Group Therapy Note    Date: 3/13/2024    Group Start Time: 1330  Group End Time: 1425  Group Topic: Psychoeducation    Emelia Mueller CTRS        Group Therapy Note    Attendees: 2/9    Psych-Ed/Relapse Prevention Group Note        Date: March 13, 2024             Start Time: 1:30pm  End Time: 2:25pm      Number of Participants in Group & Unit Census:  2/9    Topic: interpersonal skills, coping skills, self-expression    Goal of Group: To improve interpersonal skills and coping skills awareness through collaborating with peers and demonstrating self-expression.       Comments:     Patient did not participate in Psych-Ed/Relapse Prevention group, despite staff encouragement and explanation of benefits.  Patient remain seclusive to self.  Q15 minute safety checks maintained for patient safety and will continue to encourage patient to attend unit programming.        Signature:  MAURY Campbell

## 2024-03-13 NOTE — PROGRESS NOTES
Post ECT patient in PACU with Psych nurse at bedside to transport patient back to [facility]. Patient continuously attempts to get out of bed and states \"I can't do this\". Patient assisted up to wheelchair per RN and then attempts to get up by himself with much redirection from the RN to sit down.

## 2024-03-13 NOTE — ANESTHESIA POSTPROCEDURE EVALUATION
Department of Anesthesiology  Postprocedure Note    Patient: William Babcock  MRN: [id number]  YOB: 1960  Date of evaluation: 3/13/2024    Procedure Summary       Date: 03/13/24 Room / Location: [facility] PACU    Anesthesia Start: 0816 Anesthesia Stop: 0830    Procedure: ECT W/ ANESTHESIA Diagnosis: Acute psychosis (HCC)    Scheduled Providers:  Responsible Provider: Aris Nichole MD    Anesthesia Type: general ASA Status: 3            Anesthesia Type: No value filed.    Wanda Phase I: Wanda Score: 7    Wanda Phase II:      Anesthesia Post Evaluation    Patient location during evaluation: PACU  Patient participation: complete - patient participated  Level of consciousness: awake and alert  Airway patency: patent  Nausea & Vomiting: no vomiting  Cardiovascular status: hemodynamically stable  Respiratory status: acceptable  Hydration status: euvolemic  Comments: POST- ANESTHESIA EVALUATION       Pt Name: William Babcock  MRN: [id number]  YOB: 1960  Date of evaluation: 3/13/2024  Time:  11:07 AM      /60   Pulse 71   Temp 97.9 °F (36.6 °C) (Temporal)   Resp 18   Ht 1.727 m (5' 8\")   Wt 60.3 kg (133 lb)   SpO2 100%   BMI 20.22 kg/m²      Consciousness Level  Awake  Cardiopulmonary Status  Stable  Pain Adequately Treated YES  Nausea / Vomiting  NO  Adequate Hydration  YES  Anesthesia Related Complications NONE      Electronically signed by Aris Nichole MD on 3/13/2024 at 11:07 AM         Pain management: satisfactory to patient    No notable events documented.

## 2024-03-14 LAB
ALBUMIN SERPL-MCNC: 4 G/DL (ref 3.5–5.2)
ALP SERPL-CCNC: 57 U/L (ref 40–129)
ALT SERPL-CCNC: 13 U/L (ref 5–41)
ANION GAP SERPL CALCULATED.3IONS-SCNC: 12 MMOL/L (ref 9–17)
AST SERPL-CCNC: 18 U/L
BASOPHILS # BLD: 0.1 K/UL (ref 0–0.2)
BASOPHILS NFR BLD: 1 % (ref 0–2)
BILIRUB SERPL-MCNC: 0.9 MG/DL (ref 0.3–1.2)
BUN SERPL-MCNC: 14 MG/DL (ref 8–23)
CALCIUM SERPL-MCNC: 8.9 MG/DL (ref 8.6–10.4)
CHLORIDE SERPL-SCNC: 104 MMOL/L (ref 98–107)
CO2 SERPL-SCNC: 26 MMOL/L (ref 20–31)
CREAT SERPL-MCNC: 0.8 MG/DL (ref 0.7–1.2)
EOSINOPHIL # BLD: 0.1 K/UL (ref 0–0.4)
EOSINOPHILS RELATIVE PERCENT: 2 % (ref 0–4)
ERYTHROCYTE [DISTWIDTH] IN BLOOD BY AUTOMATED COUNT: 15 % (ref 11.5–14.9)
GFR SERPL CREATININE-BSD FRML MDRD: >60 ML/MIN/1.73M2
GLUCOSE SERPL-MCNC: 95 MG/DL (ref 70–99)
HCT VFR BLD AUTO: 39.8 % (ref 41–53)
HGB BLD-MCNC: 13.3 G/DL (ref 13.5–17.5)
LYMPHOCYTES NFR BLD: 0.8 K/UL (ref 1–4.8)
LYMPHOCYTES RELATIVE PERCENT: 15 % (ref 24–44)
MCH RBC QN AUTO: 30.6 PG (ref 26–34)
MCHC RBC AUTO-ENTMCNC: 33.5 G/DL (ref 31–37)
MCV RBC AUTO: 91.5 FL (ref 80–100)
MONOCYTES NFR BLD: 0.4 K/UL (ref 0.1–1.3)
MONOCYTES NFR BLD: 8 % (ref 1–7)
NEUTROPHILS NFR BLD: 74 % (ref 36–66)
NEUTS SEG NFR BLD: 4 K/UL (ref 1.3–9.1)
PLATELET # BLD AUTO: 181 K/UL (ref 150–450)
PMV BLD AUTO: 7.2 FL (ref 6–12)
POTASSIUM SERPL-SCNC: 4.5 MMOL/L (ref 3.7–5.3)
PROT SERPL-MCNC: 6.5 G/DL (ref 6.4–8.3)
RBC # BLD AUTO: 4.36 M/UL (ref 4.5–5.9)
SODIUM SERPL-SCNC: 142 MMOL/L (ref 135–144)
WBC OTHER # BLD: 5.4 K/UL (ref 3.5–11)

## 2024-03-14 PROCEDURE — 6370000000 HC RX 637 (ALT 250 FOR IP): Performed by: INTERNAL MEDICINE

## 2024-03-14 PROCEDURE — 6370000000 HC RX 637 (ALT 250 FOR IP): Performed by: NURSE PRACTITIONER

## 2024-03-14 PROCEDURE — 99232 SBSQ HOSP IP/OBS MODERATE 35: CPT | Performed by: INTERNAL MEDICINE

## 2024-03-14 PROCEDURE — 36415 COLL VENOUS BLD VENIPUNCTURE: CPT

## 2024-03-14 PROCEDURE — 99232 SBSQ HOSP IP/OBS MODERATE 35: CPT | Performed by: PSYCHIATRY & NEUROLOGY

## 2024-03-14 PROCEDURE — 80053 COMPREHEN METABOLIC PANEL: CPT

## 2024-03-14 PROCEDURE — 1240000000 HC EMOTIONAL WELLNESS R&B

## 2024-03-14 PROCEDURE — 6370000000 HC RX 637 (ALT 250 FOR IP): Performed by: PSYCHIATRY & NEUROLOGY

## 2024-03-14 PROCEDURE — APPSS30 APP SPLIT SHARED TIME 16-30 MINUTES

## 2024-03-14 PROCEDURE — 85025 COMPLETE CBC W/AUTO DIFF WBC: CPT

## 2024-03-14 RX ADMIN — VENLAFAXINE HYDROCHLORIDE 37.5 MG: 75 CAPSULE, EXTENDED RELEASE ORAL at 08:47

## 2024-03-14 RX ADMIN — ASPIRIN 81 MG 81 MG: 81 TABLET ORAL at 08:49

## 2024-03-14 RX ADMIN — CLOPIDOGREL BISULFATE 75 MG: 75 TABLET ORAL at 08:47

## 2024-03-14 RX ADMIN — FLUTICASONE PROPIONATE 2 SPRAY: 50 SPRAY, METERED NASAL at 08:47

## 2024-03-14 RX ADMIN — OLANZAPINE 7.5 MG: 7.5 TABLET, FILM COATED ORAL at 21:07

## 2024-03-14 RX ADMIN — ATENOLOL 25 MG: 25 TABLET ORAL at 08:47

## 2024-03-14 RX ADMIN — TRAZODONE HYDROCHLORIDE 50 MG: 50 TABLET ORAL at 21:07

## 2024-03-14 RX ADMIN — DONEPEZIL HYDROCHLORIDE 5 MG: 5 TABLET, FILM COATED ORAL at 21:07

## 2024-03-14 ASSESSMENT — PAIN SCALES - GENERAL: PAINLEVEL_OUTOF10: 0

## 2024-03-14 NOTE — PLAN OF CARE
[facility]  Weekly Interdisciplinary Treatment Plan Note     Review Date & Time: 3/14/2024 1245    Admission Type:   Admission Type: Voluntary    Reason for admission:  Reason for Admission: Acute Psychosis Bizarre behavior, decline in ADLs, harrasing neighbors    Estimated Length of Stay :  5-7 days  Estimated Discharge Date Update: to be determined by physician    PATIENT STRENGTHS:  Patient Strengths:   Patient Strengths and Limitations:Limitations: Perceives need for assistance with self-care, Difficult relationships / poor social skills, Multiple barriers to leisure interests, Difficulty problem solving/relies on others to help solve problems, Tendency to isolate self, Limited education -> difficulty reading or writing, Unrealistic self-view, Demonstrates discomfort with /lack of social skills  Addictive Behavior:Addictive Behavior  In the Past 3 Months, Have You Felt or Has Someone Told You That You Have a Problem With  : None  Medical Problems:   Past Medical History:   Diagnosis Date    Anxiety     Autism        Risk:  Fall Risk   Houston Scale Houston Scale Score: 21  BVC      Change in scores no. Changes to plan of Care no    Status EXAM:   Mental Status and Behavioral Exam  Normal: No  Level of Assistance: Needs encouragement  Facial Expression: Expressionless, Worried  Affect: Blunt  Level of Consciousness: Alert  Frequency of Checks: 4 times per hour, close  Mood:Normal: No  Mood: Anxious, Helpless  Motor Activity:Normal: No  Motor Activity: Decreased, Repetitive acts  Eye Contact: Fair  Observed Behavior: Impulsive, Cooperative, Preoccupied  Sexual Misconduct History: Current - no  Preception: [place] to person, [place] to place  Attention:Normal: No  Attention: Unable to concentrate  Thought Processes: Blocking, Flight of ideas  Thought Content:Normal: No  Thought Content: Paranoia, Preoccupations  Depression Symptoms: Appetite change, Impaired concentration  Anxiety Symptoms:  Generalized  Isabel Symptoms: Poor judgment  Hallucinations: None  Delusions: Yes  Delusions: Paranoid  Memory:Normal: No  Memory: Poor recent  Insight and Judgment: No  Insight and Judgment: Poor judgment, Poor insight, Unrealistic    Daily Assessment Last Entry:   Daily Sleep (WDL): Within Defined Limits            Daily Nutrition (WDL): Within Defined Limits  Appetite Change: Decreased  Barriers to Nutrition: Cognitive impairment  Level of Assistance: Needs encouragement    Patient Monitoring:  Frequency of Checks: 4 times per hour, close    Psychiatric Symptoms:   Depression Symptoms  Depression Symptoms: Appetite change, Impaired concentration  Anxiety Symptoms  Anxiety Symptoms: Generalized  Isabel Symptoms  Isabel Symptoms: Poor judgment  Isabel Description: less need to sleep, poor judgement     Psychosis Symptoms  Hallucination Type: No problems reported or observed.  Delusion Type: Paranoid  Describe Delusion: paranoia of unit peers and staff    Suicide Risk CSSR-S:  1) Within the past month, have you wished you were dead or wished you could go to sleep and not wake up? : No  2) Have you actually had any thoughts of killing yourself? : No  6) Have you ever done anything, started to do anything, or prepared to do anything to end your life?: No  Change in Result NO Change in Plan of care NO    EDUCATION:   Learner Progress Toward Treatment Goals: Reviewed results and recommendations of this team, Reviewed group plan and strategies, Reviewed signs, symptoms and risk of self harm and violent behavior, Reviewed goals and plan of care    Method: individual education, small group, verbal education    Outcome: verbalized understanding, but needs reinforcement to obtain goals    PATIENT GOALS:   short term: patient unable to attend treatment team to discuss goals at this time  Long term: patient unable to attend treatment team to discuss goals at this time    PLAN/TREATMENT RECOMMENDATIONS UPDATE:   Continue with group  therapies, education of coping skills   Continue to monitor patient on unit.  Medications provided/ medication compliance by patient.  Continue for plans to obtain long term goals after discharge.    SHORT-TERM GOALS UPDATE:  Time frame for Short-Term Goals: 8-14days     LONG-TERM GOALS UPDATE:  Time frame for Long-Term Goals: 6 months  Members Present in Team Meeting: See Signature Sheet    Yady Muniz RN

## 2024-03-14 NOTE — PROGRESS NOTES
Daily Progress Note  3/14/2024    Patient Name: William Babcock    CHIEF COMPLAINT:  Acute Psychosis          SUBJECTIVE:      Patient is seen today for a follow up assessment. Vitals and labs reviewed and appear to be within normal limits.  Reagan is compliant with scheduled medications including Aricept, Effexor, and Zyprexa.  Reagan remains behaviorally in control and has not required emergency medications in the past 24 hours. He started receiving Electroconvulsive therapy yesterday and will resume treatments tomorrow morning.  Reagan is seen at bedside today, he immediately gets up and starts following writer around the milieu.  He does not seem to have any personal boundaries today.  He is fixated on \"dying\" today.  He says over and over that he is going to \"die a virgin.\"  He reports he is \"not okay\" but cannot elaborate.  Nursing staff reports that he is eating better at nighttime, but still very minimally throughout the day.  He has been drinking more water.  Unsure how Reagan is sleeping but he states \"Not good.\"  Unable to validate if this is true.  He continues to be disorganized, bizarre and lacks significant insight into his mental illness.  He would not be able to meet his own basic needs outside of the hospital.  He continues to require inpatient hospitalization for his safety and stability at this time.    Appetite:  [] Normal/Adequate/Unchanged  [] Increased  [x] Decreased      Sleep:       [] Normal/Adequate/Unchanged  [] Fair  [x] Poor      Group Attendance on Unit:   [] Yes  [] Selectively    [x] No    Medication Side Effects:  Patient denies any medication side effects at the time of assessment.         Mental Status Exam  Level of consciousness: Alert and awake.   Appearance: Appropriate attire for setting,laying in bed but then pacing on milieu, with poor grooming and hygiene, disheveled.   Behavior/Motor: Bizarre, restless, pacing  Attitude toward examiner: Attempts to be cooperative,  distracted, poor eye contact, no personal boundaries  Speech: Latent, mumbled, low volume  Mood:  Patient reports \"I'm going to die\".   Affect: Not congruent, blunted  Thought processes: Disorganized, illogical  Thought content: Denies homicidal ideation.  Suicidal Ideation: States he is having Active suicidal ideations but does not appear that he understands what this means  Delusions: Paranoid delusions evident  Perceptual Disturbance: Patient does not appear to be responding to internal stimuli. Denies auditory hallucinations. Denies visual hallucinations.   Cognition: Oriented to self and situation  Memory: Impaired.  Insight & Judgement: Poor.     Data   height is 1.727 m (5' 8\") and weight is 60.3 kg (133 lb). His temporal temperature is 97.3 °F (36.3 °C). His blood pressure is 120/64 and his pulse is 59. His respiration is 14 and oxygen saturation is 99%.   Labs:   No results displayed because visit has over 200 results.            Reviewed patient's current plan of care and vital signs with nursing staff.    Labs reviewed: [x] Yes  Last EKG in EMR reviewed: [x] Yes  QTC: 414    Medications  Current Facility-Administered Medications: lactated ringers IV soln infusion, , IntraVENous, Continuous  atenolol (TENORMIN) tablet 25 mg, 25 mg, Oral, Daily  venlafaxine (EFFEXOR XR) extended release capsule 37.5 mg, 37.5 mg, Oral, Daily with breakfast  OLANZapine (ZYPREXA) tablet 7.5 mg, 7.5 mg, Oral, Nightly  polyethylene glycol (GLYCOLAX) packet 17 g, 17 g, Oral, Daily  cyanocobalamin injection 1,000 mcg, 1,000 mcg, IntraMUSCular, Weekly  donepezil (ARICEPT) tablet 5 mg, 5 mg, Oral, Nightly  diphenhydrAMINE-zinc acetate 2-0.1 % cream, , Topical, TID PRN  OLANZapine zydis (ZYPREXA) disintegrating tablet 5 mg, 5 mg, Oral, BID PRN **OR** OLANZapine (ZyPREXA) 5 mg in sterile water 1 mL injection, 5 mg, IntraMUSCular, BID PRN  aspirin chewable tablet 81 mg, 81 mg, Oral, Daily  clopidogrel (PLAVIX) tablet 75 mg, 75 mg,  Oral, Daily  fluticasone (FLONASE) 50 MCG/ACT nasal spray 2 spray, 2 spray, Each Nostril, Daily  acetaminophen (TYLENOL) tablet 650 mg, 650 mg, Oral, Q4H PRN  polyethylene glycol (GLYCOLAX) packet 17 g, 17 g, Oral, Daily PRN  traZODone (DESYREL) tablet 50 mg, 50 mg, Oral, Nightly PRN  aluminum & magnesium hydroxide-simethicone (MAALOX) 200-200-20 MG/5ML suspension 30 mL, 30 mL, Oral, Q6H PRN    ASSESSMENT  Severe recurrent major depression with psychotic features (HCC)         HANDOFF  Patient symptoms are: Remains Unstable.  Medications as determined by attending physician  Will continue ECT treatments on Monday, Wednesday and Fridays  Monitor need and frequency of PRN medications.  Encourage participation in groups and milieu.  Probable discharge is to be determined by MD.     Electronically signed by BENOIT Michaud CNP on 3/14/2024 at 11:58 AM    **This report has been created using voice recognition software. It may contain minor errors which are inherent in voice recognition technology.**    I independently saw and evaluated the patient.  I reviewed the nurse practitioners documentation above.  Principle diagnosis we are treating for is Severe recurrent major depression with psychotic features (HCC). Any additional comments or changes to the nurse practitioners documentation are stated below otherwise agree with assessment.  Plan will be as follows:    PLAN  Patient s symptoms   show no change  Continue with ECT and current medications for now  Attempt to develop insight  Psycho-education conducted.  Supportive Therapy conducted.  Probable discharge is undetermined at this time  Follow-up daily while on inpatient unit

## 2024-03-14 NOTE — PLAN OF CARE
Problem: Behavior  Goal: Pt/Family maintain appropriate behavior and adhere to behavioral management agreement, if implemented  Description: INTERVENTIONS:  1. Assess patient/family's coping skills and  non-compliant behavior (including use of illegal substances)  2. Notify security of behavior or suspected illegal substances which indicate the need for search of the family and/or belongings  3. Encourage verbalization of thoughts and concerns in a socially appropriate manner  4. Utilize positive, consistent limit setting strategies supporting safety of patient, staff and others  5. Encourage participation in the decision making process about the behavioral management agreement  6. If a visitor's behavior poses a threat to safety call refer to organization policy.  7. Initiate consult with , Psychosocial CNS, Spiritual Care as appropriate  Outcome: Progressing     Problem: Safety - Adult  Goal: Free from fall injury  Outcome: Progressing  Note: Pt remains free of falls; is wearing nonskid footwear; fall risk is indicated on arm band and room door. Educated on \"Stay with Me\" practices to reduce likelihood of falls; agreeable to requesting staff assistance as needed. Q15min checks maintained.      Problem: Psychosis  Goal: Will report no hallucinations or delusions  Description: INTERVENTIONS:  1. Administer medication as  ordered  2. Assist with reality testing to support increasing orientation  3. Assess if patient's hallucinations or delusions are encouraging self harm or harm to others and intervene as appropriate  Outcome: Progressing  Note: Patient continues to pace and make bizarre statements. Patient is religously preocupied stating that he cannot eat due to lent. Patient encouraged to eat and drink fluids. Patient accepting of medications this morning and food consumption of approximately 25-35%. Comfort and reassurance provided. Safety checks Maintained every 15 minutes and as needed.

## 2024-03-14 NOTE — GROUP NOTE
Group Therapy Note    Date: 3/14/2024    Group Start Time: 1330  Group End Time: 1410  Group Topic: Psychoeducation    [facility] Emelia Belcher CTRS        Group Therapy Note    Attendees: 4/9       Patient's Goal:  To improve interpersonal skills and gratitude awareness through collaborating with peers and demonstrating creative expression     Notes:  Patient attended and participated in 5 minutes of group with prompting. Patient minimally collaborated with peers and demonstrated creative expression while in group. Patient appeared anxious. Patient removed himself from group.     Status After Intervention:  Unchanged    Participation Level: Minimal. Active Listener and Interactive with prompting.     Participation Quality:  Attentive while in group. Sharing with prompting. Resistant.     Speech:  normal, mumbling      Thought Process/Content: Flight of ideas. Preoccupied.       Affective Functioning: Constricted       Mood: anxious      Level of consciousness:  Alert. Preoccupied.       Response to Learning: Able to verbalize current knowledge/experience and Resistant      Endings: None Reported    Modes of Intervention: Support, Socialization, Exploration, and Activity      Discipline Responsible: Psychoeducational Specialist      Signature:  MAURY Campbell

## 2024-03-14 NOTE — GROUP NOTE
Group Therapy Note    Date: 3/14/2024    Group Start Time: 1000  Group End Time: 1020  Group Topic: Psychotherapy    [facility] Jannette Waller MSW        Group Therapy Note    Attendees: 0/10       Patient's Goal:  Discuss and identify healthy v. unhealthy coping skills     Patient was offered group therapy today but declined to participate despite encouragement from staff.  1:1 was offered.      Discipline Responsible: /Counselor      Signature:  TANO Hernández

## 2024-03-14 NOTE — GROUP NOTE
Group Therapy Note    Date: 3/14/2024    Group Start Time: 1100  Group End Time: 1140  Group Topic: Recreational    [facility] Emelia Belcher CTRS        Group Therapy Note    Attendees: 2/10    Recreation Group Note        Date: March 14, 2024               Start Time: 11am  End Time: 11:40am      Number of Participants in Group & Unit Census:  2/10    Topic:  interpersonal skills, leisure awareness, concentration     Goal of Group: To improve interpersonal skills and leisure awareness through collaborating with peers and concentrating on a presented task.       Comments:     Patient did not participate in Recreation group, despite staff encouragement and explanation of benefits.  Patient remain seclusive to self.  Q15 minute safety checks maintained for patient safety and will continue to encourage patient to attend unit programming.        Signature:  MAURY Campbell

## 2024-03-14 NOTE — PROGRESS NOTES
IN-PATIENT SERVICE  [facility]    HISTORY AND PHYSICAL EXAMINATION/ CONSULT NOTE            Date:   3/14/2024  Patient name:  William Babcock  Date of admission:  2/21/2024  9:18 PM  MRN:   [id number]  Account:  [id number]  YOB: 1960  PCP:    Billy Francois MD  Room:   [address] [place] [address]  Code Status:    Full Code    Physician Requesting Consult: Donnie Rosa MD    Reason for Consult: History and physical, medical management    Chief Complaint:     No chief complaint on file.    Medical management    History Obtained From:     Patient, EMR, nursing staff    History of Present Illness:     63-year-old male admitted for acute psychosis.  Patient has history of autism;   Carotid pseudoaneurysm.  Recent admission to [facility] for right proximal RCA pseudoaneurysm after patient presented on 2/5 for agitation and psychosis.  Patient underwent cerebral angiogram and embolization of cervical pseudoaneurysm 2/9/2024.  Was discharged on aspirin and Plavix, resumed.  Patient was discharged to [facility] on 2/12 subsequently discharged on 2/16.    Past Medical History:     Past Medical History:   Diagnosis Date    Anxiety     Autism         Past Surgical History:     Past Surgical History:   Procedure Laterality Date    CEREBRAL ANGIOGRAM  02/09/2024    IR ANGIOGRAM CAROTID CEREBRAL BILATERAL    EYE SURGERY          Medications Prior to Admission:     Prior to Admission medications    Medication Sig Start Date End Date Taking? Authorizing Provider   fluticasone (FLONASE) 50 MCG/ACT nasal spray 2 sprays by Each Nostril route daily   Yes Provider, MD Nick   aspirin 81 MG chewable tablet Take 1 tablet by mouth daily 2/17/24   Donnie Rosa MD   clopidogrel (PLAVIX) 75 MG tablet Take 1 tablet by mouth daily 2/17/24   Donnie Rosa MD   QUEtiapine (SEROQUEL) 25 MG tablet Take 1 tablet by mouth 2 times daily 2/16/24   Donnie Rosa MD   atenolol (TENORMIN) 50

## 2024-03-15 ENCOUNTER — ANESTHESIA (OUTPATIENT)
Dept: POSTOP/PACU | Age: 64
End: 2024-03-15
Payer: MEDICARE

## 2024-03-15 ENCOUNTER — APPOINTMENT (OUTPATIENT)
Dept: POSTOP/PACU | Age: 64
End: 2024-03-15
Payer: MEDICARE

## 2024-03-15 ENCOUNTER — ANESTHESIA EVENT (OUTPATIENT)
Dept: POSTOP/PACU | Age: 64
End: 2024-03-15
Payer: MEDICARE

## 2024-03-15 LAB — GLUCOSE BLD-MCNC: 85 MG/DL (ref 75–110)

## 2024-03-15 PROCEDURE — 7100000001 HC PACU RECOVERY - ADDTL 15 MIN: Performed by: ANESTHESIOLOGY

## 2024-03-15 PROCEDURE — 90870 ELECTROCONVULSIVE THERAPY: CPT | Performed by: PSYCHIATRY & NEUROLOGY

## 2024-03-15 PROCEDURE — 90870 ELECTROCONVULSIVE THERAPY: CPT | Performed by: ANESTHESIOLOGY

## 2024-03-15 PROCEDURE — 6370000000 HC RX 637 (ALT 250 FOR IP): Performed by: NURSE PRACTITIONER

## 2024-03-15 PROCEDURE — 6370000000 HC RX 637 (ALT 250 FOR IP): Performed by: PSYCHIATRY & NEUROLOGY

## 2024-03-15 PROCEDURE — 3700000001 HC ADD 15 MINUTES (ANESTHESIA): Performed by: ANESTHESIOLOGY

## 2024-03-15 PROCEDURE — 6370000000 HC RX 637 (ALT 250 FOR IP): Performed by: INTERNAL MEDICINE

## 2024-03-15 PROCEDURE — 7100000000 HC PACU RECOVERY - FIRST 15 MIN: Performed by: ANESTHESIOLOGY

## 2024-03-15 PROCEDURE — 82947 ASSAY GLUCOSE BLOOD QUANT: CPT

## 2024-03-15 PROCEDURE — 3700000000 HC ANESTHESIA ATTENDED CARE: Performed by: ANESTHESIOLOGY

## 2024-03-15 PROCEDURE — 2500000003 HC RX 250 WO HCPCS: Performed by: NURSE ANESTHETIST, CERTIFIED REGISTERED

## 2024-03-15 PROCEDURE — 1240000000 HC EMOTIONAL WELLNESS R&B

## 2024-03-15 PROCEDURE — 99232 SBSQ HOSP IP/OBS MODERATE 35: CPT | Performed by: INTERNAL MEDICINE

## 2024-03-15 PROCEDURE — 99232 SBSQ HOSP IP/OBS MODERATE 35: CPT | Performed by: PSYCHIATRY & NEUROLOGY

## 2024-03-15 PROCEDURE — APPSS30 APP SPLIT SHARED TIME 16-30 MINUTES: Performed by: NURSE PRACTITIONER

## 2024-03-15 RX ORDER — SUCCINYLCHOLINE/SOD CL,ISO/PF 200MG/10ML
SYRINGE (ML) INTRAVENOUS PRN
Status: DISCONTINUED | OUTPATIENT
Start: 2024-03-15 | End: 2024-03-15 | Stop reason: SDUPTHER

## 2024-03-15 RX ORDER — METHOHEXITAL IN WATER/PF 100MG/10ML
SYRINGE (ML) INTRAVENOUS
Status: COMPLETED
Start: 2024-03-15 | End: 2024-03-15

## 2024-03-15 RX ORDER — SUCCINYLCHOLINE/SOD CL,ISO/PF 200MG/10ML
SYRINGE (ML) INTRAVENOUS
Status: COMPLETED
Start: 2024-03-15 | End: 2024-03-15

## 2024-03-15 RX ORDER — METHOHEXITAL IN WATER/PF 100MG/10ML
SYRINGE (ML) INTRAVENOUS PRN
Status: DISCONTINUED | OUTPATIENT
Start: 2024-03-15 | End: 2024-03-15 | Stop reason: SDUPTHER

## 2024-03-15 RX ADMIN — OLANZAPINE 7.5 MG: 7.5 TABLET, FILM COATED ORAL at 20:39

## 2024-03-15 RX ADMIN — ASPIRIN 81 MG 81 MG: 81 TABLET ORAL at 10:14

## 2024-03-15 RX ADMIN — DONEPEZIL HYDROCHLORIDE 5 MG: 5 TABLET, FILM COATED ORAL at 20:39

## 2024-03-15 RX ADMIN — TRAZODONE HYDROCHLORIDE 50 MG: 50 TABLET ORAL at 20:39

## 2024-03-15 RX ADMIN — CLOPIDOGREL BISULFATE 75 MG: 75 TABLET ORAL at 10:13

## 2024-03-15 RX ADMIN — Medication 70 MG: at 08:25

## 2024-03-15 RX ADMIN — ATENOLOL 25 MG: 25 TABLET ORAL at 06:52

## 2024-03-15 RX ADMIN — ALUMINUM HYDROXIDE, MAGNESIUM HYDROXIDE, AND SIMETHICONE 30 ML: 1200; 120; 1200 SUSPENSION ORAL at 18:38

## 2024-03-15 RX ADMIN — ACETAMINOPHEN 650 MG: 325 TABLET ORAL at 06:48

## 2024-03-15 RX ADMIN — VENLAFAXINE HYDROCHLORIDE 37.5 MG: 75 CAPSULE, EXTENDED RELEASE ORAL at 10:13

## 2024-03-15 ASSESSMENT — PAIN - FUNCTIONAL ASSESSMENT
PAIN_FUNCTIONAL_ASSESSMENT: NONE - DENIES PAIN
PAIN_FUNCTIONAL_ASSESSMENT: WONG-BAKER FACES

## 2024-03-15 ASSESSMENT — PAIN SCALES - GENERAL: PAINLEVEL_OUTOF10: 3

## 2024-03-15 NOTE — PLAN OF CARE
Problem: Psychosis  Goal: Will report no hallucinations or delusions  Description: INTERVENTIONS:  1. Administer medication as  ordered  2. Assist with reality testing to support increasing orientation  3. Assess if patient's hallucinations or delusions are encouraging self harm or harm to others and intervene as appropriate  3/15/2024 1710 by Essex, Matthew, RN  Outcome: Progressing  Note: Pt denies having homicidal ideations. Pt denies having depression and anxiety. Pt reports he is going to jump and that he is going to die a virgin. Pt was cooperative with medical treatment and pleasant with staff. Pt was encouraged to shower, but refused saying he was going to get burned or that the water was too cold.      Problem: Safety - Adult  Goal: Free from fall injury  3/15/2024 1710 by Essex, Matthew, RN  Outcome: Progressing  Note: Pt remains free from falls.

## 2024-03-15 NOTE — PROGRESS NOTES
Daily Progress Note  3/15/2024    Patient Name: William Babcock    CHIEF COMPLAINT:  Acute psychosis          SUBJECTIVE:      Patient is seen today for a follow up assessment.  Vitals and labs were reviewed and appear to be within normal limits. Reagan has been compliant with his scheduled medications (Aricept, Effexor, and Zyprexa) and has not required any emergency medications in the past 24 hours. Staff note he has been taking medications better with pudding. Not noted to be spitting them out today. He completed ECT treatment #2 this morning without any adverse effects. Reagan was resting in bed when I arrived to speak with him. He reported he was \"not good\". When asked for further detail, he reported that he feels that way because he \"can't draw a good picture\". He then got up and walked around the room before laying back down in his bed for the rest of our conversation. Presents as restless. Easily distracted. He participated in group today; he reports that he \"doesn't do well with it\" but is unable to elaborate as to why. He reports eating more today and nursing staff agreed that he ate about 50% of his meals today, which is improved for him. Dietary was consulted and did see patient today. He reports not sleeping well due to his depression and hasn't showered in a while. When asked why, he answered \"because\", but was unable to elaborate further. He was also unable to provide an answer when asked why showering is important. No SI or HI at this time. He continues to be disorganized and lacks significant insight into his mental illness. Since he would not be able to meet his own basic needs outside of the hospital, he continues to require inpatient hospitalization for his safety and stability at this time. He also lacks insight and is acutely psychotic. Not stable for discharge.     Appetite:  [] Adequate/Unchanged  [x] Increased  [] Decreased      Sleep:       [] Adequate/Unchanged  [] Fair  [x] Poor   major depression with psychotic features (HCC). Any additional comments or changes to the nurse practitioners documentation are stated below otherwise agree with assessment.  Plan will be as follows:  Staff reports modest improvement in oral intake.  Will continue with ECT  PLAN  Patient s symptoms   show modest signs of increased oral intake  Continue with ECT  Attempt to develop insight  Psycho-education conducted.  Supportive Therapy conducted.  Probable discharge is undetermined at this time  Follow-up daily while on inpatient unit         no fever and no chills.

## 2024-03-15 NOTE — ANESTHESIA POSTPROCEDURE EVALUATION
Department of Anesthesiology  Postprocedure Note    Patient: William Babcock  MRN: [id number]  YOB: 1960  Date of evaluation: 3/15/2024    Procedure Summary       Date: 03/15/24 Room / Location: [facility] PACU    Anesthesia Start: 0821 Anesthesia Stop: 0837    Procedure: ECT W/ ANESTHESIA Diagnosis:     Scheduled Providers:  Responsible Provider: Aris Nichole MD    Anesthesia Type: General ASA Status: 3            Anesthesia Type: General    Wanda Phase I: Wanda Score: 8    Wanda Phase II:      Anesthesia Post Evaluation    Patient location during evaluation: PACU  Patient participation: complete - patient participated  Level of consciousness: awake and alert  Airway patency: patent  Nausea & Vomiting: no vomiting  Cardiovascular status: hemodynamically stable  Respiratory status: acceptable  Hydration status: euvolemic  Comments: POST- ANESTHESIA EVALUATION       Pt Name: William Babcock  MRN: [id number]  YOB: 1960  Date of evaluation: 3/15/2024  Time:  9:45 AM      /68   Pulse 65   Temp 97.7 °F (36.5 °C)   Resp 19   Ht 1.727 m (5' 8\")   Wt 60.3 kg (133 lb)   SpO2 96%   BMI 20.22 kg/m²      Consciousness Level  Awake  Cardiopulmonary Status  Stable  Pain Adequately Treated YES  Nausea / Vomiting  NO  Adequate Hydration  YES  Anesthesia Related Complications NONE      Electronically signed by Aris Nichole MD on 3/15/2024 at 9:45 AM         Pain management: satisfactory to patient    No notable events documented.

## 2024-03-15 NOTE — PROGRESS NOTES
Comprehensive Nutrition Assessment    Type and Reason for Visit:  Reassess    Nutrition Recommendations/Plan:   Would Marinol be appropriate for this pt?  Will continue Regular diet and Ensure Enlive all trays     Malnutrition Assessment:  Malnutrition Status:  Severe malnutrition (03/06/24 1413)    Context:  Acute Illness     Findings of the 6 clinical characteristics of malnutrition:  Energy Intake:  75% or less of estimated energy requirements for 7 or more days  Weight Loss:  5% over 1 month (15% in 2 weeks)     Body Fat Loss:   (Severe) Orbital   Muscle Mass Loss:   (Severe) Temples (temporalis), Hand (interosseous)  Fluid Accumulation:  No significant fluid accumulation     Strength:  Not Performed    Nutrition Assessment:    Pt continues to consume less than 25% of food provided because he believes any food he is given is poisoned. Nursing is encouraging supplement intake.    Nutrition Related Findings:    no edema, Labs/Meds: Reviewed Wound Type: None       Current Nutrition Intake & Therapies:    Average Meal Intake: 1-25%  Average Supplements Intake: 1-25%  ADULT DIET; Regular  ADULT ORAL NUTRITION SUPPLEMENT; Breakfast, Lunch, Dinner; Standard High Calorie/High Protein Oral Supplement    Anthropometric Measures:  Height: 172.7 cm (5' 8\")  Ideal Body Weight (IBW): 154 lbs (70 kg)    Admission Body Weight: 71.7 kg (158 lb)  Current Body Weight: 60.3 kg (133 lb), 87 % IBW. Weight Source: Not Specified  Current BMI (kg/m2): 20.2  Usual Body Weight: 71.7 kg (158 lb) (2/6 bed scale)  % Weight Change (Calculated): -15.2                    BMI Categories: Normal Weight (BMI 18.5-24.9)    Estimated Daily Nutrient Needs:  Energy Requirements Based On: Formula  Weight Used for Energy Requirements: Current  Energy (kcal/day): [place] x 1.3= 1800 kcal  Weight Used for Protein Requirements: Current  Protein (g/day): 1.5g/kg= 90 g  Method Used for Fluid Requirements: 1 ml/kcal    Nutrition Diagnosis:   Severe  malnutrition related to inadequate protein-energy intake as evidenced by Criteria as identified in malnutrition assessment    Nutrition Interventions:   Food and/or Nutrient Delivery: Continue Current Diet, Continue Oral Nutrition Supplement  Nutrition Education/Counseling: No recommendation at this time  Coordination of Nutrition Care: Continue to monitor while inpatient       Goals:  Previous Goal Met: No Progress toward Goal(s)  Goals: PO intake 50% or greater       Nutrition Monitoring and Evaluation:      Food/Nutrient Intake Outcomes: Food and Nutrient Intake, Supplement Intake  Physical Signs/Symptoms Outcomes: Biochemical Data, GI Status, Fluid Status or Edema, Weight, Skin    Discharge Planning:    Continue current diet     Jonna Schmidt RD, LD  Contact: [phone]

## 2024-03-15 NOTE — PLAN OF CARE
Problem: Psychosis  Goal: Will report no hallucinations or delusions  Description: INTERVENTIONS:  1. Administer medication as  ordered  2. Assist with reality testing to support increasing orientation  3. Assess if patient's hallucinations or delusions are encouraging self harm or harm to others and intervene as appropriate  3/14/2024 2142 by Zhanna Black RN  Outcome: Progressing     Problem: Behavior  Goal: Pt/Family maintain appropriate behavior and adhere to behavioral management agreement, if implemented  Description: INTERVENTIONS:  1. Assess patient/family's coping skills and  non-compliant behavior (including use of illegal substances)  2. Notify security of behavior or suspected illegal substances which indicate the need for search of the family and/or belongings  3. Encourage verbalization of thoughts and concerns in a socially appropriate manner  4. Utilize positive, consistent limit setting strategies supporting safety of patient, staff and others  5. Encourage participation in the decision making process about the behavioral management agreement  6. If a visitor's behavior poses a threat to safety call refer to organization policy.  7. Initiate consult with , Psychosocial CNS, Spiritual Care as appropriate  3/14/2024 2142 by Zhanna Black, RN  Outcome: Progressing

## 2024-03-15 NOTE — ANESTHESIA PRE PROCEDURE
AM       POC Tests:   Recent Labs     03/15/24  0641   POCGLU 85       Coags: No results found for: \"PROTIME\", \"INR\", \"APTT\"    HCG (If Applicable): No results found for: \"PREGTESTUR\", \"PREGSERUM\", \"HCG\", \"HCGQUANT\"     ABGs: No results found for: \"PHART\", \"PO2ART\", \"ZZL5SRS\", \"SDU7EJK\", \"BEART\", \"X8AGWNDF\"     Type & Screen (If Applicable):  No results found for: \"LABABO\", \"LABRH\"    Drug/Infectious Status (If Applicable):  No results found for: \"HIV\", \"HEPCAB\"    COVID-19 Screening (If Applicable): No results found for: \"COVID19\"        Anesthesia Evaluation  Patient summary reviewed and Nursing notes reviewed   no history of anesthetic complications:   Airway: Mallampati: III  TM distance: >3 FB   Neck ROM: full  Mouth opening: > = 3 FB   Dental:          Pulmonary:Negative Pulmonary ROS breath sounds clear to auscultation                             Cardiovascular:    (+) hypertension: no interval change        Rhythm: regular  Rate: normal                    Neuro/Psych:   (+) psychiatric history:depression/anxiety              ROS comment: Autism GI/Hepatic/Renal: Neg GI/Hepatic/Renal ROS            Endo/Other: Negative Endo/Other ROS                    Abdominal:             Vascular: negative vascular ROS.         Other Findings:       Anesthesia Plan      general     ASA 3       Induction: intravenous.    MIPS: Prophylactic antiemetics administered.  Anesthetic plan and risks discussed with patient.      Plan discussed with CRNA.                Aris Nichole MD   3/15/2024

## 2024-03-15 NOTE — PROGRESS NOTES
IN-PATIENT SERVICE  [facility]    HISTORY AND PHYSICAL EXAMINATION/ CONSULT NOTE            Date:   3/15/2024  Patient name:  William Babcock  Date of admission:  2/21/2024  9:18 PM  MRN:   [id number]  Account:  [id number]  YOB: 1960  PCP:    Billy Francois MD  Room:   [address] [place] [address]  Code Status:    Full Code    Physician Requesting Consult: Donnie Rosa MD    Reason for Consult: History and physical, medical management    Chief Complaint:     No chief complaint on file.    Medical management    History Obtained From:     Patient, EMR, nursing staff    History of Present Illness:     63-year-old male admitted for acute psychosis.  Patient has history of autism;   Carotid pseudoaneurysm.  Recent admission to [facility] for right proximal RCA pseudoaneurysm after patient presented on 2/5 for agitation and psychosis.  Patient underwent cerebral angiogram and embolization of cervical pseudoaneurysm 2/9/2024.  Was discharged on aspirin and Plavix, resumed.  Patient was discharged to [facility] on 2/12 subsequently discharged on 2/16.    3/15/2024  Patient s/p ECT, still slightly groggy.    Past Medical History:     Past Medical History:   Diagnosis Date    Anxiety     Autism         Past Surgical History:     Past Surgical History:   Procedure Laterality Date    CEREBRAL ANGIOGRAM  02/09/2024    IR ANGIOGRAM CAROTID CEREBRAL BILATERAL    EYE SURGERY          Medications Prior to Admission:     Prior to Admission medications    Medication Sig Start Date End Date Taking? Authorizing Provider   fluticasone (FLONASE) 50 MCG/ACT nasal spray 2 sprays by Each Nostril route daily   Yes Provider, MD Nick   aspirin 81 MG chewable tablet Take 1 tablet by mouth daily 2/17/24   Donnie Rosa MD   clopidogrel (PLAVIX) 75 MG tablet Take 1 tablet by mouth daily 2/17/24   Donnie Rosa MD   QUEtiapine (SEROQUEL) 25 MG tablet Take 1 tablet by mouth 2 times daily

## 2024-03-15 NOTE — PROCEDURES
Bilateral ECT Procedure Report  William Babcock   3/15/2024  1960         Attending:Donnie Rosa MD  Preprocedure diagnosis: Major depressive disorder, recurrent, severe with psychotic symptoms (F33.3)  Postprocedure diagnosis: SAME AS PRE-PROCEDURE DIAGNOSIS  Treatment Number: This is treatment # 2   Patient Status: BEHAVIOR IP   Type of ECT: Bilateral Brief Pulse  Medications  Preprocedure: Tylenol 650mg  Anesthetic: Methohexital 70 mg  Paralytic: Succinylcholine 70 mg  Post procedure: none needed   Cuff placement: Left Lower Extremity    MECTA Settings 1st Stimulus:     Pulse width: 1.0 ms   Frequency:  40 hz   Duration:   2.0 seconds   Static Impedance: 391 ohms             Dynamic Impedance: 170 ohms             Motor Seizure Duration: 0 seconds  EEG Seizure Duration: 71 seconds             The patient's ECT treatment was performed using MECTA machine  .  Timeout:  Time out was performed using two patient identifiers and confirmation of procedure.     Patient Preparation: Preprocedure documentation, labs, H&P were all verified and reviewed.  The patient was placed in supine position.  EEG leads were placed for monitoring of seizure.   [place] areas bilaterally cleaned and prepped.  Conductive gel  was applied over stimulus electrode site.   The patient was pre-oxygenated.       Procedure in detail: Medications were administered by anesthesia using intravenous access at the doses listed previously in this summary.  Anesthetic administered and once confirmation the patient is anesthetized, the patient's blood pressure cuff on lower extremity was inflated to 100mmHg in excess of patient's blood pressure.  At this time, the neuromuscular blockade was administered intravenously by anesthesia.  Upper extremity fasciculation were verified followed by lower extremity fasciculation.  Once fasciculations terminated, confirmation of affective neuromuscular blockade demonstrated by absence of withdrawal reflex.  Bite

## 2024-03-15 NOTE — GROUP NOTE
Group Therapy Note    Date: 3/15/2024    Group Start Time: 1100  Group End Time: 1130  Group Topic: Psychoeducation    [facility] Emelia Belcher CTRS        Group Therapy Note    Attendees: 3/9     Patient's Goal:  To improve interpersonal skills and coping skills awareness through collaborating with peers and demonstrating self-expression.       Notes:  Patient attended and intermittently participated in 5 minutes of group with prompting. Patient was able to collaborate with peers and demonstrate self-expression with prompting. Patient appeared anxious. Patient removed himself from group.      Status After Intervention:  Unchanged     Participation Level: Minimal. Active Listener and Interactive with prompting.      Participation Quality:   Attentive while in group. Sharing with prompting. Resistant.      Speech:  normal, mumbling        Thought Process/Content: Preoccupied         Affective Functioning: Constricted         Mood: anxious        Level of consciousness:  Alert. Preoccupied.         Response to Learning: Able to verbalize current knowledge/experience and Resistant         Endings: None Reported     Modes of Intervention: Support, Socialization, Exploration, and Activity        Discipline Responsible: Psychoeducational Specialist        Signature:  MAURY Campbell

## 2024-03-15 NOTE — GROUP NOTE
Group Therapy Note    Date: 3/15/2024    Group Start Time: 1330  Group End Time: 1420  Group Topic: Relaxation    [facility] Emelia Belcher CTRS        Group Therapy Note    Attendees: 4/9       Patient's Goal:  To improve interpersonal skills and leisure awareness through collaborating with peers and demonstrating creative expression.    Notes:  Patient attended and participated in the the first 20 minutes of group intermittently. Patient was able to collaborate with peers and demonstrate creative expression while in group with continuous prompting. Patient appeared anxious. Patient was pleasant.    Status After Intervention:  Improved    Participation Level: Active Listener and Interactive with prompting.      Participation Quality:  Attentive while in group. Sharing with prompting. Resistant at times.       Speech:  normal, mumbling        Thought Process/Content: Preoccupied         Affective Functioning: Constricted         Mood: anxious        Level of consciousness:  Alert.  Attentive while in group. Preoccupied.         Response to Learning: Able to verbalize current knowledge/experience and Able to retain information. Resistant at times.         Endings: None Reported       Modes of Intervention: Socialization, Exploration, and Activity        Discipline Responsible: Psychoeducational Specialist        Signature:  MAURY Campbell

## 2024-03-15 NOTE — GROUP NOTE
Group Therapy Note    Date: 3/15/2024    Group Start Time: 1000  Group End Time: 1010  Group Topic: Psychotherapy    [facility] Jannette Waller MSW        Group Therapy Note    Attendees: 2/9       Patient's Goal:  Discuss changes in self from admission to date and how to continue that after discharge    Notes:  Patient came in and out of group before leaving after approx. 6 minutes. Throughout, patient was able to answer direct questions but would interject at times with phrases such as \"I'm going to die\" and \"I'm never leaving.\"    Status After Intervention:  Unchanged    Participation Level: Interactive    Participation Quality: Throughout, patient was able to answer direct questions but would interject at times with phrases such as \"I'm going to die\" and \"I'm never leaving.\"      Speech:  normal      Thought Process/Content: Flight of ideas      Affective Functioning: Congruent      Mood: anxious      Level of consciousness:  Preoccupied      Response to Learning: Throughout, patient was able to answer direct questions but would interject at times with phrases such as \"I'm going to die\" and \"I'm never leaving.\"      Endings: None Reported    Modes of Intervention: Support and Socialization      Discipline Responsible: /Counselor      Signature:  TANO Hernández

## 2024-03-15 NOTE — BH NOTE
Arrival from ECT to [facility] Unit:    Patient arrived to the unit Time: 0920 and Via Wheelchair. Patient is oriented to person, time/date. Patient is Calm and cooperative with assessments.    Physical and Mental assessment, Vitals x 2, and fall risk assessment completed and documented.     Post-op ECT checklist completed and placed in patients chart.

## 2024-03-15 NOTE — BH NOTE
Departure from I Unit to ECT:    Pre-op ECT Checklist completed and placed by front of patients chart. Patient departed unit Time: 0703 and Via wheelchair, accompanied by staff. Patient is alert and oriented at this time, as well as calm and cooperative.

## 2024-03-16 PROCEDURE — 6370000000 HC RX 637 (ALT 250 FOR IP): Performed by: NURSE PRACTITIONER

## 2024-03-16 PROCEDURE — 6370000000 HC RX 637 (ALT 250 FOR IP): Performed by: PSYCHIATRY & NEUROLOGY

## 2024-03-16 PROCEDURE — 99232 SBSQ HOSP IP/OBS MODERATE 35: CPT | Performed by: INTERNAL MEDICINE

## 2024-03-16 PROCEDURE — 99232 SBSQ HOSP IP/OBS MODERATE 35: CPT

## 2024-03-16 PROCEDURE — 1240000000 HC EMOTIONAL WELLNESS R&B

## 2024-03-16 RX ADMIN — DONEPEZIL HYDROCHLORIDE 5 MG: 5 TABLET, FILM COATED ORAL at 20:44

## 2024-03-16 RX ADMIN — VENLAFAXINE HYDROCHLORIDE 37.5 MG: 75 CAPSULE, EXTENDED RELEASE ORAL at 08:45

## 2024-03-16 RX ADMIN — OLANZAPINE 7.5 MG: 7.5 TABLET, FILM COATED ORAL at 20:45

## 2024-03-16 RX ADMIN — TRAZODONE HYDROCHLORIDE 50 MG: 50 TABLET ORAL at 20:45

## 2024-03-16 NOTE — PLAN OF CARE
Problem: Nutrition Deficit:  Goal: Optimize nutritional status  Outcome: Not Progressing     Patient was provided applesauce with medications crushed and mixed in. After a bite with the medications, he refused to eat the remainder. Patient was offered a Banquet meatloaf microwave meal and refused. Patient also refused evening snack.    Problem: Psychosis  Goal: Will report no hallucinations or delusions  Description: INTERVENTIONS:  1. Administer medication as  ordered  2. Assist with reality testing to support increasing orientation  3. Assess if patient's hallucinations or delusions are encouraging self harm or harm to others and intervene as appropriate  3/16/2024 0503 by See Mojica RN  Outcome: Progressing     Patient denied auditory and visual hallucinations. Patient cooperated with medication administration with crushed medications in applesauce. Attempts to orient patient to time and situation were unsuccessful. Patient focused on dying by \"jumping.\" When asked where he was going to jump from, he replied \"the woods.\"

## 2024-03-16 NOTE — PLAN OF CARE
Problem: Psychosis  Goal: Will report no hallucinations or delusions  Description: INTERVENTIONS:  1. Administer medication as  ordered  2. Assist with reality testing to support increasing orientation  3. Assess if patient's hallucinations or delusions are encouraging self harm or harm to others and intervene as appropriate  3/16/2024 1334 by Chelsey Barros RN  Outcome: Progressing  Note: Patient presents this shift with flat, blunt affect and maintains fair eye contact during interaction. Patient engages in 1:1 talk but response is not congruent with questions. Writer asked patient about his mental health/mood/if suicidal/homicidal/or thoughts of self harm/any kind of hallucinations. Patient has same response to every question \" I  extra virgin. \" Patient is out in day room off/on, pacing at times. Patient observed disheveled in appearance. Writer and other staff offered to assist with activities of daily living including a shower and patient  would allow for hair to be combed, teeth brushed, and wash hands. Patient has eaten only bites at breakfast and lunch. Sips of juice. When patient was asked about how he slept this past night he stated \" I die extra virgin. \" Patient is redirectable and remains in behavioral control. Patient refused to take all medications today but Effexor in applesauce. Patient denies pain. Support and reassurance provided. Safety maintained with every 15 minute checks and as needed.      Problem: Behavior  Goal: Pt/Family maintain appropriate behavior and adhere to behavioral management agreement, if implemented  Description: INTERVENTIONS:  1. Assess patient/family's coping skills and  non-compliant behavior (including use of illegal substances)  2. Notify security of behavior or suspected illegal substances which indicate the need for search of the family and/or belongings  3. Encourage verbalization of thoughts and concerns in a socially appropriate manner  4. Utilize

## 2024-03-16 NOTE — PROGRESS NOTES
Daily Progress Note  3/16/2024    Patient Name: William Babcock    CHIEF COMPLAINT:  Acute psychosis          SUBJECTIVE:      Patient is seen today for a follow up assessment.  Vitals and labs were reviewed and appear to be within normal limits. Reagan has been compliant with his scheduled medications (Aricept, Effexor, and Zyprexa) however did refuse scheduled cardiac medication.  Nursing staff reports patient to have not had any significant changes in behavior and has not required any emergency medication last 24 hours.  Nursing staff reports the patient accepting to above-mentioned medication with applesauce after much encouragement. He completed ECT treatment without any reported adverse effects.  Upon approach patient was initially resting in bed and patient was noticed to stand, pacing the room and reports feeling \"not good\" when asked about mood.  He is easily distracted, restless and voices feeling suicidal however free of self damaging/harming behaviors while on the unit per nursing staff.  Patient is unable to share active plan or intent.  Patient extremely preoccupied and makes statement how he is \"going to die extra virgin\".  Patient is easily distractible and requires redirection on assessment.  He denies any homicidal ideation.  Patient reports having auditory hallucinations where he states hearing \"go back\", denies command or negative in type. He endorses having visual hallucinations however unable to elaborate further.  Nursing staff acknowledges patient's have had poor oral meal and fluid intake however continues to encourage.  Patient to have had poor sleep and states being due to depression.  Patient continues to present with significant lack of insight and is disorganized thus were not able to meet own basic needs outside of the hospital.  He continues to require inpatient psychiatric hospitalization for safety and stabilization.    Appetite:  [] Adequate/Unchanged  [x] Increased  [] Decreased       Sleep:       [] Adequate/Unchanged  [] Fair  [x] Poor      Group Attendance on Unit:   [x] Yes   [] Selectively    [] No    Compliant with scheduled medications: [x] Yes  [] No    Received emergency medications in past 24 hrs: [] Yes   [x] No    Medication Side Effects: Denies         Mental Status Exam  Level of consciousness: Alert and awake   Appearance: Appropriate attire for setting, resting in bed and intermittent pacing around room, with poor grooming and hygiene, appears disheveled.   Behavior/Motor: Attempts to be cooperative but is distracted and has poor eye contact, Approachable, engages with interviewer, no psychomotor abnormalities   Attitude toward examiner: Cooperative, attentive, fair eye contact  Speech: Mumbling at a low volume  Mood:  Patient reports \"not good\"  Affect: bizarre, restless  Thought processes: Disorganized, illogical  Thought content: Denies homicidal ideation  Suicidal Ideation: Endorses suicidal ideations at this time, unable to contract for safety on the  Delusions: Possible delusions  Perceptual Disturbance: Patient does not appear to be responding to internal stimuli.  Endorses auditory hallucinations and visual hallucinations.  Cognition: Oriented to person. Not oriented to place, time, or event.  Memory: Impaired  Insight: Poor   Judgement: Poor    Data   height is 1.727 m (5' 8\") and weight is 60.3 kg (133 lb). His temperature is 97.8 °F (36.6 °C). His blood pressure is 113/81 and his pulse is 66. His respiration is 15 and oxygen saturation is 99%.   Labs:   No results displayed because visit has over 200 results.          Reviewed patient's current plan of care and vital signs with nursing staff.    Labs reviewed: [x] Yes  QTc Calculation (Bazett)   Date Value Ref Range Status   03/09/2024 414 ms Final     Medications  Current Facility-Administered Medications: lactated ringers IV soln infusion, , IntraVENous, Continuous  atenolol (TENORMIN) tablet 25 mg, 25 mg, Oral,  recognition software. It may contain minor errors which are inherent in voice recognition technology.**

## 2024-03-16 NOTE — PROGRESS NOTES
IN-PATIENT SERVICE  [facility] Internal Medicine    Progress note  Date:   3/16/2024  Patient name:  William Babcock  Date of admission:  2/21/2024  9:18 PM  MRN:   [id number]  Account:  [id number]  YOB: 1960  PCP:    Billy Francois MD  Room:   [facility]  Code Status:    Full Code    Physician Requesting Consult: Donnie Rosa MD    Reason for Consult: History and physical, medical management    Chief Complaint:     No chief complaint on file.    Medical management    History Obtained From:     Patient, EMR, nursing staff    History of Present Illness:     63-year-old male admitted for acute psychosis.  Patient has history of autism;   Carotid pseudoaneurysm.  Recent admission to [facility] for right proximal RCA pseudoaneurysm after patient presented on 2/5 for agitation and psychosis.  Patient underwent cerebral angiogram and embolization of cervical pseudoaneurysm 2/9/2024.  Was discharged on aspirin and Plavix, resumed.  Patient was discharged to [facility] on 2/12 subsequently discharged on 2/16.    3/16/2024  The patient is walking around, denies any concerns however then monitors to himself.  He talks about \" dying a virgin\".    Past Medical History:     Past Medical History:   Diagnosis Date    Anxiety     Autism         Past Surgical History:     Past Surgical History:   Procedure Laterality Date    CEREBRAL ANGIOGRAM  02/09/2024    IR ANGIOGRAM CAROTID CEREBRAL BILATERAL    EYE SURGERY          Medications Prior to Admission:     Prior to Admission medications    Medication Sig Start Date End Date Taking? Authorizing Provider   fluticasone (FLONASE) 50 MCG/ACT nasal spray 2 sprays by Each Nostril route daily   Yes Provider, MD Nick   aspirin 81 MG chewable tablet Take 1 tablet by mouth daily 2/17/24   Donnie Rosa MD   clopidogrel (PLAVIX) 75 MG tablet Take 1 tablet by mouth daily 2/17/24   Donnie Rosa MD   QUEtiapine (SEROQUEL) 25 MG tablet  pain with palpation      Investigations:      Laboratory Testing:  No results found for this or any previous visit (from the past 24 hour(s)).      Imaging/Diagonstics:  Recent data reviewed    Assessment :      Primary Problem  Severe recurrent major depression with psychotic features (HCC)    Active Hospital Problems    Diagnosis Date Noted    Severe recurrent major depression with psychotic features (HCC) [F33.3] 03/13/2024    Altered mental status [R41.82] 02/26/2024       Plan:     Reason for consult: General medical management     Acute psychosis-management per psychiatry  Carotid pseudoaneurysm.  Recent admission to [facility] for right proximal RCA pseudoaneurysm after patient presented on 2/5 for agitation and psychosis.  Patient underwent cerebral angiogram and embolization of cervical pseudoaneurysm 2/9/2024.  Continue aspirin Plavix.  Patient has not had a recent lipid profile, will obtain to determine if will benefit from initiating a statin  Essential hypertension, controlled  Normocytic normochromic anemia related to critical illness, currently stable.  No reported bleeding, no indication for transfusion    DVT prophylaxis-not required, patient is mobile      Consultations:   IP CONSULT TO INTERNAL MEDICINE  IP CONSULT TO NEUROLOGY  IP CONSULT TO MARILEE Lamar MD  3/16/2024  2:30 PM    Copy sent to Billy Francois MD    Please note that this chart was generated using voice recognition Dragon dictation software.  Although every effort was made to ensure the accuracy of this automated transcription, some errors in transcription may have occurred.

## 2024-03-17 LAB
ALBUMIN SERPL-MCNC: 4 G/DL (ref 3.5–5.2)
ALP SERPL-CCNC: 63 U/L (ref 40–129)
ALT SERPL-CCNC: 14 U/L (ref 5–41)
ANION GAP SERPL CALCULATED.3IONS-SCNC: 10 MMOL/L (ref 9–17)
AST SERPL-CCNC: 16 U/L
BASOPHILS # BLD: 0.1 K/UL (ref 0–0.2)
BASOPHILS NFR BLD: 2 % (ref 0–2)
BILIRUB SERPL-MCNC: 0.6 MG/DL (ref 0.3–1.2)
BUN SERPL-MCNC: 13 MG/DL (ref 8–23)
CALCIUM SERPL-MCNC: 9.2 MG/DL (ref 8.6–10.4)
CHLORIDE SERPL-SCNC: 107 MMOL/L (ref 98–107)
CHOLEST SERPL-MCNC: 149 MG/DL
CHOLESTEROL/HDL RATIO: 3.2
CO2 SERPL-SCNC: 26 MMOL/L (ref 20–31)
CREAT SERPL-MCNC: 0.9 MG/DL (ref 0.7–1.2)
EOSINOPHIL # BLD: 0.1 K/UL (ref 0–0.4)
EOSINOPHILS RELATIVE PERCENT: 2 % (ref 0–4)
ERYTHROCYTE [DISTWIDTH] IN BLOOD BY AUTOMATED COUNT: 15 % (ref 11.5–14.9)
GFR SERPL CREATININE-BSD FRML MDRD: >60 ML/MIN/1.73M2
GLUCOSE SERPL-MCNC: 99 MG/DL (ref 70–99)
HCT VFR BLD AUTO: 40.2 % (ref 41–53)
HDLC SERPL-MCNC: 47 MG/DL
HGB BLD-MCNC: 13.6 G/DL (ref 13.5–17.5)
LACTATE BLDV-SCNC: 1.6 MMOL/L (ref 0.5–2.2)
LDLC SERPL CALC-MCNC: 82 MG/DL (ref 0–130)
LYMPHOCYTES NFR BLD: 0.8 K/UL (ref 1–4.8)
LYMPHOCYTES RELATIVE PERCENT: 22 % (ref 24–44)
MCH RBC QN AUTO: 30.7 PG (ref 26–34)
MCHC RBC AUTO-ENTMCNC: 33.9 G/DL (ref 31–37)
MCV RBC AUTO: 90.5 FL (ref 80–100)
MONOCYTES NFR BLD: 0.5 K/UL (ref 0.1–1.3)
MONOCYTES NFR BLD: 13 % (ref 1–7)
NEUTROPHILS NFR BLD: 61 % (ref 36–66)
NEUTS SEG NFR BLD: 2.2 K/UL (ref 1.3–9.1)
PLATELET # BLD AUTO: 209 K/UL (ref 150–450)
PMV BLD AUTO: 7.5 FL (ref 6–12)
POTASSIUM SERPL-SCNC: 4.1 MMOL/L (ref 3.7–5.3)
PROT SERPL-MCNC: 6.8 G/DL (ref 6.4–8.3)
RBC # BLD AUTO: 4.44 M/UL (ref 4.5–5.9)
SODIUM SERPL-SCNC: 143 MMOL/L (ref 135–144)
TRIGL SERPL-MCNC: 100 MG/DL
WBC OTHER # BLD: 3.6 K/UL (ref 3.5–11)

## 2024-03-17 PROCEDURE — 80061 LIPID PANEL: CPT

## 2024-03-17 PROCEDURE — 99232 SBSQ HOSP IP/OBS MODERATE 35: CPT

## 2024-03-17 PROCEDURE — 6370000000 HC RX 637 (ALT 250 FOR IP): Performed by: PSYCHIATRY & NEUROLOGY

## 2024-03-17 PROCEDURE — 80053 COMPREHEN METABOLIC PANEL: CPT

## 2024-03-17 PROCEDURE — 6370000000 HC RX 637 (ALT 250 FOR IP): Performed by: INTERNAL MEDICINE

## 2024-03-17 PROCEDURE — 83605 ASSAY OF LACTIC ACID: CPT

## 2024-03-17 PROCEDURE — 1240000000 HC EMOTIONAL WELLNESS R&B

## 2024-03-17 PROCEDURE — 36415 COLL VENOUS BLD VENIPUNCTURE: CPT

## 2024-03-17 PROCEDURE — 85025 COMPLETE CBC W/AUTO DIFF WBC: CPT

## 2024-03-17 PROCEDURE — 6370000000 HC RX 637 (ALT 250 FOR IP): Performed by: NURSE PRACTITIONER

## 2024-03-17 PROCEDURE — 99231 SBSQ HOSP IP/OBS SF/LOW 25: CPT | Performed by: INTERNAL MEDICINE

## 2024-03-17 RX ORDER — ATENOLOL 25 MG/1
12.5 TABLET ORAL DAILY
Status: DISCONTINUED | OUTPATIENT
Start: 2024-03-17 | End: 2024-03-29 | Stop reason: HOSPADM

## 2024-03-17 RX ADMIN — VENLAFAXINE HYDROCHLORIDE 37.5 MG: 75 CAPSULE, EXTENDED RELEASE ORAL at 10:41

## 2024-03-17 RX ADMIN — OLANZAPINE 7.5 MG: 7.5 TABLET, FILM COATED ORAL at 20:39

## 2024-03-17 RX ADMIN — DONEPEZIL HYDROCHLORIDE 5 MG: 5 TABLET, FILM COATED ORAL at 20:39

## 2024-03-17 RX ADMIN — ASPIRIN 81 MG 81 MG: 81 TABLET ORAL at 10:41

## 2024-03-17 RX ADMIN — TRAZODONE HYDROCHLORIDE 50 MG: 50 TABLET ORAL at 20:39

## 2024-03-17 RX ADMIN — CLOPIDOGREL BISULFATE 75 MG: 75 TABLET ORAL at 10:41

## 2024-03-17 NOTE — PLAN OF CARE
Problem: Psychosis  Goal: Will report no hallucinations or delusions  Description: INTERVENTIONS:  1. Administer medication as  ordered  2. Assist with reality testing to support increasing orientation  3. Assess if patient's hallucinations or delusions are encouraging self harm or harm to others and intervene as appropriate  3/17/2024 1626 by Nika Rocha LPN  Outcome: Progressing     Problem: Behavior  Goal: Pt/Family maintain appropriate behavior and adhere to behavioral management agreement, if implemented  Description: INTERVENTIONS:  1. Assess patient/family's coping skills and  non-compliant behavior (including use of illegal substances)  2. Notify security of behavior or suspected illegal substances which indicate the need for search of the family and/or belongings  3. Encourage verbalization of thoughts and concerns in a socially appropriate manner  4. Utilize positive, consistent limit setting strategies supporting safety of patient, staff and others  5. Encourage participation in the decision making process about the behavioral management agreement  6. If a visitor's behavior poses a threat to safety call refer to organization policy.  7. Initiate consult with , Psychosocial CNS, Spiritual Care as appropriate  3/17/2024 1626 by Nika Rocha LPN  Outcome: Progressing  Flowsheets (Taken 3/17/2024 1626)  Patient/family maintains appropriate behavior and adheres to behavioral management agreement, if implemented:   Assess patient/family’s coping skills and  non-compliant behavior (including use of illegal substances)   Notify security of behavior or suspected illegal substances which indicate the need for search of the patient and/or belongings   Encourage verbalization of thoughts and concerns in a socially appropriate manner   Utilize positive, consistent limit setting strategies supporting safety of patient, staff and others   Encourage participation in the decision making  Licensed Independent Practitioner if interventions unsuccessful or patient reports new pain   Patient was compliant with his medications today. He continues to refuse a shower. He only took bites of food. He did come up to the desk and request some water and drank a full cup. Patient walks from his room to the dayroom throughout the day. He did voice that he wanted to jump out the window. When asked why he states \"I'm going to die a virgin\". Patient encouraged to attend unit programming and interact with peers and staff.  Patient also encouraged to tend to hygiene and ADLs.  Patient encouraged to discuss feelings with staff and feedback and reassurance provided.

## 2024-03-17 NOTE — GROUP NOTE
Group Therapy Note    Date: 3/17/2024    Group Start Time: 1030  Group End Time: 1105  Group Topic: Cognitive Skills    Lina Porras CTRS    Cognitive Skills Group Note        Date: March 17, 2024 Start Time:  1030am   End Time:  1105am      Number of Participants in Group & Unit Census:  1/10    Topic: cognitive skills     Goal of Group:pt will demonstrate improved coping skills and improved concentration       Comments:     Patient did not participate in Cognitive Skills group, despite staff encouragement and explanation of benefits.  Patient remain seclusive to self.  Q15 minute safety checks maintained for patient safety and will continue to encourage patient to attend unit programming.              Signature:  MAURY DECKER

## 2024-03-17 NOTE — PROGRESS NOTES
Daily Progress Note  3/17/2024    Patient Name: William Babcock    CHIEF COMPLAINT:  Acute psychosis          SUBJECTIVE:      Patient is seen today for a follow up assessment. Vitals and labs reviewed and appear to be within normal limits.  Reagan is compliant with scheduled medications including Aricept, Effexor and Zyprexa. He remains behaviorally in control and has not required emergency medications in the past 24 hours.  Reagan is receiving electroconvulsive therapy treatments and last received ECT on Friday morning.  Reagan is seen at bedside today where he is resting.  He immediately gets up and starts following writer around not giving any personal space until redirected.  Reagan reports he is \"good\" then he states, \"I am going to die a virgin because I didn't take my pills.'  He continues to be very fixated on this. He is hard to engage in any linear conversation.  He ate a small amount of his breakfast but then was spitting it out.  He also refused to engage in showering today however did brush his teeth, washed his face, and combed his hair.  At this time, Reagan would significantly decompensate outside of the hospital on his own.  He cannot meet his own basic needs.  He would be very unsafe out of the hospital and continues to require inpatient hospitalization for his safety and stability. He will resume ECT treatment tomorrow.     Appetite:  [] Normal/Adequate/Unchanged  [] Increased  [x] Decreased      Sleep:       [] Normal/Adequate/Unchanged  [x] Fair  [] Poor      Group Attendance on Unit:   [] Yes  [] Selectively    [x] No    Medication Side Effects:  Patient denies any medication side effects at the time of assessment.         Mental Status Exam  Level of consciousness: Alert and awake.   Appearance: Appropriate attire for setting, resting in bed then sits up and paces behind writer, with poor grooming and hygiene.   Behavior/Motor: Bizarre, pacing  Attitude toward examiner: Attempts to be

## 2024-03-17 NOTE — PLAN OF CARE
Problem: Psychosis  Goal: Will report no hallucinations or delusions  Description: INTERVENTIONS:  1. Administer medication as  ordered  2. Assist with reality testing to support increasing orientation  3. Assess if patient's hallucinations or delusions are encouraging self harm or harm to others and intervene as appropriate  Outcome: Progressing     Problem: Behavior  Goal: Pt/Family maintain appropriate behavior and adhere to behavioral management agreement, if implemented  Description: INTERVENTIONS:  1. Assess patient/family's coping skills and  non-compliant behavior (including use of illegal substances)  2. Notify security of behavior or suspected illegal substances which indicate the need for search of the family and/or belongings  3. Encourage verbalization of thoughts and concerns in a socially appropriate manner  4. Utilize positive, consistent limit setting strategies supporting safety of patient, staff and others  5. Encourage participation in the decision making process about the behavioral management agreement  6. If a visitor's behavior poses a threat to safety call refer to organization policy.  7. Initiate consult with , Psychosocial CNS, Spiritual Care as appropriate  Outcome: Progressing       Patient denies suicidal and homicidal ideations. He does not report any hallucinations. Patient is isolative to self, paces the unit, only oriented to person and time. Compliant with taking night medications. Patient appetite has been poor, rested through the night with no disturbances. Behavior is controlled, easily re-directed. 15 minute checks and safe environment maintained.    Patient Seen in: [address]      History   Patient presents with:  Trauma (cardiovascular, musculoskeletal)    Stated Complaint: right side rib pain    HPI    Is a 28-year-old female who presents to immediate care compla light-headedness. Positive for stated complaint: right side rib pain  Other systems are as noted in HPI. Constitutional and vital signs reviewed. All other systems reviewed and negative except as noted above.     Physical Exam     ED Triage Erica days  If symptoms worsen        Medications Prescribed:  Current Discharge Medication List    START taking these medications    Acetaminophen-Codeine #3 300-30 MG Oral Tab  Take 1 tablet by mouth every 6 (six) hours as needed for Pain.   Qty: 10 tablet Refi

## 2024-03-17 NOTE — PROGRESS NOTES
IN-PATIENT SERVICE  [facility] Internal Medicine    Progress note  Date:   3/17/2024  Patient name:  William Babcock  Date of admission:  2/21/2024  9:18 PM  MRN:   [id number]  Account:  [id number]  YOB: 1960  PCP:    Billy Francois MD  Room:   [facility]  Code Status:    Full Code    Physician Requesting Consult: Donnie Rosa MD    Reason for Consult: History and physical, medical management    Chief Complaint:     No chief complaint on file.    Medical management    History Obtained From:     Patient, EMR, nursing staff    History of Present Illness:     63-year-old male admitted for acute psychosis.  Patient has history of autism;   Carotid pseudoaneurysm.  Recent admission to [facility] for right proximal RCA pseudoaneurysm after patient presented on 2/5 for agitation and psychosis.  Patient underwent cerebral angiogram and embolization of cervical pseudoaneurysm 2/9/2024.  Was discharged on aspirin and Plavix, resumed.  Patient was discharged to [facility] on 2/12 subsequently discharged on 2/16.    3/17/2024  Continues to talk about\" dying a virgin\".  No meaningful conversation    Past Medical History:     Past Medical History:   Diagnosis Date    Anxiety     Autism         Past Surgical History:     Past Surgical History:   Procedure Laterality Date    CEREBRAL ANGIOGRAM  02/09/2024    IR ANGIOGRAM CAROTID CEREBRAL BILATERAL    EYE SURGERY          Medications Prior to Admission:     Prior to Admission medications    Medication Sig Start Date End Date Taking? Authorizing Provider   fluticasone (FLONASE) 50 MCG/ACT nasal spray 2 sprays by Each Nostril route daily   Yes Provider, MD Nick   aspirin 81 MG chewable tablet Take 1 tablet by mouth daily 2/17/24   Donnie Rosa MD   clopidogrel (PLAVIX) 75 MG tablet Take 1 tablet by mouth daily 2/17/24   Donnie Rosa MD   QUEtiapine (SEROQUEL) 25 MG tablet Take 1 tablet by mouth 2 times daily 2/16/24    palpation      Investigations:      Laboratory Testing:  Recent Results (from the past 24 hour(s))   Lipid Panel    Collection Time: 03/17/24  9:03 AM   Result Value Ref Range    Cholesterol 149 <200 mg/dL    HDL 47 >40 mg/dL    LDL Cholesterol 82 0 - 130 mg/dL    Chol/HDL Ratio 3.2 <5    Triglycerides 100 <150 mg/dL   CBC with Auto Differential    Collection Time: 03/17/24  9:03 AM   Result Value Ref Range    WBC 3.6 3.5 - 11.0 k/uL    RBC 4.44 (L) 4.5 - 5.9 m/uL    Hemoglobin 13.6 13.5 - 17.5 g/dL    Hematocrit 40.2 (L) 41 - 53 %    MCV 90.5 80 - 100 fL    MCH 30.7 26 - 34 pg    MCHC 33.9 31 - 37 g/dL    RDW 15.0 (H) 11.5 - 14.9 %    Platelets 209 150 - 450 k/uL    MPV 7.5 6.0 - 12.0 fL    Neutrophils % 61 36 - 66 %    Lymphocytes % 22 (L) 24 - 44 %    Monocytes % 13 (H) 1 - 7 %    Eosinophils % 2 0 - 4 %    Basophils % 2 0 - 2 %    Neutrophils Absolute 2.20 1.3 - 9.1 k/uL    Lymphocytes Absolute 0.80 (L) 1.0 - 4.8 k/uL    Monocytes Absolute 0.50 0.1 - 1.3 k/uL    Eosinophils Absolute 0.10 0.0 - 0.4 k/uL    Basophils Absolute 0.10 0.0 - 0.2 k/uL   Comprehensive Metabolic Panel w/ Reflex to MG    Collection Time: 03/17/24  9:03 AM   Result Value Ref Range    Sodium 143 135 - 144 mmol/L    Potassium 4.1 3.7 - 5.3 mmol/L    Chloride 107 98 - 107 mmol/L    CO2 26 20 - 31 mmol/L    Anion Gap 10 9 - 17 mmol/L    Glucose 99 70 - 99 mg/dL    BUN 13 8 - 23 mg/dL    Creatinine 0.9 0.7 - 1.2 mg/dL    Est, Glom Filt Rate >60 >60 mL/min/1.73m2    Calcium 9.2 8.6 - 10.4 mg/dL    Total Protein 6.8 6.4 - 8.3 g/dL    Albumin 4.0 3.5 - 5.2 g/dL    Total Bilirubin 0.6 0.3 - 1.2 mg/dL    Alkaline Phosphatase 63 40 - 129 U/L    ALT 14 5 - 41 U/L    AST 16 <40 U/L   Lactic Acid    Collection Time: 03/17/24  9:03 AM   Result Value Ref Range    Lactic Acid 1.6 0.5 - 2.2 mmol/L         Imaging/Diagonstics:  Recent data reviewed    Assessment :      Primary Problem  Severe recurrent major depression with psychotic features (HCC)    Active

## 2024-03-17 NOTE — BH NOTE
Patient refused showered. He was compliant with brushing his teeth, washing his face, combing hair, and changing his clothes.

## 2024-03-17 NOTE — BH NOTE
Patient offered breakfast tray. He would only take bits of his PB&J sandwich then spit it out. He would pretend to take sips of his Gatorade. He is focus on \"dying a virgin\". Patient encouraged and offered reassurance.

## 2024-03-18 ENCOUNTER — APPOINTMENT (OUTPATIENT)
Dept: POSTOP/PACU | Age: 64
DRG: 885 | End: 2024-03-18
Payer: MEDICARE

## 2024-03-18 ENCOUNTER — ANESTHESIA EVENT (OUTPATIENT)
Dept: POSTOP/PACU | Age: 64
End: 2024-03-18
Payer: MEDICARE

## 2024-03-18 ENCOUNTER — ANESTHESIA (OUTPATIENT)
Dept: POSTOP/PACU | Age: 64
End: 2024-03-18
Payer: MEDICARE

## 2024-03-18 LAB
GLUCOSE BLD-MCNC: 63 MG/DL (ref 75–110)
GLUCOSE BLD-MCNC: 97 MG/DL (ref 75–110)

## 2024-03-18 PROCEDURE — 1240000000 HC EMOTIONAL WELLNESS R&B

## 2024-03-18 PROCEDURE — 6370000000 HC RX 637 (ALT 250 FOR IP): Performed by: NURSE PRACTITIONER

## 2024-03-18 PROCEDURE — 7100000000 HC PACU RECOVERY - FIRST 15 MIN

## 2024-03-18 PROCEDURE — 6370000000 HC RX 637 (ALT 250 FOR IP): Performed by: PSYCHIATRY & NEUROLOGY

## 2024-03-18 PROCEDURE — 90870 ELECTROCONVULSIVE THERAPY: CPT | Performed by: PSYCHIATRY & NEUROLOGY

## 2024-03-18 PROCEDURE — 99231 SBSQ HOSP IP/OBS SF/LOW 25: CPT | Performed by: INTERNAL MEDICINE

## 2024-03-18 PROCEDURE — 3700000000 HC ANESTHESIA ATTENDED CARE

## 2024-03-18 PROCEDURE — APPSS30 APP SPLIT SHARED TIME 16-30 MINUTES

## 2024-03-18 PROCEDURE — 99232 SBSQ HOSP IP/OBS MODERATE 35: CPT | Performed by: PSYCHIATRY & NEUROLOGY

## 2024-03-18 PROCEDURE — 82947 ASSAY GLUCOSE BLOOD QUANT: CPT

## 2024-03-18 PROCEDURE — 6360000002 HC RX W HCPCS: Performed by: PSYCHIATRY & NEUROLOGY

## 2024-03-18 PROCEDURE — 7100000001 HC PACU RECOVERY - ADDTL 15 MIN

## 2024-03-18 PROCEDURE — 2580000003 HC RX 258: Performed by: ANESTHESIOLOGY

## 2024-03-18 PROCEDURE — 6360000002 HC RX W HCPCS

## 2024-03-18 PROCEDURE — 2500000003 HC RX 250 WO HCPCS: Performed by: NURSE ANESTHETIST, CERTIFIED REGISTERED

## 2024-03-18 RX ORDER — METHOHEXITAL IN WATER/PF 100MG/10ML
SYRINGE (ML) INTRAVENOUS PRN
Status: DISCONTINUED | OUTPATIENT
Start: 2024-03-18 | End: 2024-03-18 | Stop reason: SDUPTHER

## 2024-03-18 RX ORDER — SUCCINYLCHOLINE/SOD CL,ISO/PF 200MG/10ML
SYRINGE (ML) INTRAVENOUS PRN
Status: DISCONTINUED | OUTPATIENT
Start: 2024-03-18 | End: 2024-03-18 | Stop reason: SDUPTHER

## 2024-03-18 RX ORDER — LORAZEPAM 2 MG/ML
INJECTION INTRAMUSCULAR
Status: COMPLETED
Start: 2024-03-18 | End: 2024-03-18

## 2024-03-18 RX ORDER — LORAZEPAM 2 MG/ML
1 INJECTION INTRAMUSCULAR ONCE
Status: DISCONTINUED | OUTPATIENT
Start: 2024-03-18 | End: 2024-03-18

## 2024-03-18 RX ORDER — LORAZEPAM 2 MG/ML
2 INJECTION INTRAMUSCULAR ONCE
Status: COMPLETED | OUTPATIENT
Start: 2024-03-18 | End: 2024-03-18

## 2024-03-18 RX ORDER — DEXTROSE MONOHYDRATE 100 MG/ML
INJECTION, SOLUTION INTRAVENOUS CONTINUOUS PRN
Status: DISCONTINUED | OUTPATIENT
Start: 2024-03-18 | End: 2024-03-29 | Stop reason: HOSPADM

## 2024-03-18 RX ORDER — LORAZEPAM 2 MG/ML
INJECTION INTRAMUSCULAR
Status: DISCONTINUED
Start: 2024-03-18 | End: 2024-03-18 | Stop reason: WASHOUT

## 2024-03-18 RX ORDER — SUCCINYLCHOLINE/SOD CL,ISO/PF 200MG/10ML
SYRINGE (ML) INTRAVENOUS
Status: COMPLETED
Start: 2024-03-18 | End: 2024-03-18

## 2024-03-18 RX ORDER — METHOHEXITAL IN WATER/PF 100MG/10ML
SYRINGE (ML) INTRAVENOUS
Status: COMPLETED
Start: 2024-03-18 | End: 2024-03-18

## 2024-03-18 RX ORDER — HALOPERIDOL 5 MG/ML
5 INJECTION INTRAMUSCULAR ONCE
Status: COMPLETED | OUTPATIENT
Start: 2024-03-18 | End: 2024-03-18

## 2024-03-18 RX ADMIN — HALOPERIDOL LACTATE 5 MG: 5 INJECTION, SOLUTION INTRAMUSCULAR at 08:16

## 2024-03-18 RX ADMIN — Medication 70 MG: at 07:27

## 2024-03-18 RX ADMIN — TRAZODONE HYDROCHLORIDE 50 MG: 50 TABLET ORAL at 21:55

## 2024-03-18 RX ADMIN — LORAZEPAM 2 MG: 2 INJECTION INTRAMUSCULAR at 08:17

## 2024-03-18 RX ADMIN — SODIUM CHLORIDE, POTASSIUM CHLORIDE, SODIUM LACTATE AND CALCIUM CHLORIDE: 600; 310; 30; 20 INJECTION, SOLUTION INTRAVENOUS at 07:09

## 2024-03-18 RX ADMIN — LORAZEPAM 2 MG: 2 INJECTION INTRAMUSCULAR; INTRAVENOUS at 08:17

## 2024-03-18 RX ADMIN — ACETAMINOPHEN 650 MG: 325 TABLET ORAL at 06:18

## 2024-03-18 RX ADMIN — OLANZAPINE 7.5 MG: 7.5 TABLET, FILM COATED ORAL at 21:55

## 2024-03-18 RX ADMIN — DONEPEZIL HYDROCHLORIDE 5 MG: 5 TABLET, FILM COATED ORAL at 21:55

## 2024-03-18 ASSESSMENT — PAIN - FUNCTIONAL ASSESSMENT
PAIN_FUNCTIONAL_ASSESSMENT: ACTIVITIES ARE NOT PREVENTED
PAIN_FUNCTIONAL_ASSESSMENT: NONE - DENIES PAIN

## 2024-03-18 NOTE — BH NOTE
Arrival from ECT to [facility] Unit:    Patient arrived to the unit Time: 08:56 and Via Wheelchair. Patient is oriented to person, disoriented to time, place, and situation. Patient is awake, slightly drowsy, and restless.    Physical and Mental assessment, Vitals x 2, and fall risk assessment completed and documented.     Post-op ECT checklist completed and placed in patients chart.

## 2024-03-18 NOTE — PROCEDURES
Bilateral ECT Procedure Report  William Babcock   3/18/2024  1960         Attending:Donnie Rosa MD  Preprocedure diagnosis: Major depressive disorder, recurrent, severe with psychotic symptoms (F33.3)  Postprocedure diagnosis: SAME AS PRE-PROCEDURE DIAGNOSIS  Treatment Number: This is treatment # 3   Patient Status: BEHAVIOR IP   Type of ECT: Bilateral Brief Pulse  Medications  Preprocedure: Tylenol 650mg  Anesthetic: Methohexital 70 mg  Paralytic: Succinylcholine 70 mg  Post procedure: none needed   Cuff placement: Left Lower Extremity    MECTA Settings 1st Stimulus:     Pulse width: 1.0 ms   Frequency:  40 hz   Duration:   2.0 seconds   Static Impedance: 307ohms             Dynamic Impedance: 158 ohms             Motor Seizure Duration: 77 seconds  EEG Seizure Duration: 107 seconds             The patient's ECT treatment was performed using MECTA machine  .  Timeout:  Time out was performed using two patient identifiers and confirmation of procedure.     Patient Preparation: Preprocedure documentation, labs, H&P were all verified and reviewed.  The patient was placed in supine position.  EEG leads were placed for monitoring of seizure.   Gnosticist areas bilaterally cleaned and prepped.  Conductive gel  was applied over stimulus electrode site.   The patient was pre-oxygenated.       Procedure in detail: Medications were administered by anesthesia using intravenous access at the doses listed previously in this summary.  Anesthetic administered and once confirmation the patient is anesthetized, the patient's blood pressure cuff on lower extremity was inflated to 100mmHg in excess of patient's blood pressure.  At this time, the neuromuscular blockade was administered intravenously by anesthesia.  Upper extremity fasciculation were verified followed by lower extremity fasciculation.  Once fasciculations terminated, confirmation of affective neuromuscular blockade demonstrated by absence of withdrawal reflex.  Bite

## 2024-03-18 NOTE — BH NOTE
Departure from I Unit to ECT:    Pre-op ECT Checklist completed and placed by front of patients chart. Patient departed unit Time: 0633 and Via Wheelchair, accompanied by [facility] Staff. Patient is alert and orientated at this time, as well as Calm, cooperative.

## 2024-03-18 NOTE — PLAN OF CARE
Problem: Psychosis  Goal: Will report no hallucinations or delusions  Description: INTERVENTIONS:  1. Administer medication as  ordered  2. Assist with reality testing to support increasing orientation  3. Assess if patient's hallucinations or delusions are encouraging self harm or harm to others and intervene as appropriate  3/18/2024 0423 by Yady Hsu, RN  Outcome: Progressing     Problem: Behavior  Goal: Pt/Family maintain appropriate behavior and adhere to behavioral management agreement, if implemented  Description: INTERVENTIONS:  1. Assess patient/family's coping skills and  non-compliant behavior (including use of illegal substances)  2. Notify security of behavior or suspected illegal substances which indicate the need for search of the family and/or belongings  3. Encourage verbalization of thoughts and concerns in a socially appropriate manner  4. Utilize positive, consistent limit setting strategies supporting safety of patient, staff and others  5. Encourage participation in the decision making process about the behavioral management agreement  6. If a visitor's behavior poses a threat to safety call refer to organization policy.  7. Initiate consult with , Psychosocial CNS, Spiritual Care as appropriate  3/18/2024 0423 by Yady Hsu, RN  Outcome: Progressing     Patient was compliant with his medications continues to refuse a shower even with encouragement. Patient does not endorse suicidal or homicidal ideations at this time, 1:1 time spent talking with the patient, emotional support provided. Patient encouraged to attend unit programming and educated on social interaction. Patient requires redirection and encouragement. 15 minute check and safe environment maintained. Medication compliant and behavior controlled.

## 2024-03-18 NOTE — ANESTHESIA PRE PROCEDURE
disintegrating tablet 5 mg  5 mg Oral BID PRN Aliya Ross APRN - CNP        Or    OLANZapine (ZyPREXA) 5 mg in sterile water 1 mL injection  5 mg IntraMUSCular BID PRN Aliya Ross APRN - CNP   5 mg at 03/09/24 0943    aspirin chewable tablet 81 mg  81 mg Oral Daily Makayla Choudhury MD   81 mg at 03/17/24 1041    clopidogrel (PLAVIX) tablet 75 mg  75 mg Oral Daily Makayla Choudhury MD   75 mg at 03/17/24 1041    fluticasone (FLONASE) 50 MCG/ACT nasal spray 2 spray  2 spray Each Nostril Daily Makayla Choudhury MD   2 spray at 03/14/24 0847    acetaminophen (TYLENOL) tablet 650 mg  650 mg Oral Q4H PRN Michelle Lomas APRN - CNP   650 mg at 03/18/24 0618    polyethylene glycol (GLYCOLAX) packet 17 g  17 g Oral Daily PRN Michelle Lomas APRN - CNP        traZODone (DESYREL) tablet 50 mg  50 mg Oral Nightly PRN Michelle Lomas APRN - CNP   50 mg at 03/17/24 2039    aluminum & magnesium hydroxide-simethicone (MAALOX) 200-200-20 MG/5ML suspension 30 mL  30 mL Oral Q6H PRN Michelle Lomas APRN - CNP   30 mL at 03/15/24 1838       Allergies:  No Known Allergies    Problem List:    Patient Active Problem List   Diagnosis Code    Essential hypertension I10    Primary insomnia F51.01    Influenza vaccine refused Z28.21    Refused Streptococcus pneumoniae vaccination Z28.21    Psychosis, paranoid (HCC) F22    Abnormal CT of the head R93.0    Psychosis (HCC) F29    Pseudoaneurysm (HCC) I72.9    Carotid pseudoaneurysm (HCC) 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

## 2024-03-18 NOTE — GROUP NOTE
Group Therapy Note    Date: 3/18/2024    Group Start Time: 1100  Group End Time: 1140  Group Topic: Cognitive Skills    Emelia Mueller CTRS        Group Therapy Note    Attendees: 4/12    Cognitive Skills Group Note        Date: March 18, 2024               Start Time: 11am  End Time: 11:40am      Number of Participants in Group & Unit Census:  4/12    Topic:  interpersonal skills, memory recall, concentration    Goal of Group: To improve interpersonal skills and memory recall through collaborating with peers and concentrating on a presented task.       Comments:     Patient did not participate in Cognitive Skills group, despite staff encouragement and explanation of benefits.  Patient remain seclusive to self.  Q15 minute safety checks maintained for patient safety and will continue to encourage patient to attend unit programming.        Signature:  MAURY Campbell

## 2024-03-18 NOTE — ANESTHESIA POSTPROCEDURE EVALUATION
Department of Anesthesiology  Postprocedure Note    Patient: William Babcock  MRN: [id number]  YOB: 1960  Date of evaluation: 3/18/2024    Procedure Summary       Date: 03/18/24 Room / Location: [facility] PACU    Anesthesia Start: 0720 Anesthesia Stop: 0734    Procedure: ECT W/ ANESTHESIA Diagnosis:     Scheduled Providers:  Responsible Provider: Dieter Kerns MD    Anesthesia Type: general ASA Status: 3            Anesthesia Type: No value filed.    Wanda Phase I: Wanda Score: 9    Wanda Phase II:      Anesthesia Post Evaluation    Comments: POST- ANESTHESIA EVALUATION       Pt Name: William Babcock  MRN: [id number]  YOB: 1960  Date of evaluation: 3/18/2024  Time:  4:36 PM      BP (!) 82/57   Pulse 53   Temp 97.7 °F (36.5 °C) (Temporal)   Resp 13   Ht 1.727 m (5' 8\")   Wt 60.3 kg (133 lb)   SpO2 96%   BMI 20.22 kg/m²      Consciousness Level  Awake  Cardiopulmonary Status  Stable  Pain Adequately Treated YES  Nausea / Vomiting  NO  Adequate Hydration  YES  Anesthesia Related Complications NONE      Electronically signed by Dieter Kerns MD on 3/18/2024 at 4:36 PM           No notable events documented.

## 2024-03-18 NOTE — PLAN OF CARE
Problem: Psychosis  Goal: Will report no hallucinations or delusions  Description: INTERVENTIONS:  1. Administer medication as  ordered  2. Assist with reality testing to support increasing orientation  3. Assess if patient's hallucinations or delusions are encouraging self harm or harm to others and intervene as appropriate  3/18/2024 1117 by Carrie Han, RN  Outcome: Not Progressing     Problem: Behavior  Goal: Pt/Family maintain appropriate behavior and adhere to behavioral management agreement, if implemented  Description: INTERVENTIONS:  1. Assess patient/family's coping skills and  non-compliant behavior (including use of illegal substances)  2. Notify security of behavior or suspected illegal substances which indicate the need for search of the family and/or belongings  3. Encourage verbalization of thoughts and concerns in a socially appropriate manner  4. Utilize positive, consistent limit setting strategies supporting safety of patient, staff and others  5. Encourage participation in the decision making process about the behavioral management agreement  6. If a visitor's behavior poses a threat to safety call refer to organization policy.  7. Initiate consult with , Psychosocial CNS, Spiritual Care as appropriate  3/18/2024 1117 by Carrie Han, RN  Outcome: Not Progressing    Problem: Safety - Adult  Goal: Free from fall injury  Outcome: Progressing     Problem: Nutrition Deficit:  Goal: Optimize nutritional status  Outcome: Progressing

## 2024-03-18 NOTE — PLAN OF CARE
Problem: Psychosis  Goal: Will report no hallucinations or delusions  Description: INTERVENTIONS:  1. Administer medication as  ordered  2. Assist with reality testing to support increasing orientation  3. Assess if patient's hallucinations or delusions are encouraging self harm or harm to others and intervene as appropriate  3/18/2024 1851 by Carrie Han RN  Outcome: Not Progressing     Problem: Behavior  Goal: Pt/Family maintain appropriate behavior and adhere to behavioral management agreement, if implemented  Description: INTERVENTIONS:  1. Assess patient/family's coping skills and  non-compliant behavior (including use of illegal substances)  2. Notify security of behavior or suspected illegal substances which indicate the need for search of the family and/or belongings  3. Encourage verbalization of thoughts and concerns in a socially appropriate manner  4. Utilize positive, consistent limit setting strategies supporting safety of patient, staff and others  5. Encourage participation in the decision making process about the behavioral management agreement  6. If a visitor's behavior poses a threat to safety call refer to organization policy.  7. Initiate consult with , Psychosocial CNS, Spiritual Care as appropriate  3/18/2024 1851 by Carrie Han, RN  Outcome: Not Progressing     Problem: Safety - Adult  Goal: Free from fall injury  3/18/2024 1851 by Carrie Han RN  Outcome: Not Progressing    Problem: Nutrition Deficit:  Goal: Optimize nutritional status  3/18/2024 1851 by Carrie Han, RN  Outcome: Progressing    Patient had difficulty remaining calm and keeping behavior controlled after ECT treatment this morning. After receiving PRN medications in PACU, patient was lethargic when returning to the unit and spent most of the day sleeping. He did eat 1/2 of his sandwich in the afternoon, and about 50% of dinner tray this evening. Patient still has trouble maintaining

## 2024-03-18 NOTE — PROGRESS NOTES
IN-PATIENT SERVICE  [facility]    Progress note  Date:   3/18/2024  Patient name:  William Babcock  Date of admission:  2/21/2024  9:18 PM  MRN:   [id number]  Account:  [id number]  YOB: 1960  PCP:    Billy Francois MD  Room:   [facility]  Code Status:    Full Code    Physician Requesting Consult: Donnie Rosa MD    Reason for Consult: History and physical, medical management    Chief Complaint:     No chief complaint on file.    Medical management    History Obtained From:     Patient, EMR, nursing staff    History of Present Illness:     63-year-old male admitted for acute psychosis.  Patient has history of autism;   Carotid pseudoaneurysm.  Recent admission to [facility] for right proximal RCA pseudoaneurysm after patient presented on 2/5 for agitation and psychosis.  Patient underwent cerebral angiogram and embolization of cervical pseudoaneurysm 2/9/2024.  Was discharged on aspirin and Plavix, resumed.  Patient was discharged to [facility] on 2/12 subsequently discharged on 2/16.    3/18/2024  Patient very confused when seen, has received ECT earlier today, also received lorazepam.  Does not seem to be in distress however is unsteady with his gait, nursing staff at bedside    Past Medical History:     Past Medical History:   Diagnosis Date    Anxiety     Autism         Past Surgical History:     Past Surgical History:   Procedure Laterality Date    CEREBRAL ANGIOGRAM  02/09/2024    IR ANGIOGRAM CAROTID CEREBRAL BILATERAL    EYE SURGERY          Medications Prior to Admission:     Prior to Admission medications    Medication Sig Start Date End Date Taking? Authorizing Provider   fluticasone (FLONASE) 50 MCG/ACT nasal spray 2 sprays by Each Nostril route daily   Yes Provider, MD Nick   aspirin 81 MG chewable tablet Take 1 tablet by mouth daily 2/17/24   Donnie Rosa MD   clopidogrel (PLAVIX) 75 MG tablet Take 1 tablet by mouth daily  calf pain with palpation  Skin does have bruising several places as it seems he was agitated during ECT      Investigations:      Laboratory Testing:  Recent Results (from the past 24 hour(s))   POC Glucose Fingerstick    Collection Time: 03/18/24  6:16 AM   Result Value Ref Range    POC Glucose 63 (L) 75 - 110 mg/dL   POC Glucose Fingerstick    Collection Time: 03/18/24  6:28 AM   Result Value Ref Range    POC Glucose 97 75 - 110 mg/dL         Imaging/Diagonstics:  Recent data reviewed    Assessment :      Primary Problem  Severe recurrent major depression with psychotic features (HCC)    Active Hospital Problems    Diagnosis Date Noted    Severe recurrent major depression with psychotic features (HCC) [F33.3] 03/13/2024    Altered mental status [R41.82] 02/26/2024       Plan:     Reason for consult: General medical management     Acute psychosis-management per psychiatry  Carotid pseudoaneurysm.  Recent admission to [facility] for right proximal RCA pseudoaneurysm after patient presented on 2/5 for agitation and psychosis.  Patient underwent cerebral angiogram and embolization of cervical pseudoaneurysm 2/9/2024.  Continue aspirin Plavix.  Lipid profile reviewed, LDL at goal  Essential hypertension, blood pressures better controlled now.  Continue current dose of atenolol at 12.5 mg p.o. daily  Normocytic normochromic anemia, now resolved  Altered mental status post ECT, likely secondary to medications he has received.  Monitor for now      DVT prophylaxis-not required, patient is mobile      Consultations:   IP CONSULT TO INTERNAL MEDICINE  IP CONSULT TO NEUROLOGY  IP CONSULT TO MARILEE Lamar MD  3/18/2024  7:08 PM    Copy sent to Billy Francois MD    Please note that this chart was generated using voice recognition Dragon dictation software.  Although every effort was made to ensure the accuracy of this automated transcription, some errors in transcription may have occurred.

## 2024-03-18 NOTE — PROGRESS NOTES
Patient incontinent of urine. Patient restless and attempts to get out of bed. Patient pulls out IV per self. Dr Rosa updated and orders haldol and ativan to be given. Patient continues to attempt to get up with continuous reorientation and direction from nurses and Dr Rosa.

## 2024-03-18 NOTE — GROUP NOTE
Group Therapy Note    Date: 3/18/2024    Group Start Time: 1000  Group End Time: 1045  Group Topic: Psychotherapy    [facility] Jannette Waller MSW        Group Therapy Note    Attendees: 3/12       Patient's Goal:  Identify healthy v. unhealthy coping skills and apply them to scenarios    Patient was offered group therapy today but declined to participate despite encouragement from staff.  1:1 was offered.      Discipline Responsible: /Counselor      Signature:  TANO Hernández

## 2024-03-18 NOTE — PROGRESS NOTES
Daily Progress Note  3/18/2024    Patient Name: William Babcock    CHIEF COMPLAINT:  Acute Psychosis          SUBJECTIVE:      Patient is seen today for a follow up assessment. Vitals and labs reviewed. Patient's blood pressure is low this morning however patient did come back from ECT and had received emergency medications while recovering from ECT.  Reagan is compliant with scheduled psychotropic medications.  Following ECT, while in recovery, Reagan was incontinent of urine.  He was very restless and pulled out his IV.  He did require Haldol and Ativan while in recovery because he could not be redirected.  Attempted to see Reagan a couple of times today following ECT.  The first two times he was extremely lethargic and would not even wake to verbal stimuli.  Noted however that patient was breathing evenly and unlabored.  Later in the afternoon Reagan was seen at bedside.  He continued to be lethargic however he did awaken to verbal stimuli.  He was very minimally cooperative with assessment and answered with one word answers.  He was linear however and did not offer any other statements like he usually does during assessment. He did not perseverate on anything this afternoon.  At this time, Reagan has yet to show stability of symptoms.  He continues to require frequent direction to complete ADLs. He is still not eating well.  He does appear to be sleeping.  He would be unable to meet his own basic needs outside of the hospital at this time.  He continues to require inpatient hospitalization for his safety and stability. Will continue ECT treatments on Wednesday.    Appetite:  [] Normal/Adequate/Unchanged  [] Increased  [x] Decreased      Sleep:       [] Normal/Adequate/Unchanged  [x] Fair  [] Poor      Group Attendance on Unit:   [] Yes  [] Selectively    [x] No    Medication Side Effects:  Patient denies any medication side effects at the time of assessment.         Mental Status Exam  Level of consciousness:

## 2024-03-19 LAB
GLUCOSE BLD-MCNC: 71 MG/DL (ref 75–110)
GLUCOSE BLD-MCNC: 96 MG/DL (ref 75–110)

## 2024-03-19 PROCEDURE — 99232 SBSQ HOSP IP/OBS MODERATE 35: CPT | Performed by: PSYCHIATRY & NEUROLOGY

## 2024-03-19 PROCEDURE — 6370000000 HC RX 637 (ALT 250 FOR IP): Performed by: INTERNAL MEDICINE

## 2024-03-19 PROCEDURE — 6370000000 HC RX 637 (ALT 250 FOR IP): Performed by: PSYCHIATRY & NEUROLOGY

## 2024-03-19 PROCEDURE — 97535 SELF CARE MNGMENT TRAINING: CPT

## 2024-03-19 PROCEDURE — 97530 THERAPEUTIC ACTIVITIES: CPT

## 2024-03-19 PROCEDURE — 82947 ASSAY GLUCOSE BLOOD QUANT: CPT

## 2024-03-19 PROCEDURE — 6370000000 HC RX 637 (ALT 250 FOR IP): Performed by: NURSE PRACTITIONER

## 2024-03-19 PROCEDURE — 99231 SBSQ HOSP IP/OBS SF/LOW 25: CPT | Performed by: INTERNAL MEDICINE

## 2024-03-19 PROCEDURE — APPSS30 APP SPLIT SHARED TIME 16-30 MINUTES

## 2024-03-19 PROCEDURE — 1240000000 HC EMOTIONAL WELLNESS R&B

## 2024-03-19 RX ADMIN — FLUTICASONE PROPIONATE 2 SPRAY: 50 SPRAY, METERED NASAL at 08:36

## 2024-03-19 RX ADMIN — CLOPIDOGREL BISULFATE 75 MG: 75 TABLET ORAL at 08:34

## 2024-03-19 RX ADMIN — TRAZODONE HYDROCHLORIDE 50 MG: 50 TABLET ORAL at 21:17

## 2024-03-19 RX ADMIN — DONEPEZIL HYDROCHLORIDE 5 MG: 5 TABLET, FILM COATED ORAL at 21:17

## 2024-03-19 RX ADMIN — ATENOLOL 12.5 MG: 25 TABLET ORAL at 08:34

## 2024-03-19 RX ADMIN — POLYETHYLENE GLYCOL 3350 17 G: 17 POWDER, FOR SOLUTION ORAL at 08:32

## 2024-03-19 RX ADMIN — ASPIRIN 81 MG 81 MG: 81 TABLET ORAL at 08:41

## 2024-03-19 RX ADMIN — VENLAFAXINE HYDROCHLORIDE 37.5 MG: 75 CAPSULE, EXTENDED RELEASE ORAL at 08:34

## 2024-03-19 RX ADMIN — OLANZAPINE 7.5 MG: 7.5 TABLET, FILM COATED ORAL at 21:17

## 2024-03-19 NOTE — GROUP NOTE
Group Therapy Note    Date: 3/19/2024    Group Start Time: 1000  Group End Time: 1045  Group Topic: Psychotherapy    [facility] Jannette Waller MSW        Group Therapy Note    Attendees: 5/12       Patient's Goal:  Discuss journaling as a coping skill and practice journaling using prompts    Patient was offered group therapy today but declined to participate despite encouragement from staff.  1:1 was offered.        Discipline Responsible: /Counselor      Signature:  TANO Hernández

## 2024-03-19 NOTE — PROGRESS NOTES
[facility]   INPATIENT OCCUPATIONAL THERAPY  PROGRESS NOTE  Date: 3/19/2024  Patient Name: William Babcock       Room: 0205/0205-01  MRN: [id number]    : 1960  (64 y.o.)  Gender: male   Referring Practitioner: Donnie Rosa MD  Diagnosis: Acute psychosis      Discharge Recommendations:  Further Occupational Therapy is recommended upon facility discharge.    OT Equipment Recommendations  Other: TBD    Restrictions/Precautions  Restrictions/Precautions  Restrictions/Precautions: Fall Risk;General Precautions    O2 Device: None (Room air)    Subjective  Subjective  Subjective: \"I think I'm done with this part. thank you.\" pt states after ~10 sets of safety cards, pt agreed to complete ADLs to continue session.  Subjective  Pain: Pt does not report pain  Comments: Ok per Nursing staff for OT session, nursing providing encouragement for pt to continue participation in therapy this date.    Objective  Orientation  Overall Orientation Status: Impaired  Cognition  Overall Cognitive Status: Exceptions  Arousal/Alertness: Delayed responses to stimuli  Following Commands: Inconsistently follows commands;Follows one step commands with increased time;Follows one step commands with repetition  Attention Span: Difficulty attending to directions;Difficulty dividing attention  Safety Judgement: Decreased awareness of need for assistance  Problem Solving: Assistance required to generate solutions;Assistance required to implement solutions;Assistance required to identify errors made;Assistance required to correct errors made;Decreased awareness of errors  Insights: Decreased awareness of deficits  Initiation: Requires cues for some  Sequencing: Requires cues for some  Cognition Comment: Pt engaged on safety cares this date successfully identifying which card was safe and also identifying what the safety barrier in the unsafe card was with <1 vc.    Activities of Daily Living  ADL  Feeding:  Supervision  Scooting: Supervision  Bed Mobility Comments: Bed mobility completed with HOB elevated.  Transfers  Sit to stand: Supervision  Stand to sit: Supervision  Transfer Comments: Verbal cues for safety throughout    Functional Mobility  Functional - Mobility Device: No device  Activity: To/from bathroom;Other;Retrieve items;Transport items  Assist Level: Supervision  Functional Mobility Comments: Verbal cues for safety           Patient Education  Patient Education  Education Given To: Patient  Education Provided: Role of Therapy, Plan of Care, Transfer Training  Education Method: Verbal  Barriers to Learning: Cognition  Education Outcome: Continued education needed    Goals  Short Term Goals  Time Frame for Short Term Goals: By discharge  Short Term Goal 1: Pt will complete BADLs with SBA with Good safety and attention to task  Short Term Goal 2: Pt will complete functional transfers/mobility during self care tasks with Supervision with Good safety and attention to task  Short Term Goal 3: Pt will follow simple 1-2 step commands during self care tasks with no more than 3 verbal cues to increase participation and safety with daily activities  Short Term Goal 4: Pt will participate in self care routine 2-3 times a week with no more than 3 verbal cues to initiate task  Short Term Goal 5: Pt will participate in 15+ minutes of therapeutic exercises/functional activities/social participation to increase safety and independence with daily activities and improve overall quality of life  Additional Goals?: Yes  Short Term Goal 6: OT to complete Robby when appropriate  Occupational Therapy Plan  Times Per Week: 2-3  Current Treatment Recommendations: Self-Care / ADL, Strengthening, ROM, Balance training, Functional mobility training, Endurance training, Cognitive reorientation, Pain management, Safety education & training, Patient/Caregiver education & training, Home management training, Cognitive/Perceptual

## 2024-03-19 NOTE — GROUP NOTE
Group Therapy Note    Date: 3/19/2024    Group Start Time: 1330  Group End Time: 1420  Group Topic: Psychoeducation    [facility] Emelia Belcher CTRS        Group Therapy Note    Attendees: 6/12       Patient's Goal:  To improve interpersonal skills and emotional identification through collaborating with peers and demonstrating self-expression.     Notes:  Patient attended and participated in group intermittently. Patient was able to collaborate with peers and demonstrate self-expression with prompting. Patient entered and left group space multiple times throughout session. Patient was pleasant.     Status After Intervention:  Improved    Participation Level: Minimal. Interactive when prompted.     Participation Quality: Intermittently attentive. Sharing when prompted.       Speech:  normal      Thought Process/Content: Logical to task. Thought blocking.       Affective Functioning: Congruent      Mood: Anxious       Level of consciousness:  Alert. Intermittently attentive. Preoccupied.       Response to Learning: Able to verbalize current knowledge/experience, Able to retain information, and Progressing to goal      Endings: None Reported    Modes of Intervention: Support, Socialization, and Exploration      Discipline Responsible: Psychoeducational Specialist      Signature:  MAURY Campbell

## 2024-03-19 NOTE — PLAN OF CARE
Pt delusions and paranoia have significantly decreased today.  Pt had stated one time, \"I'm going to die a virgin.\"  And Pt stated one time, \"I'm going to jump from my room.\"  Without prompting, Pt had agreed to to participate in an occupational therapy assessment and had agreed to wash self at bathroom sink and change his clothes. Pt participated I one group          Without prompting/encouragement Pt has been eating meals independently with at least 50% intake.  Pt has been accepting of frequent drinks of water.      Writer expressed concerns  to Dr Barron about bilateral bruising to Pt lower extremities due to Pt having to be held down in PACU yesterday 3/18 for safety of Pt and staff safety. Per MAR Pt is on Plavix and Asprin - causing Pt to bruise easily.   Writer suggests sitter in PACU  for 3/19 ECT.   Problem: Psychosis  Goal: Will report no hallucinations or delusions  Description: INTERVENTIONS:  1. Administer medication as  ordered  2. Assist with reality testing to support increasing orientation  3. Assess if patient's hallucinations or delusions are encouraging self harm or harm to others and intervene as appropriate  Outcome: Progressing     Problem: Behavior  Goal: Pt/Family maintain appropriate behavior and adhere to behavioral management agreement, if implemented  Description: INTERVENTIONS:  1. Assess patient/family's coping skills and  non-compliant behavior (including use of illegal substances)  2. Notify security of behavior or suspected illegal substances which indicate the need for search of the family and/or belongings  3. Encourage verbalization of thoughts and concerns in a socially appropriate manner  4. Utilize positive, consistent limit setting strategies supporting safety of patient, staff and others  5. Encourage participation in the decision making process about the behavioral management agreement  6. If a visitor's behavior poses a threat to safety call refer to organization  policy.  7. Initiate consult with , Psychosocial CNS, Spiritual Care as appropriate  Outcome: Progressing     Problem: Safety - Adult  Goal: Free from fall injury  Outcome: Progressing

## 2024-03-19 NOTE — PROGRESS NOTES
Daily Progress Note  3/19/2024    Patient Name: William Babcock    CHIEF COMPLAINT: Severe recurrent major depression with psychotic features         SUBJECTIVE:      Patient is seen today for a follow up assessment.  Reagan was found resting in his room.  He does not appear as scattered or bizarre today.  He was easier to talk to and he remained seated in his bed instead of getting up out of the bed abruptly and walking away like he usually does.  Nursing staff notes that his fluid intake has improved.  He was able to answer questions appropriately today but still very quiet and responds with 1 word answers.  He states that he is okay and he is denying any suicidal or homicidal ideation.  He denies any perceptual disturbances.  Not exhibiting anything delusions at this time but occasionally exhibits some paranoia.  He denies any concerns with sleep.  After discussion is noted that he ate all of his breakfast eggs and most of his lunch today.  This is a great improvement from the past few weeks.  Normally he states that he is afraid he is \"going to die.\"  He has not been doing this lately.  He did not appear anxious or repeating himself.  Still taking his meds as prescribed.  Nursing staff noted some concerns with some bruising to his posterior calves from yesterday after ECT.  It is noted the patient was extremely difficult coming out of anesthesia and had to be held down for his own safety and others.  It is noted that he is on aspirin and Plavix and aspirin slightly easily.  He was assessed and he is complaining of no pain.  There is no swelling noted.  Bruising appears superficial.  Discussed possible one-to-one sitter when going to ECT and will speak with attending about this.  Otherwise she is showing improvements overall.  Vitals and blood work remained stable.  Medication management per attending.  Next ECT treatment is tomorrow.  Patient continues to require inpatient hospitalization he is unable to care

## 2024-03-19 NOTE — BH NOTE
Pt came up to get lunch tray  without having to be directed/encouraged.  Pt eating lunch without prompting.  Pt eats 75% of lunch. Pt refused Ensure Supplement.

## 2024-03-19 NOTE — PROGRESS NOTES
Behavioral Services                                              Medicare Re-Certification    I certify that the inpatient psychiatric hospital services furnished since the previous certification/re-certification were, and continue to be, medically necessary for;    [x] (1) Treatment which could reasonably be expected to improve the patient's condition,    [x] (2) Or for diagnostic study.    Estimated length of stay/service 4 to 7 days    Plan for post-hospital care ECF    This patient continues to need, on a daily basis, active treatment furnished directly by or requiring the supervision of inpatient psychiatric personnel.    Electronically signed by ZAINA TODD MD on 3/18/2024 at 8:01 PM

## 2024-03-19 NOTE — BH NOTE
Pt delusions and paranoia have significantly decreased today.  Pt had stated one time, \"I'm going to die a virgin.\"  And Pt stated one time, \"I'm going to jump from my room.\"  Without prompting, Pt had agreed to to participate in an occupational therapy assessment and had agreed to wash self at bathroom sink and change his clothes. Pt participated I one group        Without prompting/encouragement Pt has been eating meals independently with at least 50% intake.  Pt has been accepting of frequent drinks of water.     Writer expressed concerns  to Dr Barron about bilateral bruising to Pt lower extremities due to Pt having to be held down in PACU yesterday 3/18 for safety of Pt and staff safety. Per MAR Pt is on Plavix and Asprin - causing Pt to bruise easily.   Writer suggests sitter in PACU.

## 2024-03-19 NOTE — GROUP NOTE
Group Therapy Note    Date: 3/19/2024    Group Start Time: 1100  Group End Time: 1140  Group Topic: Cognitive Skills    Emelia Mueller CTRS        Group Therapy Note    Attendees: 6/12    Cognitive Skills Group Note        Date: March 19, 2024       Start Time: 11am  End Time: 11:40am      Number of Participants in Group & Unit Census:  6/12    Topic:  interpersonal skills, decision-making, concentration     Goal of Group: To improve interpersonal skills and decision-making through collaborating with peers and concentrating on a presented task.       Comments:     Patient did not participate in Cognitive Skills group, despite staff encouragement and explanation of benefits.  Patient remain seclusive to self.  Q15 minute safety checks maintained for patient safety and will continue to encourage patient to attend unit programming.        Signature:  MAURY Campbell

## 2024-03-19 NOTE — PLAN OF CARE
Problem: Psychosis  Goal: Will report no hallucinations or delusions  Description: INTERVENTIONS:  1. Administer medication as  ordered  2. Assist with reality testing to support increasing orientation  3. Assess if patient's hallucinations or delusions are encouraging self harm or harm to others and intervene as appropriate  3/19/2024 0052 by Zainab Mcfarland RN  Outcome: Progressing     Problem: Behavior  Goal: Pt/Family maintain appropriate behavior and adhere to behavioral management agreement, if implemented  Description: INTERVENTIONS:  1. Assess patient/family's coping skills and  non-compliant behavior (including use of illegal substances)  2. Notify security of behavior or suspected illegal substances which indicate the need for search of the family and/or belongings  3. Encourage verbalization of thoughts and concerns in a socially appropriate manner  4. Utilize positive, consistent limit setting strategies supporting safety of patient, staff and others  5. Encourage participation in the decision making process about the behavioral management agreement  6. If a visitor's behavior poses a threat to safety call refer to organization policy.  7. Initiate consult with , Psychosocial CNS, Spiritual Care as appropriate  3/19/2024 0052 by Zainab Mcfarland RN  Outcome: Progressing     Problem: Safety - Adult  Goal: Free from fall injury  3/19/2024 0052 by Zainab Mcfarland RN  Outcome: Progressing     Patient alert and oriented to self only. Patient remains free from falls while on the unit. Patient endorses paranoid and obsessive delusions. Patient is medication compliant and behavior remains controlled at this time.  Patient is isolative to room this evening coming out for needs only. Patient is cooperative with staff when providing assistance with ADLs. Patient encouraged to eat and drink this evening during snack time; patient ate half a bag of chips and two cups of water. Safe

## 2024-03-19 NOTE — BH NOTE
Pt eats 100% of scrabbled eggs. Takes bites of Uzbek toast. Drinks 75% of orange juice.. Pt accepting of all medication with encouragement.  Drinks 100% of water. Denies pain. Pt did not drink Ensure.

## 2024-03-19 NOTE — PROGRESS NOTES
Comprehensive Nutrition Assessment    Type and Reason for Visit:  Reassess    Nutrition Recommendations/Plan:   Will continue Regular diet with Ensure Enlive all trays     Malnutrition Assessment:  Malnutrition Status:  Severe malnutrition (03/06/24 1413)    Context:  Acute Illness     Findings of the 6 clinical characteristics of malnutrition:  Energy Intake:  75% or less of estimated energy requirements for 7 or more days  Weight Loss:  5% over 1 month (15% in 2 weeks)     Body Fat Loss:   (Severe) Orbital   Muscle Mass Loss:   (Severe) Temples (temporalis), Hand (interosseous)  Fluid Accumulation:  No significant fluid accumulation     Strength:  Not Performed    Nutrition Assessment:    Intake continues to be variable (0-75% per nursing documentation). This morning's specific intake documentation of breakfast intake is very helpful and appreciated    Nutrition Related Findings:    no edema, Labs/Meds: Reviewed Wound Type: None       Current Nutrition Intake & Therapies:    Average Meal Intake: 1-25%, 26-50%, 51-75%  Average Supplements Intake: 1-25%  ADULT DIET; Regular  ADULT ORAL NUTRITION SUPPLEMENT; Breakfast, Lunch, Dinner; Standard High Calorie/High Protein Oral Supplement    Anthropometric Measures:  Height: 172.7 cm (5' 8\")  Ideal Body Weight (IBW): 154 lbs (70 kg)    Admission Body Weight: 71.7 kg (158 lb)  Current Body Weight: 60.3 kg (133 lb), 87 % IBW. Weight Source: Not Specified  Current BMI (kg/m2): 20.2  Usual Body Weight: 71.7 kg (158 lb) (2/6 bed scale)  % Weight Change (Calculated): -15.2                    BMI Categories: Normal Weight (BMI 18.5-24.9)    Estimated Daily Nutrient Needs:  Energy Requirements Based On: Formula  Weight Used for Energy Requirements: Current  Energy (kcal/day): [place] x 1.3= 1800 kcal  Weight Used for Protein Requirements: Current  Protein (g/day): 1.5g/kg= 90 g  Method Used for Fluid Requirements: 1 ml/kcal    Nutrition Diagnosis:   Severe malnutrition

## 2024-03-19 NOTE — PROGRESS NOTES
IN-PATIENT SERVICE  [facility] Internal Medicine    Progress note  Date:   3/19/2024  Patient name:  William Babcock  Date of admission:  2/21/2024  9:18 PM  MRN:   [id number]  Account:  [id number]  YOB: 1960  PCP:    Billy Francois MD  Room:   [facility]  Code Status:    Full Code    Physician Requesting Consult: Donnie Rosa MD    Reason for Consult: History and physical, medical management    Chief Complaint:     No chief complaint on file.    Medical management    History Obtained From:     Patient, EMR, nursing staff    History of Present Illness:     63-year-old male admitted for acute psychosis.  Patient has history of autism;   Carotid pseudoaneurysm.  Recent admission to [facility] for right proximal RCA pseudoaneurysm after patient presented on 2/5 for agitation and psychosis.  Patient underwent cerebral angiogram and embolization of cervical pseudoaneurysm 2/9/2024.  Was discharged on aspirin and Plavix, resumed.  Patient was discharged to [facility] on 2/12 subsequently discharged on 2/16.    3/19/2024  Much better as compared to yesterday  Denies cardiac respiratory GI or  concerns eating breakfast when seen  Past Medical History:     Past Medical History:   Diagnosis Date    Anxiety     Autism         Past Surgical History:     Past Surgical History:   Procedure Laterality Date    CEREBRAL ANGIOGRAM  02/09/2024    IR ANGIOGRAM CAROTID CEREBRAL BILATERAL    EYE SURGERY          Medications Prior to Admission:     Prior to Admission medications    Medication Sig Start Date End Date Taking? Authorizing Provider   fluticasone (FLONASE) 50 MCG/ACT nasal spray 2 sprays by Each Nostril route daily   Yes Provider, MD Nick   aspirin 81 MG chewable tablet Take 1 tablet by mouth daily 2/17/24   Donnie Rosa MD   clopidogrel (PLAVIX) 75 MG tablet Take 1 tablet by mouth daily 2/17/24   Donnie Rosa MD   QUEtiapine (SEROQUEL) 25 MG tablet Take 1 tablet  by mouth 2 times daily 24   Donnie Rosa MD   atenolol (TENORMIN) 50 MG tablet Take 1 tablet by mouth daily 23   Billy Francois MD        Allergies:     Patient has no known allergies.    Social History:     Tobacco:    reports that he has never smoked. He has never used smokeless tobacco.  Alcohol:      reports no history of alcohol use.  Drug Use:  reports no history of drug use.    Family History:     Family History   Problem Relation Age of Onset    Colon Cancer Father     Colon Cancer Sister        Review of Systems:     Positive and Negative as described in HPI.  Reliability doubted    Unable to obtain due to patient factors    Physical Exam:     /88   Pulse 100   Temp 97.9 °F (36.6 °C) (Temporal)   Resp 14   Ht 1.727 m (5' 8\")   Wt 60.3 kg (133 lb)   SpO2 99%   BMI 20.22 kg/m²   Temp (24hrs), Av.8 °F (36.6 °C), Min:97.7 °F (36.5 °C), Max:97.9 °F (36.6 °C)    Recent Labs     24  0616 24  0628 24  0758   POCGLU 63* 97 71*       No intake or output data in the 24 hours ending 24 1521      General Appearance: Alert, moving around, has a unstable gait.  Head:  normocephalic, atraumatic.  Eye: no icterus, redness, pupils equal and reactive, extraocular eye movements intact, conjunctiva clear  Ear: normal external ear, no discharge, hearing intact  Nose:  no drainage noted  Mouth: mucous membranes moist  Neck: supple, no carotid bruits, thyroid not palpable  Lungs: Bilateral equal air entry, clear to ausculation, no wheezing, rales or rhonchi, normal effort  Cardiovascular: normal rate, regular rhythm, no murmur, gallop, rub.  Abdomen: Soft, nontender, nondistended, normal bowel sounds, no hepatomegaly or splenomegaly  Neurologic: Grossly nonfocal  Extremities:  No joint swelling, no pedal edema or calf pain with palpation  Skin does have bruising several places, stable      Investigations:      Laboratory Testing:  Recent Results (from the past 24

## 2024-03-20 ENCOUNTER — ANESTHESIA (OUTPATIENT)
Dept: POSTOP/PACU | Age: 64
End: 2024-03-20
Payer: MEDICARE

## 2024-03-20 ENCOUNTER — ANESTHESIA EVENT (OUTPATIENT)
Dept: POSTOP/PACU | Age: 64
End: 2024-03-20
Payer: MEDICARE

## 2024-03-20 ENCOUNTER — APPOINTMENT (OUTPATIENT)
Dept: POSTOP/PACU | Age: 64
End: 2024-03-20
Payer: MEDICARE

## 2024-03-20 DIAGNOSIS — F33.3 SEVERE RECURRENT MAJOR DEPRESSION WITH PSYCHOTIC FEATURES (HCC): Primary | ICD-10-CM

## 2024-03-20 LAB
GLUCOSE BLD-MCNC: 79 MG/DL (ref 75–110)
GLUCOSE BLD-MCNC: 83 MG/DL (ref 75–110)

## 2024-03-20 PROCEDURE — 3700000001 HC ADD 15 MINUTES (ANESTHESIA): Performed by: ANESTHESIOLOGY

## 2024-03-20 PROCEDURE — APPSS30 APP SPLIT SHARED TIME 16-30 MINUTES

## 2024-03-20 PROCEDURE — 99231 SBSQ HOSP IP/OBS SF/LOW 25: CPT | Performed by: INTERNAL MEDICINE

## 2024-03-20 PROCEDURE — 6370000000 HC RX 637 (ALT 250 FOR IP): Performed by: INTERNAL MEDICINE

## 2024-03-20 PROCEDURE — 6360000002 HC RX W HCPCS: Performed by: PSYCHIATRY & NEUROLOGY

## 2024-03-20 PROCEDURE — 90870 ELECTROCONVULSIVE THERAPY: CPT

## 2024-03-20 PROCEDURE — 7100000000 HC PACU RECOVERY - FIRST 15 MIN: Performed by: ANESTHESIOLOGY

## 2024-03-20 PROCEDURE — 2580000003 HC RX 258: Performed by: ANESTHESIOLOGY

## 2024-03-20 PROCEDURE — 82947 ASSAY GLUCOSE BLOOD QUANT: CPT

## 2024-03-20 PROCEDURE — 3700000000 HC ANESTHESIA ATTENDED CARE: Performed by: ANESTHESIOLOGY

## 2024-03-20 PROCEDURE — 6370000000 HC RX 637 (ALT 250 FOR IP): Performed by: PSYCHIATRY & NEUROLOGY

## 2024-03-20 PROCEDURE — 90870 ELECTROCONVULSIVE THERAPY: CPT | Performed by: PSYCHIATRY & NEUROLOGY

## 2024-03-20 PROCEDURE — 7100000001 HC PACU RECOVERY - ADDTL 15 MIN: Performed by: ANESTHESIOLOGY

## 2024-03-20 PROCEDURE — 1240000000 HC EMOTIONAL WELLNESS R&B

## 2024-03-20 PROCEDURE — 99232 SBSQ HOSP IP/OBS MODERATE 35: CPT | Performed by: PSYCHIATRY & NEUROLOGY

## 2024-03-20 PROCEDURE — 6360000002 HC RX W HCPCS: Performed by: NURSE ANESTHETIST, CERTIFIED REGISTERED

## 2024-03-20 PROCEDURE — 6370000000 HC RX 637 (ALT 250 FOR IP): Performed by: NURSE PRACTITIONER

## 2024-03-20 PROCEDURE — 2500000003 HC RX 250 WO HCPCS: Performed by: NURSE ANESTHETIST, CERTIFIED REGISTERED

## 2024-03-20 RX ORDER — HALOPERIDOL 5 MG/ML
INJECTION INTRAMUSCULAR PRN
Status: DISCONTINUED | OUTPATIENT
Start: 2024-03-20 | End: 2024-03-20 | Stop reason: SDUPTHER

## 2024-03-20 RX ORDER — SUCCINYLCHOLINE/SOD CL,ISO/PF 200MG/10ML
SYRINGE (ML) INTRAVENOUS PRN
Status: DISCONTINUED | OUTPATIENT
Start: 2024-03-20 | End: 2024-03-20 | Stop reason: SDUPTHER

## 2024-03-20 RX ORDER — LORAZEPAM 2 MG/ML
INJECTION INTRAMUSCULAR PRN
Status: DISCONTINUED | OUTPATIENT
Start: 2024-03-20 | End: 2024-03-20 | Stop reason: SDUPTHER

## 2024-03-20 RX ORDER — ETOMIDATE 2 MG/ML
INJECTION INTRAVENOUS PRN
Status: DISCONTINUED | OUTPATIENT
Start: 2024-03-20 | End: 2024-03-20 | Stop reason: SDUPTHER

## 2024-03-20 RX ADMIN — HALOPERIDOL LACTATE 5 MG: 5 INJECTION, SOLUTION INTRAMUSCULAR at 09:33

## 2024-03-20 RX ADMIN — VENLAFAXINE HYDROCHLORIDE 37.5 MG: 75 CAPSULE, EXTENDED RELEASE ORAL at 11:36

## 2024-03-20 RX ADMIN — POLYETHYLENE GLYCOL 3350 17 G: 17 POWDER, FOR SOLUTION ORAL at 11:35

## 2024-03-20 RX ADMIN — ATENOLOL 12.5 MG: 25 TABLET ORAL at 06:37

## 2024-03-20 RX ADMIN — SODIUM CHLORIDE, POTASSIUM CHLORIDE, SODIUM LACTATE AND CALCIUM CHLORIDE: 600; 310; 30; 20 INJECTION, SOLUTION INTRAVENOUS at 08:11

## 2024-03-20 RX ADMIN — ETOMIDATE INJECTION 20 MG: 2 SOLUTION INTRAVENOUS at 09:19

## 2024-03-20 RX ADMIN — TRAZODONE HYDROCHLORIDE 50 MG: 50 TABLET ORAL at 21:36

## 2024-03-20 RX ADMIN — CYANOCOBALAMIN 1000 MCG: 1000 INJECTION, SOLUTION INTRAMUSCULAR; SUBCUTANEOUS at 11:39

## 2024-03-20 RX ADMIN — OLANZAPINE 7.5 MG: 7.5 TABLET, FILM COATED ORAL at 21:36

## 2024-03-20 RX ADMIN — CLOPIDOGREL BISULFATE 75 MG: 75 TABLET ORAL at 11:36

## 2024-03-20 RX ADMIN — DONEPEZIL HYDROCHLORIDE 5 MG: 5 TABLET, FILM COATED ORAL at 21:36

## 2024-03-20 RX ADMIN — ASPIRIN 81 MG 81 MG: 81 TABLET ORAL at 11:36

## 2024-03-20 RX ADMIN — FLUTICASONE PROPIONATE 2 SPRAY: 50 SPRAY, METERED NASAL at 11:43

## 2024-03-20 RX ADMIN — Medication 80 MG: at 09:19

## 2024-03-20 RX ADMIN — ACETAMINOPHEN 650 MG: 325 TABLET ORAL at 06:38

## 2024-03-20 RX ADMIN — LORAZEPAM 1 MG: 2 INJECTION, SOLUTION INTRAMUSCULAR; INTRAVENOUS at 09:32

## 2024-03-20 ASSESSMENT — PAIN DESCRIPTION - DESCRIPTORS: DESCRIPTORS: OTHER (COMMENT)

## 2024-03-20 ASSESSMENT — PAIN SCALES - GENERAL
PAINLEVEL_OUTOF10: 0

## 2024-03-20 ASSESSMENT — PAIN DESCRIPTION - LOCATION: LOCATION: OTHER (COMMENT)

## 2024-03-20 ASSESSMENT — PAIN DESCRIPTION - ORIENTATION: ORIENTATION: OTHER (COMMENT)

## 2024-03-20 ASSESSMENT — PAIN - FUNCTIONAL ASSESSMENT
PAIN_FUNCTIONAL_ASSESSMENT: 0-10
PAIN_FUNCTIONAL_ASSESSMENT: 0-10

## 2024-03-20 NOTE — PROGRESS NOTES
IN-PATIENT SERVICE  [facility] Internal Medicine    Progress note  Date:   3/20/2024  Patient name:  William Babcock  Date of admission:  2/21/2024  9:18 PM  MRN:   [id number]  Account:  [id number]  YOB: 1960  PCP:    Billy Francois MD  Room:   [facility]  Code Status:    Full Code    Physician Requesting Consult: Donnie Rosa MD    Reason for Consult: History and physical, medical management    Chief Complaint:     No chief complaint on file.    Medical management    History Obtained From:     Patient, EMR, nursing staff    History of Present Illness:     63-year-old male admitted for acute psychosis.  Patient has history of autism;   Carotid pseudoaneurysm.  Recent admission to [facility] for right proximal RCA pseudoaneurysm after patient presented on 2/5 for agitation and psychosis.  Patient underwent cerebral angiogram and embolization of cervical pseudoaneurysm 2/9/2024.  Was discharged on aspirin and Plavix, resumed.  Patient was discharged to [facility] on 2/12 subsequently discharged on 2/16.    3/20/2024  Seen after his ECT treatment today, better as compared to when seen after treatment before.  Past Medical History:     Past Medical History:   Diagnosis Date    Anxiety     Autism         Past Surgical History:     Past Surgical History:   Procedure Laterality Date    CEREBRAL ANGIOGRAM  02/09/2024    IR ANGIOGRAM CAROTID CEREBRAL BILATERAL    EYE SURGERY          Medications Prior to Admission:     Prior to Admission medications    Medication Sig Start Date End Date Taking? Authorizing Provider   fluticasone (FLONASE) 50 MCG/ACT nasal spray 2 sprays by Each Nostril route daily   Yes Provider, MD Nick   aspirin 81 MG chewable tablet Take 1 tablet by mouth daily 2/17/24   Donnie Rosa MD   clopidogrel (PLAVIX) 75 MG tablet Take 1 tablet by mouth daily 2/17/24   Donnie Rosa MD   QUEtiapine (SEROQUEL) 25 MG tablet Take 1 tablet by mouth 2 times

## 2024-03-20 NOTE — ANESTHESIA PRE PROCEDURE
Department of Anesthesiology  Preprocedure Note       Name:  William Babcock   Age:  64 y.o.  :  1960                                          MRN:  [id number]         Date:  3/20/2024      Surgeon: * No surgeons listed *    Procedure: * No procedures listed *    Medications prior to admission:   Prior to Admission medications    Medication Sig Start Date End Date Taking? Authorizing Provider   fluticasone (FLONASE) 50 MCG/ACT nasal spray 2 sprays by Each Nostril route daily   Yes Provider, MD Nick   aspirin 81 MG chewable tablet Take 1 tablet by mouth daily 24   Donnie Rosa MD   clopidogrel (PLAVIX) 75 MG tablet Take 1 tablet by mouth daily 24   Donnie Rosa MD   QUEtiapine (SEROQUEL) 25 MG tablet Take 1 tablet by mouth 2 times daily 24   Donnie Rosa MD   atenolol (TENORMIN) 50 MG tablet Take 1 tablet by mouth daily 23   Billy Francois MD       Current medications:    Current Facility-Administered Medications   Medication Dose Route Frequency Provider Last Rate Last Admin   • glucose chewable tablet 16 g  4 tablet Oral PRN Rsoario Lamar MD       • dextrose bolus 10% 125 mL  125 mL IntraVENous PRN Rosario Lamar MD        Or   • dextrose bolus 10% 250 mL  250 mL IntraVENous PRN Rosario Lamar MD       • glucagon injection 1 mg  1 mg SubCUTAneous PRN Rosario Lamar MD       • dextrose 10 % infusion   IntraVENous Continuous PRN Rosario Lamar MD       • atenolol (TENORMIN) tablet 12.5 mg  12.5 mg Oral Daily Rosario Lamar MD   12.5 mg at 24 0637   • lactated ringers IV soln infusion   IntraVENous Continuous Aris Nichole MD   Stopped at 24 0732   • venlafaxine (EFFEXOR XR) extended release capsule 37.5 mg  37.5 mg Oral Daily with breakfast Donnie Rosa MD   37.5 mg at 24 0834   • OLANZapine (ZYPREXA) tablet 7.5 mg  7.5 mg Oral Nightly Donnie Rosa MD   7.5 mg at 24 2117   • polyethylene glycol (GLYCOLAX) packet 17 g

## 2024-03-20 NOTE — PROCEDURES
Bilateral ECT Procedure Report  William Babcock   3/20/2024  1960         Attending:Donnie Rosa MD  Preprocedure diagnosis: Major depressive disorder, recurrent, severe with psychotic symptoms (F33.3)  Postprocedure diagnosis: SAME AS PRE-PROCEDURE DIAGNOSIS  Treatment Number: This is treatment # 4   Patient Status: BEHAVIOR IP   Type of ECT: Bilateral Brief Pulse  Medications  Preprocedure: Tylenol 650mg  Anesthetic: Methohexital 70 mg  Paralytic: Succinylcholine 70 mg  Post procedure: none needed   Cuff placement: Left Lower Extremity    MECTA Settings 1st Stimulus:     Pulse width: 1.0 ms   Frequency:  60 hz   Duration:   3.0 seconds   Static Impedance: 322ohms             Dynamic Impedance: 140 ohms             Motor Seizure Duration: 21seconds  EEG Seizure Duration: 51 seconds             The patient's ECT treatment was performed using MECTA machine  .  Timeout:  Time out was performed using two patient identifiers and confirmation of procedure.     Patient Preparation: Preprocedure documentation, labs, H&P were all verified and reviewed.  The patient was placed in supine position.  EEG leads were placed for monitoring of seizure.   Amish areas bilaterally cleaned and prepped.  Conductive gel  was applied over stimulus electrode site.   The patient was pre-oxygenated.       Procedure in detail: Medications were administered by anesthesia using intravenous access at the doses listed previously in this summary.  Anesthetic administered and once confirmation the patient is anesthetized, the patient's blood pressure cuff on lower extremity was inflated to 100mmHg in excess of patient's blood pressure.  At this time, the neuromuscular blockade was administered intravenously by anesthesia.  Upper extremity fasciculation were verified followed by lower extremity fasciculation.  Once fasciculations terminated, confirmation of affective neuromuscular blockade demonstrated by absence of withdrawal reflex.  Bite

## 2024-03-20 NOTE — PLAN OF CARE
Problem: Psychosis  Goal: Will report no hallucinations or delusions  Description: INTERVENTIONS:  1. Administer medication as  ordered  2. Assist with reality testing to support increasing orientation  3. Assess if patient's hallucinations or delusions are encouraging self harm or harm to others and intervene as appropriate  3/20/2024 0041 by Zainab Mcfarland RN  Outcome: Progressing     Problem: Behavior  Goal: Pt/Family maintain appropriate behavior and adhere to behavioral management agreement, if implemented  Description: INTERVENTIONS:  1. Assess patient/family's coping skills and  non-compliant behavior (including use of illegal substances)  2. Notify security of behavior or suspected illegal substances which indicate the need for search of the family and/or belongings  3. Encourage verbalization of thoughts and concerns in a socially appropriate manner  4. Utilize positive, consistent limit setting strategies supporting safety of patient, staff and others  5. Encourage participation in the decision making process about the behavioral management agreement  6. If a visitor's behavior poses a threat to safety call refer to organization policy.  7. Initiate consult with , Psychosocial CNS, Spiritual Care as appropriate  3/20/2024 0041 by Zainab Mcfarland RN  Outcome: Progressing     Problem: Safety - Adult  Goal: Free from fall injury  3/20/2024 0041 by Zainab Mcfarland RN  Outcome: Progressing       Patient alert and oriented to self only. Patient remains free from falls while on the unit. Patient endorses paranoid and obsessive delusions. Patient is medication compliant and behavior remains controlled at this time.  Patient is out in milieu this evening but is aloof of peers. Patient is cooperative with staff when providing assistance with ADLs. Patient encouraged to eat and drink this evening during snack time; patient ate a bag of chips, a pudding cup and three cups of water. Safe

## 2024-03-20 NOTE — BH NOTE
Patient accepting of nighttime snack without prompting. Patient drinks 3 cups of ice water with nighttime snack. Patient accepting of medications this evening.

## 2024-03-20 NOTE — BH NOTE
Departure from I Unit to ECT:    Pre-op ECT Checklist completed and placed by front of patients chart. Patient departed unit Time: 7:34 and Via Wheelchair, accompanied by 2 staff. Patient is alert and orientated at this time, as well as cooperative, and slightly restless.

## 2024-03-20 NOTE — PROGRESS NOTES
[facility]   OCCUPATIONAL THERAPY MISSED TREATMENT NOTE   [facility]  Date: 3/20/24  Patient Name: William Babcock       Room: 0205/0205-01  MRN: [id number]   Account #: [id number]    : 1960  (64 y.o.)  Gender: male   Referring Practitioner: Donnie Rosa MD  Diagnosis: Acute psychosis             REASON FOR MISSED TREATMENT:  Patient at testing and/or off the floor  for EC. Will continue to follow       Electronically signed by DIANNE Mckinney on 3/20/24 at 9:41 AM EDT

## 2024-03-20 NOTE — BH NOTE
Arrival from ECT to [facility] Unit:    Patient arrived to the unit Time: 10:58 and Via Cart. Patient is oriented to person and place, which is baseline for this patient. Patient is calm and cooperative.     Physical and Mental assessment, Vitals x 2, and fall risk assessment completed and documented.     Post-op ECT checklist completed and placed in patients chart.

## 2024-03-20 NOTE — GROUP NOTE
Group Therapy Note    Date: 3/20/2024    Group Start Time: 1100  Group End Time: 1155  Group Topic: Cognitive Skills    Nia Vincent CTRS        Group Therapy Note    Attendees: 6/12     Topic: To increase social interaction, practice decision making, and communication   skills.      Patient attended but did not participate in Cognitive Skills Group at 1100 for last 15 mins of group when invited to joina second time. Pt took interest in looking at image cards in task but remained seclusive to self and did not practice decision making. Pt was calm, in behavioral control .     Q15 minute safety checks maintained for patient safety and will continue to encourage   patient to attend unit programming.       Discipline Responsible: Psychoeducational Specialist  Signature:  MAURY BARNES

## 2024-03-20 NOTE — GROUP NOTE
Group Therapy Note    Date: 3/20/2024    Group Start Time: 1000  Group End Time: 1045  Group Topic: Psychotherapy    [facility] Jannette Waller MSW        Group Therapy Note    Attendees: 5/12       Patient's Goal:  Discuss mindfulness as a coping skill; Practice mindfullness    Patient was offered group therapy today but declined to participate despite encouragement from staff.  1:1 was offered.      Discipline Responsible: /Counselor      Signature:  TANO Hernández

## 2024-03-20 NOTE — ANESTHESIA POSTPROCEDURE EVALUATION
Department of Anesthesiology  Postprocedure Note    Patient: William Babcock  MRN: [id number]  YOB: 1960  Date of evaluation: 3/20/2024    Procedure Summary       Date: 03/20/24 Room / Location: [facility] PACU    Anesthesia Start: 0911 Anesthesia Stop: 0939    Procedure: ECT W/ ANESTHESIA Diagnosis:     Scheduled Providers:  Responsible Provider: Aris Nichole MD    Anesthesia Type: general ASA Status: 3            Anesthesia Type: No value filed.    Wanda Phase I: Wanda Score: 5    Wanda Phase II:      Anesthesia Post Evaluation    Patient location during evaluation: PACU  Patient participation: complete - patient participated  Level of consciousness: awake and alert  Airway patency: patent  Nausea & Vomiting: no vomiting  Cardiovascular status: hemodynamically stable  Respiratory status: acceptable  Hydration status: euvolemic  Comments: POST- ANESTHESIA EVALUATION       Pt Name: William Babcock  MRN: [id number]  YOB: 1960  Date of evaluation: 3/20/2024  Time:  10:32 AM      BP 90/62   Pulse (!) 49   Temp 97.1 °F (36.2 °C)   Resp (!) 0   Ht 1.727 m (5' 8\")   Wt 60.3 kg (133 lb)   SpO2 99%   BMI 20.22 kg/m²      Consciousness Level  Awake  Cardiopulmonary Status  Stable  Pain Adequately Treated YES  Nausea / Vomiting  NO  Adequate Hydration  YES  Anesthesia Related Complications NONE      Electronically signed by Aris Nichole MD on 3/20/2024 at 10:32 AM         Pain management: satisfactory to patient    No notable events documented.

## 2024-03-20 NOTE — GROUP NOTE
Group Therapy Note    Date: 3/20/2024    Group Start Time: 1330  Group End Time: 1415  Group Topic: Music Therapy    Dejuan Adair        Group Therapy Note    Attendees: 7/12       Patient's Goal:  Patients were asked to share music to dedicate to important people from their lives. Answered questions about these people and relationships as it related to their sharing and music. Periodically had one or two patients in attendance, and periodically no patients. When no patients were in attendance, this writer would select music to play in milieu.     Notes:  Patient attended and participated primarily as an active listener. Patient was pleasant, though declined to share a song upon multiple offers to share. Was calm and alert throughout    Status After Intervention:  Improved    Participation Level: Active Listener    Participation Quality: Appropriate, Attentive, and Sharing      Speech:  normal      Thought Process/Content: Logical      Affective Functioning: Congruent      Mood: euthymic      Level of consciousness:  Alert and Attentive      Response to Learning: Progressing to goal      Endings: None Reported    Modes of Intervention: Support, Socialization, Exploration, Activity, Media, and Reality-testing      Discipline Responsible: Psychoeducational Specialist      Signature:  Dejuan Whitman

## 2024-03-20 NOTE — PROGRESS NOTES
Daily Progress Note  3/20/2024    Patient Name: William Babcock    CHIEF COMPLAINT: Severe recurrent major depression with psychotic features         SUBJECTIVE:      Patient is seen today for a follow up assessment.  Reagan was found up at the nurses station eating pudding.  His affect is slightly brightened but he still blunted.  Speech is very rarely spontaneous but he does answer questions appropriately with 1 word responses.  He is showing improvement over the course of the last few days since receiving ECT.  He had his fourth ECT treatment this morning and had no concerns with coming out of anesthesia today.  The patient denies any headaches.  Nursing staff notes that his appetite and fluid intake have improved.  The patient is requesting food now and finishing most of his meals.  He is beginning to take his medications without difficulty.  His sleep has improved.  He is remaining in behavioral control.  He is no longer repeating himself on how he is \"going to die a virgin.\"  He is redirectable and improving in thought processes.  Speech is still slow and monotone but more linear.  Vitals and blood work remained stable.  He still requires inpatient hospitalization due to being unable to care for himself independently outside of the hospital.  Medication management discharge planning per attending.        Appetite:  [] Adequate/Unchanged  [x] Increased  [] Decreased      Sleep:       [x] Adequate/Unchanged  [] Fair  [] Poor      Group Attendance on Unit:   [] Yes   [x] Selectively    [] No    Compliant with scheduled medications: [x] Yes  [] No    Received emergency medications in past 24 hrs: [] Yes   [x] No    Medication Side Effects: Denies         Mental Status Exam  Level of consciousness: Alert and awake   Appearance: Appropriate attire for setting, standing, with fair  grooming and hygiene   Behavior/Motor: Approachable, engages with interviewer, no psychomotor abnormalities   Attitude toward examiner:  Cooperative, attentive, good eye contact  Speech: slow and monotone   Mood: \"Good\"  Affect: Blunted  Thought processes: linear and slow   Thought content:  Denies homicidal ideation  Suicidal Ideation: Denies suicidal ideations, contracts for safety on the unit.   Delusions: Patient presents with delusions.   Perceptual Disturbance:  Patient does not appear to be responding to internal stimuli.   Cognition: Oriented to self,, location,, and situation.  Memory: impaired recent memory  Insight: Improving  Judgement: Improving      Data   height is 1.727 m (5' 8\") and weight is 60.3 kg (133 lb). His temporal temperature is 97.7 °F (36.5 °C). His blood pressure is 97/71 and his pulse is 56. His respiration is 16 and oxygen saturation is 99%.   Labs:   No results displayed because visit has over 200 results.            Reviewed patient's current plan of care and vital signs with nursing staff.    Labs reviewed: [x] Yes    Medications  Current Facility-Administered Medications: glucose chewable tablet 16 g, 4 tablet, Oral, PRN  dextrose bolus 10% 125 mL, 125 mL, IntraVENous, PRN **OR** dextrose bolus 10% 250 mL, 250 mL, IntraVENous, PRN  glucagon injection 1 mg, 1 mg, SubCUTAneous, PRN  dextrose 10 % infusion, , IntraVENous, Continuous PRN  atenolol (TENORMIN) tablet 12.5 mg, 12.5 mg, Oral, Daily  lactated ringers IV soln infusion, , IntraVENous, Continuous  venlafaxine (EFFEXOR XR) extended release capsule 37.5 mg, 37.5 mg, Oral, Daily with breakfast  OLANZapine (ZYPREXA) tablet 7.5 mg, 7.5 mg, Oral, Nightly  polyethylene glycol (GLYCOLAX) packet 17 g, 17 g, Oral, Daily  donepezil (ARICEPT) tablet 5 mg, 5 mg, Oral, Nightly  diphenhydrAMINE-zinc acetate 2-0.1 % cream, , Topical, TID PRN  OLANZapine zydis (ZYPREXA) disintegrating tablet 5 mg, 5 mg, Oral, BID PRN **OR** OLANZapine (ZyPREXA) 5 mg in sterile water 1 mL injection, 5 mg, IntraMUSCular, BID PRN  aspirin chewable tablet 81 mg, 81 mg, Oral, Daily  clopidogrel

## 2024-03-21 PROCEDURE — 6370000000 HC RX 637 (ALT 250 FOR IP): Performed by: INTERNAL MEDICINE

## 2024-03-21 PROCEDURE — 99232 SBSQ HOSP IP/OBS MODERATE 35: CPT | Performed by: INTERNAL MEDICINE

## 2024-03-21 PROCEDURE — APPSS30 APP SPLIT SHARED TIME 16-30 MINUTES: Performed by: NURSE PRACTITIONER

## 2024-03-21 PROCEDURE — 6370000000 HC RX 637 (ALT 250 FOR IP): Performed by: PSYCHIATRY & NEUROLOGY

## 2024-03-21 PROCEDURE — 99232 SBSQ HOSP IP/OBS MODERATE 35: CPT | Performed by: PSYCHIATRY & NEUROLOGY

## 2024-03-21 PROCEDURE — 1240000000 HC EMOTIONAL WELLNESS R&B

## 2024-03-21 RX ADMIN — DONEPEZIL HYDROCHLORIDE 5 MG: 5 TABLET, FILM COATED ORAL at 21:50

## 2024-03-21 RX ADMIN — ASPIRIN 81 MG 81 MG: 81 TABLET ORAL at 09:01

## 2024-03-21 RX ADMIN — FLUTICASONE PROPIONATE 2 SPRAY: 50 SPRAY, METERED NASAL at 09:02

## 2024-03-21 RX ADMIN — OLANZAPINE 7.5 MG: 7.5 TABLET, FILM COATED ORAL at 21:50

## 2024-03-21 RX ADMIN — VENLAFAXINE HYDROCHLORIDE 37.5 MG: 75 CAPSULE, EXTENDED RELEASE ORAL at 09:01

## 2024-03-21 RX ADMIN — CLOPIDOGREL BISULFATE 75 MG: 75 TABLET ORAL at 09:01

## 2024-03-21 RX ADMIN — POLYETHYLENE GLYCOL 3350 17 G: 17 POWDER, FOR SOLUTION ORAL at 09:02

## 2024-03-21 NOTE — GROUP NOTE
Group Therapy Note    Date: 3/21/2024    Group Start Time: 1000  Group End Time: 1045  Group Topic: Psychotherapy    ST Jannette Waller MSW        Group Therapy Note    Attendees: 5/9       Patient's Goal:  Identify strengths, their importance, and how to utilize them    Patient was offered group therapy today but declined to participate despite encouragement from staff.  1:1 was offered.      Discipline Responsible: /Counselor      Signature:  TANO Hernández

## 2024-03-21 NOTE — PLAN OF CARE
Problem: Psychosis  Goal: Will report no hallucinations or delusions  Description: INTERVENTIONS:  1. Administer medication as  ordered  2. Assist with reality testing to support increasing orientation  3. Assess if patient's hallucinations or delusions are encouraging self harm or harm to others and intervene as appropriate  Outcome: Progressing     Problem: Behavior  Goal: Pt/Family maintain appropriate behavior and adhere to behavioral management agreement, if implemented  Description: INTERVENTIONS:  1. Assess patient/family's coping skills and  non-compliant behavior (including use of illegal substances)  2. Notify security of behavior or suspected illegal substances which indicate the need for search of the family and/or belongings  3. Encourage verbalization of thoughts and concerns in a socially appropriate manner  4. Utilize positive, consistent limit setting strategies supporting safety of patient, staff and others  5. Encourage participation in the decision making process about the behavioral management agreement  6. If a visitor's behavior poses a threat to safety call refer to organization policy.  7. Initiate consult with , Psychosocial CNS, Spiritual Care as appropriate  3/21/2024 1115 by Carrie Han, RN  Outcome: Progressing     Problem: Safety - Adult  Goal: Free from fall injury  Outcome: Progressing     Problem: Nutrition Deficit:  Goal: Optimize nutritional status  3/21/2024 1115 by Carrie Han, RN  Outcome: Progressing

## 2024-03-21 NOTE — PLAN OF CARE
Problem: Psychosis  Goal: Will report no hallucinations or delusions  Description: INTERVENTIONS:  1. Administer medication as  ordered  2. Assist with reality testing to support increasing orientation  3. Assess if patient's hallucinations or delusions are encouraging self harm or harm to others and intervene as appropriate  3/21/2024 1128 by Carrie Han, RN  Outcome: Progressing     Problem: Behavior  Goal: Pt/Family maintain appropriate behavior and adhere to behavioral management agreement, if implemented  Description: INTERVENTIONS:  1. Assess patient/family's coping skills and  non-compliant behavior (including use of illegal substances)  2. Notify security of behavior or suspected illegal substances which indicate the need for search of the family and/or belongings  3. Encourage verbalization of thoughts and concerns in a socially appropriate manner  4. Utilize positive, consistent limit setting strategies supporting safety of patient, staff and others  5. Encourage participation in the decision making process about the behavioral management agreement  6. If a visitor's behavior poses a threat to safety call refer to organization policy.  7. Initiate consult with , Psychosocial CNS, Spiritual Care as appropriate  3/21/2024 1128 by Carrie Han, RN  Outcome: Progressing     Problem: Safety - Adult  Goal: Free from fall injury  3/21/2024 1128 by Carrie Han, RN  Outcome: Progressing     Problem: Nutrition Deficit:  Goal: Optimize nutritional status  3/21/2024 1128 by Carrie Han, RN  Outcome: Progressing

## 2024-03-21 NOTE — PROGRESS NOTES
IN-PATIENT SERVICE  [facility]    Progress note  Date:   3/21/2024  Patient name:  William Babcock  Date of admission:  2/21/2024  9:18 PM  MRN:   [id number]  Account:  [id number]  YOB: 1960  PCP:    Billy Frnacois MD  Room:   [facility]  Code Status:    Full Code    Physician Requesting Consult: Donnie Rosa MD    Reason for Consult: History and physical, medical management    Chief Complaint:     No chief complaint on file.    Medical management    History Obtained From:     Patient, EMR, nursing staff    History of Present Illness:     63-year-old male admitted for acute psychosis.  Patient has history of autism;   Carotid pseudoaneurysm.  Recent admission to [facility] for right proximal RCA pseudoaneurysm after patient presented on 2/5 for agitation and psychosis.  Patient underwent cerebral angiogram and embolization of cervical pseudoaneurysm 2/9/2024.  Was discharged on aspirin and Plavix, resumed.  Patient was discharged to [facility] on 2/12 subsequently discharged on 2/16.    3/21/2024  Walking the hallway, denies concerns, makes meaningful conversation.  Past Medical History:     Past Medical History:   Diagnosis Date    Anxiety     Autism         Past Surgical History:     Past Surgical History:   Procedure Laterality Date    CEREBRAL ANGIOGRAM  02/09/2024    IR ANGIOGRAM CAROTID CEREBRAL BILATERAL    EYE SURGERY          Medications Prior to Admission:     Prior to Admission medications    Medication Sig Start Date End Date Taking? Authorizing Provider   fluticasone (FLONASE) 50 MCG/ACT nasal spray 2 sprays by Each Nostril route daily   Yes Provider, MD Nick   aspirin 81 MG chewable tablet Take 1 tablet by mouth daily 2/17/24   Donnie Rosa MD   clopidogrel (PLAVIX) 75 MG tablet Take 1 tablet by mouth daily 2/17/24   Donnie Rosa MD   QUEtiapine (SEROQUEL) 25 MG tablet Take 1 tablet by mouth 2 times daily 2/16/24    hour(s)).        Imaging/Diagonstics:  Recent data reviewed    Assessment :      Primary Problem  Severe recurrent major depression with psychotic features (HCC)    Active Hospital Problems    Diagnosis Date Noted    Severe recurrent major depression with psychotic features (HCC) [F33.3] 03/13/2024    Altered mental status [R41.82] 02/26/2024       Plan:     Reason for consult: General medical management     Acute psychosis-management per psychiatry  Carotid pseudoaneurysm.  Recent admission to [facility] for right proximal RCA pseudoaneurysm after patient presented on 2/5 for agitation and psychosis.  Patient underwent cerebral angiogram and embolization of cervical pseudoaneurysm 2/9/2024.  Continue aspirin Plavix.  Lipid profile reviewed, LDL at goal  Essential hypertension, blood pressures stable.  Blood pressure is now low normal.  Will hold atenolol  Normocytic normochromic anemia, now resolved  Altered mental status, improved today.  This is secondary to meds which the patient received prior to his ECT    Prognosis appears poor  DVT prophylaxis-not required, patient is mobile      Consultations:   IP CONSULT TO INTERNAL MEDICINE  IP CONSULT TO NEUROLOGY  IP CONSULT TO MARILEE Lamar MD  3/21/2024  3:21 PM    Copy sent to Billy Francois MD    Please note that this chart was generated using voice recognition Dragon dictation software.  Although every effort was made to ensure the accuracy of this automated transcription, some errors in transcription may have occurred.

## 2024-03-21 NOTE — GROUP NOTE
Group Therapy Note    Date: 3/21/2024    Group Start Time: 1330  Group End Time: 1415  Group Topic: Cognitive Skills    Emelia Mueller CTRS        Group Therapy Note    Attendees: 6/11    Cognitive Skills Group Note        Date: March 21, 2024            Start Time: 1:30pm  End Time: 2:15pm      Number of Participants in Group & Unit Census:  6/11    Topic:  interpersonal skills, memory recall, self-expression    Goal of Group: To improve interpersonal skills and memory recall through collaborating with peers and demonstrating self-expression.       Comments:     Patient did not participate in Cognitive Skills group, despite staff encouragement and explanation of benefits.  Patient remain seclusive to self.  Q15 minute safety checks maintained for patient safety and will continue to encourage patient to attend unit programming.        Signature:  MAURY Campbell

## 2024-03-21 NOTE — PLAN OF CARE
Problem: Behavior  Goal: Pt/Family maintain appropriate behavior and adhere to behavioral management agreement, if implemented  Description: INTERVENTIONS:  1. Assess patient/family's coping skills and  non-compliant behavior (including use of illegal substances)  2. Notify security of behavior or suspected illegal substances which indicate the need for search of the family and/or belongings  3. Encourage verbalization of thoughts and concerns in a socially appropriate manner  4. Utilize positive, consistent limit setting strategies supporting safety of patient, staff and others  5. Encourage participation in the decision making process about the behavioral management agreement  6. If a visitor's behavior poses a threat to safety call refer to organization policy.  7. Initiate consult with , Psychosocial CNS, Spiritual Care as appropriate  Outcome: Progressing     Patient remained in behavioral control. Pacing in renteria prior to retiring to bed, isolative to self. Did not mention \"dying\" or \"dying a virgin.\"    Problem: Nutrition Deficit:  Goal: Optimize nutritional status  Outcome: Progressing     Patient did not eat snack, but cooperated with medication administration in chocolate pudding. Patient ate about 3/4 of the pudding with encouragement.

## 2024-03-21 NOTE — GROUP NOTE
Group Therapy Note    Date: 3/21/2024    Group Start Time: 1100  Group End Time: 1145  Group Topic: Cognitive Skills    Emelia Mueller CTRS        Group Therapy Note    Attendees: 4/9    Cognitive Skills Group Note        Date: March 21, 2024              Start Time: 11am  End Time: 11:45am      Number of Participants in Group & Unit Census:  4/9    Topic: interpersonal skills, decision-making, concentration     Goal of Group: To improve interpersonal skills and decision-making through collaborating with peers and concentrating on a presented task.       Comments:     Patient did not participate in Cognitive Skills group, despite staff encouragement and explanation of benefits.  Patient remain seclusive to self.  Q15 minute safety checks maintained for patient safety and will continue to encourage patient to attend unit programming.        Signature:  MAURY Campbell

## 2024-03-21 NOTE — PROGRESS NOTES
[facility]   OCCUPATIONAL THERAPY MISSED TREATMENT NOTE   [facility]  Date: 3/21/24  Patient Name: William Babcock       Room: 0205/0205-01  MRN: [id number]   Account #: [id number]    : 1960  (64 y.o.)  Gender: male   Referring Practitioner: Donnie Rosa MD  Diagnosis: Acute psychosis             REASON FOR MISSED TREATMENT:  3/21/24    -    Refusal by Patient - pt lying in bed with eyes closed upon entrance. This writer introduces self, role of OT. Pt initially states \"yes\" to OT tx however when this writer asked pt to sit EOB, pt states \"actually I'm really tired.\" This writer attempted to encourage pt's participation with OT, pt sits up then abruptly lays back down and states \"Maybe tomorrow.\" OT will continue to follow.    [id number]       Electronically signed by CHLOÉ Costa on 3/21/24 at 2:54 PM EDT

## 2024-03-21 NOTE — PROGRESS NOTES
Pre ECT Assessment Note  Psychiatry  3/20/2024      William Babcock  1960  [id number]      Subjective:     Patient is a 64 y.o.  male seen for an evaluation prior to today's electroconvulsive therapy treatment. Today is treatment number 4, utilizing bilateral.  This is course number 1.  The patient has previously never previously completed a course of ECT.    Reagan remains confused, he is unable to identify the treatment he is participating in this morning.  He does verbalize understanding that it is meant to help improve his overall thought process, cognition and mood.  Staff reports that his appetite is starting to improve.  He is less paranoid and has been taking scheduled medication.  PACU staff reports that he had extremely agitated behaviors upon awakening during his last treatment, today he will be prophylactically medicated following ECT.      Patient Active Problem List    Diagnosis Date Noted    Severe recurrent major depression with psychotic features ([facility]) 03/13/2024    Altered mental status 02/26/2024    Acute psychosis ([facility]) 02/12/2024    Secondary hypertension 02/10/2024    Carotid pseudoaneurysm ([facility]) 02/09/2024    Agitation 02/09/2024    Stenosis of right carotid artery greater than 50% 02/09/2024    Psychosis, paranoid (HCC) 02/06/2024    Abnormal CT of the head 02/06/2024    Psychosis (HCC) 02/06/2024    Pseudoaneurysm ([facility]) 02/06/2024    Essential hypertension 12/01/2016    Primary insomnia 12/01/2016    Influenza vaccine refused 12/01/2016    Refused Streptococcus pneumoniae vaccination 12/01/2016     Past Medical History:   Diagnosis Date    Anxiety     Autism       Past Surgical History:   Procedure Laterality Date    CEREBRAL ANGIOGRAM  02/09/2024    IR ANGIOGRAM CAROTID CEREBRAL BILATERAL    EYE SURGERY        Medications Prior to Admission: fluticasone (FLONASE) 50 MCG/ACT nasal spray, 2 sprays by Each Nostril route daily  aspirin 81 MG chewable tablet, Take 1 tablet by mouth  daily  clopidogrel (PLAVIX) 75 MG tablet, Take 1 tablet by mouth daily  QUEtiapine (SEROQUEL) 25 MG tablet, Take 1 tablet by mouth 2 times daily  atenolol (TENORMIN) 50 MG tablet, Take 1 tablet by mouth daily  No Known Allergies   Social History     Tobacco Use    Smoking status: Never    Smokeless tobacco: Never   Substance Use Topics    Alcohol use: No      Family History   Problem Relation Age of Onset    Colon Cancer Father     Colon Cancer Sister           Objective:       Mental Status Evaluation:  Appearance:  Wearing gown, on stretcher, fairly groomed   Behavior:  Somewhat restless, redirectable   Speech:  Normal rate, low volume and tone   Mood:  \"Okay\"   Affect:  Congruent, withdrawn, confused   Thought Process:  Coherent, linear at times   Thought Content:  Denies suicidal ideation, no reports of self-injurious behavior   Sensorium:  Does not appear to attend to internal stimuli   Cognition:  Oriented to person, place and general circumstance   Insight:  Poor   Judgment:  Improving     Assessment:     Diagnosis: Major depressive disorder, recurrent, severe with psychosis    Plan:     Continue bilateral ECT 3 times a week  Consider transitioning to outpatient ECT once stability in symptoms is achieved and it is safe for patient to transition back to the community    Update consent and H&P every 30 days while undergoing ECT treatment, update labs every 6 months.   Patient verbalizes understanding of risks/benefits/alternatives to ECT.

## 2024-03-21 NOTE — PLAN OF CARE
[facility]  Week Interdisciplinary Treatment Plan Note     Review Date & Time: 3/21/2024 12:45    Admission Type:   Admission Type: Voluntary    Reason for admission:  Reason for Admission: Acute Psychosis Bizarre behavior, decline in ADLs, harrasing neighbors    Estimated Length of Stay:  8-14 days  Estimated Discharge Date Update:   to be determined by physician    PATIENT STRENGTHS:  Patient Strengths:   Patient Strengths and Limitations:Limitations: Perceives need for assistance with self-care, Difficult relationships / poor social skills, Multiple barriers to leisure interests, Difficulty problem solving/relies on others to help solve problems, Tendency to isolate self, Limited education -> difficulty reading or writing, Unrealistic self-view, Demonstrates discomfort with /lack of social skills  Addictive Behavior:Addictive Behavior  In the Past 3 Months, Have You Felt or Has Someone Told You That You Have a Problem With  : None  Medical Problems:   Past Medical History:   Diagnosis Date    Anxiety     Autism        Risk:  Fall Risk   Houston Scale Houston Scale Score: 22  BVC      Change in scores:  No. Changes to plan of Care:  No    Status EXAM:   Mental Status and Behavioral Exam  Normal: No  Level of Assistance: Independent/Self  Facial Expression: Flat  Affect: Blunt  Level of Consciousness: Alert  Frequency of Checks: 4 times per hour, close  Mood:Normal: No  Mood: Anhedonia  Motor Activity:Normal: Yes  Motor Activity: Unusual posture/gait (Normal for pt)  Eye Contact: Fair  Observed Behavior: Cooperative, Preoccupied, Withdrawn, Friendly  Sexual Misconduct History: Current - no  Preception: Leroy to person, Leroy to time  Attention:Normal: No  Attention: Distractible  Thought Processes: Unremarkable  Thought Content:Normal: No  Thought Content: Poverty of content, Preoccupations  Depression Symptoms: Impaired concentration, Isolative, Loss of interest  Anxiety Symptoms:

## 2024-03-21 NOTE — PROGRESS NOTES
Daily Progress Note  3/21/2024    Patient Name: William Babcock    CHIEF COMPLAINT:  Severe recurrent major depression with psychotic features          SUBJECTIVE:      Patient is seen today for a follow up assessment.  Interview conducted in patient's private room with the door open.  He remains compliant with taking scheduled psychotropic medication and denies adverse effects.   He initially reports feeling \"alright\" however later in the encounter admits to feeling depressed.  Denies suicidal or homicidal ideation.  Denies new symptoms.  Denies auditory, visual hallucinations or other perceptual disturbances.  Not exhibiting anything delusions at this time but occasionally exhibits some paranoia.  Denies problems with sleep or appetite.  Reports his brother, Maximo,  and niece, Tara came and visited him yesterday.  Next ECT treatment is tomorrow.  Patient continues to require inpatient hospitalization as he is unable to care for himself.         Appetite:  [] Adequate/Unchanged  [x] Improving  [] Decreased      Sleep:       [x] Adequate/Unchanged  [] Fair  [] Poor      Group Attendance on Unit:   [] Yes   [x] Selectively    [] No    Compliant with scheduled medications: [x] Yes  [] No    Received emergency medications in past 24 hrs: [] Yes   [x] No    Medication Side Effects: Denies         Mental Status Exam  Level of consciousness: Alert and awake   Appearance: Appropriate attire for setting, seated on bed, with fair  grooming and hygiene   Behavior/Motor: Approachable, engages with interviewer, no psychomotor abnormalities   Attitude toward examiner: Cooperative, attentive, good eye contact  Speech: slow and monotone   Mood: per patient \"alright\"  Affect: Blunted  Thought processes: linear and slow   Thought content:  Denies homicidal ideation  Suicidal Ideation: Denies suicidal ideations, contracts for safety on the unit.   Delusions: Patient presents with delusions.   Perceptual Disturbance:  Patient does

## 2024-03-22 ENCOUNTER — APPOINTMENT (OUTPATIENT)
Dept: POSTOP/PACU | Age: 64
DRG: 885 | End: 2024-03-22
Payer: MEDICARE

## 2024-03-22 ENCOUNTER — ANESTHESIA (OUTPATIENT)
Dept: POSTOP/PACU | Age: 64
End: 2024-03-22
Payer: MEDICARE

## 2024-03-22 ENCOUNTER — ANESTHESIA EVENT (OUTPATIENT)
Dept: POSTOP/PACU | Age: 64
End: 2024-03-22
Payer: MEDICARE

## 2024-03-22 LAB — GLUCOSE BLD-MCNC: 78 MG/DL (ref 75–110)

## 2024-03-22 PROCEDURE — 2580000003 HC RX 258: Performed by: ANESTHESIOLOGY

## 2024-03-22 PROCEDURE — 6370000000 HC RX 637 (ALT 250 FOR IP): Performed by: PSYCHIATRY & NEUROLOGY

## 2024-03-22 PROCEDURE — 6370000000 HC RX 637 (ALT 250 FOR IP): Performed by: INTERNAL MEDICINE

## 2024-03-22 PROCEDURE — 6360000002 HC RX W HCPCS: Performed by: NURSE ANESTHETIST, CERTIFIED REGISTERED

## 2024-03-22 PROCEDURE — 82947 ASSAY GLUCOSE BLOOD QUANT: CPT

## 2024-03-22 PROCEDURE — 99232 SBSQ HOSP IP/OBS MODERATE 35: CPT | Performed by: PSYCHIATRY & NEUROLOGY

## 2024-03-22 PROCEDURE — 1240000000 HC EMOTIONAL WELLNESS R&B

## 2024-03-22 PROCEDURE — 3700000000 HC ANESTHESIA ATTENDED CARE: Performed by: ANESTHESIOLOGY

## 2024-03-22 PROCEDURE — 90870 ELECTROCONVULSIVE THERAPY: CPT

## 2024-03-22 PROCEDURE — 7100000000 HC PACU RECOVERY - FIRST 15 MIN: Performed by: ANESTHESIOLOGY

## 2024-03-22 PROCEDURE — 6370000000 HC RX 637 (ALT 250 FOR IP): Performed by: NURSE PRACTITIONER

## 2024-03-22 PROCEDURE — APPSS30 APP SPLIT SHARED TIME 16-30 MINUTES

## 2024-03-22 PROCEDURE — 2500000003 HC RX 250 WO HCPCS: Performed by: NURSE ANESTHETIST, CERTIFIED REGISTERED

## 2024-03-22 PROCEDURE — 7100000001 HC PACU RECOVERY - ADDTL 15 MIN: Performed by: ANESTHESIOLOGY

## 2024-03-22 PROCEDURE — 90870 ELECTROCONVULSIVE THERAPY: CPT | Performed by: PSYCHIATRY & NEUROLOGY

## 2024-03-22 RX ORDER — METHOHEXITAL IN WATER/PF 100MG/10ML
SYRINGE (ML) INTRAVENOUS
Status: COMPLETED
Start: 2024-03-22 | End: 2024-03-22

## 2024-03-22 RX ORDER — HALOPERIDOL 5 MG/ML
INJECTION INTRAMUSCULAR
Status: COMPLETED
Start: 2024-03-22 | End: 2024-03-22

## 2024-03-22 RX ORDER — METHOHEXITAL IN WATER/PF 100MG/10ML
SYRINGE (ML) INTRAVENOUS PRN
Status: DISCONTINUED | OUTPATIENT
Start: 2024-03-22 | End: 2024-03-22 | Stop reason: SDUPTHER

## 2024-03-22 RX ORDER — LORAZEPAM 2 MG/ML
INJECTION INTRAMUSCULAR
Status: DISPENSED
Start: 2024-03-22 | End: 2024-03-22

## 2024-03-22 RX ORDER — SUCCINYLCHOLINE/SOD CL,ISO/PF 200MG/10ML
SYRINGE (ML) INTRAVENOUS
Status: COMPLETED
Start: 2024-03-22 | End: 2024-03-22

## 2024-03-22 RX ORDER — SUCCINYLCHOLINE/SOD CL,ISO/PF 200MG/10ML
SYRINGE (ML) INTRAVENOUS PRN
Status: DISCONTINUED | OUTPATIENT
Start: 2024-03-22 | End: 2024-03-22 | Stop reason: SDUPTHER

## 2024-03-22 RX ORDER — HALOPERIDOL 5 MG/ML
INJECTION INTRAMUSCULAR PRN
Status: DISCONTINUED | OUTPATIENT
Start: 2024-03-22 | End: 2024-03-22 | Stop reason: SDUPTHER

## 2024-03-22 RX ADMIN — ASPIRIN 81 MG 81 MG: 81 TABLET ORAL at 13:49

## 2024-03-22 RX ADMIN — CLOPIDOGREL BISULFATE 75 MG: 75 TABLET ORAL at 13:48

## 2024-03-22 RX ADMIN — Medication 70 MG: at 08:23

## 2024-03-22 RX ADMIN — OLANZAPINE 7.5 MG: 7.5 TABLET, FILM COATED ORAL at 21:38

## 2024-03-22 RX ADMIN — SODIUM CHLORIDE, POTASSIUM CHLORIDE, SODIUM LACTATE AND CALCIUM CHLORIDE: 600; 310; 30; 20 INJECTION, SOLUTION INTRAVENOUS at 07:03

## 2024-03-22 RX ADMIN — ACETAMINOPHEN 650 MG: 325 TABLET ORAL at 06:28

## 2024-03-22 RX ADMIN — HALOPERIDOL LACTATE 5 MG: 5 INJECTION, SOLUTION INTRAMUSCULAR at 08:25

## 2024-03-22 RX ADMIN — DONEPEZIL HYDROCHLORIDE 5 MG: 5 TABLET, FILM COATED ORAL at 21:38

## 2024-03-22 RX ADMIN — VENLAFAXINE HYDROCHLORIDE 37.5 MG: 75 CAPSULE, EXTENDED RELEASE ORAL at 13:48

## 2024-03-22 ASSESSMENT — PAIN - FUNCTIONAL ASSESSMENT: PAIN_FUNCTIONAL_ASSESSMENT: CRITICAL CARE PAIN OBSERVATION TOOL (CPOT)

## 2024-03-22 ASSESSMENT — PAIN SCALES - GENERAL
PAINLEVEL_OUTOF10: 0
PAINLEVEL_OUTOF10: 0

## 2024-03-22 NOTE — PROGRESS NOTES
[facility]   OCCUPATIONAL THERAPY MISSED TREATMENT NOTE   INPATIENT   Date: 3/22/24  Patient Name: William Babcock       Room: 0205/0205-01  MRN: [id number]   Account #: [id number]    : 1960  (64 y.o.)  Gender: male   Referring Practitioner: Donnie Rosa MD  Diagnosis: Acute psychosis             REASON FOR MISSED TREATMENT:  Patient unable to participate   -  RN reports pt received shots this AM and has been deeply sleeping since then. Will attempt at next available date.       1330    Electronically signed by ANTONIA Corbett on 3/22/24 at 2:52 PM EDT

## 2024-03-22 NOTE — BH NOTE
Arrival from ECT to [facility] Unit:    Patient arrived to the unit Time: 0957 and [address] Wheelchair. Patient is oriented to person and place. Patient is Calm and cooperative with assessments.    Physical and Mental assessment, Vitals x 2, and fall risk assessment completed and documented.     Post-op ECT checklist completed and placed in patients chart.

## 2024-03-22 NOTE — CARE COORDINATION
Social work received call from Wesley [phone] at [facility]. Social work shared the doctor is looking at discharging the patient on Tuesday 3/26/24, Wesley is questioning if patient is ready for discharge and will need to speak with Bart to see what they have in place for the patient. Wesley shared he will stop in and visit the patient today.

## 2024-03-22 NOTE — GROUP NOTE
Group Therapy Note    Date: 3/22/2024    Group Start Time: 1100  Group End Time: 1140  Group Topic: Recreational    [facility] Emelia Belcher CTRS        Group Therapy Note    Attendees: 4/10    Recreation Group Note        Date: March 22, 2024               Start Time: 11am  End Time: 11:40am      Number of Participants in Group & Unit Census:  4/10    Topic:  interpersonal skills, leisure awareness, concentration     Goal of Group: To improve interpersonal skills and leisure awareness through collaborating with peers and concentrating on a presented task.      Comments:     Patient did not participate in Recreation group, despite staff encouragement and explanation of benefits.  Patient remain seclusive to self.  Q15 minute safety checks maintained for patient safety and will continue to encourage patient to attend unit programming.        Signature:  MAURY Campbell

## 2024-03-22 NOTE — CARE COORDINATION
Social work attempted to contact [facility] at [phone] to coordinate care for patients discharge. Social work was unable to leave a message. Social work called the [facility] Board of Developmental Disabilities at  [phone], social work left a message with [facility] asking for someone to return social works call.

## 2024-03-22 NOTE — CARE COORDINATION
was approached by Wesley from [facility] Board of  on the unit. Wesley advised  that he was previously here last week to see Reagan and he feels as though Reagan is not ready for discharge as he does not see improvement.  advised Wesley that discharge is determined by the doctor and that the doctor currently feels patient is stable and ready for discharge on Tuesday March 26th. Wesley advised this is not enough time for [facility] to get 24 care set up for patient.  advised Wesley that social work does not determine discharge and discharge is based solely on patient readiness and stabilization, as determined by the doctor. Wesley requested this information be shared with  Brunilda MCDANIEL. Information shared with Brunilda MCDANIEL as requested.

## 2024-03-22 NOTE — PLAN OF CARE
Problem: Behavior  Goal: Pt/Family maintain appropriate behavior and adhere to behavioral management agreement, if implemented  Description: INTERVENTIONS:  1. Assess patient/family's coping skills and  non-compliant behavior (including use of illegal substances)  2. Notify security of behavior or suspected illegal substances which indicate the need for search of the family and/or belongings  3. Encourage verbalization of thoughts and concerns in a socially appropriate manner  4. Utilize positive, consistent limit setting strategies supporting safety of patient, staff and others  5. Encourage participation in the decision making process about the behavioral management agreement  6. If a visitor's behavior poses a threat to safety call refer to organization policy.  7. Initiate consult with , Psychosocial CNS, Spiritual Care as appropriate  Outcome: Progressing  Note: Pt denies having suicidal or homicidal ideations. Pt denies having depression or anxiety. Pt is isolative to self most of shift. Pt is encouraged to care for his ADLs.      Problem: Safety - Adult  Goal: Free from fall injury  Outcome: Progressing  Note: Pt remains free from falls.

## 2024-03-22 NOTE — BH NOTE
.Departure from I Unit to ECT:    Pre-op ECT Checklist completed and placed by front of patients chart. Patient departed unit Time: 0635 and Via Wheelchair, accompanied by [facility] staff. Patient is alert and orientated at this time, as well as calm, cooperative.

## 2024-03-22 NOTE — GROUP NOTE
Group Therapy Note    Date: 3/22/2024    Group Start Time: 1330  Group End Time: 1400  Group Topic: Psychoeducation    Emelia Mueller CTRS        Group Therapy Note    Attendees: 1/8    Psych-Ed/Relapse Prevention Group Note        Date: March 22, 2024            Start Time: 1:30pm  End Time: 2pm      Number of Participants in Group & Unit Census:  1/8    Topic: interpersonal skills, coping skills, self-expression    Goal of Group: To improve interpersonal skills and coping skills awareness through collaborating with peers and demonstrating self-expression.      Comments:     Patient did not participate in Psych-Ed/Relapse Prevention group, despite staff encouragement and explanation of benefits.  Patient remain seclusive to self.  Q15 minute safety checks maintained for patient safety and will continue to encourage patient to attend unit programming.        Signature:  MAURY Campbell

## 2024-03-22 NOTE — PROCEDURES
Bilateral ECT Procedure Report  William Babcock   3/22/2024  1960         Attending:Donnie Rosa MD  Preprocedure diagnosis: Major depressive disorder, recurrent, severe with psychotic symptoms (F33.3)  Postprocedure diagnosis: SAME AS PRE-PROCEDURE DIAGNOSIS  Treatment Number: This is treatment # 5   Patient Status: BEHAVIOR IP   Type of ECT: Bilateral Brief Pulse  Medications  Preprocedure: Tylenol 650mg  Anesthetic: Methohexital 70 mg  Paralytic: Succinylcholine 70 mg  Post procedure: none needed   Cuff placement: Left Lower Extremity    MECTA Settings 1st Stimulus:     Pulse width: 1.0 ms   Frequency:  60 hz   Duration:   3.0 seconds   Static Impedance: 318ohms             Dynamic Impedance: 152 ohms             Motor Seizure Duration: 24seconds  EEG Seizure Duration: 55 seconds             The patient's ECT treatment was performed using MECTA machine  .  Timeout:  Time out was performed using two patient identifiers and confirmation of procedure.     Patient Preparation: Preprocedure documentation, labs, H&P were all verified and reviewed.  The patient was placed in supine position.  EEG leads were placed for monitoring of seizure.   Christianity areas bilaterally cleaned and prepped.  Conductive gel  was applied over stimulus electrode site.   The patient was pre-oxygenated.       Procedure in detail: Medications were administered by anesthesia using intravenous access at the doses listed previously in this summary.  Anesthetic administered and once confirmation the patient is anesthetized, the patient's blood pressure cuff on lower extremity was inflated to 100mmHg in excess of patient's blood pressure.  At this time, the neuromuscular blockade was administered intravenously by anesthesia.  Upper extremity fasciculation were verified followed by lower extremity fasciculation.  Once fasciculations terminated, confirmation of affective neuromuscular blockade demonstrated by absence of withdrawal reflex.  Bite  blocks were put in place. Stimulus leads then applied to stimulus electrode site bilaterally with the center of the lead placed approximately 1 inch superior to the midpoint of line between canthus and the tragus bilaterally. Static impedance was tested and within appropriate limits. MECTA settings were confirmed with nursing staff.   Staff was cleared from the patient and dose of ECT stimulus was delivered.  Motor seizure activity  was observed at duration noted and terminated.  EEG seizure activity was observed of duration noted that was confirmed via monitoring EEG and terminated with appropriate postictal suppression noted on EEG.  Static impedance and dynamic impedance were documented. Tourniquet on  lower extremity was released and recovery procedures initiated.        The patient was mask ventilated during the procedure with no significant drops in oxygenation saturation and tolerated the procedure well without any notable adverse effects.   Condition: The patient was in stable condition with stable vital signs and able to follow commands when moved to recovery.

## 2024-03-22 NOTE — ANESTHESIA POSTPROCEDURE EVALUATION
Department of Anesthesiology  Postprocedure Note    Patient: William Babcock  MRN: [id number]  YOB: 1960  Date of evaluation: 3/22/2024    Procedure Summary       Date: 03/22/24 Room / Location: [facility] PACU    Anesthesia Start: 0818 Anesthesia Stop: 0832    Procedure: ECT W/ ANESTHESIA Diagnosis:     Scheduled Providers:  Responsible Provider: Dieter Kerns MD    Anesthesia Type: General ASA Status: 3            Anesthesia Type: General    Wanda Phase I: Wanda Score: 9    Wanda Phase II:      Anesthesia Post Evaluation    Comments: POST- ANESTHESIA EVALUATION       Pt Name: William Babcock  MRN: [id number]  YOB: 1960  Date of evaluation: 3/22/2024  Time:  10:18 AM      BP 95/62   Pulse 52   Temp 97.8 °F (36.6 °C) (Infrared)   Resp 15   Ht 1.727 m (5' 8\")   Wt 60.3 kg (133 lb)   SpO2 98%   BMI 20.22 kg/m²      Consciousness Level  Awake  Cardiopulmonary Status  Stable  Pain Adequately Treated YES  Nausea / Vomiting  NO  Adequate Hydration  YES  Anesthesia Related Complications NONE      Electronically signed by Dieter Kerns MD on 3/22/2024 at 10:18 AM           No notable events documented.

## 2024-03-22 NOTE — PROGRESS NOTES
Daily Progress Note  3/22/2024    Patient Name: William Babcock    CHIEF COMPLAINT:  Acute Psychosis          SUBJECTIVE:      Patient is seen today for a follow up assessment. Vitals and labs reviewed and appear to be within normal limits. Reagan remains compliant with scheduled medications. He has remained behaviorally in control and has not required emergency medications in the past 24 hours.  Reagan is receiving Electroconvulsive therapy treatments and last received one this morning.  Reagan is seen at bedside following lunch today.  He remains somnolent secondary to ECT this morning but he does wake up to engage in assessment with writer.  He really only answers assessment questions with \"yes\" or \"no\" but does seem more linear than previous meetings.  He does report that he is tired still from ECT.  He denies depression currently. He denies suicidal ideation. He denies homicidal ideation. He denies auditory and visual hallucinations. Reagan still needs much encouragement to engage in ADLs.  He would be unable to meet his own basic needs outside of the hospital at this time. He continues to require inpatient hospitalization for his safety and stability.  He will continue to receive ECT treatments on Monday morning.    Appetite:  [] Normal/Adequate/Unchanged  [] Increased  [x] Decreased      Sleep:       [] Normal/Adequate/Unchanged  [x] Fair  [] Poor      Group Attendance on Unit:   [] Yes  [] Selectively    [x] No    Medication Side Effects:  Patient denies any medication side effects at the time of assessment.         Mental Status Exam  Level of consciousness: Somnolent but responds to verbal stimuli  Appearance: Appropriate attire for setting, resting in bed, with poor grooming and hygiene.   Behavior/Motor: Approachable, somnolent  Attitude toward examiner: Attempts to be cooperative, fair eye contact  Speech: Latent, low volume, monotone, monosyllabic  Mood:  Patient reports \"okay\".   Affect:  Blunted  Thought processes: Linear and coherent  Thought content: Denies homicidal ideation.  Suicidal Ideation: Denies suicidal ideations  Delusions: No evidence of delusions. Denies paranoia.  Perceptual Disturbance: Patient does not appear to be responding to internal stimuli. Denies auditory hallucinations. Denies visual hallucinations.   Cognition: Oriented to self, location, time, and situation.  Memory: Intact.  Insight & Judgement: Poor.     Data   height is 1.727 m (5' 8\") and weight is 60.3 kg (133 lb). His temporal temperature is 98.1 °F (36.7 °C). His blood pressure is 88/68 (abnormal) and his pulse is 52. His respiration is 14 and oxygen saturation is 98%.   Labs:   No results displayed because visit has over 200 results.            Reviewed patient's current plan of care and vital signs with nursing staff.    Labs reviewed: [x] Yes  Last EKG in EMR reviewed: [x] Yes  QTC: 414    Medications  Current Facility-Administered Medications: LORazepam (ATIVAN) 2 MG/ML injection, , ,   glucose chewable tablet 16 g, 4 tablet, Oral, PRN  dextrose bolus 10% 125 mL, 125 mL, IntraVENous, PRN **OR** dextrose bolus 10% 250 mL, 250 mL, IntraVENous, PRN  glucagon injection 1 mg, 1 mg, SubCUTAneous, PRN  dextrose 10 % infusion, , IntraVENous, Continuous PRN  [Held by provider] atenolol (TENORMIN) tablet 12.5 mg, 12.5 mg, Oral, Daily  lactated ringers IV soln infusion, , IntraVENous, Continuous  venlafaxine (EFFEXOR XR) extended release capsule 37.5 mg, 37.5 mg, Oral, Daily with breakfast  OLANZapine (ZYPREXA) tablet 7.5 mg, 7.5 mg, Oral, Nightly  polyethylene glycol (GLYCOLAX) packet 17 g, 17 g, Oral, Daily  donepezil (ARICEPT) tablet 5 mg, 5 mg, Oral, Nightly  diphenhydrAMINE-zinc acetate 2-0.1 % cream, , Topical, TID PRN  OLANZapine zydis (ZYPREXA) disintegrating tablet 5 mg, 5 mg, Oral, BID PRN **OR** OLANZapine (ZyPREXA) 5 mg in sterile water 1 mL injection, 5 mg, IntraMUSCular, BID PRN  aspirin chewable tablet 81

## 2024-03-23 PROCEDURE — 6370000000 HC RX 637 (ALT 250 FOR IP): Performed by: PSYCHIATRY & NEUROLOGY

## 2024-03-23 PROCEDURE — APPSS30 APP SPLIT SHARED TIME 16-30 MINUTES

## 2024-03-23 PROCEDURE — 99232 SBSQ HOSP IP/OBS MODERATE 35: CPT | Performed by: INTERNAL MEDICINE

## 2024-03-23 PROCEDURE — 1240000000 HC EMOTIONAL WELLNESS R&B

## 2024-03-23 PROCEDURE — 6370000000 HC RX 637 (ALT 250 FOR IP): Performed by: INTERNAL MEDICINE

## 2024-03-23 PROCEDURE — 99232 SBSQ HOSP IP/OBS MODERATE 35: CPT | Performed by: PSYCHIATRY & NEUROLOGY

## 2024-03-23 RX ADMIN — DONEPEZIL HYDROCHLORIDE 5 MG: 5 TABLET, FILM COATED ORAL at 21:55

## 2024-03-23 RX ADMIN — VENLAFAXINE HYDROCHLORIDE 37.5 MG: 75 CAPSULE, EXTENDED RELEASE ORAL at 08:20

## 2024-03-23 RX ADMIN — ASPIRIN 81 MG 81 MG: 81 TABLET ORAL at 08:20

## 2024-03-23 RX ADMIN — POLYETHYLENE GLYCOL 3350 17 G: 17 POWDER, FOR SOLUTION ORAL at 08:22

## 2024-03-23 RX ADMIN — CLOPIDOGREL BISULFATE 75 MG: 75 TABLET ORAL at 08:20

## 2024-03-23 RX ADMIN — OLANZAPINE 7.5 MG: 7.5 TABLET, FILM COATED ORAL at 21:55

## 2024-03-23 RX ADMIN — FLUTICASONE PROPIONATE 2 SPRAY: 50 SPRAY, METERED NASAL at 08:20

## 2024-03-23 NOTE — PLAN OF CARE
Problem: Behavior  Goal: Pt/Family maintain appropriate behavior and adhere to behavioral management agreement, if implemented  Description: INTERVENTIONS:  1. Assess patient/family's coping skills and  non-compliant behavior (including use of illegal substances)  2. Notify security of behavior or suspected illegal substances which indicate the need for search of the family and/or belongings  3. Encourage verbalization of thoughts and concerns in a socially appropriate manner  4. Utilize positive, consistent limit setting strategies supporting safety of patient, staff and others  5. Encourage participation in the decision making process about the behavioral management agreement  6. If a visitor's behavior poses a threat to safety call refer to organization policy.  7. Initiate consult with , Psychosocial CNS, Spiritual Care as appropriate  Outcome: Progressing  Flowsheets (Taken 3/23/2024 0132)  Patient/family maintains appropriate behavior and adheres to behavioral management agreement, if implemented:   Assess patient/family’s coping skills and  non-compliant behavior (including use of illegal substances)   Encourage verbalization of thoughts and concerns in a socially appropriate manner   Encourage participation in the decision making process about the behavioral management agreement  Note: Patient minimally interactive during shift assessment, not engaging in conversation, answering only Yes/No questions. No noted aggressive behaviors on unit, compliant with prescribed medications.     Problem: Psychosis  Goal: Will report no hallucinations or delusions  Description: INTERVENTIONS:  1. Administer medication as  ordered  2. Assist with reality testing to support increasing orientation  3. Assess if patient's hallucinations or delusions are encouraging self harm or harm to others and intervene as appropriate  Outcome: Progressing  Note: Patient denies any hallucinations during shift assessment, no

## 2024-03-23 NOTE — GROUP NOTE
Group Therapy Note    Date: 3/23/2024    Group Start Time: 1330  Group End Time: 1420  Group Topic: Music Therapy    SAUNDRA NGUYEN BALTA    Dejuan Whitman    Music Therapy Group Note        Date: 3/23/2024 Start Time: 1330  End Time: 1420      Number of Participants in Group & Unit Census:  5/9    Topic: Patients shared music and analyzed lyrics for themes, and asked to offer advice based on themes with their song selections.     Goal of Group: Goals to increase motivation; Increase sense of community; Increase self-expression; Demonstrate positive use of time; Increase socialization      Comments:     Patient did not participate in Music Therapy group, despite staff encouragement and explanation of benefits.  Patient remain seclusive to self.  Q15 minute safety checks maintained for patient safety and will continue to encourage patient to attend unit programming.

## 2024-03-23 NOTE — PROGRESS NOTES
IN-PATIENT SERVICE  [facility] Internal Medicine    Progress note  Date:   3/23/2024  Patient name:  William Babcock  Date of admission:  2/21/2024  9:18 PM  MRN:   [id number]  Account:  [id number]  YOB: 1960  PCP:    Billy Francois MD  Room:   [facility]  Code Status:    Full Code    Physician Requesting Consult: Donnie Rosa MD    Reason for Consult: History and physical, medical management    Chief Complaint:     No chief complaint on file.    Medical management    History Obtained From:     Patient, EMR, nursing staff    History of Present Illness:     63-year-old male admitted for acute psychosis.  Patient has history of autism;   Carotid pseudoaneurysm.  Recent admission to [facility] for right proximal RCA pseudoaneurysm after patient presented on 2/5 for agitation and psychosis.  Patient underwent cerebral angiogram and embolization of cervical pseudoaneurysm 2/9/2024.  Was discharged on aspirin and Plavix, resumed.  Patient was discharged to [facility] on 2/12 subsequently discharged on 2/16.    3/20/2024  Seen after his ECT treatment today, better as compared to when seen after treatment before.  Past Medical History:     Past Medical History:   Diagnosis Date    Anxiety     Autism         Past Surgical History:     Past Surgical History:   Procedure Laterality Date    CEREBRAL ANGIOGRAM  02/09/2024    IR ANGIOGRAM CAROTID CEREBRAL BILATERAL    EYE SURGERY      OTHER SURGICAL HISTORY      ECT        Medications Prior to Admission:     Prior to Admission medications    Medication Sig Start Date End Date Taking? Authorizing Provider   fluticasone (FLONASE) 50 MCG/ACT nasal spray 2 sprays by Each Nostril route daily   Yes Provider, MD Nick   aspirin 81 MG chewable tablet Take 1 tablet by mouth daily 2/17/24   Donnie Rosa MD   clopidogrel (PLAVIX) 75 MG tablet Take 1 tablet by mouth daily 2/17/24   Donnie Rosa MD   QUEtiapine (SEROQUEL) 25 MG  tablet Take 1 tablet by mouth 2 times daily 24   Donnie Rosa MD   atenolol (TENORMIN) 50 MG tablet Take 1 tablet by mouth daily 23   Billy Francois MD        Allergies:     Patient has no known allergies.    Social History:     Tobacco:    reports that he has never smoked. He has never used smokeless tobacco.  Alcohol:      reports no history of alcohol use.  Drug Use:  reports no history of drug use.    Family History:     Family History   Problem Relation Age of Onset    Colon Cancer Father     Colon Cancer Sister        Review of Systems:     Positive and Negative as described in HPI.  Reliability doubted    Unable to obtain due to patient factors    Physical Exam:     /76   Pulse 64   Temp 97.6 °F (36.4 °C) (Temporal)   Resp 14   Ht 1.727 m (5' 8\")   Wt 60.3 kg (133 lb)   SpO2 98%   BMI 20.22 kg/m²   Temp (24hrs), Av.4 °F (36.3 °C), Min:97.2 °F (36.2 °C), Max:97.6 °F (36.4 °C)    Recent Labs     24  0611   POCGLU 78     No intake or output data in the 24 hours ending 24 1515    General Appearance: Alert, laying in bed  Head:  normocephalic, atraumatic.  Eye: no icterus, redness, pupils equal and reactive, extraocular eye movements intact, conjunctiva clear  Ear: normal external ear, no discharge, hearing intact  Nose:  no drainage noted  Mouth: mucous membranes moist  Neck: supple, no carotid bruits, thyroid not palpable  Lungs: Bilateral equal air entry, clear to ausculation, no wheezing, rales or rhonchi, normal effort  Cardiovascular: normal rate, regular rhythm, no murmur, gallop, rub.  Abdomen: Soft, nontender, nondistended, normal bowel sounds, no hepatomegaly or splenomegaly  Neurologic: Grossly nonfocal  Extremities:  No joint swelling, no pedal edema or calf pain with palpation  Skin does have bruising several places, stable      Investigations:      Laboratory Testing:  No results found for this or any previous visit (from the past 24  hour(s)).        Imaging/Diagonstics:  Recent data reviewed    Assessment :      Primary Problem  Severe recurrent major depression with psychotic features (HCC)    Active Hospital Problems    Diagnosis Date Noted    Severe recurrent major depression with psychotic features (HCC) [F33.3] 03/13/2024    Altered mental status [R41.82] 02/26/2024       Plan:     Reason for consult: General medical management     Acute psychosis-management per psychiatry  Carotid pseudoaneurysm.  Recent admission to [facility] for right proximal RCA pseudoaneurysm after patient presented on 2/5 for agitation and psychosis.  Patient underwent cerebral angiogram and embolization of cervical pseudoaneurysm 2/9/2024.  Continue aspirin Plavix.  Lipid profile reviewed, LDL at goal  Essential hypertension, blood pressures stable.  Continue current dose of atenolol  Normocytic normochromic anemia, now resolved  Altered mental status, s/p meds today.  Unable to assess    Prognosis appears poor  DVT prophylaxis-not required, patient is mobile      3/23  Patient seen and examined  Denies any chest pain shortness of breath on aspirin Plavix  Was borderline hypotensive yesterday was on atenolol which is currently held, blood pressure has been stable now  Recent labs reviewed from 3/17 stable.  Consultations:   IP CONSULT TO INTERNAL MEDICINE  IP CONSULT TO NEUROLOGY  IP CONSULT TO MARILEE Barrow MD  3/23/2024  3:15 PM    Copy sent to Billy Francois MD    Please note that this chart was generated using voice recognition Dragon dictation software.  Although every effort was made to ensure the accuracy of this automated transcription, some errors in transcription may have occurred.

## 2024-03-23 NOTE — GROUP NOTE
Group Therapy Note    Date: 3/23/2024    Group Start Time: 0900  Group End Time: 0914  Group Topic: Daily Inventory Group    Parvin Noland LPN        Group Therapy Note    Attendees: 6/9       Patient's Goal:  To wash up, change clothes     Notes:  needs encouragement     Status After Intervention:  Improved    Participation Level: Active Listener    Participation Quality: Appropriate      Speech:  normal      Thought Process/Content: Linear      Affective Functioning: Congruent      Mood: euthymic      Level of consciousness:  Alert      Response to Learning: Progressing to goal      Endings: None Reported    Modes of Intervention: Support and Socialization      Discipline Responsible: Licensed Practical Nurse      Signature:  Parvin Coffman LPN

## 2024-03-23 NOTE — BH NOTE
Patient is more alert and responsive, smiling and communicating with staff. Patient asked to watch the basketball games and has been able to sit and focus longer before getting up and walking. He has ate % of all 3 meals and requesting snacks and water throughout the day. Overall behavior and mood is improving. Patient is still reluctant to shower but will put on clean clothes, brush teeth and comb his hair.

## 2024-03-23 NOTE — PLAN OF CARE
Problem: Psychosis  Goal: Will report no hallucinations or delusions  Description: INTERVENTIONS:  1. Administer medication as  ordered  2. Assist with reality testing to support increasing orientation  3. Assess if patient's hallucinations or delusions are encouraging self harm or harm to others and intervene as appropriate  3/23/2024 1240 by Yady Hsu RN  Outcome: Progressing     Problem: Behavior  Goal: Pt/Family maintain appropriate behavior and adhere to behavioral management agreement, if implemented  Description: INTERVENTIONS:  1. Assess patient/family's coping skills and  non-compliant behavior (including use of illegal substances)  2. Notify security of behavior or suspected illegal substances which indicate the need for search of the family and/or belongings  3. Encourage verbalization of thoughts and concerns in a socially appropriate manner  4. Utilize positive, consistent limit setting strategies supporting safety of patient, staff and others  5. Encourage participation in the decision making process about the behavioral management agreement  6. If a visitor's behavior poses a threat to safety call refer to organization policy.  7. Initiate consult with , Psychosocial CNS, Spiritual Care as appropriate  3/23/2024 1240 by Yady Hsu RN  Outcome: Progressing       Patient denies any suicidal or homicidal ideations at this time, denies any depression, but reports anxiety at times. Denies hallucinations or delusions. Patient paces/wanders throughout the unit having difficulty concentrating often bouncing from one thing to another. He is more brightened, communicating needs with staff and expressing himself. He has ate most of his meals %. Shower was offered and encouraged but patient declined. He did put on clean clothes, brush his teeth and comb his hair. Fresh linen was placed on his bed. Selectively social in the dayroom, coloring and requesting to watch

## 2024-03-23 NOTE — PROGRESS NOTES
Daily Progress Note  3/23/2024    Patient Name: William Babcock    CHIEF COMPLAINT:  Acute Psychosis          SUBJECTIVE:      Patient is seen today for a follow up assessment. Vitals and labs reviewed and appear to be within normal limits. Reagan remains compliant with scheduled medications. He has remained behaviorally in control and has not required emergency medications in the past 24 hours.  Reagan is receiving ECT and will continue with treatments on Monday. Reagan is agreeable to assessment in the milieu today, declining the need for privacy.  He smiles today and this is the first time that writer has seen patient smile since his admission.  He continues to be somewhat monosyllabic however is linear and no longer perseverates on dying.  He reports that he is doing \"good\" and denies any depression, anxiety, suicidal or homicidal thoughts.  He is eating well and sleeping well.  He states that he feels well rested today.  He denies any issues with medication or ECT.      Appetite:  [x] Normal/Adequate/Unchanged  [] Increased  [] Decreased      Sleep:       [x] Normal/Adequate/Unchanged  [] Fair  [] Poor      Group Attendance on Unit:   [] Yes  [x] Selectively    [] No    Medication Side Effects:  Patient denies any medication side effects at the time of assessment.         Mental Status Exam  Level of consciousness: Awake and alert  Appearance: Appropriate attire for setting, seated in chair, with fair  grooming and hygiene.   Behavior/Motor: Approachable, calm  Attitude toward examiner: Cooperative, attentive, fair eye contact  Speech: Normal rate, low volume, monotone, monosyllabic  Mood:  Patient reports \"good\".   Affect: Blunted  Thought processes: Linear and coherent  Thought content: Denies homicidal ideation.  Suicidal Ideation: Denies suicidal ideations  Delusions: No evidence of delusions. Denies paranoia.  Perceptual Disturbance: Patient does not appear to be responding to internal stimuli. Denies  Nightly PRN  aluminum & magnesium hydroxide-simethicone (MAALOX) 200-200-20 MG/5ML suspension 30 mL, 30 mL, Oral, Q6H PRN    ASSESSMENT  Severe recurrent major depression with psychotic features (HCC)         HANDOFF  Patient symptoms are: Modestly Improving.  Medications as determined by attending physician  Continue ECT treatments Monday, Wednesday and Friday  Monitor need and frequency of PRN medications.  Encourage participation in groups and milieu.  Probable discharge is to be determined by MD.     Electronically signed by BENOIT Michaud CNP on 3/23/2024 at 11:05 AM    **This report has been created using voice recognition software. It may contain minor errors which are inherent in voice recognition technology.**      I independently saw and evaluated the patient.  I reviewed the nurse practitioners documentation above.  Principle diagnosis we are treating for is Severe recurrent major depression with psychotic features (HCC). Any additional comments or changes to the nurse practitioners documentation are stated below otherwise agree with assessment.  Plan will be as follows:  Documentation is for date of service March .  Patient denying side effects to medication.  Overall continues to demonstrate improvement.  Still trying to coordinate with family for disposition likely Tuesday.  PLAN  Patient s symptoms   are improving  Continue with current course of ECT treatment  Attempt to develop insight  Psycho-education conducted.  Supportive Therapy conducted.  Probable discharge is pending coordination and placement with family  Follow-up daily while on inpatient unit

## 2024-03-24 PROCEDURE — 6370000000 HC RX 637 (ALT 250 FOR IP): Performed by: INTERNAL MEDICINE

## 2024-03-24 PROCEDURE — APPSS30 APP SPLIT SHARED TIME 16-30 MINUTES

## 2024-03-24 PROCEDURE — 6370000000 HC RX 637 (ALT 250 FOR IP): Performed by: PSYCHIATRY & NEUROLOGY

## 2024-03-24 PROCEDURE — 99232 SBSQ HOSP IP/OBS MODERATE 35: CPT | Performed by: PSYCHIATRY & NEUROLOGY

## 2024-03-24 PROCEDURE — 1240000000 HC EMOTIONAL WELLNESS R&B

## 2024-03-24 PROCEDURE — 99232 SBSQ HOSP IP/OBS MODERATE 35: CPT | Performed by: INTERNAL MEDICINE

## 2024-03-24 RX ADMIN — POLYETHYLENE GLYCOL 3350 17 G: 17 POWDER, FOR SOLUTION ORAL at 08:39

## 2024-03-24 RX ADMIN — DONEPEZIL HYDROCHLORIDE 5 MG: 5 TABLET, FILM COATED ORAL at 21:23

## 2024-03-24 RX ADMIN — OLANZAPINE 7.5 MG: 7.5 TABLET, FILM COATED ORAL at 21:23

## 2024-03-24 RX ADMIN — VENLAFAXINE HYDROCHLORIDE 37.5 MG: 75 CAPSULE, EXTENDED RELEASE ORAL at 08:36

## 2024-03-24 RX ADMIN — CLOPIDOGREL BISULFATE 75 MG: 75 TABLET ORAL at 08:36

## 2024-03-24 RX ADMIN — ASPIRIN 81 MG 81 MG: 81 TABLET ORAL at 08:36

## 2024-03-24 NOTE — PROGRESS NOTES
IN-PATIENT SERVICE  [facility] Internal Medicine    Progress note  Date:   3/24/2024  Patient name:  William Babcock  Date of admission:  2/21/2024  9:18 PM  MRN:   [id number]  Account:  [id number]  YOB: 1960  PCP:    Billy Francois MD  Room:   [facility]  Code Status:    Full Code    Physician Requesting Consult: Donnie Rosa MD    Reason for Consult: History and physical, medical management    Chief Complaint:     No chief complaint on file.    Medical management    History Obtained From:     Patient, EMR, nursing staff    History of Present Illness:     63-year-old male admitted for acute psychosis.  Patient has history of autism;   Carotid pseudoaneurysm.  Recent admission to [facility] for right proximal RCA pseudoaneurysm after patient presented on 2/5 for agitation and psychosis.  Patient underwent cerebral angiogram and embolization of cervical pseudoaneurysm 2/9/2024.  Was discharged on aspirin and Plavix, resumed.  Patient was discharged to [facility] on 2/12 subsequently discharged on 2/16.    3/20/2024  Seen after his ECT treatment today, better as compared to when seen after treatment before.  Past Medical History:     Past Medical History:   Diagnosis Date    Anxiety     Autism         Past Surgical History:     Past Surgical History:   Procedure Laterality Date    CEREBRAL ANGIOGRAM  02/09/2024    IR ANGIOGRAM CAROTID CEREBRAL BILATERAL    EYE SURGERY      OTHER SURGICAL HISTORY      ECT        Medications Prior to Admission:     Prior to Admission medications    Medication Sig Start Date End Date Taking? Authorizing Provider   fluticasone (FLONASE) 50 MCG/ACT nasal spray 2 sprays by Each Nostril route daily   Yes Provider, MD Nick   aspirin 81 MG chewable tablet Take 1 tablet by mouth daily 2/17/24   Donnie Rosa MD   clopidogrel (PLAVIX) 75 MG tablet Take 1 tablet by mouth daily 2/17/24   Donnie Rosa MD   QUEtiapine (SEROQUEL) 25 MG

## 2024-03-24 NOTE — PLAN OF CARE
Problem: Psychosis  Goal: Will report no hallucinations or delusions  Description: INTERVENTIONS:  1. Administer medication as  ordered  2. Assist with reality testing to support increasing orientation  3. Assess if patient's hallucinations or delusions are encouraging self harm or harm to others and intervene as appropriate  Outcome: Progressing  Note: Patient denies any hallucinations during shift assessment, no delusion reported or observed.      Problem: Behavior  Goal: Pt/Family maintain appropriate behavior and adhere to behavioral management agreement, if implemented  Description: INTERVENTIONS:  1. Assess patient/family's coping skills and  non-compliant behavior (including use of illegal substances)  2. Notify security of behavior or suspected illegal substances which indicate the need for search of the family and/or belongings  3. Encourage verbalization of thoughts and concerns in a socially appropriate manner  4. Utilize positive, consistent limit setting strategies supporting safety of patient, staff and others  5. Encourage participation in the decision making process about the behavioral management agreement  6. If a visitor's behavior poses a threat to safety call refer to organization policy.  7. Initiate consult with , Psychosocial CNS, Spiritual Care as appropriate  3/24/2024 0257 by Gayatri Banegas RN  Outcome: Progressing  Flowsheets (Taken 3/24/2024 0257)  Patient/family maintains appropriate behavior and adheres to behavioral management agreement, if implemented:   Assess patient/family’s coping skills and  non-compliant behavior (including use of illegal substances)   Encourage verbalization of thoughts and concerns in a socially appropriate manner   Encourage participation in the decision making process about the behavioral management agreement  Note: Patient maintains appropriate behavior on the unit, compliant with prescribed medications.      Problem: Safety -

## 2024-03-24 NOTE — GROUP NOTE
Group Therapy Note    Date: 3/24/2024    Group Start Time: 1400  Group End Time: 1440  Group Topic: Recreational    [facility] Emelia Belcher CTRS        Group Therapy Note    Attendees: 5/14    Recreation Group Note        Date: March 24, 2024         Start Time: 2pm  End Time: 2:40pm      Number of Participants in Group & Unit Census:  5/14    Topic:  interpersonal skills, leisure awareness, concentration     Goal of Group: To improve interpersonal skills and leisure awareness through collaborating with peers and concentrating on a presented task.       Comments:     Patient did not participate in Recreation group, despite staff encouragement and explanation of benefits.  Patient remain seclusive to self.  Q15 minute safety checks maintained for patient safety and will continue to encourage patient to attend unit programming.      Signature:  MAURY Campbell

## 2024-03-24 NOTE — GROUP NOTE
Group Therapy Note    Date: 3/24/2024    Group Start Time: 1000  Group End Time: 1045  Group Topic: Psychoeducation    Jannette Fu MSW        Group Therapy Note    Attendees: 6/13       Patient's Goal:  Discuss mental health maintenance (triggers, warning signs, self-care, and coping skills)    Patient was offered group therapy today but declined to participate despite encouragement from staff.  1:1 was offered.    Discipline Responsible: /Counselor      Signature:  TANO Hernández

## 2024-03-24 NOTE — PLAN OF CARE
Problem: Psychosis  Goal: Will report no hallucinations or delusions  Description: INTERVENTIONS:  1. Administer medication as  ordered  2. Assist with reality testing to support increasing orientation  3. Assess if patient's hallucinations or delusions are encouraging self harm or harm to others and intervene as appropriate  3/24/2024 1358 by Yady Hsu, RN  Outcome: Progressing     Problem: Behavior  Goal: Pt/Family maintain appropriate behavior and adhere to behavioral management agreement, if implemented  Description: INTERVENTIONS:  1. Assess patient/family's coping skills and  non-compliant behavior (including use of illegal substances)  2. Notify security of behavior or suspected illegal substances which indicate the need for search of the family and/or belongings  3. Encourage verbalization of thoughts and concerns in a socially appropriate manner  4. Utilize positive, consistent limit setting strategies supporting safety of patient, staff and others  5. Encourage participation in the decision making process about the behavioral management agreement  6. If a visitor's behavior poses a threat to safety call refer to organization policy.  7. Initiate consult with , Psychosocial CNS, Spiritual Care as appropriate  3/24/2024 1351 by Yady Hsu, RN  Outcome: Progressing       Patient denies any suicidal or homicidal ideations at this time, denies any depression, but reports anxiety at times. Denies hallucinations or delusions. The patient paces/wanders throughout the unit. He is more brightened, communicating needs to staff. He is eating %. Shower was offered and encouraged but patient declined saying \"someday\". Fresh linen placed on his bed. Selectively social in the dayroom watching TV. Patient was encouraged to attend group but unable to concentrate and stay in one spot for that long. 1:1 time spent with the patient, emotional support provided. Patient compliant  with medications, taking whole in apple sauce. Behavior is controlled, pleasant, calm and cooperative.

## 2024-03-24 NOTE — PROGRESS NOTES
Daily Progress Note  3/24/2024    Patient Name: William Babcock    CHIEF COMPLAINT:  Acute Psychosis          SUBJECTIVE:      Patient is seen today for a follow up assessment. Vitals and labs reviewed and appear to be within normal limits.  He continues to remain compliant with scheduled medication.  Patient has remaining behavior control and not requiring emergency medication last 24 hours.  He has been receiving ECT and will continue treatment while inpatient.  Patient was seen while slowly pacing in milieu, declines need for privacy.  On approach patient answers with yes or no however linear and coherent on assessment.  He endorses feeling \"pretty good\" when asked about mood.  Patient denies any thoughts of self-harm or thoughts of harming others.  He denies any anxiety or other depressive symptoms.  Patient denies any issues with hallucinations or paranoia.  He reports to have had adequate sleep last night and states feels rested during the day.  Patient continues to endorse improvement in oral intake and to be eating majority of meals per nursing staff.  He denies any medication or ECT side effects.  At this time patient presents with improvement in symptoms and disposition pending placement and coordination with family per attending psychiatrist.    Appetite:  [x] Normal/Adequate/Unchanged  [] Increased  [] Decreased      Sleep:       [x] Normal/Adequate/Unchanged  [] Fair  [] Poor      Group Attendance on Unit:   [] Yes  [x] Selectively    [] No    Medication Side Effects:  Patient denies any medication side effects at the time of assessment.         Mental Status Exam  Level of consciousness: Awake and alert  Appearance: Appropriate attire for setting, standing, with fair  grooming and hygiene.   Behavior/Motor: Approachable, calm  Attitude toward examiner: Cooperative, attentive, fair eye contact  Speech: Normal rate, low volume, monotone, monosyllabic  Mood:  Patient reports \"pretty good\".   Affect:  Daily  clopidogrel (PLAVIX) tablet 75 mg, 75 mg, Oral, Daily  fluticasone (FLONASE) 50 MCG/ACT nasal spray 2 spray, 2 spray, Each Nostril, Daily  acetaminophen (TYLENOL) tablet 650 mg, 650 mg, Oral, Q4H PRN  polyethylene glycol (GLYCOLAX) packet 17 g, 17 g, Oral, Daily PRN  traZODone (DESYREL) tablet 50 mg, 50 mg, Oral, Nightly PRN  aluminum & magnesium hydroxide-simethicone (MAALOX) 200-200-20 MG/5ML suspension 30 mL, 30 mL, Oral, Q6H PRN    ASSESSMENT  Severe recurrent major depression with psychotic features (HCC)         HANDOFF  Patient symptoms are: Improving.  Medications as determined by attending physician  Continue ECT treatments while inpatient, Monday, Wednesday and Friday  Monitor need and frequency of PRN medications.  Encourage participation in groups and milieu.  Probable discharge is to be determined by MD.   Disposition plan is pending coordination and placement with family per attending psychiatrist.    Electronically signed by BENOIT Chavez CNP on 3/24/2024 at 11:17 AM    **This report has been created using voice recognition software. It may contain minor errors which are inherent in voice recognition technology.**    I independently saw and evaluated the patient.  I reviewed the nurse practitioners documentation above.  Principle diagnosis we are treating for is Severe recurrent major depression with psychotic features (HCC). Any additional comments or changes to the nurse practitioners documentation are stated below otherwise agree with assessment.  Plan will be as follows:  Patient continues to do well.  There has been communication with his outpatient staff with regards to trying to secure disposition planning.  Will continue ECT in the meantime  PLAN  Patient s symptoms   are improving  Continue with current medication and treatment approach for now  Attempt to develop insight  Psycho-education conducted.  Supportive Therapy conducted.  Probable discharge is undetermined at  this time  Follow-up daily while on inpatient unit

## 2024-03-25 ENCOUNTER — APPOINTMENT (OUTPATIENT)
Dept: POSTOP/PACU | Age: 64
DRG: 885 | End: 2024-03-25
Payer: MEDICARE

## 2024-03-25 ENCOUNTER — ANESTHESIA (OUTPATIENT)
Dept: POSTOP/PACU | Age: 64
End: 2024-03-25
Payer: MEDICARE

## 2024-03-25 ENCOUNTER — ANESTHESIA EVENT (OUTPATIENT)
Dept: POSTOP/PACU | Age: 64
End: 2024-03-25
Payer: MEDICARE

## 2024-03-25 LAB — GLUCOSE BLD-MCNC: 80 MG/DL (ref 75–110)

## 2024-03-25 PROCEDURE — APPSS30 APP SPLIT SHARED TIME 16-30 MINUTES

## 2024-03-25 PROCEDURE — 1240000000 HC EMOTIONAL WELLNESS R&B

## 2024-03-25 PROCEDURE — 7100000001 HC PACU RECOVERY - ADDTL 15 MIN: Performed by: ANESTHESIOLOGY

## 2024-03-25 PROCEDURE — 7100000000 HC PACU RECOVERY - FIRST 15 MIN: Performed by: ANESTHESIOLOGY

## 2024-03-25 PROCEDURE — 6370000000 HC RX 637 (ALT 250 FOR IP): Performed by: PSYCHIATRY & NEUROLOGY

## 2024-03-25 PROCEDURE — 2500000003 HC RX 250 WO HCPCS: Performed by: NURSE ANESTHETIST, CERTIFIED REGISTERED

## 2024-03-25 PROCEDURE — 90870 ELECTROCONVULSIVE THERAPY: CPT | Performed by: ANESTHESIOLOGY

## 2024-03-25 PROCEDURE — 3700000001 HC ADD 15 MINUTES (ANESTHESIA): Performed by: ANESTHESIOLOGY

## 2024-03-25 PROCEDURE — 6370000000 HC RX 637 (ALT 250 FOR IP): Performed by: NURSE PRACTITIONER

## 2024-03-25 PROCEDURE — 99232 SBSQ HOSP IP/OBS MODERATE 35: CPT | Performed by: INTERNAL MEDICINE

## 2024-03-25 PROCEDURE — 6360000002 HC RX W HCPCS: Performed by: PSYCHIATRY & NEUROLOGY

## 2024-03-25 PROCEDURE — 90870 ELECTROCONVULSIVE THERAPY: CPT | Performed by: PSYCHIATRY & NEUROLOGY

## 2024-03-25 PROCEDURE — 99232 SBSQ HOSP IP/OBS MODERATE 35: CPT | Performed by: PSYCHIATRY & NEUROLOGY

## 2024-03-25 PROCEDURE — 6370000000 HC RX 637 (ALT 250 FOR IP): Performed by: INTERNAL MEDICINE

## 2024-03-25 PROCEDURE — 3700000000 HC ANESTHESIA ATTENDED CARE: Performed by: ANESTHESIOLOGY

## 2024-03-25 PROCEDURE — 6360000002 HC RX W HCPCS

## 2024-03-25 PROCEDURE — 82947 ASSAY GLUCOSE BLOOD QUANT: CPT

## 2024-03-25 PROCEDURE — 2580000003 HC RX 258: Performed by: ANESTHESIOLOGY

## 2024-03-25 RX ORDER — LORAZEPAM 2 MG/ML
1 INJECTION INTRAMUSCULAR ONCE
Status: COMPLETED | OUTPATIENT
Start: 2024-03-25 | End: 2024-03-25

## 2024-03-25 RX ORDER — SUCCINYLCHOLINE/SOD CL,ISO/PF 200MG/10ML
SYRINGE (ML) INTRAVENOUS
Status: COMPLETED
Start: 2024-03-25 | End: 2024-03-25

## 2024-03-25 RX ORDER — ETOMIDATE 2 MG/ML
INJECTION INTRAVENOUS
Status: COMPLETED
Start: 2024-03-25 | End: 2024-03-25

## 2024-03-25 RX ORDER — HALOPERIDOL 5 MG/ML
5 INJECTION INTRAMUSCULAR ONCE
Status: COMPLETED | OUTPATIENT
Start: 2024-03-25 | End: 2024-03-25

## 2024-03-25 RX ORDER — GLYCOPYRROLATE 0.2 MG/ML
INJECTION INTRAMUSCULAR; INTRAVENOUS
Status: DISPENSED
Start: 2024-03-25 | End: 2024-03-25

## 2024-03-25 RX ORDER — LORAZEPAM 2 MG/ML
INJECTION INTRAMUSCULAR
Status: COMPLETED
Start: 2024-03-25 | End: 2024-03-25

## 2024-03-25 RX ORDER — SUCCINYLCHOLINE/SOD CL,ISO/PF 200MG/10ML
SYRINGE (ML) INTRAVENOUS PRN
Status: DISCONTINUED | OUTPATIENT
Start: 2024-03-25 | End: 2024-03-25 | Stop reason: SDUPTHER

## 2024-03-25 RX ORDER — ETOMIDATE 2 MG/ML
INJECTION INTRAVENOUS PRN
Status: DISCONTINUED | OUTPATIENT
Start: 2024-03-25 | End: 2024-03-25 | Stop reason: SDUPTHER

## 2024-03-25 RX ORDER — SODIUM CHLORIDE, SODIUM LACTATE, POTASSIUM CHLORIDE, CALCIUM CHLORIDE 600; 310; 30; 20 MG/100ML; MG/100ML; MG/100ML; MG/100ML
INJECTION, SOLUTION INTRAVENOUS CONTINUOUS
Status: DISCONTINUED | OUTPATIENT
Start: 2024-03-25 | End: 2024-03-29 | Stop reason: HOSPADM

## 2024-03-25 RX ADMIN — OLANZAPINE 7.5 MG: 7.5 TABLET, FILM COATED ORAL at 20:37

## 2024-03-25 RX ADMIN — ACETAMINOPHEN 650 MG: 325 TABLET ORAL at 06:15

## 2024-03-25 RX ADMIN — VENLAFAXINE HYDROCHLORIDE 37.5 MG: 75 CAPSULE, EXTENDED RELEASE ORAL at 10:00

## 2024-03-25 RX ADMIN — CLOPIDOGREL BISULFATE 75 MG: 75 TABLET ORAL at 10:00

## 2024-03-25 RX ADMIN — Medication 80 MG: at 08:05

## 2024-03-25 RX ADMIN — ASPIRIN 81 MG 81 MG: 81 TABLET ORAL at 10:00

## 2024-03-25 RX ADMIN — DONEPEZIL HYDROCHLORIDE 5 MG: 5 TABLET, FILM COATED ORAL at 20:37

## 2024-03-25 RX ADMIN — LORAZEPAM 1 MG: 2 INJECTION INTRAMUSCULAR at 08:43

## 2024-03-25 RX ADMIN — SODIUM CHLORIDE, POTASSIUM CHLORIDE, SODIUM LACTATE AND CALCIUM CHLORIDE: 600; 310; 30; 20 INJECTION, SOLUTION INTRAVENOUS at 07:33

## 2024-03-25 RX ADMIN — ETOMIDATE INJECTION 20 MG: 2 SOLUTION INTRAVENOUS at 08:03

## 2024-03-25 RX ADMIN — HALOPERIDOL LACTATE 5 MG: 5 INJECTION, SOLUTION INTRAMUSCULAR at 08:38

## 2024-03-25 RX ADMIN — LORAZEPAM 1 MG: 2 INJECTION INTRAMUSCULAR; INTRAVENOUS at 08:43

## 2024-03-25 RX ADMIN — FLUTICASONE PROPIONATE 2 SPRAY: 50 SPRAY, METERED NASAL at 10:43

## 2024-03-25 ASSESSMENT — PAIN SCALES - GENERAL: PAINLEVEL_OUTOF10: 0

## 2024-03-25 ASSESSMENT — PAIN - FUNCTIONAL ASSESSMENT: PAIN_FUNCTIONAL_ASSESSMENT: 0-10

## 2024-03-25 NOTE — ANESTHESIA PRE PROCEDURE
Donnie Rosa MD   5 mg at 03/24/24 2123   • diphenhydrAMINE-zinc acetate 2-0.1 % cream   Topical TID PRN Neeraj Mary MD   Given at 02/26/24 1849   • OLANZapine zydis (ZYPREXA) disintegrating tablet 5 mg  5 mg Oral BID PRN Aliya Ross APRN - CNP        Or   • OLANZapine (ZyPREXA) 5 mg in sterile water 1 mL injection  5 mg IntraMUSCular BID PRN Aliya Ross APRN - CNP   5 mg at 03/09/24 0943   • aspirin chewable tablet 81 mg  81 mg Oral Daily Makayla Choudhury MD   81 mg at 03/24/24 0836   • clopidogrel (PLAVIX) tablet 75 mg  75 mg Oral Daily Makayla Choudhury MD   75 mg at 03/24/24 0836   • fluticasone (FLONASE) 50 MCG/ACT nasal spray 2 spray  2 spray Each Nostril Daily Makayla Choudhury MD   2 spray at 03/23/24 0820   • acetaminophen (TYLENOL) tablet 650 mg  650 mg Oral Q4H PRN Michelle Lomas APRN - CNP   650 mg at 03/25/24 0615   • polyethylene glycol (GLYCOLAX) packet 17 g  17 g Oral Daily PRN Michelle Lomas APRN - CNP       • traZODone (DESYREL) tablet 50 mg  50 mg Oral Nightly PRN Michelle Lomas APRN - CNP   50 mg at 03/20/24 2136   • aluminum & magnesium hydroxide-simethicone (MAALOX) 200-200-20 MG/5ML suspension 30 mL  30 mL Oral Q6H PRN Michelle Lomas APRN - CNP   30 mL at 03/15/24 1838       Allergies:  No Known Allergies    Problem List:    Patient Active Problem List   Diagnosis Code   • Essential hypertension I10   • Primary insomnia F51.01   • Influenza vaccine refused Z28.21   • Refused Streptococcus pneumoniae vaccination Z28.21   • Psychosis, paranoid (HCC) F22   • Abnormal CT of the head R93.0   • Psychosis (HCC) F29   • Pseudoaneurysm (HCC) I72.9   • Carotid pseudoaneurysm (HCC) I72.0   • Agitation R45.1   • Stenosis of right carotid artery greater than 50% I65.21   • Secondary hypertension I15.9   • Acute psychosis (HCC) F23   • Altered mental status R41.82   • Severe recurrent major depression with psychotic features (HCC) F33.3       Past Medical History:

## 2024-03-25 NOTE — PROGRESS NOTES
IN-PATIENT SERVICE  [facility] Internal Medicine    Progress note  Date:   3/25/2024  Patient name:  William Babcock  Date of admission:  2/21/2024  9:18 PM  MRN:   [id number]  Account:  [id number]  YOB: 1960  PCP:    Billy Francois MD  Room:   [facility]  Code Status:    Full Code    Physician Requesting Consult: Donnie Rosa MD    Reason for Consult: History and physical, medical management    Chief Complaint:     No chief complaint on file.    Medical management    History Obtained From:     Patient, EMR, nursing staff    History of Present Illness:     63-year-old male admitted for acute psychosis.  Patient has history of autism;   Carotid pseudoaneurysm.  Recent admission to [facility] for right proximal RCA pseudoaneurysm after patient presented on 2/5 for agitation and psychosis.  Patient underwent cerebral angiogram and embolization of cervical pseudoaneurysm 2/9/2024.  Was discharged on aspirin and Plavix, resumed.  Patient was discharged to [facility] on 2/12 subsequently discharged on 2/16.    3/20/2024  Seen after his ECT treatment today, better as compared to when seen after treatment before.  Past Medical History:     Past Medical History:   Diagnosis Date    Anxiety     Autism         Past Surgical History:     Past Surgical History:   Procedure Laterality Date    CEREBRAL ANGIOGRAM  02/09/2024    IR ANGIOGRAM CAROTID CEREBRAL BILATERAL    EYE SURGERY      OTHER SURGICAL HISTORY      ECT        Medications Prior to Admission:     Prior to Admission medications    Medication Sig Start Date End Date Taking? Authorizing Provider   fluticasone (FLONASE) 50 MCG/ACT nasal spray 2 sprays by Each Nostril route daily   Yes Provider, MD Nick   aspirin 81 MG chewable tablet Take 1 tablet by mouth daily 2/17/24   Donnie Rosa MD   clopidogrel (PLAVIX) 75 MG tablet Take 1 tablet by mouth daily 2/17/24   Donnie Rosa MD   QUEtiapine (SEROQUEL) 25 MG

## 2024-03-25 NOTE — GROUP NOTE
Group Therapy Note    Date: 3/25/2024    Group Start Time: 1430  Group End Time: 1530  Group Topic: Cognitive Skills    Nia Vincent CTRS        Group Therapy Note    Attendees: 9/14       Topic: To increase social interaction, expressing feelings and explore stress boosters   and stress busters through creative expression, and discussion .        Patient came to group late but did not participate in Cognitive Skills Group at 1430, despite staff encouragement and explanation of benefits. Patient is seclusive to self and was back and forth/in and out of group. Pt was easily distracted and restless.      Q15 minute safety checks maintained for patient safety and will continue to encourage   patient to attend unit programming.       Discipline Responsible: Psychoeducational Specialist  Signature:  MAURY BARNES

## 2024-03-25 NOTE — PROGRESS NOTES
Occupational Therapy  [facility]   OCCUPATIONAL THERAPY MISSED TREATMENT NOTE   [facility]  Date: 3/25/24  Patient Name: William Babcock       Room: 0205/0205-01  MRN: [id number]   Account #: [id number]    : 1960  (64 y.o.)  Gender: male   Referring Practitioner: Donnie Rosa MD  Diagnosis: Acute psychosis             REASON FOR MISSED TREATMENT:  [id number] - pt sitting in group therapy as writer arrives. RN oks tx. Pt agrees to UB exercises and after working c writer x 6 minutes pt puts theraband down on table and walks away and doesn't return. Will continue to follow for OT needs. Theraband removed from unit.         Electronically signed by DIANNE Romero on 3/25/24 at 3:21 PM EDT

## 2024-03-25 NOTE — CARE COORDINATION
Social work contacted [facility] speaking to Sh[facility] who shared they have male beds available and requested referral to be faxed to [phone]. Social work completed the fax.

## 2024-03-25 NOTE — CARE COORDINATION
Social work received call from [facility] Disability Supervisor Yady Manzano [phone] regarding patients discharge. Yady stated \"We are not prepared for the patient to be discharged home.\" Yady shared they \"put the plans on hold\" for in home care due to the PAS[facility] reporting the patient needs requires services provided by a nursing facility.  Social work shared she would speak with the doctor regarding the change. Yady shared they are unable to place the patient in a facility.

## 2024-03-25 NOTE — BH NOTE
Departure from I Unit to ECT:    Pre-op ECT Checklist completed and placed by front of patients chart. Patient departed unit Time: 0700 and Via Wheelchair, accompanied by staff. Patient is alert and orientated at this time, as well as calm.

## 2024-03-25 NOTE — BH NOTE
Arrival from ECT to [facility] Unit:    Patient arrived to the unit Time: 0936 and Via Wheelchair. Patient is person, place, time/date, and situation. Patient is lethargic and impulsive, needs redirection to sit to to being unstable.     Physical and Mental assessment, Vitals x 2, and fall risk assessment completed and documented.     Post-op ECT checklist completed and placed in patients chart.

## 2024-03-25 NOTE — PROGRESS NOTES
Daily Progress Note  3/25/2024    Patient Name: William Babcock    CHIEF COMPLAINT: Severe recurrent major depression with psychotic features         SUBJECTIVE:      Patient is seen today for a follow up assessment.  Reagan was found sleeping in his room.  Per nursing staff he had ECT this morning and woke up combative after anesthesia.  The patient appears to be quite sensitive to anesthesia and will frequently wake up confused and combative after his ECTs. He did have to receive some emergency medications to calm him down.  Nursing staff states he at one point walked out into the hallway naked and confused he was redirected to his room and dressed.  Since then they state he has been sleeping.  He did miss breakfast this morning.  Overall the patient has shown great improvement in his symptoms and behaviors.  Most of these behaviors are most likely due to postop effects of anesthesia.  Nursing staff states yesterday leading up to ECT he was denying any suicidal or homicidal ideations.  He was not exhibiting any responses to internal stimuli.  His appetite continues to improve and he has been taking his medications.  He is no longer hyper fixated on dying.  We will continue to monitor patient's progress.  Medication management discharge planning per attending.  He is unable to be discharged until he is placed in the appropriate facility.  He is unable to care for himself without assistance. Possible referral to [facility].         Appetite:  [] Adequate/Unchanged  [x] Increased  [] Decreased      Sleep:       [x] Adequate/Unchanged  [] Fair  [] Poor      Group Attendance on Unit:   [] Yes   [] Selectively    [x] No    Compliant with scheduled medications: [x] Yes  [] No    Received emergency medications in past 24 hrs: [x] Yes   [] No    Medication Side Effects: Denies         Mental Status Exam  Level of consciousness: Currently sleeping  Appearance: Appropriate attire for setting, resting in bed, with fair   grooming and hygiene   Behavior/Motor:sleeping   Attitude toward examiner: pt sleeping  Speech: Unable to assess  Mood: Patient sleeping  Affect: Patient sleeping  Thought processes: Patient sleeping but per nursing staff no changes since yesterday  Thought content: Has been denying per nursing staff homicidal ideation  Suicidal Ideation: Has been denying per nursing staff suicidal ideations, contracts for safety on the unit.   Delusions: No evidence of delusions.   Perceptual Disturbance: Has been denying.  Patient does not appear to be responding to internal stimuli.   Cognition: Oriented to self,  Memory: impaired recent memory  Insight: poor   Judgement: poor       Data   height is 1.727 m (5' 8\") and weight is 60.3 kg (133 lb). His temporal temperature is 96.8 °F (36 °C). His blood pressure is 123/85 and his pulse is 65. His respiration is 14 and oxygen saturation is 96%.   Labs:   No results displayed because visit has over 200 results.            Reviewed patient's current plan of care and vital signs with nursing staff.    Labs reviewed: [x] Yes    Medications  Current Facility-Administered Medications: lactated ringers IV soln infusion, , IntraVENous, Continuous  glycopyrrolate (ROBINUL) 0.2 MG/ML injection, , ,   glucose chewable tablet 16 g, 4 tablet, Oral, PRN  dextrose bolus 10% 125 mL, 125 mL, IntraVENous, PRN **OR** dextrose bolus 10% 250 mL, 250 mL, IntraVENous, PRN  glucagon injection 1 mg, 1 mg, SubCUTAneous, PRN  dextrose 10 % infusion, , IntraVENous, Continuous PRN  [Held by provider] atenolol (TENORMIN) tablet 12.5 mg, 12.5 mg, Oral, Daily  venlafaxine (EFFEXOR XR) extended release capsule 37.5 mg, 37.5 mg, Oral, Daily with breakfast  OLANZapine (ZYPREXA) tablet 7.5 mg, 7.5 mg, Oral, Nightly  polyethylene glycol (GLYCOLAX) packet 17 g, 17 g, Oral, Daily  donepezil (ARICEPT) tablet 5 mg, 5 mg, Oral, Nightly  diphenhydrAMINE-zinc acetate 2-0.1 % cream, , Topical, TID PRN  OLANZapine zydis

## 2024-03-25 NOTE — PROCEDURES
blocks were put in place. Stimulus leads then applied to stimulus electrode site bilaterally with the center of the lead placed approximately 1 inch superior to the midpoint of line between canthus and the tragus bilaterally. Static impedance was tested and within appropriate limits. MECTA settings were confirmed with nursing staff.   Staff was cleared from the patient and dose of ECT stimulus was delivered.  Motor seizure activity  was observed at duration noted and terminated.  EEG seizure activity was observed of duration noted that was confirmed via monitoring EEG and terminated with appropriate postictal suppression noted on EEG.  Static impedance and dynamic impedance were documented. Tourniquet on  lower extremity was released and recovery procedures initiated.        The patient was mask ventilated during the procedure with no significant drops in oxygenation saturation and tolerated the procedure well without any notable adverse effects.   Condition: The patient was in stable condition with stable vital signs and able to follow commands when moved to recovery.

## 2024-03-25 NOTE — PLAN OF CARE
Problem: Psychosis  Goal: Will report no hallucinations or delusions  Description: INTERVENTIONS:  1. Administer medication as  ordered  2. Assist with reality testing to support increasing orientation  3. Assess if patient's hallucinations or delusions are encouraging self harm or harm to others and intervene as appropriate  3/24/2024 2119 by Omar Ortega  Outcome: Progressing     Problem: Behavior  Goal: Pt/Family maintain appropriate behavior and adhere to behavioral management agreement, if implemented  Description: INTERVENTIONS:  1. Assess patient/family's coping skills and  non-compliant behavior (including use of illegal substances)  2. Notify security of behavior or suspected illegal substances which indicate the need for search of the family and/or belongings  3. Encourage verbalization of thoughts and concerns in a socially appropriate manner  4. Utilize positive, consistent limit setting strategies supporting safety of patient, staff and others  5. Encourage participation in the decision making process about the behavioral management agreement  6. If a visitor's behavior poses a threat to safety call refer to organization policy.  7. Initiate consult with , Psychosocial CNS, Spiritual Care as appropriate  3/24/2024 2119 by Omar Ortega  Outcome: Progressing     Patient visible walking back and forth between his room and the dayroom at this time. Patient is cooperative and pleasant. Patient states he feels \"alright\". Patient reports eating and sleeping. Patient denies anxiety and depression at this time. Patient denies visual and auditory hallucinations at this time. Patient remains free from self harm this shift. Patient denies wanting to cause harm to self or others at this time. Patient encouraged to seek nursing staff at anytime if he felt at danger to themselves or others. Patient states understanding. Safety checks maintained [id number]

## 2024-03-25 NOTE — PROGRESS NOTES
Comprehensive Nutrition Assessment    Type and Reason for Visit:  Reassess    Nutrition Recommendations/Plan:   Will continue Regular diet with Ensure Enlive all trays     Malnutrition Assessment:  Malnutrition Status:  Severe malnutrition (03/06/24 1413)    Context:  Acute Illness     Findings of the 6 clinical characteristics of malnutrition:  Energy Intake:  75% or less of estimated energy requirements for 7 or more days  Weight Loss:  5% over 1 month (15% in 2 weeks)     Body Fat Loss:   (Severe) Orbital   Muscle Mass Loss:   (Severe) Temples (temporalis), Hand (interosseous)  Fluid Accumulation:  No significant fluid accumulation     Strength:  Not Performed    Nutrition Assessment:    Intake is much improved. Nursing documents intake greater than 75% for the past 2 days. Discussed with RN who states pt is dinking supplements.    Nutrition Related Findings:    non pitting BLE edema, Labs/Meds: Reviewed Wound Type: None       Current Nutrition Intake & Therapies:    Average Meal Intake: %  Average Supplements Intake: 26-50%  ADULT DIET; Regular  ADULT ORAL NUTRITION SUPPLEMENT; Breakfast, Lunch, Dinner; Standard High Calorie/High Protein Oral Supplement    Anthropometric Measures:  Height: 172.7 cm (5' 8\")  Ideal Body Weight (IBW): 154 lbs (70 kg)    Admission Body Weight: 71.7 kg (158 lb)  Current Body Weight: 60.3 kg (133 lb), 87 % IBW. Weight Source: Not Specified  Current BMI (kg/m2): 20.2  Usual Body Weight: 71.7 kg (158 lb) (2/6 bed scale)  % Weight Change (Calculated): -15.2                    BMI Categories: Normal Weight (BMI 18.5-24.9)    Estimated Daily Nutrient Needs:  Energy Requirements Based On: Formula  Weight Used for Energy Requirements: Current  Energy (kcal/day): [place] x 1.3= 1800 kcal  Weight Used for Protein Requirements: Current  Protein (g/day): 1.5g/kg= 90 g  Method Used for Fluid Requirements: 1 ml/kcal    Nutrition Diagnosis:   Severe malnutrition related to inadequate

## 2024-03-25 NOTE — ANESTHESIA POSTPROCEDURE EVALUATION
Department of Anesthesiology  Postprocedure Note    Patient: William Babcock  MRN: [id number]  YOB: 1960  Date of evaluation: 3/25/2024    Procedure Summary       Date: 03/25/24 Room / Location: [facility] PACU    Anesthesia Start: 0759 Anesthesia Stop: 0821    Procedure: ECT W/ ANESTHESIA Diagnosis:     Scheduled Providers:  Responsible Provider: Chanda Adams MD    Anesthesia Type: General ASA Status: 3            Anesthesia Type: General    Wanda Phase I: Wanda Score: 10    Wanda Phase II:      Anesthesia Post Evaluation    Comments: POST- ANESTHESIA EVALUATION       Pt Name: William Babcock  MRN: [id number]  YOB: 1960  Date of evaluation: 3/25/2024  Time:  9:37 AM      BP (!) 96/55   Pulse 59   Temp 97.5 °F (36.4 °C)   Resp 13   Ht 1.727 m (5' 8\")   Wt 60.3 kg (133 lb)   SpO2 96%   BMI 20.22 kg/m²      Consciousness Level  Awake  Cardiopulmonary Status  Stable  Pain Adequately Treated YES  Nausea / Vomiting  NO  Adequate Hydration  YES  Anesthesia Related Complications NONE      Electronically signed by Chanda Adams MD on 3/25/2024 at 9:37 AM      No notable events documented.

## 2024-03-25 NOTE — CARE COORDINATION
Social work called [facility] to inquire about bed availability. Social work left message asking for return call.

## 2024-03-25 NOTE — GROUP NOTE
Group Therapy Note    Date: 3/25/2024    Group Start Time: 1015  Group End Time: 1045  Group Topic: Psychoeducation    [facility] Kayleigh Soto MSW        Group Therapy Note    Attendees: 7/15       Patient was offered group therapy today but declined to participate despite encouragement from staff.  1:1 was offered.     Signature:  TANO Allan

## 2024-03-25 NOTE — GROUP NOTE
Group Therapy Note    Date: 3/25/2024    Group Start Time: 1430  Group End Time: 1530  Group Topic: Cognitive Skills    [facility]        Group Therapy Note    Attendees:          Patient's Goal:  ***    Notes:  ***    Status After Intervention:  {Status After Intervention:460794691}    Participation Level: {Participation Level:362531971}    Participation Quality: {[facility] PARTICIPATION QUALITY:222309927}      Speech:  {[facility] CD_SPEECH:[id number]}      Thought Process/Content: {Thought Process/Content:583025617}      Affective Functioning: {Affective Functionin}      Mood: {Mood:350433951}      Level of consciousness:  {Level of consciousness:664355493}      Response to Learning: {[facility] Responses to Learnin}      Endings: {[facility] Endings:72313}    Modes of Intervention: {[facility] Modes of Intervention:910830952}      Discipline Responsible: {[facility] Multidisciplinary:266721202}      Signature:  MAURY BARNES     Per Hasmukh Walker , the insurance will not approve this study for a diagnosis of snoring or unspecified fatigue. It would have to be one of the G codes. Obstructive sleep apnea ( they will not approve rule out). Will need new order re-faxed to [phone].  Ins

## 2024-03-25 NOTE — PLAN OF CARE
Problem: Psychosis  Goal: Will report no hallucinations or delusions  Description: INTERVENTIONS:  1. Administer medication as  ordered  2. Assist with reality testing to support increasing orientation  3. Assess if patient's hallucinations or delusions are encouraging self harm or harm to others and intervene as appropriate  3/25/2024 0919 by Elana Naranjo RN  Outcome: Progressing     Problem: Behavior  Goal: Pt/Family maintain appropriate behavior and adhere to behavioral management agreement, if implemented  Description: INTERVENTIONS:  1. Assess patient/family's coping skills and  non-compliant behavior (including use of illegal substances)  2. Notify security of behavior or suspected illegal substances which indicate the need for search of the family and/or belongings  3. Encourage verbalization of thoughts and concerns in a socially appropriate manner  4. Utilize positive, consistent limit setting strategies supporting safety of patient, staff and others  5. Encourage participation in the decision making process about the behavioral management agreement  6. If a visitor's behavior poses a threat to safety call refer to organization policy.  7. Initiate consult with , Psychosocial CNS, Spiritual Care as appropriate  3/25/2024 0919 by lEana Naranjo RN  Outcome: Progressing     Problem: Safety - Adult  Goal: Free from fall injury  Outcome: Progressing     Problem: Nutrition Deficit:  Goal: Optimize nutritional status  Outcome: Progressing     Problem: Pain  Goal: Verbalizes/displays adequate comfort level or baseline comfort level  Outcome: Progressing     Patient denied suicidal ideations. Patient denied homicidal ideations. Safe environment maintained. Safety checks every 15 minutes continued per facility policy. Patient reported prior to ECT treatment that he was anxious to have procedure to be completed because the bed was uncomfortable. Patient also reported poor sleep, prior to ECT patient

## 2024-03-26 PROCEDURE — 6370000000 HC RX 637 (ALT 250 FOR IP): Performed by: PSYCHIATRY & NEUROLOGY

## 2024-03-26 PROCEDURE — 6370000000 HC RX 637 (ALT 250 FOR IP): Performed by: INTERNAL MEDICINE

## 2024-03-26 PROCEDURE — 99232 SBSQ HOSP IP/OBS MODERATE 35: CPT | Performed by: PSYCHIATRY & NEUROLOGY

## 2024-03-26 PROCEDURE — 1240000000 HC EMOTIONAL WELLNESS R&B

## 2024-03-26 PROCEDURE — 97535 SELF CARE MNGMENT TRAINING: CPT

## 2024-03-26 RX ADMIN — VENLAFAXINE HYDROCHLORIDE 37.5 MG: 75 CAPSULE, EXTENDED RELEASE ORAL at 08:10

## 2024-03-26 RX ADMIN — FLUTICASONE PROPIONATE 2 SPRAY: 50 SPRAY, METERED NASAL at 08:08

## 2024-03-26 RX ADMIN — CLOPIDOGREL BISULFATE 75 MG: 75 TABLET ORAL at 08:10

## 2024-03-26 RX ADMIN — POLYETHYLENE GLYCOL 3350 17 G: 17 POWDER, FOR SOLUTION ORAL at 08:08

## 2024-03-26 RX ADMIN — DONEPEZIL HYDROCHLORIDE 5 MG: 5 TABLET, FILM COATED ORAL at 20:31

## 2024-03-26 RX ADMIN — ASPIRIN 81 MG 81 MG: 81 TABLET ORAL at 08:11

## 2024-03-26 RX ADMIN — OLANZAPINE 7.5 MG: 7.5 TABLET, FILM COATED ORAL at 20:31

## 2024-03-26 NOTE — PLAN OF CARE
Pt is eating meals and taking meds without staff encouragement. Pt paranoia has significantly diminished as Pt is not making bizarre statements. Pt is able to engage in short linear, logical conversation.  Pt denies pain/discomfort. Pt denied thoughts of SI/HI/self-harm and agreed to seek out staff should thoughts of SI/HI/self-harm arise. Safe environment maintained. Q15 minute checks for safety continued per unit policy. Will continue to monitor for safety and provide support and reassurance as needed.  Pt remains free of falls and verbalizes understanding of individual fall risks.  Pt wearing non skid footwear and encouraged to seek out staff for any assistance needed.    Problem: Psychosis  Goal: Will report no hallucinations or delusions  Description: INTERVENTIONS:  1. Administer medication as  ordered  2. Assist with reality testing to support increasing orientation  3. Assess if patient's hallucinations or delusions are encouraging self harm or harm to others and intervene as appropriate  3/26/2024 1020 by Parvin Coffman LPN  Outcome: Progressing     Problem: Behavior  Goal: Pt/Family maintain appropriate behavior and adhere to behavioral management agreement, if implemented  Description: INTERVENTIONS:  1. Assess patient/family's coping skills and  non-compliant behavior (including use of illegal substances)  2. Notify security of behavior or suspected illegal substances which indicate the need for search of the family and/or belongings  3. Encourage verbalization of thoughts and concerns in a socially appropriate manner  4. Utilize positive, consistent limit setting strategies supporting safety of patient, staff and others  5. Encourage participation in the decision making process about the behavioral management agreement  6. If a visitor's behavior poses a threat to safety call refer to organization policy.  7. Initiate consult with , Psychosocial CNS, Spiritual Care as  appropriate  3/26/2024 1020 by Parvin Coffman LPN  Outcome: Progressing     Problem: Safety - Adult  Goal: Free from fall injury  Outcome: Progressing     Problem: Nutrition Deficit:  Goal: Optimize nutritional status  Outcome: Progressing     Problem: Pain  Goal: Verbalizes/displays adequate comfort level or baseline comfort level  Outcome: Progressing

## 2024-03-26 NOTE — GROUP NOTE
Group Therapy Note    Date: 3/26/2024    Group Start Time: 0903  Group End Time: 0918  Group Topic: Daily Inventory Group    [facility]    Parvin Coffman LPN        Group Therapy Note    Attendees: 5/16   Patient did not participate in Community Meeting /Goals group, despite staff encouragement and explanation of benefits.  Patient remain seclusive to self.  Q15 minute safety checks maintained for patient safety and will continue to encourage patient to attend unit programming.        Discipline Responsible: Licensed Practical Nurse      Signature:  Parvin Coffman LPN

## 2024-03-26 NOTE — GROUP NOTE
Group Therapy Note    Date: 3/26/2024    Group Start Time: 1330  Group End Time: 1410  Group Topic: Psychoeducation    Emelia Mueller CTRS        Group Therapy Note    Attendees: 7/16    Psych-Ed/Relapse Prevention Group Note        Date: March 26, 2024            Start Time: 1:30pm  End Time: 2:10pm      Number of Participants in Group & Unit Census:  7/16    Topic:  interpersonal skills, coping skills, self-expression    Goal of Group: To improve interpersonal skills and coping skills awareness through collaborating with peers and demonstrating self-expression.      Comments:     Patient did not participate in Psych-Ed/Relapse Prevention group, despite staff encouragement and explanation of benefits.  Patient remain seclusive to self.  Q15 minute safety checks maintained for patient safety and will continue to encourage patient to attend unit programming.      Signature:  MAURY Campbell

## 2024-03-26 NOTE — GROUP NOTE
Group Therapy Note    Date: 3/26/2024    Group Start Time: 1100  Group End Time: 1145  Group Topic: Cognitive Skills    Emelia Mueller CTRS        Group Therapy Note    Attendees: 6/16    Cognitive Skills Group Note        Date: March 26, 2024             Start Time: 11am  End Time: 11:45am      Number of Participants in Group & Unit Census:  6/16    Topic:  interpersonal skills, memory recall, self-expression    Goal of Group: To improve interpersonal skills and memory recall through collaborating with peers and demonstrating self-expression      Comments:     Patient did not participate in Cognitive Skills group, despite staff encouragement and explanation of benefits.  Patient remain seclusive to self.  Q15 minute safety checks maintained for patient safety and will continue to encourage patient to attend unit programming.        Signature:  MAURY Campbell

## 2024-03-26 NOTE — PLAN OF CARE
Problem: Psychosis  Goal: Will report no hallucinations or delusions  Description: INTERVENTIONS:  1. Administer medication as  ordered  2. Assist with reality testing to support increasing orientation  3. Assess if patient's hallucinations or delusions are encouraging self harm or harm to others and intervene as appropriate  3/25/2024 2047 by Amber Aguilar RN  Outcome: Progressing     Problem: Behavior  Goal: Pt/Family maintain appropriate behavior and adhere to behavioral management agreement, if implemented  Description: INTERVENTIONS:  1. Assess patient/family's coping skills and  non-compliant behavior (including use of illegal substances)  2. Notify security of behavior or suspected illegal substances which indicate the need for search of the family and/or belongings  3. Encourage verbalization of thoughts and concerns in a socially appropriate manner  4. Utilize positive, consistent limit setting strategies supporting safety of patient, staff and others  5. Encourage participation in the decision making process about the behavioral management agreement  6. If a visitor's behavior poses a threat to safety call refer to organization policy.  7. Initiate consult with , Psychosocial CNS, Spiritual Care as appropriate  3/25/2024 2047 by Amber Aguilar RN  Outcome: Progressing  Note: Patient is out in day room pacing unit aloof of peers. He participated in snack time and took medications in applesauce. He is agreeable to take scheduled medication. He denies depression and suicidal ideation. Staff maintains Q 15 minute safety checks.

## 2024-03-26 NOTE — CARE COORDINATION
Social work spoke with Shital at [facility]. The director of nursing is expressing concern regarding patients behaviors after ECT. They are questioning if the patient is going to get ECT after being discharged. Shital will speak with the DoN in the morning and call social work with an answer.

## 2024-03-26 NOTE — GROUP NOTE
Group Therapy Note    Date: 3/26/2024    Group Start Time: 1000  Group End Time: 1045  Group Topic: Psychotherapy    [facility] Jannette Waller MSW        Group Therapy Note    Attendees: 10/16       Patient's Goal:  Learn how our thoughts, feelings, and actions are connected and apply it to personal scenarios    Patient was offered group therapy today but declined to participate despite encouragement from staff.  1:1 was offered.      Discipline Responsible: /Counselor      Signature:  TANO Hernández

## 2024-03-26 NOTE — CARE COORDINATION
Social work spoke with Yady from the [facility] of  who is asking for a referral to be sent to [facility]. Social work will contact [facility] regarding referral.

## 2024-03-26 NOTE — BH NOTE
Pt eating breakfast without staff encouragement. Pt eats over 75% of breakfast tray. Pt refuses Ensure supplement.  Takes scheduled medications without encouragement.   Pt denies further needs.

## 2024-03-26 NOTE — CARE COORDINATION
No beds available at [facility] per admission.     Writer spoke with Dayana ( covering for admissions) at [facility] have male bed available, asked for referral to be sent, writer faxed to to [phone].

## 2024-03-26 NOTE — PROGRESS NOTES
[facility]   INPATIENT OCCUPATIONAL THERAPY  PROGRESS NOTE  Date: 3/26/2024  Patient Name: William Babcock       Room: 0205/0205-01  MRN: [id number]    : 1960  (64 y.o.)  Gender: male   Referring Practitioner: Donnie Rosa MD  Diagnosis: Acute psychosis      Discharge Recommendations:  Further Occupational Therapy is recommended upon facility discharge.      OT Equipment Recommendations  Other: TBD    Restrictions/Precautions  Restrictions/Precautions  Restrictions/Precautions: Fall Risk;General Precautions    O2 Device: None (Room air)    Subjective  Subjective  Subjective: \"I already brushed my teeth 5 minutes ago\" Pt agreeable to engage in OT session  Subjective  Pain: Pt did not comment on pain  Comments: Ok per nursing for OT session    Objective  Orientation  Overall Orientation Status: Impaired  Cognition  Overall Cognitive Status: Exceptions  Arousal/Alertness: Delayed responses to stimuli  Following Commands: Inconsistently follows commands;Follows one step commands with increased time;Follows one step commands with repetition  Attention Span: Difficulty attending to directions;Difficulty dividing attention;Attends with cues to redirect  Safety Judgement: Decreased awareness of need for assistance  Problem Solving: Assistance required to generate solutions;Assistance required to implement solutions;Assistance required to identify errors made;Assistance required to correct errors made;Decreased awareness of errors  Insights: Decreased awareness of deficits  Initiation: Requires cues for some  Sequencing: Requires cues for some  Cognition Comment: Pt continues to demonstrate difficulty attending to directions and short attention span with tasks but easily redirected this date with 1-2 verbal cues.    Activities of Daily Living  ADL  Feeding: Supervision  Feeding Skilled Clinical Factors: per nursing report  Grooming: Setup  Grooming Skilled Clinical Factors: OTR facilitated  (Goal updated 3/26/2024 by RICK Duron/L)  Short Term Goal 3: Pt will follow simple 1-2 step commands during self care tasks with no more than 3 verbal cues to increase participation and safety with daily activities  Short Term Goal 4: Pt will participate in self care routine 2-3 times a week with no more than 3 verbal cues to initiate task  Short Term Goal 5: Pt will participate in 15+ minutes of therapeutic exercises/functional activities/social participation to increase safety and independence with daily activities and improve overall quality of life  Additional Goals?: Yes  Short Term Goal 6: OT to complete Robby when appropriate  Occupational Therapy Plan  Times Per Week: 2-3  Current Treatment Recommendations: Self-Care / ADL, Strengthening, ROM, Balance training, Functional mobility training, Endurance training, Cognitive reorientation, Pain management, Safety education & training, Patient/Caregiver education & training, Home management training, Cognitive/Perceptual training    Assessment  Activity Tolerance  Activity Tolerance: Treatment limited secondary to decreased cognition  Assessment  Performance deficits / Impairments: Decreased ADL status, Decreased functional mobility , Decreased ROM, Decreased strength, Decreased safe awareness, Decreased cognition, Decreased endurance, Decreased balance, Decreased high-level IADLs, Decreased fine motor control, Decreased coordination  Treatment Diagnosis: Impaired self care status  Prognosis: Fair  Decision Making: Low Complexity  Discharge Recommendations: 24 hour supervision or assist, Continue to assess pending progress  OT Equipment Recommendations  Other: TBD  Safety Devices  Type of Devices: All fall risk precautions in place, Patient at risk for falls, Nurse notified, Left in chair    AM-PAC Daily Activities Inpatient  AM-PAC Daily Activity - Inpatient   How much help is needed for putting on and taking off regular lower body clothing?: A Little  How

## 2024-03-27 ENCOUNTER — ANESTHESIA (OUTPATIENT)
Dept: POSTOP/PACU | Age: 64
End: 2024-03-27
Payer: MEDICARE

## 2024-03-27 ENCOUNTER — APPOINTMENT (OUTPATIENT)
Dept: POSTOP/PACU | Age: 64
DRG: 885 | End: 2024-03-27
Payer: MEDICARE

## 2024-03-27 ENCOUNTER — ANESTHESIA EVENT (OUTPATIENT)
Dept: POSTOP/PACU | Age: 64
End: 2024-03-27
Payer: MEDICARE

## 2024-03-27 DIAGNOSIS — F33.3 SEVERE RECURRENT MAJOR DEPRESSION WITH PSYCHOTIC FEATURES (HCC): Primary | ICD-10-CM

## 2024-03-27 LAB — GLUCOSE BLD-MCNC: 83 MG/DL (ref 75–110)

## 2024-03-27 PROCEDURE — 3700000000 HC ANESTHESIA ATTENDED CARE: Performed by: ANESTHESIOLOGY

## 2024-03-27 PROCEDURE — 1240000000 HC EMOTIONAL WELLNESS R&B

## 2024-03-27 PROCEDURE — 2500000003 HC RX 250 WO HCPCS

## 2024-03-27 PROCEDURE — 82947 ASSAY GLUCOSE BLOOD QUANT: CPT

## 2024-03-27 PROCEDURE — 90870 ELECTROCONVULSIVE THERAPY: CPT | Performed by: PSYCHIATRY & NEUROLOGY

## 2024-03-27 PROCEDURE — 7100000000 HC PACU RECOVERY - FIRST 15 MIN: Performed by: ANESTHESIOLOGY

## 2024-03-27 PROCEDURE — 6360000002 HC RX W HCPCS

## 2024-03-27 PROCEDURE — 6370000000 HC RX 637 (ALT 250 FOR IP): Performed by: PSYCHIATRY & NEUROLOGY

## 2024-03-27 PROCEDURE — 6370000000 HC RX 637 (ALT 250 FOR IP): Performed by: NURSE PRACTITIONER

## 2024-03-27 PROCEDURE — APPSS30 APP SPLIT SHARED TIME 16-30 MINUTES: Performed by: NURSE PRACTITIONER

## 2024-03-27 PROCEDURE — 2580000003 HC RX 258: Performed by: ANESTHESIOLOGY

## 2024-03-27 PROCEDURE — 99232 SBSQ HOSP IP/OBS MODERATE 35: CPT | Performed by: PSYCHIATRY & NEUROLOGY

## 2024-03-27 PROCEDURE — 6370000000 HC RX 637 (ALT 250 FOR IP): Performed by: INTERNAL MEDICINE

## 2024-03-27 PROCEDURE — 3700000001 HC ADD 15 MINUTES (ANESTHESIA): Performed by: ANESTHESIOLOGY

## 2024-03-27 PROCEDURE — 7100000001 HC PACU RECOVERY - ADDTL 15 MIN: Performed by: ANESTHESIOLOGY

## 2024-03-27 PROCEDURE — 90870 ELECTROCONVULSIVE THERAPY: CPT

## 2024-03-27 RX ORDER — SUCCINYLCHOLINE/SOD CL,ISO/PF 200MG/10ML
SYRINGE (ML) INTRAVENOUS
Status: COMPLETED
Start: 2024-03-27 | End: 2024-03-27

## 2024-03-27 RX ORDER — SODIUM CHLORIDE 0.9 % (FLUSH) 0.9 %
5-40 SYRINGE (ML) INJECTION EVERY 12 HOURS SCHEDULED
Status: DISCONTINUED | OUTPATIENT
Start: 2024-03-27 | End: 2024-03-29 | Stop reason: HOSPADM

## 2024-03-27 RX ORDER — ETOMIDATE 2 MG/ML
INJECTION INTRAVENOUS
Status: COMPLETED
Start: 2024-03-27 | End: 2024-03-27

## 2024-03-27 RX ORDER — METOCLOPRAMIDE HYDROCHLORIDE 5 MG/ML
10 INJECTION INTRAMUSCULAR; INTRAVENOUS
Status: ACTIVE | OUTPATIENT
Start: 2024-03-27 | End: 2024-03-28

## 2024-03-27 RX ORDER — HALOPERIDOL 5 MG/ML
INJECTION INTRAMUSCULAR
Status: COMPLETED
Start: 2024-03-27 | End: 2024-03-27

## 2024-03-27 RX ORDER — DIPHENHYDRAMINE HYDROCHLORIDE 50 MG/ML
12.5 INJECTION INTRAMUSCULAR; INTRAVENOUS
Status: ACTIVE | OUTPATIENT
Start: 2024-03-27 | End: 2024-03-28

## 2024-03-27 RX ORDER — HYDRALAZINE HYDROCHLORIDE 20 MG/ML
10 INJECTION INTRAMUSCULAR; INTRAVENOUS
Status: DISCONTINUED | OUTPATIENT
Start: 2024-03-27 | End: 2024-03-29 | Stop reason: HOSPADM

## 2024-03-27 RX ORDER — LORAZEPAM 2 MG/ML
INJECTION INTRAMUSCULAR
Status: DISPENSED
Start: 2024-03-27 | End: 2024-03-27

## 2024-03-27 RX ORDER — SODIUM CHLORIDE 9 MG/ML
INJECTION, SOLUTION INTRAVENOUS PRN
Status: DISCONTINUED | OUTPATIENT
Start: 2024-03-27 | End: 2024-03-29 | Stop reason: HOSPADM

## 2024-03-27 RX ORDER — SODIUM CHLORIDE 0.9 % (FLUSH) 0.9 %
5-40 SYRINGE (ML) INJECTION PRN
Status: DISCONTINUED | OUTPATIENT
Start: 2024-03-27 | End: 2024-03-29 | Stop reason: HOSPADM

## 2024-03-27 RX ORDER — SUCCINYLCHOLINE/SOD CL,ISO/PF 200MG/10ML
SYRINGE (ML) INTRAVENOUS PRN
Status: DISCONTINUED | OUTPATIENT
Start: 2024-03-27 | End: 2024-03-27 | Stop reason: SDUPTHER

## 2024-03-27 RX ORDER — LORAZEPAM 2 MG/ML
INJECTION INTRAMUSCULAR PRN
Status: DISCONTINUED | OUTPATIENT
Start: 2024-03-27 | End: 2024-03-27 | Stop reason: SDUPTHER

## 2024-03-27 RX ORDER — HALOPERIDOL 5 MG/ML
INJECTION INTRAMUSCULAR PRN
Status: DISCONTINUED | OUTPATIENT
Start: 2024-03-27 | End: 2024-03-27 | Stop reason: SDUPTHER

## 2024-03-27 RX ORDER — ETOMIDATE 2 MG/ML
INJECTION INTRAVENOUS PRN
Status: DISCONTINUED | OUTPATIENT
Start: 2024-03-27 | End: 2024-03-27 | Stop reason: SDUPTHER

## 2024-03-27 RX ORDER — DROPERIDOL 2.5 MG/ML
0.62 INJECTION, SOLUTION INTRAMUSCULAR; INTRAVENOUS
Status: ACTIVE | OUTPATIENT
Start: 2024-03-27 | End: 2024-03-28

## 2024-03-27 RX ORDER — ATROPINE SULFATE 0.1 MG/ML
INJECTION INTRAVENOUS
Status: DISPENSED
Start: 2024-03-27 | End: 2024-03-27

## 2024-03-27 RX ORDER — MEPERIDINE HYDROCHLORIDE 25 MG/ML
12.5 INJECTION INTRAMUSCULAR; INTRAVENOUS; SUBCUTANEOUS EVERY 5 MIN PRN
Status: DISCONTINUED | OUTPATIENT
Start: 2024-03-27 | End: 2024-03-29 | Stop reason: HOSPADM

## 2024-03-27 RX ADMIN — DONEPEZIL HYDROCHLORIDE 5 MG: 5 TABLET, FILM COATED ORAL at 21:44

## 2024-03-27 RX ADMIN — SODIUM CHLORIDE, POTASSIUM CHLORIDE, SODIUM LACTATE AND CALCIUM CHLORIDE: 600; 310; 30; 20 INJECTION, SOLUTION INTRAVENOUS at 08:17

## 2024-03-27 RX ADMIN — FLUTICASONE PROPIONATE 2 SPRAY: 50 SPRAY, METERED NASAL at 10:46

## 2024-03-27 RX ADMIN — LORAZEPAM 1 MG: 2 INJECTION, SOLUTION INTRAMUSCULAR; INTRAVENOUS at 08:29

## 2024-03-27 RX ADMIN — CLOPIDOGREL BISULFATE 75 MG: 75 TABLET ORAL at 10:46

## 2024-03-27 RX ADMIN — ETOMIDATE INJECTION 20 MG: 2 SOLUTION INTRAVENOUS at 08:22

## 2024-03-27 RX ADMIN — HALOPERIDOL LACTATE 5 MG: 5 INJECTION, SOLUTION INTRAMUSCULAR at 08:27

## 2024-03-27 RX ADMIN — ACETAMINOPHEN 650 MG: 325 TABLET ORAL at 05:46

## 2024-03-27 RX ADMIN — OLANZAPINE 7.5 MG: 7.5 TABLET, FILM COATED ORAL at 21:44

## 2024-03-27 RX ADMIN — Medication 80 MG: at 08:23

## 2024-03-27 RX ADMIN — ASPIRIN 81 MG 81 MG: 81 TABLET ORAL at 10:46

## 2024-03-27 RX ADMIN — VENLAFAXINE HYDROCHLORIDE 37.5 MG: 75 CAPSULE, EXTENDED RELEASE ORAL at 10:46

## 2024-03-27 ASSESSMENT — PAIN - FUNCTIONAL ASSESSMENT
PAIN_FUNCTIONAL_ASSESSMENT: 0-10
PAIN_FUNCTIONAL_ASSESSMENT: CRITICAL CARE PAIN OBSERVATION TOOL (CPOT)

## 2024-03-27 ASSESSMENT — PAIN SCALES - GENERAL: PAINLEVEL_OUTOF10: 0

## 2024-03-27 NOTE — GROUP NOTE
Group Therapy Note    Date: 3/27/2024    Group Start Time: 1330  Group End Time: 1415  Group Topic: Recreational    [facility] Lina Cochran [facility]    Recreation Group Note        Date: March 27, 2024 Start Time: 1:30pm  End Time:  215pm      Number of Participants in Group & Unit Census:  9/16    Topic: recreation therapy group     Goal of Group: pt will demonstrate improved social skills and improved decision making skills       Comments:     Patient did not participate in Recreation group, despite staff encouragement and explanation of benefits.  Patient remain seclusive to self.  Q15 minute safety checks maintained for patient safety and will continue to encourage patient to attend unit programming.              Signature:  MAURY DECKER

## 2024-03-27 NOTE — GROUP NOTE
Group Therapy Note    Date: 3/27/2024    Group Start Time: 1055  Group End Time: 1130  Group Topic: Cognitive Skills    Lina Porras CTRS    Cognitive Skills Group Note        Date: March 27, 2024 Start Time:  1055am   End Time: 11:30am      Number of Participants in Group & Unit Census:  9/15    Topic: cognitive skills     Goal of Group: pt will demonstrate improved coping skills and improved decision making skills       Comments:     Patient did not participate in Cognitive Skills group, despite staff encouragement and explanation of benefits.  Patient remain seclusive to self.  Q15 minute safety checks maintained for patient safety and will continue to encourage patient to attend unit programming.            Signature:  MAURY DECKER

## 2024-03-27 NOTE — BH NOTE
Arrival from ECT to [facility] Unit:    Patient arrived to the unit Time: 0935 and Via Wheelchair. Patient is oriented to person, place, time/date, and situation. Patient is Calm and cooperative with assessments.    Physical and Mental assessment, Vitals x 2, and fall risk assessment completed and documented.     Post-op ECT checklist completed and placed in patients chart.

## 2024-03-27 NOTE — PLAN OF CARE
Pt denied thoughts of SI/HI/self-harm and agreed to seek out staff should thoughts of SI/HI/self-harm arise. Safe environment maintained. Q15 minute checks for safety continued per unit policy. Will continue to monitor for safety and provide support and reassurance as needed.  Pt is free of falls, pt is wearing non skid slippers, pt's room is clutter free.Pt is free of falls, pt is wearing non skid slippers, pt's room is clutter free. Pt is eating meals independently without staff encouragement.  Pt is cooperative and compliant with taking scheduled medication.  Problem: Psychosis  Goal: Will report no hallucinations or delusions  Description: INTERVENTIONS:  1. Administer medication as  ordered  2. Assist with reality testing to support increasing orientation  3. Assess if patient's hallucinations or delusions are encouraging self harm or harm to others and intervene as appropriate  3/27/2024 1254 by Parvin Coffman LPN  Outcome: Progressing     Problem: Behavior  Goal: Pt/Family maintain appropriate behavior and adhere to behavioral management agreement, if implemented  Description: INTERVENTIONS:  1. Assess patient/family's coping skills and  non-compliant behavior (including use of illegal substances)  2. Notify security of behavior or suspected illegal substances which indicate the need for search of the family and/or belongings  3. Encourage verbalization of thoughts and concerns in a socially appropriate manner  4. Utilize positive, consistent limit setting strategies supporting safety of patient, staff and others  5. Encourage participation in the decision making process about the behavioral management agreement  6. If a visitor's behavior poses a threat to safety call refer to organization policy.  7. Initiate consult with , Psychosocial CNS, Spiritual Care as appropriate  Outcome: Progressing     Problem: Nutrition Deficit:  Goal: Optimize nutritional status  Outcome: Progressing

## 2024-03-27 NOTE — PLAN OF CARE
Problem: Psychosis  Goal: Will report no hallucinations or delusions  Description: INTERVENTIONS:  1. Administer medication as  ordered  2. Assist with reality testing to support increasing orientation  3. Assess if patient's hallucinations or delusions are encouraging self harm or harm to others and intervene as appropriate  3/26/2024 2042 by Amber Aguilar, RN  Outcome: Progressing     Problem: Safety - Adult  Goal: Free from fall injury  3/26/2024 2042 by Amber Aguilar RN  Outcome: Progressing  Note: Patient is pleasant and cooperative with staff. He is agreeable to take scheduled medication. Patient spend most of shift pacing unit aloof of peers. He denies suicidal ideation and thoughts of self harm. He remains free from falls and injury during this shift. Staff maintains Q15 minute safety checks.

## 2024-03-27 NOTE — ANESTHESIA POSTPROCEDURE EVALUATION
Department of Anesthesiology  Postprocedure Note    Patient: William Babcock  MRN: [id number]  YOB: 1960  Date of evaluation: 3/27/2024    Procedure Summary       Date: 03/27/24 Room / Location: [facility] PACU    Anesthesia Start: 0819 Anesthesia Stop: 0838    Procedure: ECT W/ ANESTHESIA Diagnosis:     Scheduled Providers:  Responsible Provider: Aris Nichole MD    Anesthesia Type: general, TIVA ASA Status: 2            Anesthesia Type: No value filed.    Wanda Phase I: Wanda Score: 6    Wanda Phase II:      Anesthesia Post Evaluation    Patient location during evaluation: PACU  Patient participation: complete - patient participated  Level of consciousness: awake and alert  Airway patency: patent  Nausea & Vomiting: no vomiting  Cardiovascular status: hemodynamically stable  Respiratory status: acceptable  Hydration status: euvolemic  Comments: POST- ANESTHESIA EVALUATION       Pt Name: William Babcock  MRN: [id number]  YOB: 1960  Date of evaluation: 3/27/2024  Time:  9:22 AM      /68   Pulse 66   Temp 98.4 °F (36.9 °C) (Infrared)   Resp 13   Ht 1.727 m (5' 8\")   Wt 60.3 kg (133 lb)   SpO2 95%   BMI 20.22 kg/m²      Consciousness Level  Awake  Cardiopulmonary Status  Stable  Pain Adequately Treated YES  Nausea / Vomiting  NO  Adequate Hydration  YES  Anesthesia Related Complications NONE      Electronically signed by Aris Nichole MD on 3/27/2024 at 9:22 AM         Pain management: satisfactory to patient    No notable events documented.

## 2024-03-27 NOTE — BH NOTE
Pt brother, Mirna FRAGA  would like to be notified  when  Henri Siddiqui comes to [facility] for onsite visit.

## 2024-03-27 NOTE — CARE COORDINATION
Social work received message from Shital at [facility] asking to do an onsite visit. Social work returned Shital's call at [phone], leaving a message inviting them to do an onsite at anytime.

## 2024-03-27 NOTE — BH NOTE
FLAKO paperwork   from  [place]  [employer] placed in front of Pt chart.

## 2024-03-27 NOTE — PROGRESS NOTES
[facility]   OCCUPATIONAL THERAPY MISSED TREATMENT NOTE   [facility]  Date: 3/27/24  Patient Name: William Babcock       Room: 0205/0205-01  MRN: [id number]   Account #: [id number]    : 1960  (64 y.o.)  Gender: male   Referring Practitioner: Donnie Rosa MD  Diagnosis: Acute psychosis             REASON FOR MISSED TREATMENT:  Hold treatment per nursing request   -    Per nursing pt just returned from ECT. Inappropriate for therapy at this time. OT will try back as time permits.        Electronically signed by DIANNE Mckeon on 3/27/24 at 10:11 AM EDT

## 2024-03-27 NOTE — PROGRESS NOTES
Daily Progress Note  Donnie Rosa MD  3/26/2024  CHIEF COMPLAINT: Depression    Reviewed patient's current plan of care and vital signs with nursing staff.  Sleep:  several hours last night  Attending groups: No:     SUBJECTIVE:    Overall patient doing better.  I did see there was some concerns expressed from ECF regarding behaviors after ECT.  Of note the patient's behaviors have generally been well maintained with the use of medication to assist with post ECT agitation.  We did try on Monday due to not using his medications and patient's agitation did return after ECT but it is brief and usually resolves after he returns to the unit.  This is a transient affect not something that occurs even after he returns to the unit.  We will continue to resume usage of post ECT medications to treat patient has done well.  He is denying suicidal ideation.  Reporting improvement in mood and appetite he is agreeable for ECF placement    Mental Status Exam  Level of consciousness:  Within normal limits  Appearance: Hospital attire, seated in chair, with good grooming and hygiene   Behavior/Motor: No abnormalities noted  Attitude toward examiner:  Cooperative, attentive, good eye contact  Speech:  spontaneous, normal rate, normal volume and well articulated  Mood: \"Pretty good\"  Affect: Fair  Thought processes:  linear, goal directed and coherent  Thought content:  denies homicidal ideation  Suicidal Ideation: Denies suicidal ideation  Delusions:  no evidence of delusions  Perceptual Disturbance:  denies any perceptual disturbance  Cognition:  Oriented to self, location, time, and situation  Memory: age appropriate  Insight & Judgement: improving  Medication side effects:  denies       Data   height is 1.727 m (5' 8\") and weight is 60.3 kg (133 lb). His temporal temperature is 97.7 °F (36.5 °C). His blood pressure is 97/61 and his pulse is 80. His respiration is 14 and oxygen saturation is 99%.   Labs:   No results displayed  EDT    **This report has been created using voice recognition software. It may contain minor errors which are inherent in voice recognition technology.**

## 2024-03-27 NOTE — ANESTHESIA PRE PROCEDURE
Altered mental status R41.82   • Severe recurrent major depression with psychotic features (HCC) F33.3       Past Medical History:        Diagnosis Date   • Anxiety    • Autism        Past Surgical History:        Procedure Laterality Date   • CEREBRAL ANGIOGRAM  02/09/2024    IR ANGIOGRAM CAROTID CEREBRAL BILATERAL   • EYE SURGERY     • OTHER SURGICAL HISTORY      ECT       Social History:    Social History     Tobacco Use   • Smoking status: Never   • Smokeless tobacco: Never   Substance Use Topics   • Alcohol use: No                                Counseling given: Not Answered      Vital Signs (Current):   Vitals:    03/25/24 1931 03/26/24 0800 03/26/24 2000 03/27/24 0546   BP: 102/64 (!) 121/53 97/61 107/71   Pulse: 62 63 80 61   Resp: 14 14 14 15   Temp: 97.7 °F (36.5 °C) 97.7 °F (36.5 °C) 97.7 °F (36.5 °C) 98 °F (36.7 °C)   TempSrc:  Temporal Temporal Temporal   SpO2:   99% 96%   Weight:       Height:                                                  BP Readings from Last 3 Encounters:   03/27/24 107/71   02/21/24 119/79   02/16/24 125/88       NPO Status: Time of last liquid consumption: 2200                        Time of last solid consumption: 2200                        Date of last liquid consumption: 03/26/24                        Date of last solid food consumption: 03/26/24    BMI:   Wt Readings from Last 3 Encounters:   03/11/24 60.3 kg (133 lb)   02/21/24 71.7 kg (158 lb)   02/08/24 71.7 kg (158 lb)     Body mass index is 20.22 kg/m².    CBC:   Lab Results   Component Value Date/Time    WBC 3.6 03/17/2024 09:03 AM    RBC 4.44 03/17/2024 09:03 AM    HGB 13.6 03/17/2024 09:03 AM    HCT 40.2 03/17/2024 09:03 AM    MCV 90.5 03/17/2024 09:03 AM    RDW 15.0 03/17/2024 09:03 AM     03/17/2024 09:03 AM       CMP:   Lab Results   Component Value Date/Time     03/17/2024 09:03 AM    K 4.1 03/17/2024 09:03 AM     03/17/2024 09:03 AM    CO2 26 03/17/2024 09:03 AM    BUN 13 03/17/2024 09:03

## 2024-03-27 NOTE — GROUP NOTE
Group Therapy Note    Date: 3/27/2024    Group Start Time: 1000  Group End Time: 1045  Group Topic: Psychotherapy    [facility] Jannette Waller MSW        Group Therapy Note    Attendees: 7/15       Patient's Goal:  Learn the 5 steps to problem solving and apply them to several scenarios     Patient was offered group therapy today but declined to participate despite encouragement from staff.  1:1 was offered.      Discipline Responsible: /Counselor      Signature:  TANO Hernández

## 2024-03-27 NOTE — PROCEDURES
Bilateral ECT Procedure Report  William Babcock   3/27/2024  1960         Attending:Donnie Rosa MD  Preprocedure diagnosis: Major depressive disorder, recurrent, severe with psychotic symptoms (F33.3)  Postprocedure diagnosis: SAME AS PRE-PROCEDURE DIAGNOSIS  Treatment Number: This is treatment # 7   Patient Status: BEHAVIOR IP   Type of ECT: Bilateral Brief Pulse  Medications  Preprocedure: Tylenol 650mg  Anesthetic: Methohexital 70 mg  Paralytic: Succinylcholine 70 mg  Post procedure: none needed   Cuff placement: Left Lower Extremity    MECTA Settings 1st Stimulus:     Pulse width: 1.0 ms   Frequency:  60 hz   Duration:   3.0 seconds   Static Impedance: 262 ohms             Dynamic Impedance: 135 ohms             Motor Seizure Duration: 21seconds  EEG Seizure Duration: 38 seconds             The patient's ECT treatment was performed using MECTA machine  .  Timeout:  Time out was performed using two patient identifiers and confirmation of procedure.     Patient Preparation: Preprocedure documentation, labs, H&P were all verified and reviewed.  The patient was placed in supine position.  EEG leads were placed for monitoring of seizure.   [place] areas bilaterally cleaned and prepped.  Conductive gel  was applied over stimulus electrode site.   The patient was pre-oxygenated.       Procedure in detail: Medications were administered by anesthesia using intravenous access at the doses listed previously in this summary.  Anesthetic administered and once confirmation the patient is anesthetized, the patient's blood pressure cuff on lower extremity was inflated to 100mmHg in excess of patient's blood pressure.  At this time, the neuromuscular blockade was administered intravenously by anesthesia.  Upper extremity fasciculation were verified followed by lower extremity fasciculation.  Once fasciculations terminated, confirmation of affective neuromuscular blockade demonstrated by absence of withdrawal reflex.  Bite

## 2024-03-28 ENCOUNTER — TELEPHONE (OUTPATIENT)
Dept: NEUROLOGY | Age: 64
End: 2024-03-28

## 2024-03-28 PROCEDURE — 6370000000 HC RX 637 (ALT 250 FOR IP): Performed by: INTERNAL MEDICINE

## 2024-03-28 PROCEDURE — 90833 PSYTX W PT W E/M 30 MIN: CPT | Performed by: PSYCHIATRY & NEUROLOGY

## 2024-03-28 PROCEDURE — 6370000000 HC RX 637 (ALT 250 FOR IP): Performed by: NURSE PRACTITIONER

## 2024-03-28 PROCEDURE — APPSS30 APP SPLIT SHARED TIME 16-30 MINUTES: Performed by: NURSE PRACTITIONER

## 2024-03-28 PROCEDURE — 97530 THERAPEUTIC ACTIVITIES: CPT

## 2024-03-28 PROCEDURE — 99232 SBSQ HOSP IP/OBS MODERATE 35: CPT | Performed by: PSYCHIATRY & NEUROLOGY

## 2024-03-28 PROCEDURE — 1240000000 HC EMOTIONAL WELLNESS R&B

## 2024-03-28 PROCEDURE — 6370000000 HC RX 637 (ALT 250 FOR IP): Performed by: PSYCHIATRY & NEUROLOGY

## 2024-03-28 PROCEDURE — 99231 SBSQ HOSP IP/OBS SF/LOW 25: CPT | Performed by: INTERNAL MEDICINE

## 2024-03-28 RX ADMIN — DONEPEZIL HYDROCHLORIDE 5 MG: 5 TABLET, FILM COATED ORAL at 21:18

## 2024-03-28 RX ADMIN — OLANZAPINE 7.5 MG: 7.5 TABLET, FILM COATED ORAL at 21:18

## 2024-03-28 RX ADMIN — TRAZODONE HYDROCHLORIDE 50 MG: 50 TABLET ORAL at 21:18

## 2024-03-28 RX ADMIN — POLYETHYLENE GLYCOL 3350 17 G: 17 POWDER, FOR SOLUTION ORAL at 08:18

## 2024-03-28 RX ADMIN — VENLAFAXINE HYDROCHLORIDE 37.5 MG: 75 CAPSULE, EXTENDED RELEASE ORAL at 08:18

## 2024-03-28 RX ADMIN — FLUTICASONE PROPIONATE 2 SPRAY: 50 SPRAY, METERED NASAL at 08:18

## 2024-03-28 RX ADMIN — ASPIRIN 81 MG 81 MG: 81 TABLET ORAL at 08:18

## 2024-03-28 RX ADMIN — CLOPIDOGREL BISULFATE 75 MG: 75 TABLET ORAL at 08:18

## 2024-03-28 NOTE — GROUP NOTE
Group Therapy Note    Date: 3/28/2024    Group Start Time: 1100  Group End Time: 1135  Group Topic: Cognitive Skills    Lina Porras CTRS    Cognitive Skills Group Note        Date: March 28, 2024 Start Time: 11am  End Time:  1135am      Number of Participants in Group & Unit Census:  9/16    Topic: cognitive skills     Goal of Group: pt will demonstrate improved coping skills and improved social skills       Comments:     Patient did not participate in Cognitive Skills group, despite staff encouragement and explanation of benefits.  Patient remain seclusive to self.  Q15 minute safety checks maintained for patient safety and will continue to encourage patient to attend unit programming.              Signature:  MAURY DECKER

## 2024-03-28 NOTE — PROGRESS NOTES
Behavioral Services                                              Medicare Re-Certification    I certify that the inpatient psychiatric hospital services furnished since the previous certification/re-certification were, and continue to be, medically necessary for;    [x] (1) Treatment which could reasonably be expected to improve the patient's condition,    [x] (2) Or for diagnostic study.    Estimated length of stay/service 2 to 5 days    Plan for post-hospital care ECF    This patient continues to need, on a daily basis, active treatment furnished directly by or requiring the supervision of inpatient psychiatric personnel.    Electronically signed by ZAINA TODD MD on 3/27/2024 at 8:14 PM

## 2024-03-28 NOTE — PLAN OF CARE
Problem: Psychosis  Goal: Will report no hallucinations or delusions  Description: INTERVENTIONS:  1. Administer medication as  ordered  2. Assist with reality testing to support increasing orientation  3. Assess if patient's hallucinations or delusions are encouraging self harm or harm to others and intervene as appropriate  Outcome: Progressing     Problem: Behavior  Goal: Pt/Family maintain appropriate behavior and adhere to behavioral management agreement, if implemented  Description: INTERVENTIONS:  1. Assess patient/family's coping skills and  non-compliant behavior (including use of illegal substances)  2. Notify security of behavior or suspected illegal substances which indicate the need for search of the family and/or belongings  3. Encourage verbalization of thoughts and concerns in a socially appropriate manner  4. Utilize positive, consistent limit setting strategies supporting safety of patient, staff and others  5. Encourage participation in the decision making process about the behavioral management agreement  6. If a visitor's behavior poses a threat to safety call refer to organization policy.  7. Initiate consult with , Psychosocial CNS, Spiritual Care as appropriate  Outcome: Progressing

## 2024-03-28 NOTE — PROGRESS NOTES
Occupational Therapy  [facility]   INPATIENT OCCUPATIONAL THERAPY  PROGRESS NOTE  Date: 3/28/2024  Patient Name: William Babcock       Room: 0205/0205-01  MRN: [id number]    : 1960  (64 y.o.)  Gender: male   Referring Practitioner: Donnie Rosa MD  Diagnosis: Acute psychosis      Discharge Recommendations:  Further Occupational Therapy is recommended upon facility discharge.    OT Equipment Recommendations  Other: TBD    Restrictions/Precautions  Restrictions/Precautions  Restrictions/Precautions: Fall Risk;General Precautions  Required Braces or Orthoses?: No    O2 Device: None (Room air)    Subjective  Subjective  Subjective: \"I was a \" pt states when writer asks about previous occupation.  Subjective  Pain: Pt did not comment on pain  Comments: Ok per nursing for OT session    Objective  Cognition  Overall Cognitive Status: Exceptions  Arousal/Alertness: Delayed responses to stimuli  Following Commands: Inconsistently follows commands;Follows one step commands with increased time;Follows one step commands with repetition  Attention Span: Difficulty attending to directions;Difficulty dividing attention;Attends with cues to redirect  Safety Judgement: Decreased awareness of need for assistance  Problem Solving: Assistance required to generate solutions;Assistance required to implement solutions;Assistance required to identify errors made;Assistance required to correct errors made;Decreased awareness of errors  Insights: Decreased awareness of deficits  Initiation: Requires cues for some  Sequencing: Requires cues for some  Cognition Comment: Pt continues to demonstrate difficulty attending to directions and short attention span with tasks but easily redirected this date with 1-2 verbal cues.    Activities of Daily Living  ADL  Feeding: Supervision  Feeding Skilled Clinical Factors: per nursing report  Grooming: Stand by assistance  Grooming Skilled Clinical Factors:  Self-Care / ADL, Strengthening, ROM, Balance training, Functional mobility training, Endurance training, Cognitive reorientation, Pain management, Safety education & training, Patient/Caregiver education & training, Home management training, Cognitive/Perceptual training    Assessment  Activity Tolerance  Activity Tolerance: Treatment limited secondary to decreased cognition  Assessment  Performance deficits / Impairments: Decreased ADL status, Decreased functional mobility , Decreased ROM, Decreased strength, Decreased safe awareness, Decreased cognition, Decreased endurance, Decreased balance, Decreased high-level IADLs, Decreased fine motor control, Decreased coordination  Treatment Diagnosis: Impaired self care status  Prognosis: Fair  Decision Making: Low Complexity  Discharge Recommendations: 24 hour supervision or assist, Continue to assess pending progress  OT Equipment Recommendations  Other: TBD  Safety Devices  Type of Devices: Nurse notified (left pt in common area, nursing aware)    AM-PAC Daily Activities Inpatient  AM-PAC Daily Activity - Inpatient   How much help is needed for putting on and taking off regular lower body clothing?: A Little  How much help is needed for bathing (which includes washing, rinsing, drying)?: A Little  How much help is needed for toileting (which includes using toilet, bedpan, or urinal)?: A Little  How much help is needed for putting on and taking off regular upper body clothing?: A Little  How much help is needed for taking care of personal grooming?: A Little  How much help for eating meals?: A Little  AM-PAC Inpatient Daily Activity Raw Score: 18  AM-PAC Inpatient ADL T-Scale Score : 38.66  ADL Inpatient CMS 0-100% Score: 46.65  ADL Inpatient CMS G-Code Modifier : CK    OT Minutes  OT Individual Minutes  Time In: 0950  Time Out: 1014  Minutes: 24  Time Code Minutes   Timed Code Treatment Minutes: 24 Minutes      Electronically signed by CHLOÉ Garcia on 3/28/24

## 2024-03-28 NOTE — CARE COORDINATION
Mr. Babcock has been approved for admission to [facility], admission tomorrow, pharmacy is [facility] in [place] MD updated.

## 2024-03-28 NOTE — PLAN OF CARE
Problem: Psychosis  Goal: Will report no hallucinations or delusions  Description: INTERVENTIONS:  1. Administer medication as  ordered  2. Assist with reality testing to support increasing orientation  3. Assess if patient's hallucinations or delusions are encouraging self harm or harm to others and intervene as appropriate  3/27/2024 2213 by Dilcia Chaudhari RN  Outcome: Progressing     Problem: Behavior  Goal: Pt/Family maintain appropriate behavior and adhere to behavioral management agreement, if implemented  Description: INTERVENTIONS:  1. Assess patient/family's coping skills and  non-compliant behavior (including use of illegal substances)  2. Notify security of behavior or suspected illegal substances which indicate the need for search of the family and/or belongings  3. Encourage verbalization of thoughts and concerns in a socially appropriate manner  4. Utilize positive, consistent limit setting strategies supporting safety of patient, staff and others  5. Encourage participation in the decision making process about the behavioral management agreement  6. If a visitor's behavior poses a threat to safety call refer to organization policy.  7. Initiate consult with , Psychosocial CNS, Spiritual Care as appropriate  3/27/2024 2213 by Dilcia Chaudhari RN  Outcome: Progressing     Problem: Safety - Adult  Goal: Free from fall injury  3/27/2024 2213 by Dilcia Chaudhari RN  Outcome: Progressing     Problem: Nutrition Deficit:  Goal: Optimize nutritional status  3/27/2024 2213 by Dilcia Chaudhari RN  Outcome: Progressing     Patient denies thoughts of self harm. Patient denies hallucinations in any form. Patient continues to be brightened. Patient remains in behavorial control and compliant with scheduled medications. Patients sleep is improving and tonight he paced the unit less than usual. Patient continues to eat his meals an snack without encouragement. Patient remains free

## 2024-03-28 NOTE — TELEPHONE ENCOUNTER
03 28 2024 I called the patient times 2 (03 21 2024 and 03 28 2024 at ) to schedule post hospital appointment with one of our providers, both times I was unable to speak to anyone or leave a message, no response.  I mailed the patient a letter asking them to call the office back to schedule this appointment.  KS

## 2024-03-28 NOTE — GROUP NOTE
Group Therapy Note    Date: 3/28/2024    Group Start Time: 1330  Group End Time: 1400  Group Topic: Cognitive Skills    Lina Luis CTRS    Psych-Ed/Relapse Prevention Group Note        Date: March 28, 2024 Start Time: 1:30pm  End Time:  200pm      Number of Participants in Group & Unit Census:  4/14    Topic: psycheducation group     Goal of Group:pt will demonstrate improved communication skills and improved coping skills       Comments:     Patient did not participate in Psych-Ed/Relapse Prevention group, despite staff encouragement and explanation of benefits.  Patient remain seclusive to self.  Q15 minute safety checks maintained for patient safety and will continue to encourage patient to attend unit programming.            Signature:  MAURY DECKER

## 2024-03-28 NOTE — GROUP NOTE
Group Therapy Note    Date: 3/28/2024    Group Start Time: 1000  Group End Time: 1030  Group Topic: Psychotherapy     Jannette Waller MSW        Group Therapy Note    Attendees: 7/16       Patient was offered group therapy today but declined to participate despite encouragement from staff.  1:1 was offered.    Discipline Responsible: /Counselor      Signature:  TANO Hernández

## 2024-03-28 NOTE — CARE COORDINATION
Writer spoke with Shital from [facility] states she will do on-site to visit with patient today around 11am, unit staff updated.

## 2024-03-28 NOTE — BH NOTE
[facility]  Weekly Interdisciplinary Treatment Plan Note     Review Date & Time: 3/28/2024   1245    Admission Type:   Admission Type: Voluntary    Reason for admission:  Reason for Admission: Acute Psychosis Bizarre behavior, decline in ADLs, harrasing neighbors    Estimated Length of Stay :  5-7 days  Estimated Discharge Date Update: to be determined by physician    PATIENT STRENGTHS:  Patient Strengths:   Patient Strengths and Limitations:Limitations: Perceives need for assistance with self-care, Difficult relationships / poor social skills, Multiple barriers to leisure interests, Difficulty problem solving/relies on others to help solve problems, Tendency to isolate self, Limited education -> difficulty reading or writing, Unrealistic self-view, Demonstrates discomfort with /lack of social skills  Addictive Behavior:Addictive Behavior  In the Past 3 Months, Have You Felt or Has Someone Told You That You Have a Problem With  : None  Medical Problems:   Past Medical History:   Diagnosis Date    Anxiety     Autism        Risk:  Fall Risk   Houston Scale Houston Scale Score: 22  BVC      Change in scores no. Changes to plan of Care no    Status EXAM:   Mental Status and Behavioral Exam  Normal: Yes  Level of Assistance: Independent/Self  Facial Expression: Brightened  Affect: Appropriate  Level of Consciousness: Alert  Frequency of Checks: 4 times per hour, close  Mood:Normal: Yes  Mood: Anxious  Motor Activity:Normal: No  Motor Activity: Unusual posture/gait  Eye Contact: Fair  Observed Behavior: Cooperative, Friendly  Sexual Misconduct History: Current - no  Preception: [place] to person, [place] to time, [place] to place, [place] to situation  Attention:Normal: No  Attention: Unable to concentrate  Thought Processes: Unremarkable  Thought Content:Normal: No  Thought Content: Poverty of content  Depression Symptoms: Impaired concentration  Anxiety Symptoms: No problems reported or observed.  Isabel  Symptoms: No problems reported or observed.  Hallucinations: None  Delusions: No  Delusions: Paranoid  Memory:Normal: Yes  Memory: Poor recent  Insight and Judgment: No  Insight and Judgment: Poor judgment, Poor insight, Other (comment) (improving)    Daily Assessment Last Entry:   Daily Sleep (WDL): Within Defined Limits            Daily Nutrition (WDL): Within Defined Limits  Appetite Change: Increased  Barriers to Nutrition: Cognitive impairment  Level of Assistance: Independent/Self    Patient Monitoring:  Frequency of Checks: 4 times per hour, close    Psychiatric Symptoms:   Depression Symptoms  Depression Symptoms: Impaired concentration  Anxiety Symptoms  Anxiety Symptoms: No problems reported or observed.  Isabel Symptoms  Isabel Symptoms: No problems reported or observed.  Isabel Description: less need to sleep, poor judgement     Psychosis Symptoms  Hallucination Type: No problems reported or observed.  Delusion Type: Paranoid  Describe Delusion: paranoia of unit peers and staff    Suicide Risk CSSR-S:  1) Within the past month, have you wished you were dead or wished you could go to sleep and not wake up? : No  2) Have you actually had any thoughts of killing yourself? : No  6) Have you ever done anything, started to do anything, or prepared to do anything to end your life?: No  Change in Result No Change in Plan of care No    EDUCATION:   Learner Progress Toward Treatment Goals: Reviewed results and recommendations of this team, Reviewed group plan and strategies, Reviewed signs, symptoms and risk of self harm and violent behavior, Reviewed goals and plan of care    Method: individual education, small group, verbal education    Outcome: verbalized understanding, but needs reinforcement to obtain goals    PATIENT GOALS:   short term:Patient unable to attend.   Long term: Patient unable to attend.     PLAN/TREATMENT RECOMMENDATIONS UPDATE:   Continue with group therapies, education of coping skills

## 2024-03-28 NOTE — PROGRESS NOTES
Daily Progress Note  3/28/2024    Patient Name: William Babcock    CHIEF COMPLAINT: Severe recurrent major depression with psychotic features         SUBJECTIVE:    Patient seen face-to-face for follow-up assessment.  He is sitting in the day area and is coloring.  Pleasant with discussion.  Patient reports that he is doing well.  Patient feels that he is sleeping through the night and denies any problems with appetite.  He has been cooperative on the unit.  Staff deny any behavioral concerns.  He has been compliant with taking his medications.  Current psychotropic medications include Aricept 5 mg nightly, Zyprexa 7.5 mg nightly and venlafaxine 37.5 mg.  We reviewed side effects and patient denies all.  Tolerating regimen well.  Noted that he did have an onsite assessment from [facility] for potential placement.  Patient is stable for discharge pending acceptance to facility.    Appetite:  [] Adequate/Unchanged  [x] Increased  [] Decreased      Sleep:       [x] Adequate/Unchanged  [] Fair  [] Poor      Group Attendance on Unit:   [] Yes   [] Selectively    [x] No    Compliant with scheduled medications: [x] Yes  [] No    Received emergency medications in past 24 hrs: [] Yes   [x] No    Medication Side Effects: Denies         Mental Status Exam  Level of consciousness: Awake and alert  Appearance: Appropriate attire for setting, seated in chair, with fair  grooming and hygiene   Behavior/Motor: Calm  Attitude toward examiner: Cooperative, attentive, good eye contact  Speech: spontaneous, normal rate, normal volume and well articulated   Mood: Euthymic  Affect: Appropriate  Thought processes: Linear, coherent  Thought content: Denies homicidal ideation  Suicidal Ideation: Denies  Delusions: No evidence of delusions.   Perceptual Disturbance: No evidence of; not responding to internal stimuli, denies  Cognition: Oriented to Oriented to self, location, time, and situation   Memory: Age-appropriate  Insight: poor,  improving  Judgement: poor, improving    Data   height is 1.727 m (5' 8\") and weight is 60.3 kg (133 lb). His temporal temperature is 98.2 °F (36.8 °C). His blood pressure is 104/76 and his pulse is 62. His respiration is 14 and oxygen saturation is 100%.   Labs:   No results displayed because visit has over 200 results.            Reviewed patient's current plan of care and vital signs with nursing staff.    Labs reviewed: [x] Yes    Medications  Current Facility-Administered Medications: sodium chloride flush 0.9 % injection 5-40 mL, 5-40 mL, IntraVENous, 2 times per day  sodium chloride flush 0.9 % injection 5-40 mL, 5-40 mL, IntraVENous, PRN  0.9 % sodium chloride infusion, , IntraVENous, PRN  meperidine (DEMEROL) injection 12.5 mg, 12.5 mg, IntraVENous, Q5 Min PRN  HYDROmorphone (DILAUDID) injection 0.25 mg, 0.25 mg, IntraVENous, Q5 Min PRN  HYDROmorphone (DILAUDID) injection 0.5 mg, 0.5 mg, IntraVENous, Q5 Min PRN  hydrALAZINE (APRESOLINE) injection 10 mg, 10 mg, IntraVENous, Q15 Min PRN  lactated ringers IV soln infusion, , IntraVENous, Continuous  glucose chewable tablet 16 g, 4 tablet, Oral, PRN  dextrose bolus 10% 125 mL, 125 mL, IntraVENous, PRN **OR** dextrose bolus 10% 250 mL, 250 mL, IntraVENous, PRN  glucagon injection 1 mg, 1 mg, SubCUTAneous, PRN  dextrose 10 % infusion, , IntraVENous, Continuous PRN  [Held by provider] atenolol (TENORMIN) tablet 12.5 mg, 12.5 mg, Oral, Daily  venlafaxine (EFFEXOR XR) extended release capsule 37.5 mg, 37.5 mg, Oral, Daily with breakfast  OLANZapine (ZYPREXA) tablet 7.5 mg, 7.5 mg, Oral, Nightly  polyethylene glycol (GLYCOLAX) packet 17 g, 17 g, Oral, Daily  donepezil (ARICEPT) tablet 5 mg, 5 mg, Oral, Nightly  diphenhydrAMINE-zinc acetate 2-0.1 % cream, , Topical, TID PRN  OLANZapine zydis (ZYPREXA) disintegrating tablet 5 mg, 5 mg, Oral, BID PRN **OR** OLANZapine (ZyPREXA) 5 mg in sterile water 1 mL injection, 5 mg, IntraMUSCular, BID PRN  aspirin chewable tablet  81 mg, 81 mg, Oral, Daily  clopidogrel (PLAVIX) tablet 75 mg, 75 mg, Oral, Daily  fluticasone (FLONASE) 50 MCG/ACT nasal spray 2 spray, 2 spray, Each Nostril, Daily  acetaminophen (TYLENOL) tablet 650 mg, 650 mg, Oral, Q4H PRN  polyethylene glycol (GLYCOLAX) packet 17 g, 17 g, Oral, Daily PRN  traZODone (DESYREL) tablet 50 mg, 50 mg, Oral, Nightly PRN  aluminum & magnesium hydroxide-simethicone (MAALOX) 200-200-20 MG/5ML suspension 30 mL, 30 mL, Oral, Q6H PRN    ASSESSMENT  Severe recurrent major depression with psychotic features (HCC)         PATIENT HANDOFF  Patient symptoms are: Improving  Monitor need and frequency of PRN medications.  Encourage participation in groups and milieu.  Medication changes and discharge planning per attending  Follow-up daily while inpatient.     Patient continues to be monitored in the inpatient psychiatric facility at [facility] for safety and stabilization. Patient continues to need, on a daily basis, active treatment furnished directly by or requiring the supervision of inpatient psychiatric personnel.    Electronically signed by BENOIT Benson CNP on 3/28/2024 at 5:58 PM    **This report has been created using voice recognition software. It may contain minor errors which are inherent in voice recognition technology.**       I independently saw and evaluated the patient.  I reviewed the nurse practitioners documentation above.  Principle diagnosis we are treating for is Severe recurrent major depression with psychotic features (HCC).  Any additional comments or changes to the nurse practitioners documentation are stated below otherwise agree with assessment.  Plan will be as follows:  Spent 17 minutes with the patient in supportive psychotherapy.  Patient denying side effects to medication.  Reporting improvement in mood.  Denying suicidal or homicidal ideation intent or plan.  Denying psychotic symptoms.  Forward-looking and constructive.  Discussed if continued stability or

## 2024-03-28 NOTE — PROGRESS NOTES
IN-PATIENT SERVICE  [facility]    Progress note  Date:   3/28/2024  Patient name:  William Babcock  Date of admission:  2/21/2024  9:18 PM  MRN:   [id number]  Account:  [id number]  YOB: 1960  PCP:    Billy Francois MD  Room:   [facility]0205-  Code Status:    Full Code    Physician Requesting Consult: Donnie Rosa MD    Reason for Consult: History and physical, medical management    Chief Complaint:     No chief complaint on file.    Medical management    History Obtained From:     Patient, EMR, nursing staff    History of Present Illness:     63-year-old male admitted for acute psychosis.  Patient has history of autism;   Carotid pseudoaneurysm.  Recent admission to [facility] for right proximal RCA pseudoaneurysm after patient presented on 2/5 for agitation and psychosis.  Patient underwent cerebral angiogram and embolization of cervical pseudoaneurysm 2/9/2024.  Was discharged on aspirin and Plavix, resumed.  Patient was discharged to [facility] on 2/12 subsequently discharged on 2/16.    3/20/2024  Seen after his ECT treatment today, better as compared to when seen after treatment before.  Past Medical History:     Past Medical History:   Diagnosis Date    Anxiety     Autism         Past Surgical History:     Past Surgical History:   Procedure Laterality Date    CEREBRAL ANGIOGRAM  02/09/2024    IR ANGIOGRAM CAROTID CEREBRAL BILATERAL    EYE SURGERY      OTHER SURGICAL HISTORY      ECT        Medications Prior to Admission:     Prior to Admission medications    Medication Sig Start Date End Date Taking? Authorizing Provider   fluticasone (FLONASE) 50 MCG/ACT nasal spray 2 sprays by Each Nostril route daily   Yes Provider, MD Nick   aspirin 81 MG chewable tablet Take 1 tablet by mouth daily 2/17/24   Donnie Rosa MD   clopidogrel (PLAVIX) 75 MG tablet Take 1 tablet by mouth daily 2/17/24   Donnie Rosa MD   QUEtiapine (SEROQUEL) 25 MG

## 2024-03-29 ENCOUNTER — ANESTHESIA EVENT (OUTPATIENT)
Dept: POSTOP/PACU | Age: 64
End: 2024-03-29
Payer: MEDICARE

## 2024-03-29 ENCOUNTER — ANESTHESIA (OUTPATIENT)
Dept: POSTOP/PACU | Age: 64
End: 2024-03-29
Payer: MEDICARE

## 2024-03-29 ENCOUNTER — APPOINTMENT (OUTPATIENT)
Dept: POSTOP/PACU | Age: 64
DRG: 885 | End: 2024-03-29
Payer: MEDICARE

## 2024-03-29 VITALS
RESPIRATION RATE: 15 BRPM | HEART RATE: 87 BPM | SYSTOLIC BLOOD PRESSURE: 95 MMHG | WEIGHT: 133 LBS | BODY MASS INDEX: 20.16 KG/M2 | DIASTOLIC BLOOD PRESSURE: 70 MMHG | OXYGEN SATURATION: 97 % | TEMPERATURE: 97.3 F | HEIGHT: 68 IN

## 2024-03-29 LAB — GLUCOSE BLD-MCNC: 87 MG/DL (ref 75–110)

## 2024-03-29 PROCEDURE — 90870 ELECTROCONVULSIVE THERAPY: CPT | Performed by: ANESTHESIOLOGY

## 2024-03-29 PROCEDURE — 6370000000 HC RX 637 (ALT 250 FOR IP): Performed by: INTERNAL MEDICINE

## 2024-03-29 PROCEDURE — 3700000000 HC ANESTHESIA ATTENDED CARE: Performed by: ANESTHESIOLOGY

## 2024-03-29 PROCEDURE — 82947 ASSAY GLUCOSE BLOOD QUANT: CPT

## 2024-03-29 PROCEDURE — 7100000000 HC PACU RECOVERY - FIRST 15 MIN: Performed by: ANESTHESIOLOGY

## 2024-03-29 PROCEDURE — 6370000000 HC RX 637 (ALT 250 FOR IP): Performed by: NURSE PRACTITIONER

## 2024-03-29 PROCEDURE — 7100000001 HC PACU RECOVERY - ADDTL 15 MIN: Performed by: ANESTHESIOLOGY

## 2024-03-29 PROCEDURE — 99239 HOSP IP/OBS DSCHRG MGMT >30: CPT | Performed by: PSYCHIATRY & NEUROLOGY

## 2024-03-29 PROCEDURE — 2580000003 HC RX 258: Performed by: ANESTHESIOLOGY

## 2024-03-29 PROCEDURE — 2500000003 HC RX 250 WO HCPCS: Performed by: NURSE ANESTHETIST, CERTIFIED REGISTERED

## 2024-03-29 PROCEDURE — 90870 ELECTROCONVULSIVE THERAPY: CPT | Performed by: PSYCHIATRY & NEUROLOGY

## 2024-03-29 PROCEDURE — 6370000000 HC RX 637 (ALT 250 FOR IP): Performed by: PSYCHIATRY & NEUROLOGY

## 2024-03-29 PROCEDURE — 6360000002 HC RX W HCPCS: Performed by: NURSE ANESTHETIST, CERTIFIED REGISTERED

## 2024-03-29 RX ORDER — ETOMIDATE 2 MG/ML
INJECTION INTRAVENOUS PRN
Status: DISCONTINUED | OUTPATIENT
Start: 2024-03-29 | End: 2024-03-29 | Stop reason: SDUPTHER

## 2024-03-29 RX ORDER — VENLAFAXINE HYDROCHLORIDE 37.5 MG/1
37.5 CAPSULE, EXTENDED RELEASE ORAL
Qty: 30 CAPSULE | Refills: 0 | Status: SHIPPED | OUTPATIENT
Start: 2024-03-29

## 2024-03-29 RX ORDER — ETOMIDATE 2 MG/ML
INJECTION INTRAVENOUS
Status: COMPLETED
Start: 2024-03-29 | End: 2024-03-29

## 2024-03-29 RX ORDER — CLOPIDOGREL BISULFATE 75 MG/1
75 TABLET ORAL DAILY
Qty: 30 TABLET | Refills: 0 | Status: SHIPPED | OUTPATIENT
Start: 2024-03-29

## 2024-03-29 RX ORDER — HALOPERIDOL 5 MG/ML
INJECTION INTRAMUSCULAR
Status: COMPLETED
Start: 2024-03-29 | End: 2024-03-29

## 2024-03-29 RX ORDER — HALOPERIDOL 5 MG/ML
INJECTION INTRAMUSCULAR PRN
Status: DISCONTINUED | OUTPATIENT
Start: 2024-03-29 | End: 2024-03-29 | Stop reason: SDUPTHER

## 2024-03-29 RX ORDER — FLUTICASONE PROPIONATE 50 MCG
2 SPRAY, SUSPENSION (ML) NASAL DAILY
Qty: 16 G | Refills: 0 | Status: SHIPPED | OUTPATIENT
Start: 2024-03-29

## 2024-03-29 RX ORDER — DIPHENHYDRAMINE HYDROCHLORIDE, ZINC ACETATE 2; .1 G/100G; G/100G
CREAM TOPICAL
Qty: 35 G | Refills: 0 | Status: SHIPPED | OUTPATIENT
Start: 2024-03-29

## 2024-03-29 RX ORDER — LORAZEPAM 2 MG/ML
INJECTION INTRAMUSCULAR
Status: COMPLETED
Start: 2024-03-29 | End: 2024-03-29

## 2024-03-29 RX ORDER — LORAZEPAM 2 MG/ML
INJECTION INTRAMUSCULAR PRN
Status: DISCONTINUED | OUTPATIENT
Start: 2024-03-29 | End: 2024-03-29 | Stop reason: SDUPTHER

## 2024-03-29 RX ORDER — ASPIRIN 81 MG/1
81 TABLET, CHEWABLE ORAL DAILY
Qty: 30 TABLET | Refills: 0 | Status: SHIPPED | OUTPATIENT
Start: 2024-03-29

## 2024-03-29 RX ORDER — SUCCINYLCHOLINE/SOD CL,ISO/PF 200MG/10ML
SYRINGE (ML) INTRAVENOUS
Status: COMPLETED
Start: 2024-03-29 | End: 2024-03-29

## 2024-03-29 RX ORDER — TRAZODONE HYDROCHLORIDE 50 MG/1
50 TABLET ORAL NIGHTLY PRN
Qty: 30 TABLET | Refills: 0 | Status: SHIPPED | OUTPATIENT
Start: 2024-03-29

## 2024-03-29 RX ORDER — ATENOLOL 25 MG/1
12.5 TABLET ORAL DAILY
Qty: 30 TABLET | Refills: 3 | Status: SHIPPED | OUTPATIENT
Start: 2024-03-29

## 2024-03-29 RX ORDER — SUCCINYLCHOLINE/SOD CL,ISO/PF 200MG/10ML
SYRINGE (ML) INTRAVENOUS PRN
Status: DISCONTINUED | OUTPATIENT
Start: 2024-03-29 | End: 2024-03-29 | Stop reason: SDUPTHER

## 2024-03-29 RX ORDER — OLANZAPINE 7.5 MG/1
7.5 TABLET, FILM COATED ORAL NIGHTLY
Qty: 30 TABLET | Refills: 0 | Status: SHIPPED | OUTPATIENT
Start: 2024-03-29

## 2024-03-29 RX ORDER — DONEPEZIL HYDROCHLORIDE 5 MG/1
5 TABLET, FILM COATED ORAL NIGHTLY
Qty: 30 TABLET | Refills: 0 | Status: SHIPPED | OUTPATIENT
Start: 2024-03-29

## 2024-03-29 RX ADMIN — FLUTICASONE PROPIONATE 2 SPRAY: 50 SPRAY, METERED NASAL at 10:09

## 2024-03-29 RX ADMIN — POLYETHYLENE GLYCOL 3350 17 G: 17 POWDER, FOR SOLUTION ORAL at 10:08

## 2024-03-29 RX ADMIN — Medication 80 MG: at 07:43

## 2024-03-29 RX ADMIN — ETOMIDATE INJECTION 20 MG: 2 SOLUTION INTRAVENOUS at 07:43

## 2024-03-29 RX ADMIN — HALOPERIDOL LACTATE 5 MG: 5 INJECTION, SOLUTION INTRAMUSCULAR at 07:43

## 2024-03-29 RX ADMIN — CLOPIDOGREL BISULFATE 75 MG: 75 TABLET ORAL at 10:08

## 2024-03-29 RX ADMIN — VENLAFAXINE HYDROCHLORIDE 37.5 MG: 75 CAPSULE, EXTENDED RELEASE ORAL at 10:08

## 2024-03-29 RX ADMIN — ASPIRIN 81 MG 81 MG: 81 TABLET ORAL at 10:08

## 2024-03-29 RX ADMIN — ACETAMINOPHEN 650 MG: 325 TABLET ORAL at 06:05

## 2024-03-29 RX ADMIN — LORAZEPAM 1 MG: 2 INJECTION, SOLUTION INTRAMUSCULAR; INTRAVENOUS at 07:46

## 2024-03-29 RX ADMIN — SODIUM CHLORIDE, POTASSIUM CHLORIDE, SODIUM LACTATE AND CALCIUM CHLORIDE: 600; 310; 30; 20 INJECTION, SOLUTION INTRAVENOUS at 06:41

## 2024-03-29 ASSESSMENT — PAIN - FUNCTIONAL ASSESSMENT: PAIN_FUNCTIONAL_ASSESSMENT: CRITICAL CARE PAIN OBSERVATION TOOL (CPOT)

## 2024-03-29 ASSESSMENT — PAIN SCALES - GENERAL: PAINLEVEL_OUTOF10: 0

## 2024-03-29 NOTE — GROUP NOTE
Group Therapy Note    Date: 3/29/2024    Group Start Time: 1100  Group End Time: 1130  Group Topic: Focus Group    Lina Porras CTRS    focus  Group Note        Date: March 29, 2024 Start Time: 11am  End Time: 11:30am      Number of Participants in Group & Unit Census:  3/12    Topic: focus group     Goal of Group: pt will identify benefits of unit and areas for improvement, pt advisory group       Comments:     Patient did not participate in  focus  group, despite staff encouragement and explanation of benefits.  Patient remain seclusive to self.  Q15 minute safety checks maintained for patient safety and will continue to encourage patient to attend unit programming.          Signature:  MAURY DECKER               June 22, 2020        Britany Rankin  [address] [id number]    To Whom It May Concern:      This is to certify Britany Rankin was evaluated on 06/22/20 and COVID result of 6- was negative, she is able to return to work on 6- without restrictions.  Obviously the Coronavirus is a rapidly evolving situation and recommendations are changing regularly to prevent spread of the disease and further loss of life.    Thank you for your understanding during these unusual times.          Electronically signed by:   CAREY Huitron                 [address] [id number]

## 2024-03-29 NOTE — BH NOTE
Tobacco Use: Low Risk  (3/27/2024)    Patient History     Smoking Tobacco Use: Never     Smokeless Tobacco Use: Never     Passive Exposure: Not on file    Patient is a non-smoker

## 2024-03-29 NOTE — CARE COORDINATION
Writer completed/faxed [facility].     Writer spoke with [facility], confirmed Mr. Babcock's discharge from [facility] and transition to [facility] today, transport at 12:30pm. Writer contacted Trevon Babcock (brother) to update him on above. Unit staff updated.     Completed/signed CRF faxed to Shital at [facility].

## 2024-03-29 NOTE — ANESTHESIA PRE PROCEDURE
bolus 10% 125 mL  125 mL IntraVENous PRN Rosario Lamar MD        Or   • dextrose bolus 10% 250 mL  250 mL IntraVENous PRN Rosario Lamar MD       • glucagon injection 1 mg  1 mg SubCUTAneous PRN Rosario Lamar MD       • dextrose 10 % infusion   IntraVENous Continuous PRN Rosario Lamar MD       • [Held by provider] atenolol (TENORMIN) tablet 12.5 mg  12.5 mg Oral Daily Rosario Lamar MD   12.5 mg at 03/20/24 0637   • venlafaxine (EFFEXOR XR) extended release capsule 37.5 mg  37.5 mg Oral Daily with breakfast Donnie Rosa MD   37.5 mg at 03/28/24 0818   • OLANZapine (ZYPREXA) tablet 7.5 mg  7.5 mg Oral Nightly Donnie Rosa MD   7.5 mg at 03/28/24 2118   • polyethylene glycol (GLYCOLAX) packet 17 g  17 g Oral Daily Bahman Cordova MD   17 g at 03/28/24 0818   • donepezil (ARICEPT) tablet 5 mg  5 mg Oral Nightly Donnie Rosa MD   5 mg at 03/28/24 2118   • diphenhydrAMINE-zinc acetate 2-0.1 % cream   Topical TID PRN Neeraj Mary MD   Given at 02/26/24 1849   • OLANZapine zydis (ZYPREXA) disintegrating tablet 5 mg  5 mg Oral BID PRN Aliya Ross APRN - CNP        Or   • OLANZapine (ZyPREXA) 5 mg in sterile water 1 mL injection  5 mg IntraMUSCular BID PRN Aliya Ross APRN - CNP   5 mg at 03/09/24 0943   • aspirin chewable tablet 81 mg  81 mg Oral Daily Makayla Choudhury MD   81 mg at 03/28/24 0818   • clopidogrel (PLAVIX) tablet 75 mg  75 mg Oral Daily Makayla Choudhury MD   75 mg at 03/28/24 0818   • fluticasone (FLONASE) 50 MCG/ACT nasal spray 2 spray  2 spray Each Nostril Daily Makayla Choudhury MD   2 spray at 03/28/24 0818   • acetaminophen (TYLENOL) tablet 650 mg  650 mg Oral Q4H PRN Michelle Lomas APRN - CNP   650 mg at 03/29/24 0605   • polyethylene glycol (GLYCOLAX) packet 17 g  17 g Oral Daily PRN Michelle Lomas APRN - CNP       • traZODone (DESYREL) tablet 50 mg  50 mg Oral Nightly PRN Michelle Lmoas APRN - CNP   50 mg at 03/28/24 211   • aluminum &

## 2024-03-29 NOTE — BH NOTE
Patient transported in ambulette, by wheelchair and 2 transport staff. Patient has all belongings from patient locker, bin, and security safe. Patient is alert and oriented x4, calm and cooperative.

## 2024-03-29 NOTE — TRANSITION OF CARE
Behavioral Health Transition Record to Provider    Patient Name: William Babcock  YOB: 1960   Medical Record Number: [id number]  Date of Admission: 2/21/2024  9:18 PM   Date of Discharge: 3/29/24    Attending Provider: Donnie Rosa MD   Discharging Provider: Donnie Rosa MD  To contact this individual call  and ask the  to page.  If unavailable, ask to be transferred to Behavioral Health Provider on call.  A Behavioral Health Provider will be available on call 24/7 and during holidays.    Primary Care Provider: Billy Francois MD    No Known Allergies    Reason for Admission:     Admission Diagnosis: Acute psychosis (HCC) [F23]    * No surgery found *    Results for orders placed or performed during the hospital encounter of 02/21/24   URINE DRUG SCREEN   Result Value Ref Range    Amphetamine Screen, Ur NEGATIVE NEGATIVE    Barbiturate Screen, Ur NEGATIVE NEGATIVE    Benzodiazepine Screen, Urine NEGATIVE NEGATIVE    Cocaine Metabolite, Urine NEGATIVE NEGATIVE    Methadone Screen, Urine NEGATIVE NEGATIVE    Opiates, Urine NEGATIVE NEGATIVE    Phencyclidine, Urine NEGATIVE NEGATIVE    Cannabinoid Scrn, Ur NEGATIVE NEGATIVE    Oxycodone Screen, Ur NEGATIVE NEGATIVE    Fentanyl, Ur NEGATIVE NEGATIVE    Test Information       Assay provides medical screening only.  The absence of expected drug(s) and/or metabolite(s) may indicate diluted or adulterated urine, limitations of testing or timing of collection.   Urinalysis with Reflex to Culture    Specimen: Urine   Result Value Ref Range    Color, UA Dark Yellow (A) Yellow    Turbidity UA Clear Clear    Glucose, Ur NEGATIVE NEGATIVE mg/dL    Bilirubin Urine NEGATIVE NEGATIVE    Ketones, Urine MOD (A) NEGATIVE mg/dL    Specific Gravity, UA 1.051 (H) 1.000 - 1.030    Urine Hgb NEGATIVE NEGATIVE    pH, UA 5.0 5.0 - 8.0    Protein, UA TRACE (A) NEGATIVE mg/dL    Urobilinogen, Urine Normal 0.0 - 1.0 EU/dL    Nitrite, Urine NEGATIVE NEGATIVE     Ventricular Rate 68 BPM    Atrial Rate 68 BPM    P-R Interval 154 ms    QRS Duration 82 ms    Q-T Interval 390 ms    QTc Calculation (Bazett) 414 ms    P Axis 48 degrees    R Axis 20 degrees    T Axis 62 degrees       Immunizations administered during this encounter:   Immunization History   Administered Date(s) Administered    COVID-19, PFIZER PURPLE top, DILUTE for use, (age 12 y+), 30mcg/0.3mL 03/03/2021, 03/24/2021, 03/31/2021     Documentation of patient's or caregiver's refusal of influenza vaccine.    Screening for Metabolic Disorders for Patients on Antipsychotic Medications  (Data obtained from the EMR)    Estimated Body Mass Index  Body mass index is 20.22 kg/m².      Vital Signs/Blood Pressure  BP 95/70   Pulse 87   Temp 97.3 °F (36.3 °C) (Temporal)   Resp 15   Ht 1.727 m (5' 8\")   Wt 60.3 kg (133 lb)   SpO2 97%   BMI 20.22 kg/m²      Fasting Blood Glucose or Hemoglobin A1c  No results found for: \"GLU\", \"GLUCPOC\"    Hemoglobin A1C   Date Value Ref Range Status   02/05/2024 5.0 4.0 - 6.0 % Final       Discharge Diagnosis: Severe recurrent major depression with psychotic features    Discharge Plan/Destination: [facility]    Discharge Medication List and Instructions:      Medication List        START taking these medications      diphenhydrAMINE-zinc acetate 2-0.1 % cream  Apply topically 3 times daily as needed.     donepezil 5 MG tablet  Commonly known as: ARICEPT  Take 1 tablet by mouth nightly     OLANZapine 7.5 MG tablet  Commonly known as: ZYPREXA  Take 1 tablet by mouth nightly     traZODone 50 MG tablet  Commonly known as: DESYREL  Take 1 tablet by mouth nightly as needed for Sleep     venlafaxine 37.5 MG extended release capsule  Commonly known as: EFFEXOR XR  Take 1 capsule by mouth daily (with breakfast)            CHANGE how you take these medications      atenolol 25 MG tablet  Commonly known as: TENORMIN  Take 0.5 tablets by mouth daily  What changed:   medication

## 2024-03-29 NOTE — BH NOTE
Arrival from ECT to [facility] Unit:    Patient arrived to the unit Time: 09:35 and Via Wheelchair. Patient is person, place, time/date, and situation. Patient is calm and cooperative.    Physical and Mental assessment, Vitals x 2, and fall risk assessment completed and documented.     Post-op ECT checklist completed and placed in patients chart.

## 2024-03-29 NOTE — PROCEDURES
Bilateral ECT Procedure Report  William Babcock   3/29/2024  1960         Attending:Donnie Rosa MD  Preprocedure diagnosis: Major depressive disorder, recurrent, severe with psychotic symptoms (F33.3)  Postprocedure diagnosis: SAME AS PRE-PROCEDURE DIAGNOSIS  Treatment Number: This is treatment # 8   Patient Status: BEHAVIOR IP   Type of ECT: Bilateral Brief Pulse  Medications  Preprocedure: Tylenol 650mg  Anesthetic: Methohexital 70 mg  Paralytic: Succinylcholine 70 mg  Post procedure: none needed   Cuff placement: Left Lower Extremity    MECTA Settings 1st Stimulus:     Pulse width: 1.0 ms   Frequency:  60 hz   Duration:   3.0 seconds   Static Impedance: 298 ohms             Dynamic Impedance: 138 ohms             Motor Seizure Duration: 33seconds  EEG Seizure Duration: 42 seconds             The patient's ECT treatment was performed using MECTA machine  .  Timeout:  Time out was performed using two patient identifiers and confirmation of procedure.     Patient Preparation: Preprocedure documentation, labs, H&P were all verified and reviewed.  The patient was placed in supine position.  EEG leads were placed for monitoring of seizure.   Lilly areas bilaterally cleaned and prepped.  Conductive gel  was applied over stimulus electrode site.   The patient was pre-oxygenated.       Procedure in detail: Medications were administered by anesthesia using intravenous access at the doses listed previously in this summary.  Anesthetic administered and once confirmation the patient is anesthetized, the patient's blood pressure cuff on lower extremity was inflated to 100mmHg in excess of patient's blood pressure.  At this time, the neuromuscular blockade was administered intravenously by anesthesia.  Upper extremity fasciculation were verified followed by lower extremity fasciculation.  Once fasciculations terminated, confirmation of affective neuromuscular blockade demonstrated by absence of withdrawal reflex.  Bite
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
## 2024-03-29 NOTE — PROGRESS NOTES
Pre ECT Assessment Note  Psychiatry  3/29/2024      William Babcock  1960  [id number]      Subjective:     Patient is a 64 y.o.  male seen for an evaluation prior to today's electroconvulsive therapy treatment. Today is treatment number 8, utilizing bilateral.  This is course number 1.  The patient has previously never previously completed a course of ECT.    Reagan continues to slowly improve.  He is discharging to [facility] today after his treatment with the plan to return once weekly.  We will continue to taper the frequency of ECT as he continues to improve.  He is eating and sleeping well.  His paranoia is improving.  He has less thought blocking, his discussion is more linear and coherent.  Overall he is responding to ECT well.      Patient Active Problem List    Diagnosis Date Noted    Severe recurrent major depression with psychotic features ([facility]) 03/13/2024    Altered mental status 02/26/2024    Acute psychosis ([facility]) 02/12/2024    Secondary hypertension 02/10/2024    Carotid pseudoaneurysm ([facility]) 02/09/2024    Agitation 02/09/2024    Stenosis of right carotid artery greater than 50% 02/09/2024    Psychosis, paranoid ([facility]) 02/06/2024    Abnormal CT of the head 02/06/2024    Psychosis ([facility]) 02/06/2024    Pseudoaneurysm ([facility]) 02/06/2024    Essential hypertension 12/01/2016    Primary insomnia 12/01/2016    Influenza vaccine refused 12/01/2016    Refused Streptococcus pneumoniae vaccination 12/01/2016     Past Medical History:   Diagnosis Date    Anxiety     Autism       Past Surgical History:   Procedure Laterality Date    CEREBRAL ANGIOGRAM  02/09/2024    IR ANGIOGRAM CAROTID CEREBRAL BILATERAL    EYE SURGERY      OTHER SURGICAL HISTORY      ECT      Medications Prior to Admission: [DISCONTINUED] fluticasone (FLONASE) 50 MCG/ACT nasal spray, 2 sprays by Each Nostril route daily  [DISCONTINUED] aspirin 81 MG chewable tablet, Take 1 tablet by mouth daily  [DISCONTINUED] clopidogrel (PLAVIX) 75 MG

## 2024-03-29 NOTE — PLAN OF CARE
Problem: Psychosis  Goal: Will report no hallucinations or delusions  Description: INTERVENTIONS:  1. Administer medication as  ordered  2. Assist with reality testing to support increasing orientation  3. Assess if patient's hallucinations or delusions are encouraging self harm or harm to others and intervene as appropriate    Outcome: Progressing     Patient denied hallucinations, had no delusional behavior, and made no delusional statements. He was brightened and more interactive with staff. Cooperated with medication administration.    Problem: Behavior  Goal: Pt/Family maintain appropriate behavior and adhere to behavioral management agreement, if implemented  Description: INTERVENTIONS:  1. Assess patient/family's coping skills and  non-compliant behavior (including use of illegal substances)  2. Notify security of behavior or suspected illegal substances which indicate the need for search of the family and/or belongings  3. Encourage verbalization of thoughts and concerns in a socially appropriate manner  4. Utilize positive, consistent limit setting strategies supporting safety of patient, staff and others  5. Encourage participation in the decision making process about the behavioral management agreement  6. If a visitor's behavior poses a threat to safety call refer to organization policy.  7. Initiate consult with , Psychosocial CNS, Spiritual Care as appropriate    Outcome: Progressing     Patient maintained appropriate behavior, making no delusional statements. Patient descriptive in what he ate for dinner, knew what time snack was and requested evening snack right at 2030, eating two packages of cookies. Ability to express needs is becoming more articulate. Patient up around [id number] and redirectable back to room.

## 2024-03-29 NOTE — BH NOTE
[facility] [place]  Discharge Note    Pt discharged with followings belongings:   Dental Appliances: None  Vision - Corrective Lenses: None  Hearing Aid: None  Jewelry: None  Body Piercings Removed: N/A  Clothing: Other (Comment) (hospital gown and pants)  Other Valuables: Other (Comment) (none)   Valuables sent home with  patient. Patient educated on aftercare instructions: yes  [facility]   at 1:06 PM .Patient verbalize understanding of AVS:  yes.    Status EXAM upon discharge:  Mental Status and Behavioral Exam  Normal: No  Level of Assistance: Independent/Self  Facial Expression: Brightened  Affect: Appropriate  Level of Consciousness: Alert  Frequency of Checks: 4 times per hour, close  Mood:Normal: Yes  Mood: Anxious  Motor Activity:Normal: Yes  Motor Activity: Unusual posture/gait (baseline)  Eye Contact: Fair  Observed Behavior: Cooperative, Friendly  Sexual Misconduct History: Current - no  Preception: [place] to person, [place] to place, [place] to time, [place] to situation  Attention:Normal: Yes  Attention: Unable to concentrate  Thought Processes: Unremarkable  Thought Content:Normal: Yes  Thought Content: Preoccupations  Depression Symptoms: No problems reported or observed.  Anxiety Symptoms: No problems reported or observed.  Isabel Symptoms: No problems reported or observed.  Hallucinations: None  Delusions: No  Delusions: Paranoid  Memory:Normal: No  Memory: Poor recent  Insight and Judgment: No  Insight and Judgment: Poor judgment, Poor insight    Tobacco Screening:  Practical Counseling, on admission, malcom X, if applicable and completed (first 3 are required if patient doesn't refuse):            ( ) Recognizing danger situations (included triggers and roadblocks)                    ( ) Coping skills (new ways to manage stress,relaxation techniques, changing routine, distraction)                                                           ( ) Basic

## 2024-03-29 NOTE — ANESTHESIA POSTPROCEDURE EVALUATION
Department of Anesthesiology  Postprocedure Note    Patient: William Babcock  MRN: [id number]  YOB: 1960  Date of evaluation: 3/29/2024    Procedure Summary       Date: 03/29/24 Room / Location: [facility] PACU    Anesthesia Start: 0738 Anesthesia Stop: 0752    Procedure: ECT W/ ANESTHESIA Diagnosis:     Scheduled Providers:  Responsible Provider: Aris Nichole MD    Anesthesia Type: General ASA Status: 3            Anesthesia Type: General    Wanda Phase I: Wanda Score: 5    Wanda Phase II:      Anesthesia Post Evaluation    Patient location during evaluation: PACU  Patient participation: complete - patient participated  Level of consciousness: awake and alert  Airway patency: patent  Nausea & Vomiting: no vomiting  Cardiovascular status: hemodynamically stable  Respiratory status: acceptable  Hydration status: euvolemic  Comments: POST- ANESTHESIA EVALUATION       Pt Name: William Babcock  MRN: [id number]  YOB: 1960  Date of evaluation: 3/29/2024  Time:  8:51 AM      /77   Pulse 60   Temp 99.5 °F (37.5 °C) (Infrared)   Resp 19   Ht 1.727 m (5' 8\")   Wt 60.3 kg (133 lb)   SpO2 95%   BMI 20.22 kg/m²      Consciousness Level  Awake  Cardiopulmonary Status  Stable  Pain Adequately Treated YES  Nausea / Vomiting  NO  Adequate Hydration  YES  Anesthesia Related Complications NONE      Electronically signed by Aris Nichole MD on 3/29/2024 at 8:51 AM         Pain management: satisfactory to patient    No notable events documented.

## 2024-03-29 NOTE — GROUP NOTE
Group Therapy Note    Date: 3/29/2024    Group Start Time: 1000  Group End Time: 1020  Group Topic: Psychotherapy     Jannette Waller MSW        Group Therapy Note    Attendees: 3/12     Patient was offered group therapy today but declined to participate despite encouragement from staff.  1:1 was offered.    Discipline Responsible: /Counselor      Signature:  TANO Hernández

## 2024-03-29 NOTE — BH NOTE
Departure from I Unit to ECT:    Pre-op ECT Checklist completed and placed by front of patients chart. Patient departed unit Time: 0615 and Via Wheelchair, accompanied by [facility] staff. Patient is alert and orientated at this time, as well as calm and cooperative.

## 2024-03-29 NOTE — DISCHARGE INSTRUCTIONS
Information:  Medications:   Medication summary provided   I understand that I should take only the medications on my list.     -why and when I need to take each medicine.     -which side effects to watch for.     -that I should carry my medication information at all times in case of     Emergency situations.    I will take all of my medicines to follow up appointments.     -check with my physician or pharmacist before taking any new    Medication, over the counter product or drink alcohol.    -Ask about food, drug or dietary supplement interactions.    -discard old lists and update records with medication providers.    Notify Physician:  Notify physician if you notice:   Always call 911 if you feel your life is in danger  In case of an emergency call 911 immediately!  If 911 is not available call your local emergency medical system for help    Behavioral Health Follow Up:  Original Referral Source: [place] [facility]  Discharge Diagnosis: Acute psychosis (HCC) [F23]  Recommendations for Level of Care: F/U with [facility]  Patient status at discharge: stable  My hospital  was:   Aftercare plan faxed: yes   -faxed by: Nurse   -date: 3/29/24   -time: 1500  Prescriptions: sent to [facility]    Smoking: Quit Smoking.   Call the [employer]'s smoking quitline at 7-211-88K-QUIT  Know the signs of a heart attack   If you have any of the following symptoms call 911 immediately, do not wait more    Than five minutes.    1. Pressure, fullness and/ or squeezing in the center of the chest spreading to    The jaw, neck or shoulder.    2. Chest discomfort with light headedness, fainting, sweating, nausea or    Shortness of breath.   3. Upper abdominal pressure or discomfort.   4. Lower chest pain, back pain, unusual fatigue, shortness of breath, nausea   Or dizziness.     General Information:   Questions regarding your bill: Call HELP program [phone]     Suicide Hotline ([facility] Crisis Care Line)  (548) 124-1827       Recovery Help line- 158.699.6312      To obtain results of pending studies call Medical Records at: [phone]     For emergencies and 24 hour/7 days a week contact information:  [phone]

## 2024-03-29 NOTE — DISCHARGE SUMMARY
ECT Inpatient Discharge Instructions    William Babcock  is being transitioned from inpatient electroconvulsive therapy (ECT) to outpatient ECT.    You will need to report to [facility], [address] [place] [address].  You may enter the main entrance on the south side of [facility] or the outpatient surgery building on the north side of the [facility].    You are scheduled for the following ECT dates: 4/5/2024 @ 7:10 am      with a 0550 arrival time.          There are requirements that must be followed prior to coming for ECT treatments    You must remain NPO, meaning you can have nothing to eat or drink beginning at midnight the evening prior to your ECT treatment.    You may take Acetaminophen (Tylenol) and/or any medication for hypertension (blood pressure) with a small sip of water the morning of your ECT treatment. The acetaminophen will help to prevent a headache after receiving ECT.  Please do not wear any jewelry to the treatment.  You will be discharged from the post anesthesia recovery unit once your orientation has returned to baseline.  You must have someone with you for 24 hours after the treatment to monitor you for side effects from the anesthesia medication that you received.  You may not drive or operate heavy machinery during this 24 hour period.    Side Effects    You may experience a dry mouth, sore muscles, temporary confusion and headache, these symptoms may be treated with acetaminophen or ibuprofen.  The effects of anesthesia may linger for up to 24 hours making you feel fatigued (tired) as well.  Your caretaker should report uncontrolled vomiting, extreme confusion, and/or increased temperature (greater than 101F) to a physician.    Please call [phone] if you have any questions regarding your ECT treatment.  If for some reason you need to cancel your treatment last minute please contact [phone] (this is an after hours number).

## 2024-04-04 ENCOUNTER — ANESTHESIA EVENT (OUTPATIENT)
Dept: POSTOP/PACU | Age: 64
End: 2024-04-04
Payer: MEDICARE

## 2024-04-04 DIAGNOSIS — F33.3 SEVERE RECURRENT MAJOR DEPRESSION WITH PSYCHOTIC FEATURES (HCC): Primary | ICD-10-CM

## 2024-04-05 ENCOUNTER — HOSPITAL ENCOUNTER (OUTPATIENT)
Dept: POSTOP/PACU | Age: 64
Discharge: HOME | End: 2024-04-05
Payer: MEDICARE

## 2024-04-05 ENCOUNTER — ANESTHESIA (OUTPATIENT)
Dept: POSTOP/PACU | Age: 64
End: 2024-04-05
Payer: MEDICARE

## 2024-04-05 VITALS
WEIGHT: 140 LBS | HEIGHT: 68 IN | DIASTOLIC BLOOD PRESSURE: 87 MMHG | HEART RATE: 82 BPM | SYSTOLIC BLOOD PRESSURE: 126 MMHG | OXYGEN SATURATION: 91 % | BODY MASS INDEX: 21.22 KG/M2 | RESPIRATION RATE: 20 BRPM | TEMPERATURE: 98.1 F

## 2024-04-05 PROCEDURE — 90870 ELECTROCONVULSIVE THERAPY: CPT | Performed by: PSYCHIATRY & NEUROLOGY

## 2024-04-05 PROCEDURE — 2580000003 HC RX 258: Performed by: ANESTHESIOLOGY

## 2024-04-05 PROCEDURE — 6360000002 HC RX W HCPCS: Performed by: NURSE PRACTITIONER

## 2024-04-05 PROCEDURE — 7100000001 HC PACU RECOVERY - ADDTL 15 MIN: Performed by: ANESTHESIOLOGY

## 2024-04-05 PROCEDURE — 3700000000 HC ANESTHESIA ATTENDED CARE: Performed by: ANESTHESIOLOGY

## 2024-04-05 PROCEDURE — 90870 ELECTROCONVULSIVE THERAPY: CPT

## 2024-04-05 PROCEDURE — 2500000003 HC RX 250 WO HCPCS: Performed by: NURSE ANESTHETIST, CERTIFIED REGISTERED

## 2024-04-05 PROCEDURE — 3700000001 HC ADD 15 MINUTES (ANESTHESIA): Performed by: ANESTHESIOLOGY

## 2024-04-05 PROCEDURE — 7100000000 HC PACU RECOVERY - FIRST 15 MIN: Performed by: ANESTHESIOLOGY

## 2024-04-05 RX ORDER — LORAZEPAM 2 MG/ML
0.5 INJECTION INTRAMUSCULAR
Status: COMPLETED | OUTPATIENT
Start: 2024-04-05 | End: 2024-04-05

## 2024-04-05 RX ORDER — LIDOCAINE HYDROCHLORIDE 10 MG/ML
1 INJECTION, SOLUTION EPIDURAL; INFILTRATION; INTRACAUDAL; PERINEURAL
Status: DISCONTINUED | OUTPATIENT
Start: 2024-04-05 | End: 2024-04-06 | Stop reason: HOSPADM

## 2024-04-05 RX ORDER — SUCCINYLCHOLINE/SOD CL,ISO/PF 200MG/10ML
SYRINGE (ML) INTRAVENOUS PRN
Status: DISCONTINUED | OUTPATIENT
Start: 2024-04-05 | End: 2024-04-05 | Stop reason: SDUPTHER

## 2024-04-05 RX ORDER — ETOMIDATE 2 MG/ML
INJECTION INTRAVENOUS
Status: COMPLETED
Start: 2024-04-05 | End: 2024-04-05

## 2024-04-05 RX ORDER — ETOMIDATE 2 MG/ML
INJECTION INTRAVENOUS PRN
Status: DISCONTINUED | OUTPATIENT
Start: 2024-04-05 | End: 2024-04-05 | Stop reason: SDUPTHER

## 2024-04-05 RX ORDER — SODIUM CHLORIDE 9 MG/ML
INJECTION, SOLUTION INTRAVENOUS PRN
Status: DISCONTINUED | OUTPATIENT
Start: 2024-04-05 | End: 2024-04-06 | Stop reason: HOSPADM

## 2024-04-05 RX ORDER — SODIUM CHLORIDE 0.9 % (FLUSH) 0.9 %
5-40 SYRINGE (ML) INJECTION EVERY 12 HOURS SCHEDULED
Status: DISCONTINUED | OUTPATIENT
Start: 2024-04-05 | End: 2024-04-06 | Stop reason: HOSPADM

## 2024-04-05 RX ORDER — SODIUM CHLORIDE, SODIUM LACTATE, POTASSIUM CHLORIDE, CALCIUM CHLORIDE 600; 310; 30; 20 MG/100ML; MG/100ML; MG/100ML; MG/100ML
INJECTION, SOLUTION INTRAVENOUS CONTINUOUS
Status: DISCONTINUED | OUTPATIENT
Start: 2024-04-05 | End: 2024-04-06 | Stop reason: HOSPADM

## 2024-04-05 RX ORDER — LORAZEPAM 2 MG/ML
0.5 INJECTION INTRAMUSCULAR ONCE
Status: DISCONTINUED | OUTPATIENT
Start: 2024-04-05 | End: 2024-04-06 | Stop reason: HOSPADM

## 2024-04-05 RX ORDER — SUCCINYLCHOLINE/SOD CL,ISO/PF 200MG/10ML
SYRINGE (ML) INTRAVENOUS
Status: COMPLETED
Start: 2024-04-05 | End: 2024-04-05

## 2024-04-05 RX ORDER — SODIUM CHLORIDE 0.9 % (FLUSH) 0.9 %
5-40 SYRINGE (ML) INJECTION PRN
Status: DISCONTINUED | OUTPATIENT
Start: 2024-04-05 | End: 2024-04-06 | Stop reason: HOSPADM

## 2024-04-05 RX ADMIN — Medication 80 MG: at 07:44

## 2024-04-05 RX ADMIN — SODIUM CHLORIDE, POTASSIUM CHLORIDE, SODIUM LACTATE AND CALCIUM CHLORIDE: 600; 310; 30; 20 INJECTION, SOLUTION INTRAVENOUS at 07:10

## 2024-04-05 RX ADMIN — ETOMIDATE INJECTION 20 MG: 2 SOLUTION INTRAVENOUS at 07:44

## 2024-04-05 RX ADMIN — LORAZEPAM 0.5 MG: 2 INJECTION, SOLUTION INTRAMUSCULAR; INTRAVENOUS at 08:49

## 2024-04-05 ASSESSMENT — ENCOUNTER SYMPTOMS
ABDOMINAL PAIN: 0
CONSTIPATION: 0
SORE THROAT: 0
NAUSEA: 0
VOMITING: 0
COUGH: 0
BACK PAIN: 0

## 2024-04-05 ASSESSMENT — PAIN - FUNCTIONAL ASSESSMENT
PAIN_FUNCTIONAL_ASSESSMENT: CRITICAL CARE PAIN OBSERVATION TOOL (CPOT)
PAIN_FUNCTIONAL_ASSESSMENT: 0-10

## 2024-04-05 NOTE — H&P (VIEW-ONLY)
Musculoskeletal:      Cervical back: Neck supple.   Skin:     General: Skin is warm and dry.      Coloration: Skin is not jaundiced.      Findings: No bruising, erythema or rash.   Neurological:      General: No focal deficit present.      Mental Status: He is alert and oriented to person, place, and time.      Cranial Nerves: No cranial nerve deficit.      Gait: Gait normal.   Psychiatric:         Mood and Affect: Mood normal.         Speech: Speech normal.         Behavior: Behavior is cooperative.      Comments: Disheveled appearance          PROVISIONAL DIAGNOSES / SURGERY:      Recurrent major depression    ECT    Patient Active Problem List    Diagnosis Date Noted    Severe recurrent major depression with psychotic features ([facility]) 03/13/2024    Altered mental status 02/26/2024    Acute psychosis ([facility]ly [facility]) 02/12/2024    Secondary hypertension 02/10/2024    Carotid pseudoaneurysm ([facility]) 02/09/2024    Agitation 02/09/2024    Stenosis of right carotid artery greater than 50% 02/09/2024    Psychosis, paranoid ([facility]) 02/06/2024    Abnormal CT of the head 02/06/2024    Psychosis ([facility]) 02/06/2024    Pseudoaneurysm ([facility]) 02/06/2024    Essential hypertension 12/01/2016    Primary insomnia 12/01/2016    Influenza vaccine refused 12/01/2016    Refused Streptococcus pneumoniae vaccination 12/01/2016           BENOIT Kebede CNP on 4/5/2024 at 6:20 AM

## 2024-04-05 NOTE — ANESTHESIA POSTPROCEDURE EVALUATION
Department of Anesthesiology  Postprocedure Note    Patient: William Babcock  MRN: [id number]  YOB: 1960  Date of evaluation: 4/5/2024    Procedure Summary       Date: 04/05/24 Room / Location: [facility] PACU    Anesthesia Start: 0739 Anesthesia Stop: 0806    Procedure: ECT W/ ANESTHESIA Diagnosis: Major depressive disorder, recurrent, severe with psychotic symptoms    Scheduled Providers:  Responsible Provider: Abby Red MD    Anesthesia Type: general ASA Status: 3            Anesthesia Type: No value filed.    Wanda Phase I: Wanda Score: 4    Wanda Phase II:      Anesthesia Post Evaluation    Comments: POST- ANESTHESIA EVALUATION       Pt Name: William Babcock  MRN: [id number]  YOB: 1960  Date of evaluation: 4/5/2024  Time:  9:15 AM      /87   Pulse 82   Temp 98.1 °F (36.7 °C) (Infrared)   Resp 20   Ht 1.727 m (5' 8\")   Wt 63.5 kg (140 lb)   SpO2 91%   BMI 21.29 kg/m²      Consciousness Level  Awake  Cardiopulmonary Status  Stable  Pain Adequately Treated YES  Nausea / Vomiting  NO  Adequate Hydration  YES  Anesthesia Related Complications NONE      Electronically signed by Abby Red MD on 4/5/2024 at 9:15 AM           No notable events documented.

## 2024-04-05 NOTE — H&P
HISTORY and PHYSICAL  [facility]       NAME:  William Babcock  MRN: [id number]   YOB: 1960   Date: 4/5/2024   Age: 64 y.o.  Gender: male       COMPLAINT AND PRESENT HISTORY:     William Babcock is 64 y.o.  male, here for ECT treatment.    Last ECT treatment was during [facility] admission 02/21/24 - 03/29/24 related to delusional, paranoid behaviors and acute psychosis. See Epic for admission details and notes. Pt tolerated last treatment well.   Pt has history of recurrent major depression.  Rating average mood 6-7/10 with 10 being the best mood.  Reports ECT treatments are effective at improving overall mood.  Other symptoms include: hopelessness, helplessness, low energy.   Patient's sleep has been good. Appetite has been good.   Pt is not suicidal. Pt is not homicidal. Pt denies auditory/visual hallucinations.   Pt has been taking all medications as prescribed.     Below italics a portion of H&P note by Dr. Choudhury dated 02/22/24: Reviewed    63-year-old male admitted for acute psychosis.  Patient has history of autism;   Carotid pseudoaneurysm.  Recent admission to [facility] for right proximal RCA pseudoaneurysm after patient presented on 2/5 for agitation and psychosis.  Patient underwent cerebral angiogram and embolization of cervical pseudoaneurysm 2/9/2024.  Was discharged on aspirin and Plavix, resumed.    NPO p MN. Took no medications this am with sip of water. Denies recent or current chest pain/pressure, palpitations, SOB, recent URI, fever or chills.       RECENT LABS, IMAGING AND TESTING     Narrative & Impression     Poor data quality, interpretation may be adversely affected  Normal sinus rhythm  Normal ECG  No previous ECGs available      Specimen Collected: 03/09/24 16:09 EST Last Resulted: 03/12/24 08:12 EDT          Lab Results   Component Value Date    WBC 3.6 03/17/2024    RBC 4.44 (L) 03/17/2024    HGB 13.6 03/17/2024    HCT 40.2 (L) 03/17/2024    MCV 90.5

## 2024-04-05 NOTE — H&P (VIEW-ONLY)
Musculoskeletal:      Cervical back: Neck supple.   Skin:     General: Skin is warm and dry.      Coloration: Skin is not jaundiced.      Findings: No bruising, erythema or rash.   Neurological:      General: No focal deficit present.      Mental Status: He is alert and oriented to person, place, and time.      Cranial Nerves: No cranial nerve deficit.      Gait: Gait normal.   Psychiatric:         Mood and Affect: Mood normal.         Speech: Speech normal.         Behavior: Behavior is cooperative.      Comments: Disheveled appearance          PROVISIONAL DIAGNOSES / SURGERY:      Recurrent major depression    ECT    Patient Active Problem List    Diagnosis Date Noted    Severe recurrent major depression with psychotic features ([facility]) 03/13/2024    Altered mental status 02/26/2024    Acute psychosis ([facility]) 02/12/2024    Secondary hypertension 02/10/2024    Carotid pseudoaneurysm ([facility]) 02/09/2024    Agitation 02/09/2024    Stenosis of right carotid artery greater than 50% 02/09/2024    Psychosis, paranoid ([facility]) 02/06/2024    Abnormal CT of the head 02/06/2024    Psychosis ([facility]) 02/06/2024    Pseudoaneurysm ([facility]) 02/06/2024    Essential hypertension 12/01/2016    Primary insomnia 12/01/2016    Influenza vaccine refused 12/01/2016    Refused Streptococcus pneumoniae vaccination 12/01/2016           BENOIT Kebede CNP on 4/5/2024 at 6:20 AM

## 2024-04-05 NOTE — ANESTHESIA PRE PROCEDURE
Department of Anesthesiology  Preprocedure Note       Name:  William Babcock   Age:  64 y.o.  :  1960                                          MRN:  [id number]         Date:  2024      Surgeon: Dr. Rosa    Procedure: ECT    Medications prior to admission:   Prior to Admission medications    Medication Sig Start Date End Date Taking? Authorizing Provider   aspirin 81 MG chewable tablet Take 1 tablet by mouth daily 3/29/24   Donnie Rosa MD   atenolol (TENORMIN) 25 MG tablet Take 0.5 tablets by mouth daily 3/29/24   Donnie Rosa MD   clopidogrel (PLAVIX) 75 MG tablet Take 1 tablet by mouth daily 3/29/24   Donnie Rosa MD   donepezil (ARICEPT) 5 MG tablet Take 1 tablet by mouth nightly 3/29/24   Donnie Rosa MD   diphenhydrAMINE-zinc acetate 2-0.1 % cream Apply topically 3 times daily as needed. 3/29/24   Donnie Rosa MD   fluticasone (FLONASE) 50 MCG/ACT nasal spray 2 sprays by Each Nostril route daily 3/29/24   Donnie Rosa MD   OLANZapine (ZYPREXA) 7.5 MG tablet Take 1 tablet by mouth nightly 3/29/24   Donnie Rosa MD   venlafaxine (EFFEXOR XR) 37.5 MG extended release capsule Take 1 capsule by mouth daily (with breakfast) 3/29/24   Donnie Rosa MD   traZODone (DESYREL) 50 MG tablet Take 1 tablet by mouth nightly as needed for Sleep 3/29/24   Donnie Rosa MD       Current medications:    Current Outpatient Medications   Medication Sig Dispense Refill    aspirin 81 MG chewable tablet Take 1 tablet by mouth daily 30 tablet 0    atenolol (TENORMIN) 25 MG tablet Take 0.5 tablets by mouth daily 30 tablet 3    clopidogrel (PLAVIX) 75 MG tablet Take 1 tablet by mouth daily 30 tablet 0    donepezil (ARICEPT) 5 MG tablet Take 1 tablet by mouth nightly 30 tablet 0    diphenhydrAMINE-zinc acetate 2-0.1 % cream Apply topically 3 times daily as needed. 35 g 0    fluticasone (FLONASE) 50 MCG/ACT nasal spray 2 sprays by Each Nostril route daily 16 g 0    OLANZapine (ZYPREXA) 
+1 pulses b/l

## 2024-04-05 NOTE — DISCHARGE INSTRUCTIONS
ELECTROCONVULSIVE THERAPY DISCHARGE INSTRUCTIONS         1. Activity - Rest today. The anesthetic agents you have received can make you feel drowsy or tired.  A responsible person must drive you home and stay with you for 8 hours after discharge from the Hospital. Do not drive a motor vehicle or operate any machinery for 24 hours. .   *Do not make any complex decisions or execute any legal documents until 3 weeks AFTER your last treatment.  If you have any questions regarding this, speak with your psychiatrist first.*    2. Diet - Nothing to eat or drink after midnight the night of your ECT treatment. After ECT, start with clear liquids, if you can drink without coughing, you may proceed with gradually progressing to your usual diet.    No alcoholic beverages for 24 hours.    3. Medications - Take your daily medications as prescribed, after you return home from today's ECT. If you take a Beta Blocker or have been prescribed Imitrex, you may be instructed to take these medications the morning of ECT. If you are unsure please ask the physician.     Take with these medication the morning of ECT with one Tablespoon of water.   Tylenol 650 mg  All blood pressure medications  ***    4. You may experience the following after your treatment:   a. Poor memory   b. Poor balance   c. Headaches   d. Confusion   e. Muscle soreness   f. Nausea      5. Special Precautions - Contact you physician immediately if these occur:   a. Signs of infection: redness, swelling, heat, red streaks at the site of the IV   b. Excessive pain unrelieved by prescription pain medications   c. Nausea and vomiting that persists beyond the first day after your procedure    6. Next Procedure Date:   Your next ECT treatment is scheduled for 8:40 am. on Friday, April 12, 2024.    Please arrive at 7:00 am.    **You are also invited to join us at our monthly Electroconvulsive Therapy Peer Support Group.  This support group is held once a month, on the last

## 2024-04-05 NOTE — PROCEDURES
Bilateral ECT Procedure Report  William Babcock   4/5/2024 1960         Attending:Aníbal Joseph MD  Preprocedure diagnosis: Major depressive disorder, recurrent, severe with psychotic symptoms (F33.3)  Postprocedure diagnosis: SAME AS PRE-PROCEDURE DIAGNOSIS  Treatment Number: This is treatment # 9   Patient Status: Outpatient   Type of ECT: Bilateral Brief Pulse  Medications  Preprocedure: Tylenol 650mg  Anesthetic: Methohexital 70 mg  Paralytic: Succinylcholine 70 mg  Post procedure: none needed   Cuff placement: Left Lower Extremity    MECTA Settings 1st Stimulus:     Pulse width: 1.0 ms   Frequency:  60 hz   Duration:   3.0 seconds   Static Impedance: 355 ohms             Dynamic Impedance: 161 ohms             Motor Seizure Duration: 08 seconds  EEG Seizure Duration: 51 seconds             The patient's ECT treatment was performed using MECTA machine  .  Timeout:  Time out was performed using two patient identifiers and confirmation of procedure.     Patient Preparation: Preprocedure documentation, labs, H&P were all verified and reviewed.  The patient was placed in supine position.  EEG leads were placed for monitoring of seizure.   Taoist areas bilaterally cleaned and prepped.  Conductive gel  was applied over stimulus electrode site.   The patient was pre-oxygenated.       Procedure in detail: Medications were administered by anesthesia using intravenous access at the doses listed previously in this summary.  Anesthetic administered and once confirmation the patient is anesthetized, the patient's blood pressure cuff on lower extremity was inflated to 100mmHg in excess of patient's blood pressure.  At this time, the neuromuscular blockade was administered intravenously by anesthesia.  Upper extremity fasciculation were verified followed by lower extremity fasciculation.  Once fasciculations terminated, confirmation of affective neuromuscular blockade demonstrated by absence of withdrawal reflex.  Bite

## 2024-04-11 ENCOUNTER — ANESTHESIA EVENT (OUTPATIENT)
Dept: POSTOP/PACU | Age: 64
End: 2024-04-11
Payer: MEDICARE

## 2024-04-12 ENCOUNTER — ANESTHESIA (OUTPATIENT)
Dept: POSTOP/PACU | Age: 64
End: 2024-04-12
Payer: MEDICARE

## 2024-04-12 ENCOUNTER — HOSPITAL ENCOUNTER (OUTPATIENT)
Dept: POSTOP/PACU | Age: 64
Discharge: HOME | End: 2024-04-12
Payer: MEDICARE

## 2024-04-12 VITALS
OXYGEN SATURATION: 92 % | WEIGHT: 140 LBS | DIASTOLIC BLOOD PRESSURE: 89 MMHG | RESPIRATION RATE: 15 BRPM | HEIGHT: 68 IN | TEMPERATURE: 96.4 F | HEART RATE: 56 BPM | SYSTOLIC BLOOD PRESSURE: 146 MMHG | BODY MASS INDEX: 21.22 KG/M2

## 2024-04-12 DIAGNOSIS — F33.2 SEVERE RECURRENT MAJOR DEPRESSION WITHOUT PSYCHOTIC FEATURES (HCC): Primary | ICD-10-CM

## 2024-04-12 PROCEDURE — 7100000000 HC PACU RECOVERY - FIRST 15 MIN: Performed by: ANESTHESIOLOGY

## 2024-04-12 PROCEDURE — 7100000001 HC PACU RECOVERY - ADDTL 15 MIN: Performed by: ANESTHESIOLOGY

## 2024-04-12 PROCEDURE — 3700000000 HC ANESTHESIA ATTENDED CARE: Performed by: ANESTHESIOLOGY

## 2024-04-12 PROCEDURE — 6360000002 HC RX W HCPCS: Performed by: NURSE ANESTHETIST, CERTIFIED REGISTERED

## 2024-04-12 PROCEDURE — 90870 ELECTROCONVULSIVE THERAPY: CPT | Performed by: PSYCHIATRY & NEUROLOGY

## 2024-04-12 PROCEDURE — 90870 ELECTROCONVULSIVE THERAPY: CPT

## 2024-04-12 PROCEDURE — 2580000003 HC RX 258: Performed by: ANESTHESIOLOGY

## 2024-04-12 PROCEDURE — 2500000003 HC RX 250 WO HCPCS: Performed by: NURSE ANESTHETIST, CERTIFIED REGISTERED

## 2024-04-12 RX ORDER — LIDOCAINE HYDROCHLORIDE 10 MG/ML
1 INJECTION, SOLUTION EPIDURAL; INFILTRATION; INTRACAUDAL; PERINEURAL
Status: DISCONTINUED | OUTPATIENT
Start: 2024-04-12 | End: 2024-04-13 | Stop reason: HOSPADM

## 2024-04-12 RX ORDER — ETOMIDATE 2 MG/ML
INJECTION INTRAVENOUS
Status: COMPLETED
Start: 2024-04-12 | End: 2024-04-12

## 2024-04-12 RX ORDER — SODIUM CHLORIDE 0.9 % (FLUSH) 0.9 %
5-40 SYRINGE (ML) INJECTION EVERY 12 HOURS SCHEDULED
Status: DISCONTINUED | OUTPATIENT
Start: 2024-04-12 | End: 2024-04-13 | Stop reason: HOSPADM

## 2024-04-12 RX ORDER — SODIUM CHLORIDE 9 MG/ML
INJECTION, SOLUTION INTRAVENOUS PRN
Status: DISCONTINUED | OUTPATIENT
Start: 2024-04-12 | End: 2024-04-13 | Stop reason: HOSPADM

## 2024-04-12 RX ORDER — SODIUM CHLORIDE 0.9 % (FLUSH) 0.9 %
5-40 SYRINGE (ML) INJECTION PRN
Status: DISCONTINUED | OUTPATIENT
Start: 2024-04-12 | End: 2024-04-13 | Stop reason: HOSPADM

## 2024-04-12 RX ORDER — LORAZEPAM 2 MG/ML
INJECTION INTRAMUSCULAR
Status: COMPLETED
Start: 2024-04-12 | End: 2024-04-12

## 2024-04-12 RX ORDER — SUCCINYLCHOLINE/SOD CL,ISO/PF 200MG/10ML
SYRINGE (ML) INTRAVENOUS PRN
Status: DISCONTINUED | OUTPATIENT
Start: 2024-04-12 | End: 2024-04-12 | Stop reason: SDUPTHER

## 2024-04-12 RX ORDER — SODIUM CHLORIDE, SODIUM LACTATE, POTASSIUM CHLORIDE, CALCIUM CHLORIDE 600; 310; 30; 20 MG/100ML; MG/100ML; MG/100ML; MG/100ML
INJECTION, SOLUTION INTRAVENOUS CONTINUOUS
Status: DISCONTINUED | OUTPATIENT
Start: 2024-04-12 | End: 2024-04-13 | Stop reason: HOSPADM

## 2024-04-12 RX ORDER — LORAZEPAM 2 MG/ML
INJECTION INTRAMUSCULAR PRN
Status: DISCONTINUED | OUTPATIENT
Start: 2024-04-12 | End: 2024-04-12 | Stop reason: SDUPTHER

## 2024-04-12 RX ORDER — SUCCINYLCHOLINE/SOD CL,ISO/PF 200MG/10ML
SYRINGE (ML) INTRAVENOUS
Status: COMPLETED
Start: 2024-04-12 | End: 2024-04-12

## 2024-04-12 RX ORDER — ETOMIDATE 2 MG/ML
INJECTION INTRAVENOUS PRN
Status: DISCONTINUED | OUTPATIENT
Start: 2024-04-12 | End: 2024-04-12 | Stop reason: SDUPTHER

## 2024-04-12 RX ADMIN — Medication 80 MG: at 08:29

## 2024-04-12 RX ADMIN — ETOMIDATE INJECTION 20 MG: 2 SOLUTION INTRAVENOUS at 08:29

## 2024-04-12 RX ADMIN — SODIUM CHLORIDE, POTASSIUM CHLORIDE, SODIUM LACTATE AND CALCIUM CHLORIDE: 600; 310; 30; 20 INJECTION, SOLUTION INTRAVENOUS at 08:18

## 2024-04-12 RX ADMIN — LORAZEPAM 1 MG: 2 INJECTION INTRAMUSCULAR; INTRAVENOUS at 08:32

## 2024-04-12 NOTE — ANESTHESIA PRE PROCEDURE
Department of Anesthesiology  Preprocedure Note       Name:  William Babcock   Age:  64 y.o.  :  1960                                          MRN:  [id number]         Date:  2024      Surgeon: Dr. Rosa    Procedure: ECT    Medications prior to admission:   Prior to Admission medications    Medication Sig Start Date End Date Taking? Authorizing Provider   aspirin 81 MG chewable tablet Take 1 tablet by mouth daily 3/29/24   Donnie Rosa MD   atenolol (TENORMIN) 25 MG tablet Take 0.5 tablets by mouth daily 3/29/24   Donnie Rosa MD   clopidogrel (PLAVIX) 75 MG tablet Take 1 tablet by mouth daily 3/29/24   Donnie Rosa MD   donepezil (ARICEPT) 5 MG tablet Take 1 tablet by mouth nightly 3/29/24   Donnie Rosa MD   diphenhydrAMINE-zinc acetate 2-0.1 % cream Apply topically 3 times daily as needed. 3/29/24   Donnie Rosa MD   fluticasone (FLONASE) 50 MCG/ACT nasal spray 2 sprays by Each Nostril route daily 3/29/24   Donnie Rosa MD   OLANZapine (ZYPREXA) 7.5 MG tablet Take 1 tablet by mouth nightly 3/29/24   Donnie Rosa MD   venlafaxine (EFFEXOR XR) 37.5 MG extended release capsule Take 1 capsule by mouth daily (with breakfast) 3/29/24   Donnie Rosa MD   traZODone (DESYREL) 50 MG tablet Take 1 tablet by mouth nightly as needed for Sleep 3/29/24   Donnie Rosa MD       Current medications:    Current Outpatient Medications   Medication Sig Dispense Refill    aspirin 81 MG chewable tablet Take 1 tablet by mouth daily 30 tablet 0    atenolol (TENORMIN) 25 MG tablet Take 0.5 tablets by mouth daily 30 tablet 3    clopidogrel (PLAVIX) 75 MG tablet Take 1 tablet by mouth daily 30 tablet 0    donepezil (ARICEPT) 5 MG tablet Take 1 tablet by mouth nightly 30 tablet 0    diphenhydrAMINE-zinc acetate 2-0.1 % cream Apply topically 3 times daily as needed. 35 g 0    fluticasone (FLONASE) 50 MCG/ACT nasal spray 2 sprays by Each Nostril route daily 16 g 0    OLANZapine (ZYPREXA)

## 2024-04-12 NOTE — PROCEDURES
Bilateral ECT Procedure Report  William Babcock   4/12/2024 1960         Attending:Donnie Rosa MD  Preprocedure diagnosis: Major depressive disorder, recurrent, severe with psychotic symptoms (F33.3)  Postprocedure diagnosis: SAME AS PRE-PROCEDURE DIAGNOSIS  Treatment Number: This is treatment # 9   Patient Status: Outpatient   Type of ECT: Bilateral Brief Pulse  Medications  Preprocedure: Tylenol 650mg  Anesthetic: Methohexital 70 mg  Paralytic: Succinylcholine 70 mg  Post procedure: none needed   Cuff placement: Left Lower Extremity    MECTA Settings 1st Stimulus:     Pulse width: 1.0 ms   Frequency:  60 hz   Duration:   3.0 seconds   Static Impedance: 1160 ohms             Dynamic Impedance: 166 ohms             Motor Seizure Duration: 22seconds  EEG Seizure Duration: 81 seconds             The patient's ECT treatment was performed using MECTA machine  .  Timeout:  Time out was performed using two patient identifiers and confirmation of procedure.     Patient Preparation: Preprocedure documentation, labs, H&P were all verified and reviewed.  The patient was placed in supine position.  EEG leads were placed for monitoring of seizure.   [place] areas bilaterally cleaned and prepped.  Conductive gel  was applied over stimulus electrode site.   The patient was pre-oxygenated.       Procedure in detail: Medications were administered by anesthesia using intravenous access at the doses listed previously in this summary.  Anesthetic administered and once confirmation the patient is anesthetized, the patient's blood pressure cuff on lower extremity was inflated to 100mmHg in excess of patient's blood pressure.  At this time, the neuromuscular blockade was administered intravenously by anesthesia.  Upper extremity fasciculation were verified followed by lower extremity fasciculation.  Once fasciculations terminated, confirmation of affective neuromuscular blockade demonstrated by absence of withdrawal reflex.  Bite

## 2024-04-12 NOTE — INTERVAL H&P NOTE
Update History & Physical    The patient's History and Physical of April 5, 2024 was reviewed with the patient and I examined the patient. Any changes are as documented below.     William Babcock is 64 y.o., male, here for ECT treatment. Last ECT treatment was 04/05/2024. Pt tolerated last treatment well and has voiced improvement. Sleep and appetite are good. Denies suicidal or homicidal ideation. Denies hallucinations. Pt has been taking all medications as prescribed.     Pt AAO x 3 in NAD. HRRR. No adventitious lung sounds. No respiratory distress. NPO p MN. Took no medications this am. Denies recent or current chest pain/pressure, palpitations, SOB, recent URI, fever or chills. Denies any new open wounds or rash. Denies any new cracked, broken teeth. Review vitals per RN flowsheet.       Electronically signed by BENOIT Kebede CNP on 4/12/2024 at 7:24 AM

## 2024-04-12 NOTE — ANESTHESIA POSTPROCEDURE EVALUATION
Department of Anesthesiology  Postprocedure Note    Patient: William Babcock  MRN: [id number]  YOB: 1960  Date of evaluation: 4/12/2024    Procedure Summary       Date: 04/12/24 Room / Location: [facility] PACU    Anesthesia Start: 0825 Anesthesia Stop: 0836    Procedure: ECT W/ ANESTHESIA Diagnosis:     Scheduled Providers:  Responsible Provider: Chanda Adams MD    Anesthesia Type: General ASA Status: 3            Anesthesia Type: General    Wanda Phase I: Wanda Score: 10    Wanda Phase II:      Anesthesia Post Evaluation    Comments: POST- ANESTHESIA EVALUATION       Pt Name: William Babcock  MRN: [id number]  YOB: 1960  Date of evaluation: 4/12/2024  Time:  10:04 AM      BP (!) 146/89   Pulse 56   Temp (!) 96.4 °F (35.8 °C)   Resp 15   Ht 1.727 m (5' 7.99\")   Wt 63.5 kg (140 lb)   SpO2 92%   BMI 21.29 kg/m²      Consciousness Level  Awake  Cardiopulmonary Status  Stable  Pain Adequately Treated YES  Nausea / Vomiting  NO  Adequate Hydration  YES  Anesthesia Related Complications NONE      Electronically signed by Chanda Adams MD on 4/12/2024 at 10:04 AM      No notable events documented.

## 2024-04-12 NOTE — DISCHARGE INSTRUCTIONS
business hours please call [facility] at [phone] to speak with a nurse, or go to your nearest emergency room.     If you need to schedule, reschedule or cancel an appointment, please call Kizzy Liao, ECT Coordinator at [phone].     You will need to arrange transportation to and from the hospital for all outpatient ECT treatments.    Bring a current list of your medications, including dosages with you to every ECT treatment and arrive 1 hour and 15 minutes before your scheduled ECT time.           Sedation or General Anesthesia, Adult  Care After  Refer to this sheet in the next 24 hours. These instructions provide you with information on caring for yourself after your procedure. Your caregiver may also give you more specific instructions. Your treatment has been planned according to current medical practices, but problems sometimes occur. Call your caregiver if you have any problems or questions after your procedure.   HOME CARE INSTRUCTIONS   Do not participate in any activities that require you to be alert or coordinated. Do not:  Drive.  Swim.  Ride a bicycle.  Operate heavy machinery.  Cook.  Use power tools.  Climb ladders.  Work at heights.  Take a bath.  Do not drink alcohol.  Do not make any important decisions or sign legal documents.  Stay with an adult.  The first meal following your procedure should be light and small. Avoid solid foods if you feel sick to your stomach (nauseous) or if you throw up (vomit).  Drink enough fluids to keep your urine clear or pale yellow.  Only take your usual medicines or new medicines if your caregiver approves them.  Only take over-the-counter or prescription medicines for pain, discomfort, or fever as directed by your caregiver.  Keep all follow-up appointments as directed by your caregiver.  SEEK IMMEDIATE MEDICAL CARE IF:   You are not feeling normal or behaving normally after 24 hours.  You have persistent nausea and vomiting.  You

## 2024-04-18 RX ORDER — SODIUM CHLORIDE 9 MG/ML
INJECTION, SOLUTION INTRAVENOUS PRN
Status: CANCELLED | OUTPATIENT
Start: 2024-04-24

## 2024-04-18 RX ORDER — SODIUM CHLORIDE 0.9 % (FLUSH) 0.9 %
5-40 SYRINGE (ML) INJECTION EVERY 12 HOURS SCHEDULED
Status: CANCELLED | OUTPATIENT
Start: 2024-04-24

## 2024-04-18 RX ORDER — LIDOCAINE HYDROCHLORIDE 10 MG/ML
1 INJECTION, SOLUTION EPIDURAL; INFILTRATION; INTRACAUDAL; PERINEURAL
Status: CANCELLED | OUTPATIENT
Start: 2024-04-24 | End: 2024-04-25

## 2024-04-18 RX ORDER — SODIUM CHLORIDE, SODIUM LACTATE, POTASSIUM CHLORIDE, CALCIUM CHLORIDE 600; 310; 30; 20 MG/100ML; MG/100ML; MG/100ML; MG/100ML
INJECTION, SOLUTION INTRAVENOUS CONTINUOUS
Status: CANCELLED | OUTPATIENT
Start: 2024-04-24

## 2024-04-18 RX ORDER — SODIUM CHLORIDE 0.9 % (FLUSH) 0.9 %
5-40 SYRINGE (ML) INJECTION PRN
Status: CANCELLED | OUTPATIENT
Start: 2024-04-24

## 2024-04-19 ENCOUNTER — HOSPITAL ENCOUNTER (OUTPATIENT)
Dept: POSTOP/PACU | Age: 64
Discharge: HOME | End: 2024-04-19

## 2024-04-19 DIAGNOSIS — F33.3 SEVERE RECURRENT MAJOR DEPRESSION WITH PSYCHOTIC FEATURES (HCC): Primary | ICD-10-CM

## 2024-04-22 DIAGNOSIS — F33.3 SEVERE RECURRENT MAJOR DEPRESSION WITH PSYCHOTIC FEATURES (HCC): Primary | ICD-10-CM

## 2024-04-23 ENCOUNTER — ANESTHESIA EVENT (OUTPATIENT)
Dept: POSTOP/PACU | Age: 64
End: 2024-04-23
Payer: MEDICARE

## 2024-04-24 ENCOUNTER — ANESTHESIA (OUTPATIENT)
Dept: POSTOP/PACU | Age: 64
End: 2024-04-24
Payer: MEDICARE

## 2024-04-24 ENCOUNTER — HOSPITAL ENCOUNTER (OUTPATIENT)
Dept: POSTOP/PACU | Age: 64
Discharge: HOME | End: 2024-04-24
Payer: MEDICARE

## 2024-04-24 VITALS
OXYGEN SATURATION: 92 % | TEMPERATURE: 97.2 F | DIASTOLIC BLOOD PRESSURE: 88 MMHG | HEIGHT: 68 IN | SYSTOLIC BLOOD PRESSURE: 125 MMHG | BODY MASS INDEX: 21.22 KG/M2 | RESPIRATION RATE: 14 BRPM | WEIGHT: 140 LBS | HEART RATE: 63 BPM

## 2024-04-24 DIAGNOSIS — F33.3 SEVERE RECURRENT MAJOR DEPRESSION WITH PSYCHOTIC FEATURES (HCC): Primary | ICD-10-CM

## 2024-04-24 PROCEDURE — 90870 ELECTROCONVULSIVE THERAPY: CPT | Performed by: ANESTHESIOLOGY

## 2024-04-24 PROCEDURE — 2580000003 HC RX 258: Performed by: ANESTHESIOLOGY

## 2024-04-24 PROCEDURE — 7100000000 HC PACU RECOVERY - FIRST 15 MIN: Performed by: ANESTHESIOLOGY

## 2024-04-24 PROCEDURE — 90870 ELECTROCONVULSIVE THERAPY: CPT | Performed by: PSYCHIATRY & NEUROLOGY

## 2024-04-24 PROCEDURE — 3700000001 HC ADD 15 MINUTES (ANESTHESIA): Performed by: ANESTHESIOLOGY

## 2024-04-24 PROCEDURE — 2500000003 HC RX 250 WO HCPCS: Performed by: NURSE ANESTHETIST, CERTIFIED REGISTERED

## 2024-04-24 PROCEDURE — 3700000000 HC ANESTHESIA ATTENDED CARE: Performed by: ANESTHESIOLOGY

## 2024-04-24 PROCEDURE — 7100000001 HC PACU RECOVERY - ADDTL 15 MIN: Performed by: ANESTHESIOLOGY

## 2024-04-24 PROCEDURE — 6360000002 HC RX W HCPCS: Performed by: NURSE ANESTHETIST, CERTIFIED REGISTERED

## 2024-04-24 RX ORDER — LORAZEPAM 2 MG/ML
INJECTION INTRAMUSCULAR PRN
Status: DISCONTINUED | OUTPATIENT
Start: 2024-04-24 | End: 2024-04-24 | Stop reason: SDUPTHER

## 2024-04-24 RX ORDER — SUCCINYLCHOLINE/SOD CL,ISO/PF 200MG/10ML
SYRINGE (ML) INTRAVENOUS
Status: COMPLETED
Start: 2024-04-24 | End: 2024-04-24

## 2024-04-24 RX ORDER — HALOPERIDOL 5 MG/ML
INJECTION INTRAMUSCULAR
Status: DISCONTINUED
Start: 2024-04-24 | End: 2024-04-24 | Stop reason: WASHOUT

## 2024-04-24 RX ORDER — GLYCOPYRROLATE 0.2 MG/ML
INJECTION INTRAMUSCULAR; INTRAVENOUS
Status: DISCONTINUED
Start: 2024-04-24 | End: 2024-04-24 | Stop reason: WASHOUT

## 2024-04-24 RX ORDER — ETOMIDATE 2 MG/ML
INJECTION INTRAVENOUS
Status: COMPLETED
Start: 2024-04-24 | End: 2024-04-24

## 2024-04-24 RX ORDER — SODIUM CHLORIDE, SODIUM LACTATE, POTASSIUM CHLORIDE, CALCIUM CHLORIDE 600; 310; 30; 20 MG/100ML; MG/100ML; MG/100ML; MG/100ML
INJECTION, SOLUTION INTRAVENOUS CONTINUOUS
Status: DISCONTINUED | OUTPATIENT
Start: 2024-04-24 | End: 2024-04-25 | Stop reason: HOSPADM

## 2024-04-24 RX ORDER — SODIUM CHLORIDE 0.9 % (FLUSH) 0.9 %
5-40 SYRINGE (ML) INJECTION PRN
Status: DISCONTINUED | OUTPATIENT
Start: 2024-04-24 | End: 2024-04-25 | Stop reason: HOSPADM

## 2024-04-24 RX ORDER — SUCCINYLCHOLINE/SOD CL,ISO/PF 200MG/10ML
SYRINGE (ML) INTRAVENOUS PRN
Status: DISCONTINUED | OUTPATIENT
Start: 2024-04-24 | End: 2024-04-24 | Stop reason: SDUPTHER

## 2024-04-24 RX ORDER — ETOMIDATE 2 MG/ML
INJECTION INTRAVENOUS PRN
Status: DISCONTINUED | OUTPATIENT
Start: 2024-04-24 | End: 2024-04-24 | Stop reason: SDUPTHER

## 2024-04-24 RX ORDER — SODIUM CHLORIDE 0.9 % (FLUSH) 0.9 %
5-40 SYRINGE (ML) INJECTION EVERY 12 HOURS SCHEDULED
Status: DISCONTINUED | OUTPATIENT
Start: 2024-04-24 | End: 2024-04-25 | Stop reason: HOSPADM

## 2024-04-24 RX ORDER — SODIUM CHLORIDE 9 MG/ML
INJECTION, SOLUTION INTRAVENOUS PRN
Status: DISCONTINUED | OUTPATIENT
Start: 2024-04-24 | End: 2024-04-25 | Stop reason: HOSPADM

## 2024-04-24 RX ORDER — LORAZEPAM 2 MG/ML
INJECTION INTRAMUSCULAR
Status: COMPLETED
Start: 2024-04-24 | End: 2024-04-24

## 2024-04-24 RX ORDER — LIDOCAINE HYDROCHLORIDE 10 MG/ML
1 INJECTION, SOLUTION EPIDURAL; INFILTRATION; INTRACAUDAL; PERINEURAL
Status: DISCONTINUED | OUTPATIENT
Start: 2024-04-24 | End: 2024-04-25 | Stop reason: HOSPADM

## 2024-04-24 RX ADMIN — SODIUM CHLORIDE, POTASSIUM CHLORIDE, SODIUM LACTATE AND CALCIUM CHLORIDE: 600; 310; 30; 20 INJECTION, SOLUTION INTRAVENOUS at 08:24

## 2024-04-24 RX ADMIN — Medication 80 MG: at 08:31

## 2024-04-24 RX ADMIN — ETOMIDATE INJECTION 20 MG: 2 SOLUTION INTRAVENOUS at 08:31

## 2024-04-24 RX ADMIN — LORAZEPAM 1 MG: 2 INJECTION INTRAMUSCULAR; INTRAVENOUS at 08:37

## 2024-04-24 ASSESSMENT — PAIN - FUNCTIONAL ASSESSMENT: PAIN_FUNCTIONAL_ASSESSMENT: 0-10

## 2024-04-24 NOTE — ANESTHESIA POSTPROCEDURE EVALUATION
Department of Anesthesiology  Postprocedure Note    Patient: William Babcock  MRN: [id number]  YOB: 1960  Date of evaluation: 4/24/2024    Procedure Summary       Date: 04/24/24 Room / Location: [facility] PACU    Anesthesia Start: 0824 Anesthesia Stop: 0839    Procedure: ECT W/ ANESTHESIA Diagnosis: Major depressive disorder, recurrent, severe with psychotic symptoms    Scheduled Providers:  Responsible Provider: Aris Nichole MD    Anesthesia Type: general ASA Status: 2            Anesthesia Type: No value filed.    Wanda Phase I: Wanda Score: 5    Wanda Phase II:      Anesthesia Post Evaluation    Patient location during evaluation: PACU  Patient participation: complete - patient participated  Level of consciousness: awake and alert  Airway patency: patent  Nausea & Vomiting: no vomiting  Cardiovascular status: hemodynamically stable  Respiratory status: acceptable  Hydration status: euvolemic  Comments: POST- ANESTHESIA EVALUATION       Pt Name: William Babcock  MRN: [id number]  YOB: 1960  Date of evaluation: 4/24/2024  Time:  9:36 AM      BP (!) 142/84   Pulse 63   Temp 97.9 °F (36.6 °C) (Infrared)   Resp 13   Ht 1.727 m (5' 7.99\")   Wt 63.5 kg (140 lb)   SpO2 93%   BMI 21.29 kg/m²      Consciousness Level  Awake  Cardiopulmonary Status  Stable  Pain Adequately Treated YES  Nausea / Vomiting  NO  Adequate Hydration  YES  Anesthesia Related Complications NONE      Electronically signed by Aris Nichole MD on 4/24/2024 at 9:36 AM         Pain management: satisfactory to patient    No notable events documented.

## 2024-04-24 NOTE — PROCEDURES
Bilateral ECT Procedure Report  William Babcock   4/24/2024 1960         Attending:Donnie Rosa MD  Preprocedure diagnosis: Major depressive disorder, recurrent, severe with psychotic symptoms (F33.3)  Postprocedure diagnosis: SAME AS PRE-PROCEDURE DIAGNOSIS  Treatment Number: This is treatment # 10   Patient Status: Outpatient   Type of ECT: Bilateral Brief Pulse  Medications  Preprocedure: Tylenol 650mg  Anesthetic: Methohexital 70 mg  Paralytic: Succinylcholine 70 mg  Post procedure: none needed   Cuff placement: Left Lower Extremity    MECTA Settings 1st Stimulus:     Pulse width: 1.0 ms   Frequency:  60 hz   Duration:   3.0 seconds   Static Impedance: 415 ohms             Dynamic Impedance: 163 ohms             Motor Seizure Duration: 90 seconds  EEG Seizure Duration: 96 seconds             The patient's ECT treatment was performed using MECTA machine  .  Timeout:  Time out was performed using two patient identifiers and confirmation of procedure.     Patient Preparation: Preprocedure documentation, labs, H&P were all verified and reviewed.  The patient was placed in supine position.  EEG leads were placed for monitoring of seizure.   Taoist areas bilaterally cleaned and prepped.  Conductive gel  was applied over stimulus electrode site.   The patient was pre-oxygenated.       Procedure in detail: Medications were administered by anesthesia using intravenous access at the doses listed previously in this summary.  Anesthetic administered and once confirmation the patient is anesthetized, the patient's blood pressure cuff on lower extremity was inflated to 100mmHg in excess of patient's blood pressure.  At this time, the neuromuscular blockade was administered intravenously by anesthesia.  Upper extremity fasciculation were verified followed by lower extremity fasciculation.  Once fasciculations terminated, confirmation of affective neuromuscular blockade demonstrated by absence of withdrawal reflex.  Bite

## 2024-04-24 NOTE — DISCHARGE INSTRUCTIONS
not:  Drive.  Swim.  Ride a bicycle.  Operate heavy machinery.  Cook.  Use power tools.  Climb ladders.  Work at heights.  Take a bath.  Do not drink alcohol.  Do not make any important decisions or sign legal documents.  Stay with an adult.  The first meal following your procedure should be light and small. Avoid solid foods if you feel sick to your stomach (nauseous) or if you throw up (vomit).  Drink enough fluids to keep your urine clear or pale yellow.  Only take your usual medicines or new medicines if your caregiver approves them.  Only take over-the-counter or prescription medicines for pain, discomfort, or fever as directed by your caregiver.  Keep all follow-up appointments as directed by your caregiver.  SEEK IMMEDIATE MEDICAL CARE IF:   You are not feeling normal or behaving normally after 24 hours.  You have persistent nausea and vomiting.  You are unable to drink fluids or eat food.  You have difficulty urinating.  You have difficulty breathing or speaking.  You have blue or gray skin.  There is difficulty waking or you cannot be woken up.  You have heavy bleeding, redness, or a lot of swelling where the sedative or anesthesia entered your skin (intravenous site).  You have a rash.  MAKE SURE YOU:  Understand these instructions.  Will watch your condition.  Will get help right away if you are not doing well or get worse.  Document Released: 12/18/2006 Document Revised: 06/18/2013 Document Reviewed: 04/17/2013  ExitCare® Patient Information ©2013 [employer].

## 2024-04-24 NOTE — PROGRESS NOTES
Smokeless tobacco: Never   Substance Use Topics    Alcohol use: No      Family History   Problem Relation Age of Onset    Colon Cancer Father     Colon Cancer Sister           Objective:       Mental Status Evaluation:  Appearance:  Wearing gown, on stretcher, good groomed   Behavior:  Cooperative, pleasant   Speech:  Normal rate, low volume and tone   Mood:  \"Good\"   Affect:  Congruent,    Thought Process:  Coherent, linear, thought blocking improved   Thought Content:  Denies suicidal ideation, no reports of self-injurious behavior   Sensorium:  Does not appear to attend to internal stimuli   Cognition:  Oriented to person, place and general circumstance   Insight:  Improved   Judgment:  Improving     Assessment:     Diagnosis: Major depressive disorder, recurrent, severe with psychosis    Plan:     Continue bilateral ECT, taper frequency to every other week  Monitor for stability in symptoms    Update consent and H&P every 30 days while undergoing ECT treatment, update labs every 6 months.   Patient verbalizes understanding of risks/benefits/alternatives to ECT.   Informed consent obtained 4/24/2024.

## 2024-04-24 NOTE — ANESTHESIA PRE PROCEDURE
Department of Anesthesiology  Preprocedure Note       Name:  William Babcock   Age:  64 y.o.  :  1960                                          MRN:  [id number]         Date:  2024      Surgeon: * Surgery not found *    Procedure:     Medications prior to admission:   Prior to Admission medications    Medication Sig Start Date End Date Taking? Authorizing Provider   aspirin 81 MG chewable tablet Take 1 tablet by mouth daily 3/29/24   Donnie Rosa MD   atenolol (TENORMIN) 25 MG tablet Take 0.5 tablets by mouth daily 3/29/24   Donnie Rosa MD   clopidogrel (PLAVIX) 75 MG tablet Take 1 tablet by mouth daily 3/29/24   Donnie Rosa MD   donepezil (ARICEPT) 5 MG tablet Take 1 tablet by mouth nightly 3/29/24   Donnie Rosa MD   diphenhydrAMINE-zinc acetate 2-0.1 % cream Apply topically 3 times daily as needed. 3/29/24   Donnie Rosa MD   fluticasone (FLONASE) 50 MCG/ACT nasal spray 2 sprays by Each Nostril route daily 3/29/24   Donnie Rosa MD   OLANZapine (ZYPREXA) 7.5 MG tablet Take 1 tablet by mouth nightly 3/29/24   Donnie Rosa MD   venlafaxine (EFFEXOR XR) 37.5 MG extended release capsule Take 1 capsule by mouth daily (with breakfast) 3/29/24   Donnie Rosa MD   traZODone (DESYREL) 50 MG tablet Take 1 tablet by mouth nightly as needed for Sleep 3/29/24   Donnie Rosa MD       Current medications:    Current Outpatient Medications   Medication Sig Dispense Refill   • aspirin 81 MG chewable tablet Take 1 tablet by mouth daily 30 tablet 0   • atenolol (TENORMIN) 25 MG tablet Take 0.5 tablets by mouth daily 30 tablet 3   • clopidogrel (PLAVIX) 75 MG tablet Take 1 tablet by mouth daily 30 tablet 0   • donepezil (ARICEPT) 5 MG tablet Take 1 tablet by mouth nightly 30 tablet 0   • diphenhydrAMINE-zinc acetate 2-0.1 % cream Apply topically 3 times daily as needed. 35 g 0   • fluticasone (FLONASE) 50 MCG/ACT nasal spray 2 sprays by Each Nostril route daily 16 g 0   •

## 2024-04-24 NOTE — H&P
HISTORY and PHYSICAL  [facility]       NAME:  William Babcock  MRN: [id number]   YOB: 1960   Date: 4/24/2024   Age: 64 y.o.  Gender: male     H&P Update Note    H&P from 4/5/2024 reviewed and updated. Patient examined.     INTERVAL HISTORY:     William Babcock is 64 y.o.,  male, here for ECT treatment.   Last ECT treatment was 4/12/2024. Pt tolerated last treatment well. Patient reports that her symptoms are improving.  Patient denies short term memory loss  Pt has history of f recurrent major depression   Mood is rated at  6/10. With 10 is the best  Other symptoms include: hopelessness, helplessness, low energy.   Patient's sleep has been good . Appetite has been good .   Pt denies feeling of suicidal/ homicidal. Pt has no auditory/visual hallucinations.   Pt has been taking psychiatric medications as prescribed.   Pt does not have any other somatic complaints at this time.    Patient is feeling well today, denies any fever/chills, chest pain, shortness of breath.  No interval changes.   Pt Npo since the past midnight, no am medication today   Pt denies any problem with anesthesia   No interval changes to past medical history, social history, family history.  Review of systems as stated above and otherwise negative.    PHYSICAL EXAM:     Vitals: see nursing flow sheet for vital sings     Patient is alert and oriented, in no distress. . Heart rate and rhythm are regular. Lungs clear to auscultation bilaterally. Abdomen is soft, non tender. No pedal edema.     No interval changes. I concur with the findings.     BENOIT Zarate - CNP on 4/24/2024 at 7:51 AM            HISTORY and PHYSICAL  [facility]         NAME:  William Babcock  MRN: [id number]   YOB: 1960   Date: 4/5/2024   Age: 64 y.o.  Gender: male         COMPLAINT AND PRESENT HISTORY:      William Babcock is 64 y.o.  male, here for ECT treatment.    Last ECT treatment was during [facility] admission

## 2024-05-02 ENCOUNTER — ANESTHESIA EVENT (OUTPATIENT)
Dept: POSTOP/PACU | Age: 64
End: 2024-05-02
Payer: MEDICARE

## 2024-05-03 ENCOUNTER — ANESTHESIA (OUTPATIENT)
Dept: POSTOP/PACU | Age: 64
End: 2024-05-03
Payer: MEDICARE

## 2024-05-03 ENCOUNTER — HOSPITAL ENCOUNTER (OUTPATIENT)
Dept: POSTOP/PACU | Age: 64
Discharge: HOME | End: 2024-05-03
Payer: MEDICARE

## 2024-05-03 VITALS
BODY MASS INDEX: 20.55 KG/M2 | RESPIRATION RATE: 14 BRPM | SYSTOLIC BLOOD PRESSURE: 137 MMHG | OXYGEN SATURATION: 98 % | WEIGHT: 130.9 LBS | DIASTOLIC BLOOD PRESSURE: 106 MMHG | HEART RATE: 69 BPM | HEIGHT: 67 IN | TEMPERATURE: 97.7 F

## 2024-05-03 PROCEDURE — 7100000000 HC PACU RECOVERY - FIRST 15 MIN: Performed by: ANESTHESIOLOGY

## 2024-05-03 PROCEDURE — 7100000001 HC PACU RECOVERY - ADDTL 15 MIN: Performed by: ANESTHESIOLOGY

## 2024-05-03 PROCEDURE — 2500000003 HC RX 250 WO HCPCS: Performed by: NURSE ANESTHETIST, CERTIFIED REGISTERED

## 2024-05-03 PROCEDURE — 90870 ELECTROCONVULSIVE THERAPY: CPT | Performed by: ANESTHESIOLOGY

## 2024-05-03 PROCEDURE — 6360000002 HC RX W HCPCS: Performed by: NURSE ANESTHETIST, CERTIFIED REGISTERED

## 2024-05-03 PROCEDURE — 2580000003 HC RX 258: Performed by: ANESTHESIOLOGY

## 2024-05-03 PROCEDURE — 3700000000 HC ANESTHESIA ATTENDED CARE: Performed by: ANESTHESIOLOGY

## 2024-05-03 PROCEDURE — 90870 ELECTROCONVULSIVE THERAPY: CPT | Performed by: PSYCHIATRY & NEUROLOGY

## 2024-05-03 RX ORDER — GLYCOPYRROLATE 0.2 MG/ML
INJECTION INTRAMUSCULAR; INTRAVENOUS
Status: COMPLETED
Start: 2024-05-03 | End: 2024-05-03

## 2024-05-03 RX ORDER — LIDOCAINE HYDROCHLORIDE 10 MG/ML
1 INJECTION, SOLUTION EPIDURAL; INFILTRATION; INTRACAUDAL; PERINEURAL
Status: DISCONTINUED | OUTPATIENT
Start: 2024-05-03 | End: 2024-05-04 | Stop reason: HOSPADM

## 2024-05-03 RX ORDER — ETOMIDATE 2 MG/ML
INJECTION INTRAVENOUS PRN
Status: DISCONTINUED | OUTPATIENT
Start: 2024-05-03 | End: 2024-05-03 | Stop reason: SDUPTHER

## 2024-05-03 RX ORDER — GLYCOPYRROLATE 0.2 MG/ML
INJECTION INTRAMUSCULAR; INTRAVENOUS PRN
Status: DISCONTINUED | OUTPATIENT
Start: 2024-05-03 | End: 2024-05-03 | Stop reason: SDUPTHER

## 2024-05-03 RX ORDER — SODIUM CHLORIDE 0.9 % (FLUSH) 0.9 %
5-40 SYRINGE (ML) INJECTION EVERY 12 HOURS SCHEDULED
Status: DISCONTINUED | OUTPATIENT
Start: 2024-05-03 | End: 2024-05-04 | Stop reason: HOSPADM

## 2024-05-03 RX ORDER — SODIUM CHLORIDE, SODIUM LACTATE, POTASSIUM CHLORIDE, CALCIUM CHLORIDE 600; 310; 30; 20 MG/100ML; MG/100ML; MG/100ML; MG/100ML
INJECTION, SOLUTION INTRAVENOUS CONTINUOUS
Status: DISCONTINUED | OUTPATIENT
Start: 2024-05-03 | End: 2024-05-04 | Stop reason: HOSPADM

## 2024-05-03 RX ORDER — SODIUM CHLORIDE 9 MG/ML
INJECTION, SOLUTION INTRAVENOUS PRN
Status: DISCONTINUED | OUTPATIENT
Start: 2024-05-03 | End: 2024-05-04 | Stop reason: HOSPADM

## 2024-05-03 RX ORDER — ETOMIDATE 2 MG/ML
INJECTION INTRAVENOUS
Status: COMPLETED
Start: 2024-05-03 | End: 2024-05-03

## 2024-05-03 RX ORDER — SODIUM CHLORIDE 0.9 % (FLUSH) 0.9 %
5-40 SYRINGE (ML) INJECTION PRN
Status: DISCONTINUED | OUTPATIENT
Start: 2024-05-03 | End: 2024-05-04 | Stop reason: HOSPADM

## 2024-05-03 RX ORDER — SUCCINYLCHOLINE/SOD CL,ISO/PF 200MG/10ML
SYRINGE (ML) INTRAVENOUS
Status: COMPLETED
Start: 2024-05-03 | End: 2024-05-03

## 2024-05-03 RX ORDER — SUCCINYLCHOLINE/SOD CL,ISO/PF 200MG/10ML
SYRINGE (ML) INTRAVENOUS PRN
Status: DISCONTINUED | OUTPATIENT
Start: 2024-05-03 | End: 2024-05-03 | Stop reason: SDUPTHER

## 2024-05-03 RX ADMIN — SODIUM CHLORIDE, POTASSIUM CHLORIDE, SODIUM LACTATE AND CALCIUM CHLORIDE: 600; 310; 30; 20 INJECTION, SOLUTION INTRAVENOUS at 08:03

## 2024-05-03 RX ADMIN — GLYCOPYRROLATE 0.2 MG: 0.2 INJECTION INTRAMUSCULAR; INTRAVENOUS at 08:10

## 2024-05-03 RX ADMIN — ETOMIDATE INJECTION 20 MG: 2 SOLUTION INTRAVENOUS at 08:13

## 2024-05-03 RX ADMIN — Medication 100 MG: at 08:13

## 2024-05-03 ASSESSMENT — PAIN - FUNCTIONAL ASSESSMENT
PAIN_FUNCTIONAL_ASSESSMENT: 0-10
PAIN_FUNCTIONAL_ASSESSMENT: 0-10

## 2024-05-03 NOTE — ANESTHESIA POSTPROCEDURE EVALUATION
Department of Anesthesiology  Postprocedure Note    Patient: William Babcock  MRN: [id number]  YOB: 1960  Date of evaluation: 5/3/2024    Procedure Summary       Date: 05/03/24 Room / Location: [facility] PACU    Anesthesia Start: 0813 Anesthesia Stop: 0824    Procedure: ECT W/ ANESTHESIA Diagnosis: Major depressive disorder, recurrent, severe with psychotic symptoms    Scheduled Providers:  Responsible Provider: Abby Red MD    Anesthesia Type: general ASA Status: 3            Anesthesia Type: No value filed.    Wanda Phase I: Wanda Score: 10    Wanda Phase II:      Anesthesia Post Evaluation    Comments: POST- ANESTHESIA EVALUATION       Pt Name: William Babcock  MRN: [id number]  YOB: 1960  Date of evaluation: 5/3/2024  Time:  9:50 AM      BP (!) 150/93  Pulse 69   Temp 97.7 °F (36.5 °C) (Infrared)   Resp 14   Ht 1.702 m (5' 7.01\")   Wt 59.4 kg (130 lb 14.4 oz)   SpO2 98%   BMI 20.50 kg/m²      Consciousness Level  Awake  Cardiopulmonary Status  Stable  Pain Adequately Treated YES  Nausea / Vomiting  NO  Adequate Hydration  YES  Anesthesia Related Complications NONE      Electronically signed by Abby Red MD on 5/3/2024 at 9:50 AM               No notable events documented.

## 2024-05-03 NOTE — PROGRESS NOTES
Pre ECT Assessment Note  Psychiatry  5/3/2024      William Babcock  1960  [id number]      Subjective:     Patient is a 64 y.o.  male seen for an evaluation prior to today's electroconvulsive therapy treatment. Today is treatment number 11, utilizing bilateral.  This is course number 1.  The patient has previously never previously completed a course of ECT.    Reagan presents today from [place], he reports that things are going \"good\".  He denies any racing thoughts, he reports that he is sleeping well.  He denies any paranoia, staff reports that he has been doing well and has not exhibited any bizarre behavior.  We will continue with ECT treatments every other week and monitor for continued stability in symptoms with the consideration of taking a break from ECT at some point.  He remains compliant with his scheduled medications.  He reports no depressive symptoms, denies any suicidal ideation with no plan or intent for self-harm.    Patient Active Problem List    Diagnosis Date Noted    Severe recurrent major depression with psychotic features (HCC) 03/13/2024    Altered mental status 02/26/2024    Acute psychosis (HCC) 02/12/2024    Secondary hypertension 02/10/2024    Carotid pseudoaneurysm (HCC) 02/09/2024    Agitation 02/09/2024    Stenosis of right carotid artery greater than 50% 02/09/2024    Psychosis, paranoid (HCC) 02/06/2024    Abnormal CT of the head 02/06/2024    Psychosis (HCC) 02/06/2024    Pseudoaneurysm (HCC) 02/06/2024    Essential hypertension 12/01/2016    Primary insomnia 12/01/2016    Influenza vaccine refused 12/01/2016    Refused Streptococcus pneumoniae vaccination 12/01/2016     Past Medical History:   Diagnosis Date    Anxiety     Autism     HTN (hypertension)     Pseudoaneurysm (HCC)     Right proximal RCA      Past Surgical History:   Procedure Laterality Date    CEREBRAL ANGIOGRAM  02/09/2024    IR ANGIOGRAM CAROTID CEREBRAL BILATERAL    EYE SURGERY      OTHER SURGICAL

## 2024-05-03 NOTE — INTERVAL H&P NOTE
Update History & Physical    The patient's History and Physical of April 5, 2024 was reviewed with the patient and I examined the patient. There was no change. The surgical site was confirmed by the patient and me.     UPDATE  William Babcock is 64 y.o., male, here for ECT treatment.    Last ECT treatment was 4/24/2024. Pt tolerated last treatment well and has voiced improvement.    Mood 7/10  Sleep and appetite are good.   Denies suicidal or homicidal ideation. Denies hallucinations. Pt has been taking all medications as prescribed.   Pt has no interval changes since last treatment visit.       NPO p MN. Took No this am with sip of water. Denies recent or current chest pain/pressure, palpitations, SOB, recent URI, fever or chills.      Electronically signed by BENOIT Bello CNP on 5/3/2024 at 7:09 AM

## 2024-05-03 NOTE — ANESTHESIA PRE PROCEDURE
Department of Anesthesiology  Preprocedure Note       Name:  William Babcock   Age:  64 y.o.  :  1960                                          MRN:  [id number]         Date:  5/3/2024      Surgeon: Dr. Rosa    Procedure: ECT    Medications prior to admission:   Prior to Admission medications    Medication Sig Start Date End Date Taking? Authorizing Provider   aspirin 81 MG chewable tablet Take 1 tablet by mouth daily 3/29/24   Donnie Rosa MD   atenolol (TENORMIN) 25 MG tablet Take 0.5 tablets by mouth daily 3/29/24   Donnie Rosa MD   clopidogrel (PLAVIX) 75 MG tablet Take 1 tablet by mouth daily 3/29/24   Donnie Rsoa MD   donepezil (ARICEPT) 5 MG tablet Take 1 tablet by mouth nightly 3/29/24   Donnie Rosa MD   diphenhydrAMINE-zinc acetate 2-0.1 % cream Apply topically 3 times daily as needed. 3/29/24   Donnie Rosa MD   fluticasone (FLONASE) 50 MCG/ACT nasal spray 2 sprays by Each Nostril route daily 3/29/24   Donnie Rosa MD   OLANZapine (ZYPREXA) 7.5 MG tablet Take 1 tablet by mouth nightly 3/29/24   Donnie Rosa MD   venlafaxine (EFFEXOR XR) 37.5 MG extended release capsule Take 1 capsule by mouth daily (with breakfast) 3/29/24   Donnie Rosa MD   traZODone (DESYREL) 50 MG tablet Take 1 tablet by mouth nightly as needed for Sleep 3/29/24   Donnie Rosa MD       Current medications:    Current Outpatient Medications   Medication Sig Dispense Refill    aspirin 81 MG chewable tablet Take 1 tablet by mouth daily 30 tablet 0    atenolol (TENORMIN) 25 MG tablet Take 0.5 tablets by mouth daily 30 tablet 3    clopidogrel (PLAVIX) 75 MG tablet Take 1 tablet by mouth daily 30 tablet 0    donepezil (ARICEPT) 5 MG tablet Take 1 tablet by mouth nightly 30 tablet 0    diphenhydrAMINE-zinc acetate 2-0.1 % cream Apply topically 3 times daily as needed. 35 g 0    fluticasone (FLONASE) 50 MCG/ACT nasal spray 2 sprays by Each Nostril route daily 16 g 0    OLANZapine (ZYPREXA)

## 2024-05-08 DIAGNOSIS — F33.3 SEVERE RECURRENT MAJOR DEPRESSION WITH PSYCHOTIC FEATURES (HCC): Primary | ICD-10-CM

## 2024-05-13 ENCOUNTER — ANESTHESIA EVENT (OUTPATIENT)
Dept: POSTOP/PACU | Age: 64
End: 2024-05-13
Payer: MEDICARE

## 2024-05-14 ENCOUNTER — ANESTHESIA (OUTPATIENT)
Dept: POSTOP/PACU | Age: 64
End: 2024-05-14
Payer: MEDICARE

## 2024-05-14 ENCOUNTER — HOSPITAL ENCOUNTER (OUTPATIENT)
Dept: POSTOP/PACU | Age: 64
Discharge: HOME | End: 2024-05-14
Payer: MEDICARE

## 2024-05-14 VITALS
TEMPERATURE: 97.7 F | OXYGEN SATURATION: 97 % | HEIGHT: 67 IN | HEART RATE: 69 BPM | BODY MASS INDEX: 21.97 KG/M2 | DIASTOLIC BLOOD PRESSURE: 75 MMHG | SYSTOLIC BLOOD PRESSURE: 134 MMHG | RESPIRATION RATE: 14 BRPM | WEIGHT: 140 LBS

## 2024-05-14 PROCEDURE — 7100000001 HC PACU RECOVERY - ADDTL 15 MIN: Performed by: ANESTHESIOLOGY

## 2024-05-14 PROCEDURE — 90870 ELECTROCONVULSIVE THERAPY: CPT | Performed by: ANESTHESIOLOGY

## 2024-05-14 PROCEDURE — 3700000001 HC ADD 15 MINUTES (ANESTHESIA): Performed by: ANESTHESIOLOGY

## 2024-05-14 PROCEDURE — 3700000000 HC ANESTHESIA ATTENDED CARE: Performed by: ANESTHESIOLOGY

## 2024-05-14 PROCEDURE — 2580000003 HC RX 258: Performed by: ANESTHESIOLOGY

## 2024-05-14 PROCEDURE — 7100000000 HC PACU RECOVERY - FIRST 15 MIN: Performed by: ANESTHESIOLOGY

## 2024-05-14 PROCEDURE — 90870 ELECTROCONVULSIVE THERAPY: CPT | Performed by: PSYCHIATRY & NEUROLOGY

## 2024-05-14 PROCEDURE — 2500000003 HC RX 250 WO HCPCS: Performed by: NURSE ANESTHETIST, CERTIFIED REGISTERED

## 2024-05-14 RX ORDER — METHOHEXITAL IN WATER/PF 100MG/10ML
SYRINGE (ML) INTRAVENOUS PRN
Status: DISCONTINUED | OUTPATIENT
Start: 2024-05-14 | End: 2024-05-14 | Stop reason: SDUPTHER

## 2024-05-14 RX ORDER — SODIUM CHLORIDE, SODIUM LACTATE, POTASSIUM CHLORIDE, CALCIUM CHLORIDE 600; 310; 30; 20 MG/100ML; MG/100ML; MG/100ML; MG/100ML
INJECTION, SOLUTION INTRAVENOUS CONTINUOUS
Status: DISCONTINUED | OUTPATIENT
Start: 2024-05-14 | End: 2024-05-15 | Stop reason: HOSPADM

## 2024-05-14 RX ORDER — LIDOCAINE HYDROCHLORIDE 10 MG/ML
1 INJECTION, SOLUTION EPIDURAL; INFILTRATION; INTRACAUDAL; PERINEURAL
Status: DISCONTINUED | OUTPATIENT
Start: 2024-05-14 | End: 2024-05-15 | Stop reason: HOSPADM

## 2024-05-14 RX ORDER — SODIUM CHLORIDE 9 MG/ML
INJECTION, SOLUTION INTRAVENOUS PRN
Status: DISCONTINUED | OUTPATIENT
Start: 2024-05-14 | End: 2024-05-15 | Stop reason: HOSPADM

## 2024-05-14 RX ORDER — SODIUM CHLORIDE 0.9 % (FLUSH) 0.9 %
5-40 SYRINGE (ML) INJECTION PRN
Status: DISCONTINUED | OUTPATIENT
Start: 2024-05-14 | End: 2024-05-15 | Stop reason: HOSPADM

## 2024-05-14 RX ORDER — METHOHEXITAL IN WATER/PF 100MG/10ML
SYRINGE (ML) INTRAVENOUS
Status: COMPLETED
Start: 2024-05-14 | End: 2024-05-14

## 2024-05-14 RX ORDER — SODIUM CHLORIDE 0.9 % (FLUSH) 0.9 %
5-40 SYRINGE (ML) INJECTION EVERY 12 HOURS SCHEDULED
Status: DISCONTINUED | OUTPATIENT
Start: 2024-05-14 | End: 2024-05-15 | Stop reason: HOSPADM

## 2024-05-14 RX ORDER — SUCCINYLCHOLINE/SOD CL,ISO/PF 200MG/10ML
SYRINGE (ML) INTRAVENOUS PRN
Status: DISCONTINUED | OUTPATIENT
Start: 2024-05-14 | End: 2024-05-14 | Stop reason: SDUPTHER

## 2024-05-14 RX ORDER — SUCCINYLCHOLINE/SOD CL,ISO/PF 200MG/10ML
SYRINGE (ML) INTRAVENOUS
Status: COMPLETED
Start: 2024-05-14 | End: 2024-05-14

## 2024-05-14 RX ADMIN — Medication 80 MG: at 08:46

## 2024-05-14 RX ADMIN — SODIUM CHLORIDE, POTASSIUM CHLORIDE, SODIUM LACTATE AND CALCIUM CHLORIDE: 600; 310; 30; 20 INJECTION, SOLUTION INTRAVENOUS at 08:27

## 2024-05-14 ASSESSMENT — ENCOUNTER SYMPTOMS
SINUS PRESSURE: 0
ABDOMINAL PAIN: 0
NAUSEA: 0
SHORTNESS OF BREATH: 0

## 2024-05-14 ASSESSMENT — PAIN - FUNCTIONAL ASSESSMENT: PAIN_FUNCTIONAL_ASSESSMENT: 0-10

## 2024-05-14 NOTE — DISCHARGE INSTRUCTIONS
holiday exceptions.  Attendance may be in person or virtually.  The support group is located in person at [facility], [address] [place] [address], [place] [address], in the basement Board Room.  It is available virtually, however you must pre-arrange access by emailing [email or web address].  The support group starts at 4p and ends at 5p.  Our support group offers a safe place where all aspects of ECT treatment can be discussed openly.  Patients and family/friends are welcome to attend.**      If you feel you are having a problem or complication from your ECT treatments and it is during normal business hours call [phone]. If it is after normal business hours please call [facility] at [phone] to speak with a nurse, or go to your nearest emergency room.     If you need to schedule, reschedule or cancel an appointment, please call the [facility] at [phone]. You may also use this number for any questions regarding the ECT Support Group.    If you have an after hours or weekend cancellation please call [phone], they will forward message to appropriate party.    You will need to arrange transportation to and from the hospital for all outpatient ECT treatments.    Bring a current list of your medications, including dosages with you to every ECT treatment and arrive 1 hour and 15 minutes before your scheduled ECT time.           Sedation or General Anesthesia, Adult  Care After  Refer to this sheet in the next 24 hours. These instructions provide you with information on caring for yourself after your procedure. Your caregiver may also give you more specific instructions. Your treatment has been planned according to current medical practices, but problems sometimes occur. Call your caregiver if you have any problems or questions after your procedure.   HOME CARE INSTRUCTIONS   Do not participate in any activities that require you to be alert or coordinated. Do

## 2024-05-14 NOTE — PROGRESS NOTES
Pre ECT Assessment Note  Psychiatry  5/14/2024      William Babcock  1960  [id number]      Subjective:     Patient is a 64 y.o.  male seen for an evaluation prior to today's electroconvulsive therapy treatment. Today is treatment number 12, utilizing bilateral.  This is course number 1.  The patient has previously never previously completed a course of ECT.    Reagan is interviewed today at bedside, he reports that things are going well.  He reports that he is eating and sleeping \"really good\".  He denies any symptoms of depression, denies any crying spells.  He denies any symptoms of paranoia or delusional thoughts.  He has no suicidal ideation, no plan or intent for self-harm.  He is receiving ECT every 3 weeks, we will continue this frequency for now and taper as tolerated.  Staff from [facility] reports that things are going well.    Patient Active Problem List    Diagnosis Date Noted    Severe recurrent major depression with psychotic features (HCC) 03/13/2024    Altered mental status 02/26/2024    Acute psychosis (HCC) 02/12/2024    Secondary hypertension 02/10/2024    Carotid pseudoaneurysm (HCC) 02/09/2024    Agitation 02/09/2024    Stenosis of right carotid artery greater than 50% 02/09/2024    Psychosis, paranoid (HCC) 02/06/2024    Abnormal CT of the head 02/06/2024    Psychosis (HCC) 02/06/2024    Pseudoaneurysm (HCC) 02/06/2024    Essential hypertension 12/01/2016    Primary insomnia 12/01/2016    Influenza vaccine refused 12/01/2016    Refused Streptococcus pneumoniae vaccination 12/01/2016     Past Medical History:   Diagnosis Date    Anxiety     Autism     HTN (hypertension)     Pseudoaneurysm (HCC)     Right proximal RCA      Past Surgical History:   Procedure Laterality Date    CEREBRAL ANGIOGRAM  02/09/2024    IR ANGIOGRAM CAROTID CEREBRAL BILATERAL    EYE SURGERY      OTHER SURGICAL HISTORY      ECT      Not in a hospital admission.  No Known Allergies   Social History     Tobacco Use

## 2024-05-14 NOTE — ANESTHESIA POSTPROCEDURE EVALUATION
Department of Anesthesiology  Postprocedure Note    Patient: William Babcock  MRN: [id number]  YOB: 1960  Date of evaluation: 5/14/2024    Procedure Summary       Date: 05/14/24 Room / Location: [facility] PACU    Anesthesia Start: 0835 Anesthesia Stop: 0855    Procedure: ECT W/ ANESTHESIA Diagnosis: Major depressive disorder, recurrent, severe with psychotic symptoms    Scheduled Providers:  Responsible Provider: Aris Nichole MD    Anesthesia Type: General ASA Status: 3            Anesthesia Type: General    Wanda Phase I: Wanda Score: 6    Wanda Phase II:      Anesthesia Post Evaluation    Comments: POST- ANESTHESIA EVALUATION       Pt Name: William Babcock  MRN: [id number]  YOB: 1960  Date of evaluation: 5/14/2024  Time:  4:47 PM      /75   Pulse 69   Temp 97.7 °F (36.5 °C)   Resp 14   Ht 1.702 m (5' 7\")   Wt 63.5 kg (140 lb)   SpO2 97%   BMI 21.93 kg/m²      Consciousness Level  Awake  Cardiopulmonary Status  Stable  Pain Adequately Treated YES  Nausea / Vomiting  NO  Adequate Hydration  YES  Anesthesia Related Complications NONE      Electronically signed by Abby Red MD on 5/14/2024 at 4:47 PM           No notable events documented.

## 2024-05-14 NOTE — PROCEDURES
Bilateral ECT Procedure Report  William Babcock   5/14/2024 1960         Attending:Donnie Rosa MD  Preprocedure diagnosis: Major depressive disorder, recurrent, severe with psychotic symptoms (F33.3)  Postprocedure diagnosis: SAME AS PRE-PROCEDURE DIAGNOSIS  Treatment Number: This is treatment # 12   Patient Status: Outpatient   Type of ECT: Bilateral Brief Pulse  Medications  Preprocedure: Tylenol 650mg  Anesthetic: Methohexital 80 mg  Paralytic: Succinylcholine 80 mg  Post procedure: none needed   Cuff placement: Left Lower Extremity    MECTA Settings 1st Stimulus:     Pulse width: 1.0 ms   Frequency:  60 hz   Duration:   3.0 seconds   Static Impedance: 806 ohms             Dynamic Impedance: 168 ohms             Motor Seizure Duration: 45seconds  EEG Seizure Duration: 86 seconds             The patient's ECT treatment was performed using MECTA machine  .  Timeout:  Time out was performed using two patient identifiers and confirmation of procedure.     Patient Preparation: Preprocedure documentation, labs, H&P were all verified and reviewed.  The patient was placed in supine position.  EEG leads were placed for monitoring of seizure.   [place] areas bilaterally cleaned and prepped.  Conductive gel  was applied over stimulus electrode site.   The patient was pre-oxygenated.       Procedure in detail: Medications were administered by anesthesia using intravenous access at the doses listed previously in this summary.  Anesthetic administered and once confirmation the patient is anesthetized, the patient's blood pressure cuff on lower extremity was inflated to 100mmHg in excess of patient's blood pressure.  At this time, the neuromuscular blockade was administered intravenously by anesthesia.  Upper extremity fasciculation were verified followed by lower extremity fasciculation.  Once fasciculations terminated, confirmation of affective neuromuscular blockade demonstrated by absence of withdrawal reflex.  Bite

## 2024-05-14 NOTE — ANESTHESIA PRE PROCEDURE
(+) psychiatric history:depression/anxiety              ROS comment: Autism GI/Hepatic/Renal: Neg GI/Hepatic/Renal ROS            Endo/Other: Negative Endo/Other ROS                    Abdominal:             Vascular:   + PVD, aortic or cerebral.        ROS comment: right internal carotid artery pseudoaneurysm embolization Feb 2024. Other Findings:           Anesthesia Plan      general     ASA 3     (TIVA)  Induction: intravenous.    MIPS: Prophylactic antiemetics administered.  Anesthetic plan and risks discussed with patient.      Plan discussed with CRNA.                  Abby Red MD   5/14/2024

## 2024-05-14 NOTE — H&P
Altered mental status 02/26/2024    Acute psychosis (HCC) 02/12/2024    Secondary hypertension 02/10/2024    Carotid pseudoaneurysm (HCC) 02/09/2024    Agitation 02/09/2024    Stenosis of right carotid artery greater than 50% 02/09/2024    Psychosis, paranoid (HCC) 02/06/2024    Abnormal CT of the head 02/06/2024    Psychosis (HCC) 02/06/2024    Pseudoaneurysm ([facility]) 02/06/2024    Essential hypertension 12/01/2016    Primary insomnia 12/01/2016    Influenza vaccine refused 12/01/2016    Refused Streptococcus pneumoniae vaccination 12/01/2016           TONEY SINGH, APRN - CNP on 5/14/2024 at 7:36 AM

## 2024-05-16 ENCOUNTER — OFFICE VISIT (OUTPATIENT)
Dept: NEUROLOGY | Age: 64
End: 2024-05-16
Payer: MEDICARE

## 2024-05-16 VITALS
HEIGHT: 67 IN | DIASTOLIC BLOOD PRESSURE: 71 MMHG | BODY MASS INDEX: 23.07 KG/M2 | HEART RATE: 71 BPM | WEIGHT: 147 LBS | SYSTOLIC BLOOD PRESSURE: 110 MMHG

## 2024-05-16 DIAGNOSIS — I72.0 CAROTID PSEUDOANEURYSM (HCC): ICD-10-CM

## 2024-05-16 DIAGNOSIS — I65.21 STENOSIS OF RIGHT CAROTID ARTERY GREATER THAN 50%: Primary | ICD-10-CM

## 2024-05-16 PROBLEM — G30.9 ALZHEIMER'S DISEASE, UNSPECIFIED (CODE) (HCC): Status: ACTIVE | Noted: 2024-05-16

## 2024-05-16 PROCEDURE — 99215 OFFICE O/P EST HI 40 MIN: CPT | Performed by: PSYCHIATRY & NEUROLOGY

## 2024-05-16 PROCEDURE — 3078F DIAST BP <80 MM HG: CPT | Performed by: PSYCHIATRY & NEUROLOGY

## 2024-05-16 PROCEDURE — 1036F TOBACCO NON-USER: CPT | Performed by: PSYCHIATRY & NEUROLOGY

## 2024-05-16 PROCEDURE — 3017F COLORECTAL CA SCREEN DOC REV: CPT | Performed by: PSYCHIATRY & NEUROLOGY

## 2024-05-16 PROCEDURE — G8427 DOCREV CUR MEDS BY ELIG CLIN: HCPCS | Performed by: PSYCHIATRY & NEUROLOGY

## 2024-05-16 PROCEDURE — G8420 CALC BMI NORM PARAMETERS: HCPCS | Performed by: PSYCHIATRY & NEUROLOGY

## 2024-05-16 PROCEDURE — 3074F SYST BP LT 130 MM HG: CPT | Performed by: PSYCHIATRY & NEUROLOGY

## 2024-05-16 RX ORDER — BISACODYL 10 MG
10 SUPPOSITORY, RECTAL RECTAL DAILY
COMMUNITY

## 2024-05-16 RX ORDER — ACETAMINOPHEN 500 MG
650 TABLET ORAL EVERY 6 HOURS PRN
COMMUNITY

## 2024-05-16 ASSESSMENT — ENCOUNTER SYMPTOMS
SHORTNESS OF BREATH: 0
VOMITING: 0
COLOR CHANGE: 0
VOICE CHANGE: 0
NAUSEA: 0
COUGH: 0

## 2024-05-16 NOTE — PROGRESS NOTES
Endovascular Neurosurgery - [place] [id number] [facility]  [place] [address]  P: [phone]  F: [phone]      Dear Dr. Francois    HPI:     HPI    William Babcock is a 64 y.o. 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  Since his procedure 2-, He had a feb 21, 2024 cta neck demonstrating patency of the stent with noted narrowing where the vessels overlap with no further evidence of aneurysm.  No new stroke deficits.  He has continued on aspirin and clopidogrel.      No Known Allergies  Current Outpatient Medications   Medication Sig Dispense Refill    acetaminophen (TYLENOL) 500 MG tablet Take 650 mg by mouth every 6 hours as needed for Pain      bisacodyl (DULCOLAX) 10 MG suppository Place 1 suppository rectally daily      aspirin 81 MG chewable tablet Take 1 tablet by mouth daily 30 tablet 0    atenolol (TENORMIN) 25 MG tablet Take 0.5 tablets by mouth daily 30 tablet 3    clopidogrel (PLAVIX) 75 MG tablet Take 1 tablet by mouth daily 30 tablet 0    donepezil (ARICEPT) 5 MG tablet Take 1 tablet by mouth nightly 30 tablet 0    diphenhydrAMINE-zinc acetate 2-0.1 % cream Apply topically 3 times daily as needed. 35 g 0    fluticasone (FLONASE) 50 MCG/ACT nasal spray 2 sprays by Each Nostril route daily 16 g 0    OLANZapine (ZYPREXA) 7.5 MG tablet Take 1 tablet by mouth nightly 30 tablet 0    venlafaxine (EFFEXOR XR) 37.5 MG extended release capsule Take 1 capsule by mouth daily (with breakfast) 30 capsule 0    traZODone (DESYREL) 50 MG tablet Take 1 tablet by mouth nightly as needed for Sleep (Patient not taking: Reported on 5/16/2024) 30 tablet 0     No current facility-administered medications for this visit.     Past Medical History:   Diagnosis Date    Anxiety     Autism     HTN (hypertension)     Pseudoaneurysm (HCC)     Right proximal RCA      Past Surgical History:   Procedure

## 2024-05-30 DIAGNOSIS — F33.3 SEVERE RECURRENT MAJOR DEPRESSION WITH PSYCHOTIC FEATURES (HCC): Primary | ICD-10-CM

## 2024-06-04 RX ORDER — SODIUM CHLORIDE 9 MG/ML
INJECTION, SOLUTION INTRAVENOUS PRN
OUTPATIENT
Start: 2024-06-05

## 2024-06-04 RX ORDER — SODIUM CHLORIDE 0.9 % (FLUSH) 0.9 %
5-40 SYRINGE (ML) INJECTION EVERY 12 HOURS SCHEDULED
OUTPATIENT
Start: 2024-06-05

## 2024-06-04 RX ORDER — LIDOCAINE HYDROCHLORIDE 10 MG/ML
1 INJECTION, SOLUTION EPIDURAL; INFILTRATION; INTRACAUDAL; PERINEURAL
OUTPATIENT
Start: 2024-06-05 | End: 2024-06-06

## 2024-06-04 RX ORDER — SODIUM CHLORIDE 0.9 % (FLUSH) 0.9 %
5-40 SYRINGE (ML) INJECTION PRN
OUTPATIENT
Start: 2024-06-05

## 2024-06-04 RX ORDER — SODIUM CHLORIDE, SODIUM LACTATE, POTASSIUM CHLORIDE, CALCIUM CHLORIDE 600; 310; 30; 20 MG/100ML; MG/100ML; MG/100ML; MG/100ML
INJECTION, SOLUTION INTRAVENOUS CONTINUOUS
OUTPATIENT
Start: 2024-06-05

## 2024-06-05 ENCOUNTER — HOSPITAL ENCOUNTER (OUTPATIENT)
Dept: POSTOP/PACU | Age: 64
Discharge: HOME | End: 2024-06-05

## 2024-06-07 DIAGNOSIS — F33.3 SEVERE RECURRENT MAJOR DEPRESSION WITH PSYCHOTIC FEATURES (HCC): Primary | ICD-10-CM

## 2024-06-09 ENCOUNTER — ANESTHESIA EVENT (OUTPATIENT)
Dept: POSTOP/PACU | Age: 64
End: 2024-06-09
Payer: MEDICARE

## 2024-06-12 ENCOUNTER — HOSPITAL ENCOUNTER (OUTPATIENT)
Dept: POSTOP/PACU | Age: 64
Discharge: HOME | End: 2024-06-12
Payer: MEDICARE

## 2024-06-12 ENCOUNTER — ANESTHESIA (OUTPATIENT)
Dept: POSTOP/PACU | Age: 64
End: 2024-06-12
Payer: MEDICARE

## 2024-06-12 VITALS
HEIGHT: 67 IN | WEIGHT: 147 LBS | HEART RATE: 75 BPM | OXYGEN SATURATION: 98 % | RESPIRATION RATE: 16 BRPM | TEMPERATURE: 98.4 F | SYSTOLIC BLOOD PRESSURE: 122 MMHG | DIASTOLIC BLOOD PRESSURE: 78 MMHG | BODY MASS INDEX: 23.07 KG/M2

## 2024-06-12 PROCEDURE — 3700000001 HC ADD 15 MINUTES (ANESTHESIA): Performed by: ANESTHESIOLOGY

## 2024-06-12 PROCEDURE — 90870 ELECTROCONVULSIVE THERAPY: CPT | Performed by: PSYCHIATRY & NEUROLOGY

## 2024-06-12 PROCEDURE — 90870 ELECTROCONVULSIVE THERAPY: CPT | Performed by: ANESTHESIOLOGY

## 2024-06-12 PROCEDURE — 7100000001 HC PACU RECOVERY - ADDTL 15 MIN: Performed by: ANESTHESIOLOGY

## 2024-06-12 PROCEDURE — 2580000003 HC RX 258: Performed by: ANESTHESIOLOGY

## 2024-06-12 PROCEDURE — 2500000003 HC RX 250 WO HCPCS: Performed by: NURSE ANESTHETIST, CERTIFIED REGISTERED

## 2024-06-12 PROCEDURE — 3700000000 HC ANESTHESIA ATTENDED CARE: Performed by: ANESTHESIOLOGY

## 2024-06-12 PROCEDURE — 7100000000 HC PACU RECOVERY - FIRST 15 MIN: Performed by: ANESTHESIOLOGY

## 2024-06-12 RX ORDER — SODIUM CHLORIDE 0.9 % (FLUSH) 0.9 %
5-40 SYRINGE (ML) INJECTION PRN
Status: DISCONTINUED | OUTPATIENT
Start: 2024-06-12 | End: 2024-06-13 | Stop reason: HOSPADM

## 2024-06-12 RX ORDER — METHOHEXITAL IN WATER/PF 100MG/10ML
SYRINGE (ML) INTRAVENOUS
Status: COMPLETED
Start: 2024-06-12 | End: 2024-06-12

## 2024-06-12 RX ORDER — METHOHEXITAL IN WATER/PF 100MG/10ML
SYRINGE (ML) INTRAVENOUS PRN
Status: DISCONTINUED | OUTPATIENT
Start: 2024-06-12 | End: 2024-06-12 | Stop reason: SDUPTHER

## 2024-06-12 RX ORDER — SODIUM CHLORIDE, SODIUM LACTATE, POTASSIUM CHLORIDE, CALCIUM CHLORIDE 600; 310; 30; 20 MG/100ML; MG/100ML; MG/100ML; MG/100ML
INJECTION, SOLUTION INTRAVENOUS CONTINUOUS
Status: DISCONTINUED | OUTPATIENT
Start: 2024-06-12 | End: 2024-06-13 | Stop reason: HOSPADM

## 2024-06-12 RX ORDER — SODIUM CHLORIDE 0.9 % (FLUSH) 0.9 %
5-40 SYRINGE (ML) INJECTION EVERY 12 HOURS SCHEDULED
Status: DISCONTINUED | OUTPATIENT
Start: 2024-06-12 | End: 2024-06-13 | Stop reason: HOSPADM

## 2024-06-12 RX ORDER — SUCCINYLCHOLINE/SOD CL,ISO/PF 200MG/10ML
SYRINGE (ML) INTRAVENOUS PRN
Status: DISCONTINUED | OUTPATIENT
Start: 2024-06-12 | End: 2024-06-12 | Stop reason: SDUPTHER

## 2024-06-12 RX ORDER — SODIUM CHLORIDE 9 MG/ML
INJECTION, SOLUTION INTRAVENOUS PRN
Status: DISCONTINUED | OUTPATIENT
Start: 2024-06-12 | End: 2024-06-13 | Stop reason: HOSPADM

## 2024-06-12 RX ORDER — LIDOCAINE HYDROCHLORIDE 10 MG/ML
1 INJECTION, SOLUTION EPIDURAL; INFILTRATION; INTRACAUDAL; PERINEURAL
Status: DISCONTINUED | OUTPATIENT
Start: 2024-06-12 | End: 2024-06-13 | Stop reason: HOSPADM

## 2024-06-12 RX ORDER — SUCCINYLCHOLINE/SOD CL,ISO/PF 200MG/10ML
SYRINGE (ML) INTRAVENOUS
Status: COMPLETED
Start: 2024-06-12 | End: 2024-06-12

## 2024-06-12 RX ADMIN — Medication 80 MG: at 08:04

## 2024-06-12 RX ADMIN — SODIUM CHLORIDE, POTASSIUM CHLORIDE, SODIUM LACTATE AND CALCIUM CHLORIDE: 600; 310; 30; 20 INJECTION, SOLUTION INTRAVENOUS at 07:51

## 2024-06-12 ASSESSMENT — PAIN - FUNCTIONAL ASSESSMENT
PAIN_FUNCTIONAL_ASSESSMENT: NONE - DENIES PAIN
PAIN_FUNCTIONAL_ASSESSMENT: 0-10

## 2024-06-12 ASSESSMENT — ENCOUNTER SYMPTOMS
EYES NEGATIVE: 1
GASTROINTESTINAL NEGATIVE: 1
RESPIRATORY NEGATIVE: 1

## 2024-06-12 NOTE — PROCEDURES
ROCEDURE NOTE  Date: 6/12/2024   Name: William Babcock  YOB: 1960    Procedures    Bilateral ECT Procedure Report  William Babcock   6/12/2024 1960         Attending:Ran Bui MD  Preprocedure diagnosis: Major depressive disorder, recurrent, severe with psychotic symptoms (F33.3)  Postprocedure diagnosis: SAME AS PRE-PROCEDURE DIAGNOSIS  Treatment Number: This is treatment # 13   Patient Status: Outpatient   Type of ECT: Bilateral Brief Pulse  Medications  Preprocedure: Tylenol 650mg  Anesthetic: Methohexital 80 mg  Paralytic: Succinylcholine 80 mg  Post procedure: none needed   Cuff placement: Left Lower Extremity    MECTA Settings 1st Stimulus:     Pulse width: 1.0 ms   Frequency:  60 hz   Duration:   3.0 seconds   Static Impedance: 309 ohms             Dynamic Impedance: 156 ohms             Motor Seizure Duration: 84 seconds  EEG Seizure Duration: 113 seconds             The patient's ECT treatment was performed using MECTA machine  .  Timeout:  Time out was performed using two patient identifiers and confirmation of procedure.     Patient Preparation: Preprocedure documentation, labs, H&P were all verified and reviewed.  The patient was placed in supine position.  EEG leads were placed for monitoring of seizure.   Orthodox areas bilaterally cleaned and prepped.  Conductive gel  was applied over stimulus electrode site.   The patient was pre-oxygenated.       Procedure in detail: Medications were administered by anesthesia using intravenous access at the doses listed previously in this summary.  Anesthetic administered and once confirmation the patient is anesthetized, the patient's blood pressure cuff on lower extremity was inflated to 100mmHg in excess of patient's blood pressure.  At this time, the neuromuscular blockade was administered intravenously by anesthesia.  Upper extremity fasciculation were verified followed by lower extremity fasciculation.  Once fasciculations

## 2024-06-12 NOTE — ANESTHESIA PRE PROCEDURE
\"POCNA\", \"POCK\", \"POCCL\", \"POCBUN\", \"POCHEMO\", \"POCHCT\" in the last 72 hours.      Coags: No results found for: \"PROTIME\", \"INR\", \"APTT\"    HCG (If Applicable): No results found for: \"PREGTESTUR\", \"PREGSERUM\", \"HCG\", \"HCGQUANT\"     ABGs: No results found for: \"PHART\", \"PO2ART\", \"IPY4OZO\", \"TAC2UQT\", \"BEART\", \"Q5WBSFIV\"     Type & Screen (If Applicable):  No results found for: \"LABABO\"    Drug/Infectious Status (If Applicable):  No results found for: \"HIV\", \"HEPCAB\"    COVID-19 Screening (If Applicable): No results found for: \"COVID19\"        Anesthesia Evaluation  Patient summary reviewed and Nursing notes reviewed   no history of anesthetic complications:   Airway: Mallampati: III  TM distance: >3 FB   Neck ROM: full  Mouth opening: > = 3 FB   Dental:    (+) poor dentition      Pulmonary:Negative Pulmonary ROS breath sounds clear to auscultation                             Cardiovascular:    (+) hypertension: no interval change        Rhythm: regular  Rate: normal                    Neuro/Psych:   (+) psychiatric history:depression/anxiety              ROS comment: Autism GI/Hepatic/Renal: Neg GI/Hepatic/Renal ROS            Endo/Other: Negative Endo/Other ROS                    Abdominal:             Vascular:   + PVD, aortic or cerebral.        ROS comment: right internal carotid artery pseudoaneurysm embolization Feb 2024. Other Findings:           Anesthesia Plan      general     ASA 3     (TIVA)  Induction: intravenous.    MIPS: Prophylactic antiemetics administered.  Anesthetic plan and risks discussed with patient.      Plan discussed with CRNA.                  Abby Red MD   6/12/2024

## 2024-06-12 NOTE — ANESTHESIA POSTPROCEDURE EVALUATION
Department of Anesthesiology  Postprocedure Note    Patient: William Babcock  MRN: [id number]  YOB: 1960  Date of evaluation: 6/12/2024    Procedure Summary       Date: 06/12/24 Room / Location: [facility] PACU    Anesthesia Start: 0802 Anesthesia Stop: 0820    Procedure: ECT W/ ANESTHESIA Diagnosis:     Scheduled Providers:  Responsible Provider: Abby Red MD    Anesthesia Type: General ASA Status: 3            Anesthesia Type: General    Wanda Phase I: Wanda Score: 7    Wanda Phase II:      Anesthesia Post Evaluation    Comments: POST- ANESTHESIA EVALUATION       Pt Name: William Babcock  MRN: [id number]  YOB: 1960  Date of evaluation: 6/12/2024  Time:  9:53 AM      /78   Pulse 75   Temp 98.4 °F (36.9 °C) (Infrared)   Resp 16   Ht 1.702 m (5' 7\")   Wt 66.7 kg (147 lb)   SpO2 98%   BMI 23.02 kg/m²      Consciousness Level  Awake  Cardiopulmonary Status  Stable  Pain Adequately Treated YES  Nausea / Vomiting  NO  Adequate Hydration  YES  Anesthesia Related Complications NONE      Electronically signed by Abby Red MD on 6/12/2024 at 9:53 AM           No notable events documented.

## 2024-06-12 NOTE — H&P
HISTORY and PHYSICAL  [facility]       NAME:  William Babcock  MRN: [id number]   YOB: 1960   Date: 6/12/2024   Age: 64 y.o.  Gender: male       COMPLAINT AND PRESENT HISTORY:     William Babcock is 64 y.o.,  male, presents for ECT WITH ANESTHESIA     Primary dx: Recurrent major depression    HPI:    William Babcock is 64 y.o.,  male, here for ECT treatment.     Last ECT treatment was 5/14/2024 . Pt tolerated last treatment well. Patient reports that his  symptoms are improving.  Patient has complaints of some short term memory loss  Pt has had multiple failed treatment modalities. Pt has history of Recurrent major depression  Mood is rated at  7/10. With 10 is the best mood. Pt denies feeling of  hopelessness, helplessness, low energy.   Patient's sleep has been good . Appetite has been good .   Pt denies feeling of suicidal/ homicidal. Pt has no auditory/visual hallucinations.   Pt has been taking psychiatric medications as prescribed.   Pt does not have any other somatic complaints at this time.    Pt denies fever/chills, chest pain or SOB     Review of additional significant medical hx:  (See chart for additional detail, including current medications /see ROS for current S/S):   HTN  Current medication r/t condition : ASA, Plavix, tenormin   BP Readings from Last 3 Encounters:   05/16/24 110/71   05/14/24 134/75   05/03/24 (!) 137/106       ECG 3/12/24  Normal sinus rhythm  Normal ECG  No previous ECGs available    NPO status: pt NPO since the past midnight   Medications taken TODAY (with sip of water): none  Anticoagulation status:ASA, last dose yesterday     Denies personal hx of blood clots.  Denies personal hx of MRSA infection.  Denies any personal or family hx of previous complications w/anesthesia.    PAST MEDICAL HISTORY     Past Medical History:   Diagnosis Date    Anxiety     Autism     HTN (hypertension)     Pseudoaneurysm (HCC)     Right proximal RCA       SURGICAL

## 2024-06-12 NOTE — DISCHARGE INSTRUCTIONS
the last Monday of the month with holiday exceptions.  Attendance may be in person or virtually.  The support group is located in person at [facility], [address] [place] [address], [place] [address], in the basement Board Room.  It is available virtually, however you must pre-arrange access by emailing [email or web address].  The support group starts at 4p and ends at 5p.  Our support group offers a safe place where all aspects of ECT treatment can be discussed openly.  Patients and family/friends are welcome to attend.**      If you feel you are having a problem or complication from your ECT treatments and it is during normal business hours call [phone]. If it is after normal business hours please call [facility] at [phone] to speak with a nurse, or go to your nearest emergency room.     If you need to schedule, reschedule or cancel an appointment, please call the [facility] at [phone]. You may also use this number for any questions regarding the ECT Support Group.    If you have an after hours or weekend cancellation please call [phone], they will forward message to appropriate party.    You will need to arrange transportation to and from the hospital for all outpatient ECT treatments.    Bring a current list of your medications, including dosages with you to every ECT treatment and arrive 1 hour and 15 minutes before your scheduled ECT time.

## 2024-07-03 DIAGNOSIS — F33.3 SEVERE RECURRENT MAJOR DEPRESSION WITH PSYCHOTIC FEATURES (HCC): Primary | ICD-10-CM

## 2024-07-08 ENCOUNTER — ANESTHESIA EVENT (OUTPATIENT)
Dept: POSTOP/PACU | Age: 64
End: 2024-07-08
Payer: MEDICARE

## 2024-07-09 ENCOUNTER — ANESTHESIA (OUTPATIENT)
Dept: POSTOP/PACU | Age: 64
End: 2024-07-09
Payer: MEDICARE

## 2024-07-09 ENCOUNTER — HOSPITAL ENCOUNTER (OUTPATIENT)
Dept: POSTOP/PACU | Age: 64
Discharge: HOME | End: 2024-07-09
Payer: MEDICARE

## 2024-07-09 VITALS
HEART RATE: 58 BPM | HEIGHT: 67 IN | BODY MASS INDEX: 23.07 KG/M2 | WEIGHT: 147 LBS | RESPIRATION RATE: 13 BRPM | SYSTOLIC BLOOD PRESSURE: 117 MMHG | DIASTOLIC BLOOD PRESSURE: 76 MMHG | TEMPERATURE: 97.8 F | OXYGEN SATURATION: 98 %

## 2024-07-09 PROCEDURE — 3700000000 HC ANESTHESIA ATTENDED CARE: Performed by: ANESTHESIOLOGY

## 2024-07-09 PROCEDURE — 2580000003 HC RX 258: Performed by: ANESTHESIOLOGY

## 2024-07-09 PROCEDURE — 2500000003 HC RX 250 WO HCPCS: Performed by: NURSE ANESTHETIST, CERTIFIED REGISTERED

## 2024-07-09 PROCEDURE — 90870 ELECTROCONVULSIVE THERAPY: CPT | Performed by: ANESTHESIOLOGY

## 2024-07-09 PROCEDURE — 90870 ELECTROCONVULSIVE THERAPY: CPT | Performed by: PSYCHIATRY & NEUROLOGY

## 2024-07-09 PROCEDURE — 7100000000 HC PACU RECOVERY - FIRST 15 MIN: Performed by: ANESTHESIOLOGY

## 2024-07-09 PROCEDURE — 7100000001 HC PACU RECOVERY - ADDTL 15 MIN: Performed by: ANESTHESIOLOGY

## 2024-07-09 PROCEDURE — 3700000001 HC ADD 15 MINUTES (ANESTHESIA): Performed by: ANESTHESIOLOGY

## 2024-07-09 RX ORDER — SODIUM CHLORIDE 0.9 % (FLUSH) 0.9 %
5-40 SYRINGE (ML) INJECTION EVERY 12 HOURS SCHEDULED
Status: DISCONTINUED | OUTPATIENT
Start: 2024-07-09 | End: 2024-07-10 | Stop reason: HOSPADM

## 2024-07-09 RX ORDER — SODIUM CHLORIDE, SODIUM LACTATE, POTASSIUM CHLORIDE, CALCIUM CHLORIDE 600; 310; 30; 20 MG/100ML; MG/100ML; MG/100ML; MG/100ML
INJECTION, SOLUTION INTRAVENOUS CONTINUOUS
Status: DISCONTINUED | OUTPATIENT
Start: 2024-07-09 | End: 2024-07-10 | Stop reason: HOSPADM

## 2024-07-09 RX ORDER — SUCCINYLCHOLINE/SOD CL,ISO/PF 200MG/10ML
SYRINGE (ML) INTRAVENOUS PRN
Status: DISCONTINUED | OUTPATIENT
Start: 2024-07-09 | End: 2024-07-09 | Stop reason: SDUPTHER

## 2024-07-09 RX ORDER — METHOHEXITAL IN WATER/PF 100MG/10ML
SYRINGE (ML) INTRAVENOUS
Status: COMPLETED
Start: 2024-07-09 | End: 2024-07-09

## 2024-07-09 RX ORDER — METHOHEXITAL IN WATER/PF 100MG/10ML
SYRINGE (ML) INTRAVENOUS PRN
Status: DISCONTINUED | OUTPATIENT
Start: 2024-07-09 | End: 2024-07-09 | Stop reason: SDUPTHER

## 2024-07-09 RX ORDER — SODIUM CHLORIDE 0.9 % (FLUSH) 0.9 %
5-40 SYRINGE (ML) INJECTION PRN
Status: DISCONTINUED | OUTPATIENT
Start: 2024-07-09 | End: 2024-07-10 | Stop reason: HOSPADM

## 2024-07-09 RX ORDER — SODIUM CHLORIDE 9 MG/ML
INJECTION, SOLUTION INTRAVENOUS PRN
Status: DISCONTINUED | OUTPATIENT
Start: 2024-07-09 | End: 2024-07-10 | Stop reason: HOSPADM

## 2024-07-09 RX ORDER — SUCCINYLCHOLINE/SOD CL,ISO/PF 200MG/10ML
SYRINGE (ML) INTRAVENOUS
Status: COMPLETED
Start: 2024-07-09 | End: 2024-07-09

## 2024-07-09 RX ORDER — LIDOCAINE HYDROCHLORIDE 10 MG/ML
1 INJECTION, SOLUTION EPIDURAL; INFILTRATION; INTRACAUDAL; PERINEURAL
Status: DISCONTINUED | OUTPATIENT
Start: 2024-07-09 | End: 2024-07-10 | Stop reason: HOSPADM

## 2024-07-09 RX ADMIN — Medication 80 MG: at 08:05

## 2024-07-09 RX ADMIN — SODIUM CHLORIDE, POTASSIUM CHLORIDE, SODIUM LACTATE AND CALCIUM CHLORIDE: 600; 310; 30; 20 INJECTION, SOLUTION INTRAVENOUS at 07:58

## 2024-07-09 ASSESSMENT — PAIN - FUNCTIONAL ASSESSMENT
PAIN_FUNCTIONAL_ASSESSMENT: 0-10
PAIN_FUNCTIONAL_ASSESSMENT: 0-10

## 2024-07-09 NOTE — H&P
diphenhydrAMINE-zinc acetate 2-0.1 % cream Apply topically 3 times daily as needed. 35 g 0    fluticasone (FLONASE) 50 MCG/ACT nasal spray 2 sprays by Each Nostril route daily 16 g 0    OLANZapine (ZYPREXA) 7.5 MG tablet Take 1 tablet by mouth nightly 30 tablet 0    venlafaxine (EFFEXOR XR) 37.5 MG extended release capsule Take 1 capsule by mouth daily (with breakfast) 30 capsule 0    traZODone (DESYREL) 50 MG tablet Take 1 tablet by mouth nightly as needed for Sleep (Patient not taking: Reported on 5/16/2024) 30 tablet 0      No current facility-administered medications on file prior to encounter.         Review of Systems   Constitutional: Negative.    HENT: Negative.     Eyes: Negative.    Respiratory: Negative.     Cardiovascular: Negative.    Gastrointestinal: Negative.    Genitourinary: Negative.    Musculoskeletal: Negative.    Skin: Negative.    Neurological: Negative.    Hematological: Negative.    Psychiatric/Behavioral: Negative.           GENERAL PHYSICAL EXAM      Vitals: see nursing flow sheet for vital sings      GENERAL APPEARANCE:   William Babcock is 64 y.o.,  male, not obese, nourished, conscious, alert.  Does not appear to be distress or pain at this time.                            Physical Exam  Constitutional:       General: He is not in acute distress.     Appearance: Normal appearance. He is normal weight. He is not ill-appearing.   HENT:      Head: Normocephalic.      Right Ear: External ear normal.      Left Ear: External ear normal.      Nose: Nose normal.      Mouth/Throat:      Mouth: Mucous membranes are dry.   Eyes:      General:         Right eye: No discharge.         Left eye: No discharge.   Cardiovascular:      Rate and Rhythm: Normal rate and regular rhythm.      Pulses: Normal pulses.      Heart sounds: Normal heart sounds.   Pulmonary:      Effort: Pulmonary effort is normal.      Breath sounds: Normal breath sounds. No wheezing or rhonchi.   Abdominal:      General: Bowel

## 2024-07-09 NOTE — PROCEDURES
Bilateral ECT Procedure Report  William Babcock   7/9/2024 1960         Attending:Donnie Rosa MD  Preprocedure diagnosis: Major depressive disorder, recurrent, severe with psychotic symptoms (F33.3)  Postprocedure diagnosis: SAME AS PRE-PROCEDURE DIAGNOSIS  Treatment Number: This is treatment # 14   Patient Status: Outpatient   Type of ECT: Bilateral Brief Pulse  Medications  Preprocedure: Tylenol 650mg  Anesthetic: Methohexital 80 mg  Paralytic: Succinylcholine 80 mg  Post procedure: none needed   Cuff placement: Left Lower Extremity    MECTA Settings 1st Stimulus:     Pulse width: 1.0 ms   Frequency:  60 hz   Duration:   3.0 seconds   Static Impedance: 542 ohms             Dynamic Impedance: 180 ohms             Motor Seizure Duration: 28seconds  EEG Seizure Duration: 74 seconds             The patient's ECT treatment was performed using MECTA machine  .  Timeout:  Time out was performed using two patient identifiers and confirmation of procedure.     Patient Preparation: Preprocedure documentation, labs, H&P were all verified and reviewed.  The patient was placed in supine position.  EEG leads were placed for monitoring of seizure.   Sabianism areas bilaterally cleaned and prepped.  Conductive gel  was applied over stimulus electrode site.   The patient was pre-oxygenated.       Procedure in detail: Medications were administered by anesthesia using intravenous access at the doses listed previously in this summary.  Anesthetic administered and once confirmation the patient is anesthetized, the patient's blood pressure cuff on lower extremity was inflated to 100mmHg in excess of patient's blood pressure.  At this time, the neuromuscular blockade was administered intravenously by anesthesia.  Upper extremity fasciculation were verified followed by lower extremity fasciculation.  Once fasciculations terminated, confirmation of affective neuromuscular blockade demonstrated by absence of withdrawal reflex.  Bite

## 2024-07-09 NOTE — ANESTHESIA POSTPROCEDURE EVALUATION
Department of Anesthesiology  Postprocedure Note    Patient: William Babcock  MRN: [id number]  YOB: 1960  Date of evaluation: 7/9/2024    Procedure Summary       Date: 07/09/24 Room / Location: [facility] PACU    Anesthesia Start: 0802 Anesthesia Stop: 0819    Procedure: ECT W/ ANESTHESIA Diagnosis: Major depressive disorder, recurrent, severe with psychotic symptoms    Scheduled Providers:  Responsible Provider: Abby Red MD    Anesthesia Type: general ASA Status: 3            Anesthesia Type: No value filed.    Wanda Phase I: Wanda Score: 9    Wanda Phase II:      Anesthesia Post Evaluation    Comments: POST- ANESTHESIA EVALUATION       Pt Name: William Babcock  MRN: [id number]  YOB: 1960  Date of evaluation: 7/9/2024  Time:  8:54 AM      /76   Pulse 60   Temp 97.8 °F (36.6 °C) (Infrared)   Resp 15   Ht 1.702 m (5' 7\")   Wt 66.7 kg (147 lb)   SpO2 100%   BMI 23.02 kg/m²      Consciousness Level  Awake  Cardiopulmonary Status  Stable  Pain Adequately Treated YES  Nausea / Vomiting  NO  Adequate Hydration  YES  Anesthesia Related Complications NONE      Electronically signed by Abby Red MD on 7/9/2024 at 8:54 AM           No notable events documented.

## 2024-07-09 NOTE — PROGRESS NOTES
Pre ECT Assessment Note  Psychiatry  7/9/2024      William Babcock  1960  [id number]      Subjective:     Patient is a 64 y.o.  male seen for an evaluation prior to today's electroconvulsive therapy treatment. Today is treatment number 14, utilizing bilateral.  This is course number 1.  The patient has previously never previously completed a course of ECT.    Reagan is interviewed today at bedside, he reports that things are going well.  He has been active at [employer], he reports that he has been medication compliant.  He denies any decline in his mood and seems to be tolerating her frequency of every 4 weeks.  He reports no suicidal ideation or symptoms of paranoia.  He continues to have an overall carefree affect, he is somewhat blunted however historically he was quite flat.  He is agreeable to continuing with the frequency of ECT once a month, we will monitor for stability in symptoms and adjust frequency accordingly.  He denies any significant concern with cognitive impairment.    Patient Active Problem List    Diagnosis Date Noted    Alzheimer's disease, unspecified 05/16/2024    Severe recurrent major depression with psychotic features (HCC) 03/13/2024    Altered mental status 02/26/2024    Acute psychosis (HCC) 02/12/2024    Secondary hypertension 02/10/2024    Carotid pseudoaneurysm (HCC) 02/09/2024    Agitation 02/09/2024    Stenosis of right carotid artery greater than 50% 02/09/2024    Psychosis, paranoid (HCC) 02/06/2024    Abnormal CT of the head 02/06/2024    Psychosis (HCC) 02/06/2024    Pseudoaneurysm (HCC) 02/06/2024    Essential hypertension 12/01/2016    Primary insomnia 12/01/2016    Influenza vaccine refused 12/01/2016    Refused Streptococcus pneumoniae vaccination 12/01/2016     Past Medical History:   Diagnosis Date    Anxiety     Autism     HTN (hypertension)     Pseudoaneurysm (HCC)     Right proximal RCA      Past Surgical History:   Procedure Laterality Date    CEREBRAL

## 2024-07-09 NOTE — ANESTHESIA PRE PROCEDURE
than 50% I65.21   • Secondary hypertension I15.9   • Acute psychosis (HCC) F23   • Altered mental status R41.82   • Severe recurrent major depression with psychotic features (HCC) F33.3   • Alzheimer's disease, unspecified G30.9       Past Medical History:        Diagnosis Date   • Anxiety    • Autism    • HTN (hypertension)    • Pseudoaneurysm (HCC)     Right proximal RCA       Past Surgical History:        Procedure Laterality Date   • CEREBRAL ANGIOGRAM  02/09/2024    IR ANGIOGRAM CAROTID CEREBRAL BILATERAL   • EYE SURGERY     • OTHER SURGICAL HISTORY      ECT       Social History:    Social History     Tobacco Use   • Smoking status: Never   • Smokeless tobacco: Never   Substance Use Topics   • Alcohol use: No                                Counseling given: Not Answered      Vital Signs (Current):   There were no vitals filed for this visit.                                             BP Readings from Last 3 Encounters:   06/12/24 122/78   05/16/24 110/71   05/14/24 134/75       NPO Status:                                                                                 BMI:   Wt Readings from Last 3 Encounters:   06/12/24 66.7 kg (147 lb)   05/16/24 66.7 kg (147 lb)   05/14/24 63.5 kg (140 lb)     There is no height or weight on file to calculate BMI.    CBC:   Lab Results   Component Value Date/Time    WBC 3.6 03/17/2024 09:03 AM    RBC 4.44 03/17/2024 09:03 AM    HGB 13.6 03/17/2024 09:03 AM    HCT 40.2 03/17/2024 09:03 AM    MCV 90.5 03/17/2024 09:03 AM    RDW 15.0 03/17/2024 09:03 AM     03/17/2024 09:03 AM       CMP:   Lab Results   Component Value Date/Time     03/17/2024 09:03 AM    K 4.1 03/17/2024 09:03 AM     03/17/2024 09:03 AM    CO2 26 03/17/2024 09:03 AM    BUN 13 03/17/2024 09:03 AM    CREATININE 0.9 03/17/2024 09:03 AM    GFRAA >60 08/29/2022 07:37 AM    LABGLOM >60 03/17/2024 09:03 AM    GLUCOSE 99 03/17/2024 09:03 AM    CALCIUM 9.2 03/17/2024 09:03 AM    BILITOT 0.6

## 2024-07-29 DIAGNOSIS — F33.3 SEVERE RECURRENT MAJOR DEPRESSION WITH PSYCHOTIC FEATURES (HCC): Primary | ICD-10-CM

## 2024-08-06 ENCOUNTER — ANESTHESIA EVENT (OUTPATIENT)
Dept: POSTOP/PACU | Age: 64
End: 2024-08-06
Payer: MEDICARE

## 2024-08-07 ENCOUNTER — ANESTHESIA (OUTPATIENT)
Dept: POSTOP/PACU | Age: 64
End: 2024-08-07
Payer: MEDICARE

## 2024-08-07 ENCOUNTER — HOSPITAL ENCOUNTER (OUTPATIENT)
Dept: POSTOP/PACU | Age: 64
Discharge: HOME | End: 2024-08-07
Payer: MEDICARE

## 2024-08-07 VITALS
TEMPERATURE: 98.5 F | BODY MASS INDEX: 23.08 KG/M2 | SYSTOLIC BLOOD PRESSURE: 103 MMHG | RESPIRATION RATE: 9 BRPM | OXYGEN SATURATION: 95 % | DIASTOLIC BLOOD PRESSURE: 72 MMHG | HEIGHT: 67 IN | WEIGHT: 147.05 LBS | HEART RATE: 62 BPM

## 2024-08-07 PROCEDURE — 3700000001 HC ADD 15 MINUTES (ANESTHESIA): Performed by: ANESTHESIOLOGY

## 2024-08-07 PROCEDURE — 7100000000 HC PACU RECOVERY - FIRST 15 MIN: Performed by: ANESTHESIOLOGY

## 2024-08-07 PROCEDURE — 90870 ELECTROCONVULSIVE THERAPY: CPT | Performed by: PSYCHIATRY & NEUROLOGY

## 2024-08-07 PROCEDURE — 7100000001 HC PACU RECOVERY - ADDTL 15 MIN: Performed by: ANESTHESIOLOGY

## 2024-08-07 PROCEDURE — 2500000003 HC RX 250 WO HCPCS: Performed by: NURSE ANESTHETIST, CERTIFIED REGISTERED

## 2024-08-07 PROCEDURE — 90870 ELECTROCONVULSIVE THERAPY: CPT

## 2024-08-07 PROCEDURE — 2580000003 HC RX 258: Performed by: ANESTHESIOLOGY

## 2024-08-07 PROCEDURE — 3700000000 HC ANESTHESIA ATTENDED CARE: Performed by: ANESTHESIOLOGY

## 2024-08-07 PROCEDURE — 2500000003 HC RX 250 WO HCPCS: Performed by: ANESTHESIOLOGY

## 2024-08-07 RX ORDER — SODIUM CHLORIDE 0.9 % (FLUSH) 0.9 %
5-40 SYRINGE (ML) INJECTION PRN
Status: DISCONTINUED | OUTPATIENT
Start: 2024-08-07 | End: 2024-08-08 | Stop reason: HOSPADM

## 2024-08-07 RX ORDER — LIDOCAINE HYDROCHLORIDE 10 MG/ML
1 INJECTION, SOLUTION EPIDURAL; INFILTRATION; INTRACAUDAL; PERINEURAL
Status: COMPLETED | OUTPATIENT
Start: 2024-08-07 | End: 2024-08-07

## 2024-08-07 RX ORDER — SUCCINYLCHOLINE/SOD CL,ISO/PF 200MG/10ML
SYRINGE (ML) INTRAVENOUS
Status: COMPLETED
Start: 2024-08-07 | End: 2024-08-07

## 2024-08-07 RX ORDER — SUCCINYLCHOLINE/SOD CL,ISO/PF 200MG/10ML
SYRINGE (ML) INTRAVENOUS PRN
Status: DISCONTINUED | OUTPATIENT
Start: 2024-08-07 | End: 2024-08-07 | Stop reason: SDUPTHER

## 2024-08-07 RX ORDER — SODIUM CHLORIDE 9 MG/ML
INJECTION, SOLUTION INTRAVENOUS PRN
Status: DISCONTINUED | OUTPATIENT
Start: 2024-08-07 | End: 2024-08-08 | Stop reason: HOSPADM

## 2024-08-07 RX ORDER — METHOHEXITAL IN WATER/PF 100MG/10ML
SYRINGE (ML) INTRAVENOUS PRN
Status: DISCONTINUED | OUTPATIENT
Start: 2024-08-07 | End: 2024-08-07 | Stop reason: SDUPTHER

## 2024-08-07 RX ORDER — SODIUM CHLORIDE 0.9 % (FLUSH) 0.9 %
5-40 SYRINGE (ML) INJECTION EVERY 12 HOURS SCHEDULED
Status: DISCONTINUED | OUTPATIENT
Start: 2024-08-07 | End: 2024-08-08 | Stop reason: HOSPADM

## 2024-08-07 RX ORDER — METHOHEXITAL IN WATER/PF 100MG/10ML
SYRINGE (ML) INTRAVENOUS
Status: COMPLETED
Start: 2024-08-07 | End: 2024-08-07

## 2024-08-07 RX ORDER — SODIUM CHLORIDE, SODIUM LACTATE, POTASSIUM CHLORIDE, CALCIUM CHLORIDE 600; 310; 30; 20 MG/100ML; MG/100ML; MG/100ML; MG/100ML
INJECTION, SOLUTION INTRAVENOUS CONTINUOUS
Status: DISCONTINUED | OUTPATIENT
Start: 2024-08-07 | End: 2024-08-08 | Stop reason: HOSPADM

## 2024-08-07 RX ADMIN — Medication 80 MG: at 08:38

## 2024-08-07 RX ADMIN — LIDOCAINE HYDROCHLORIDE 1 ML: 10 INJECTION, SOLUTION EPIDURAL; INFILTRATION; INTRACAUDAL; PERINEURAL at 07:57

## 2024-08-07 RX ADMIN — SODIUM CHLORIDE, POTASSIUM CHLORIDE, SODIUM LACTATE AND CALCIUM CHLORIDE: 600; 310; 30; 20 INJECTION, SOLUTION INTRAVENOUS at 07:57

## 2024-08-07 ASSESSMENT — ENCOUNTER SYMPTOMS
EYES NEGATIVE: 1
RESPIRATORY NEGATIVE: 1
GASTROINTESTINAL NEGATIVE: 1

## 2024-08-07 ASSESSMENT — PAIN SCALES - GENERAL: PAINLEVEL_OUTOF10: 0

## 2024-08-07 ASSESSMENT — PAIN - FUNCTIONAL ASSESSMENT: PAIN_FUNCTIONAL_ASSESSMENT: 0-10

## 2024-08-07 NOTE — ANESTHESIA PRE PROCEDURE
Department of Anesthesiology  Preprocedure Note       Name:  William Babcock   Age:  64 y.o.  :  1960                                          MRN:  [id number]         Date:  2024      Surgeon: Dr. Rosa    Procedure: ECT    Medications prior to admission:   Prior to Admission medications    Medication Sig Start Date End Date Taking? Authorizing Provider   acetaminophen (TYLENOL) 500 MG tablet Take 650 mg by mouth every 6 hours as needed for Pain    ProviderNick MD   bisacodyl (DULCOLAX) 10 MG suppository Place 1 suppository rectally daily    Provider, MD Nick   aspirin 81 MG chewable tablet Take 1 tablet by mouth daily 3/29/24   Donnie Rosa MD   atenolol (TENORMIN) 25 MG tablet Take 0.5 tablets by mouth daily 3/29/24   Donnie Rosa MD   clopidogrel (PLAVIX) 75 MG tablet Take 1 tablet by mouth daily 3/29/24   Donnie Rosa MD   donepezil (ARICEPT) 5 MG tablet Take 1 tablet by mouth nightly 3/29/24   Donnie Rosa MD   diphenhydrAMINE-zinc acetate 2-0.1 % cream Apply topically 3 times daily as needed. 3/29/24   Donnie Rosa MD   fluticasone (FLONASE) 50 MCG/ACT nasal spray 2 sprays by Each Nostril route daily 3/29/24   Donnie Rosa MD   OLANZapine (ZYPREXA) 7.5 MG tablet Take 1 tablet by mouth nightly 3/29/24   Donnie Rosa MD   venlafaxine (EFFEXOR XR) 37.5 MG extended release capsule Take 1 capsule by mouth daily (with breakfast) 3/29/24   Donnie Rosa MD   traZODone (DESYREL) 50 MG tablet Take 1 tablet by mouth nightly as needed for Sleep  Patient not taking: Reported on 2024 3/29/24   Donnie Rosa MD       Current medications:    Current Outpatient Medications   Medication Sig Dispense Refill    acetaminophen (TYLENOL) 500 MG tablet Take 650 mg by mouth every 6 hours as needed for Pain      bisacodyl (DULCOLAX) 10 MG suppository Place 1 suppository rectally daily      aspirin 81 MG chewable tablet Take 1 tablet by mouth daily 30 tablet 0

## 2024-08-07 NOTE — ANESTHESIA POSTPROCEDURE EVALUATION
Department of Anesthesiology  Postprocedure Note    Patient: William Babcock  MRN: [id number]  YOB: 1960  Date of evaluation: 8/7/2024    Procedure Summary       Date: 08/07/24 Room / Location: [facility] PACU    Anesthesia Start: 0834 Anesthesia Stop: 0850    Procedure: ECT W/ ANESTHESIA Diagnosis: Major depressive disorder, recurrent, severe with psychotic symptoms    Scheduled Providers:  Responsible Provider: Dieter Kerns MD    Anesthesia Type: general ASA Status: 3            Anesthesia Type: No value filed.    Wanda Phase I: Wanda Score: 5    Wanda Phase II:      Anesthesia Post Evaluation    Comments: POST- ANESTHESIA EVALUATION       Pt Name: William Babcock  MRN: [id number]  YOB: 1960  Date of evaluation: 8/7/2024  Time:  12:19 PM      /72   Pulse 62   Temp 98.5 °F (36.9 °C)   Resp (!) 9   Ht 1.702 m (5' 7\")   Wt 66.7 kg (147 lb 0.8 oz)   SpO2 95%   BMI 23.03 kg/m²      Consciousness Level  Awake  Cardiopulmonary Status  Stable  Pain Adequately Treated YES  Nausea / Vomiting  NO  Adequate Hydration  YES  Anesthesia Related Complications NONE      Electronically signed by Dieter Kerns MD on 8/7/2024 at 12:19 PM           No notable events documented.

## 2024-08-07 NOTE — DISCHARGE INSTRUCTIONS
holiday exceptions.  Attendance may be in person or virtually.  The support group is located in person at [facility], [address] [place] [address], [place] [address], in the basement Board Room.  It is available virtually, however you must pre-arrange access by emailing [email or web address].  The support group starts at 4p and ends at 5p.  Our support group offers a safe place where all aspects of ECT treatment can be discussed openly.  Patients and family/friends are welcome to attend.**      If you feel you are having a problem or complication from your ECT treatments and it is during normal business hours call [phone]. If it is after normal business hours please call [facility] at [phone] to speak with a nurse, or go to your nearest emergency room.     If you need to schedule, reschedule or cancel an appointment, please call the [facility] at [phone]. You may also use this number for any questions regarding the ECT Support Group.    If you have an after hours or weekend cancellation please call [phone], they will forward message to appropriate party.    You will need to arrange transportation to and from the hospital for all outpatient ECT treatments.    Bring a current list of your medications, including dosages with you to every ECT treatment and arrive 1 hour and 15 minutes before your scheduled ECT time.           Sedation or General Anesthesia, Adult  Care After  Refer to this sheet in the next 24 hours. These instructions provide you with information on caring for yourself after your procedure. Your caregiver may also give you more specific instructions. Your treatment has been planned according to current medical practices, but problems sometimes occur. Call your caregiver if you have any problems or questions after your procedure.   HOME CARE INSTRUCTIONS   Do not participate in any activities that require you to be alert or coordinated. Do

## 2024-08-07 NOTE — PROCEDURES
Bilateral ECT Procedure Report  William Babcock   8/7/2024 1960         Attending:Donnie Rosa MD  Preprocedure diagnosis: Major depressive disorder, recurrent, severe with psychotic symptoms (F33.3)  Postprocedure diagnosis: SAME AS PRE-PROCEDURE DIAGNOSIS  Treatment Number: This is treatment # 15   Patient Status: Outpatient   Type of ECT: Bilateral Brief Pulse  Medications  Preprocedure: Tylenol 650mg  Anesthetic: Methohexital 80 mg  Paralytic: Succinylcholine 80 mg  Post procedure: none needed   Cuff placement: Left Lower Extremity    MECTA Settings 1st Stimulus:     Pulse width: 1.0 ms   Frequency:  60 hz   Duration:   3.0 seconds   Static Impedance: 313 ohms             Dynamic Impedance: 180 ohms             Motor Seizure Duration: 60seconds  EEG Seizure Duration: 94 seconds             The patient's ECT treatment was performed using MECTA machine  .  Timeout:  Time out was performed using two patient identifiers and confirmation of procedure.     Patient Preparation: Preprocedure documentation, labs, H&P were all verified and reviewed.  The patient was placed in supine position.  EEG leads were placed for monitoring of seizure.   Holiness areas bilaterally cleaned and prepped.  Conductive gel  was applied over stimulus electrode site.   The patient was pre-oxygenated.       Procedure in detail: Medications were administered by anesthesia using intravenous access at the doses listed previously in this summary.  Anesthetic administered and once confirmation the patient is anesthetized, the patient's blood pressure cuff on lower extremity was inflated to 100mmHg in excess of patient's blood pressure.  At this time, the neuromuscular blockade was administered intravenously by anesthesia.  Upper extremity fasciculation were verified followed by lower extremity fasciculation.  Once fasciculations terminated, confirmation of affective neuromuscular blockade demonstrated by absence of withdrawal reflex.  Bite

## 2024-08-07 NOTE — H&P
wheezing or rhonchi.   Abdominal:      General: Bowel sounds are normal. There is no distension.      Palpations: Abdomen is soft. There is no mass.      Tenderness: There is no abdominal tenderness.   Musculoskeletal:         General: Normal range of motion.      Cervical back: Normal range of motion and neck supple.      Right lower leg: No edema.      Left lower leg: No edema.   Skin:     General: Skin is warm and dry.      Findings: No bruising or erythema.   Neurological:      General: No focal deficit present.      Mental Status: He is alert and oriented to person, place, and time.      Motor: No weakness.      Gait: Gait normal.   Psychiatric:         Mood and Affect: Mood normal.         Behavior: Behavior normal.                   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

## 2024-08-28 ENCOUNTER — HOSPITAL ENCOUNTER (OUTPATIENT)
Dept: PSYCHIATRY | Age: 64
Setting detail: THERAPIES SERIES
Discharge: HOME | End: 2024-08-28
Payer: MEDICARE

## 2024-08-28 DIAGNOSIS — F33.3 SEVERE RECURRENT MAJOR DEPRESSION WITH PSYCHOTIC FEATURES (HCC): Primary | ICD-10-CM

## 2024-08-28 PROCEDURE — 99212 OFFICE O/P EST SF 10 MIN: CPT | Performed by: PSYCHIATRY & NEUROLOGY

## 2024-08-28 PROCEDURE — 99213 OFFICE O/P EST LOW 20 MIN: CPT | Performed by: PSYCHIATRY & NEUROLOGY

## 2024-09-05 NOTE — PROGRESS NOTES
Psychiatry Progress  Note     William Babcock  64 y.o.  male    Patient's chart reviewed .     Patient is seen today for follow-up, his brother is present and Reagan agrees to allow his brother to participate actively in our visit.    Patient has tapered down ECT.  He continues to do very well according to both the patient and his brother.  His brother states he feels like he has his old brother back.  They do acknowledge some cognitive side effects and memory impairment from the ECT treatments but states they are pleased with the results on his mood.  Patient denying any suicidal or homicidal ideation intent or plan.  Denying any paranoia.  No delusions elicited.  Patient getting along well in the ECF.    After discussion of risk benefits and alternatives, we mutually agreed to stop ECT treatments for now.  Both the patient and his brother were educated that they may call at any time if there is concern for worsening of mood or symptoms.  Reagan was quite happy with the recommendation to stop ECT and see how he does without any ECT at all.    Mental Status:  Patient is alert and oriented to time , place , person and self.   He  is cooperative with fair eye contact and fair grooming .  Appears of stated age .   Mood is ' good'  . Affect is congruent .   Thought process is linear and goal directed .   Thought content is negative for  Suicidal or homicidal thoughts .   Denies any hallucinations or delusions .  Insight is fair . Judgement is fair.  Memory : No deficits per conversation.   Attention : Intact per conversation.  Intellect :  average per vocabulary and fund of knowledge .   Psychomotor activity : normal.    No abnormal movements noted .  Speech is regular rate and rhythm.      Review of Systems:  Review of Systems   Constitutional: Negative.   HENT: Negative.   Eyes: Negative.   Respiratory: Negative.   Cardiovascular: Negative.   Gastrointestinal: Negative.   Genitourinary: Negative .   Musculoskeletal:

## 2024-12-11 ENCOUNTER — HOSPITAL ENCOUNTER (OUTPATIENT)
Dept: PSYCHIATRY | Age: 64
Setting detail: THERAPIES SERIES
Discharge: HOME | End: 2024-12-11
Payer: MEDICARE

## 2024-12-11 DIAGNOSIS — F33.3 SEVERE RECURRENT MAJOR DEPRESSION WITH PSYCHOTIC FEATURES (HCC): Primary | ICD-10-CM

## 2024-12-11 PROCEDURE — 99212 OFFICE O/P EST SF 10 MIN: CPT | Performed by: PSYCHIATRY & NEUROLOGY

## 2024-12-11 PROCEDURE — 99213 OFFICE O/P EST LOW 20 MIN: CPT | Performed by: PSYCHIATRY & NEUROLOGY

## 2024-12-11 NOTE — PROGRESS NOTES
Psychiatry Progress  Note     William Maloneleah  64 y.o.  male    Patient's chart reviewed .     Reagan presents today with his brother as well.  He gives permission for her brother to participate in interview.  Reagan is doing quite well.  He is reporting a good mood.  Reports good sleep and good appetite.  Denying any problems with his mood.  He denies any anhedonia and states he is able to enjoy things.  He is interactive and pleasant during the course of the interview.  Denying any depressive symptoms.  Denies any significant side effects from ECT.  His brother also reports that things have been going very well with Reagan and that there are no concerns or special considerations at this time.    We did discuss whether or not we wanted to sign off or not.  Les reports that he would prefer to follow-up 1 more time in approximately 3 to 4 months.  He states that Reagan is potentially transitioning to his \"half-way home\".  And that times of transition are often difficult.  He would like to follow-up.  We agreed to follow-up in 3 to 4 months time.    Medications were reviewed.  Patient currently on Effexor 50 mg daily and olanzapine 2.5 mg p.o. nightly.  These represent a decrease in medications from previous levels    Mental Status:  Patient is alert and oriented to time , place , person and self.   He  is cooperative with fair eye contact and fair grooming .  Appears of stated age .   Mood is ' ok'  . Affect is congruent .   Thought process is linear and goal directed .   Thought content is negative for  Suicidal or homicidal thoughts .   Denies any hallucinations or delusions .  Insight is fair . Judgement is fair.  Memory : Intact per conversation   attention : Intact per conversation  Intellect :  average per vocabulary and fund of knowledge .   Psychomotor activity : normal.    No abnormal movements noted .  Speech is regular rate and rhythm.      Review of Systems:  Review of Systems   Constitutional: Negative.

## 2025-01-02 NOTE — GROUP NOTE
Group Therapy Note    Date: 3/11/2024    Group Start Time: 1400  Group End Time: 1450  Group Topic: Music Therapy    Dejuan Adair    Music Therapy Group Note        Date: 3/11/2024 Start Time: 1400  End Time: 1450      Number of Participants in Group & Unit Census:  2/11    Topic: Patients shared music and analyzed lyrics for themes, and asked to share advice based on themes within their music.     Goal of Group: Patient goals to increase sense of community; Increase socialization; Demonstrate positive use of time; Engage in peer support; Increase self-expression      Comments:     Patient did not participate in Music Therapy group, despite staff encouragement and explanation of benefits.  Patient remain seclusive to self.  Q15 minute safety checks maintained for patient safety and will continue to encourage patient to attend unit programming.        VIRTUAL NURSE:  Labs, notes, orders, and careplan reviewed.   VN to be available as needed.       Problem: Adult Inpatient Plan of Care  Goal: Plan of Care Review  Outcome: Progressing  Goal: Patient-Specific Goal (Individualized)  Outcome: Progressing

## 2025-03-26 ENCOUNTER — HOSPITAL ENCOUNTER (OUTPATIENT)
Dept: PSYCHIATRY | Age: 65
Setting detail: THERAPIES SERIES
Discharge: HOME | End: 2025-03-26
Payer: MEDICAID

## 2025-03-26 DIAGNOSIS — F33.40 MAJOR DEPRESSIVE DISORDER, RECURRENT, IN REMISSION: Primary | ICD-10-CM

## 2025-03-26 PROCEDURE — 99212 OFFICE O/P EST SF 10 MIN: CPT | Performed by: PSYCHIATRY & NEUROLOGY

## 2025-03-26 NOTE — PROGRESS NOTES
Psychiatry Progress  Note     William Babcock  65 y.o.  male    Patient's chart reviewed .     Reagan presents today with his brother as well.  He gives permission for her brother to participate in interview.  Reagan is doing quite well.  He is reporting a good mood.  Reports good sleep and good appetite.  Denying any problems with his mood.  He denies any anhedonia and states he is able to enjoy things.  He is interactive and pleasant during the course of the interview.  Denying any depressive symptoms.  Denies any significant side effects from ECT.  His brother also reports that things have been going very well with Reagan and that there are no concerns or special considerations at this time.    We did discuss whether or not we wanted to sign off or not.  At last visit they were anticipating that Reagan would have moved into his penitentiary home by now.  He is yet to move in and so less than Reagan are both asking for 1 more visit in the future where then they would likely sign off if things are still going well.  The expectation is he will have had time to experience his new penitentiary home for several months at our next visit.  Les reports that he would prefer to follow-up 1 more time in approximately 3 to 4 months.  We agreed to follow-up in the second half of July with the understanding that he could call back sooner if need be    Medications were reviewed.  Patient currently on Effexor 50 mg daily and olanzapine 2.5 mg p.o. nightly.  These are unchanged from previous levels  Mental Status:  Patient is alert and oriented to time , place , person and self.   He  is cooperative with fair eye contact and fair grooming .  Appears of stated age .   Mood is ' ok'  . Affect is congruent .   Thought process is linear and goal directed .   Thought content is negative for  Suicidal or homicidal thoughts .   Denies any hallucinations or delusions .  Insight is fair . Judgement is fair.  Memory : Intact per conversation

## 2025-05-15 ENCOUNTER — APPOINTMENT (OUTPATIENT)
Dept: GENERAL RADIOLOGY | Age: 65
End: 2025-05-15
Payer: MEDICAID

## 2025-05-15 ENCOUNTER — HOSPITAL ENCOUNTER (EMERGENCY)
Age: 65
Discharge: OTHER INSTITUTION | End: 2025-05-16
Attending: EMERGENCY MEDICINE
Payer: MEDICAID

## 2025-05-15 VITALS
HEIGHT: 68 IN | RESPIRATION RATE: 14 BRPM | TEMPERATURE: 97.8 F | DIASTOLIC BLOOD PRESSURE: 91 MMHG | SYSTOLIC BLOOD PRESSURE: 126 MMHG | OXYGEN SATURATION: 97 % | WEIGHT: 160 LBS | HEART RATE: 70 BPM | BODY MASS INDEX: 24.25 KG/M2

## 2025-05-15 DIAGNOSIS — R55 NEAR SYNCOPE: Primary | ICD-10-CM

## 2025-05-15 DIAGNOSIS — T67.5XXA HEAT EXHAUSTION, INITIAL ENCOUNTER: ICD-10-CM

## 2025-05-15 LAB
ALBUMIN SERPL-MCNC: 3.8 G/DL (ref 3.5–5.2)
ALBUMIN/GLOB SERPL: 1.5 {RATIO} (ref 1–2.5)
ALP SERPL-CCNC: 55 U/L (ref 40–129)
ALT SERPL-CCNC: <5 U/L (ref 10–50)
ANION GAP SERPL CALCULATED.3IONS-SCNC: 12 MMOL/L (ref 9–16)
AST SERPL-CCNC: 13 U/L (ref 10–50)
BASOPHILS # BLD: 0.1 K/UL (ref 0–0.2)
BASOPHILS NFR BLD: 1 % (ref 0–2)
BILIRUB SERPL-MCNC: 0.4 MG/DL (ref 0–1.2)
BUN SERPL-MCNC: 13 MG/DL (ref 8–23)
CALCIUM SERPL-MCNC: 8.9 MG/DL (ref 8.6–10.4)
CHLORIDE SERPL-SCNC: 105 MMOL/L (ref 98–107)
CO2 SERPL-SCNC: 24 MMOL/L (ref 20–31)
CREAT SERPL-MCNC: 1.1 MG/DL (ref 0.7–1.2)
EOSINOPHIL # BLD: 0.1 K/UL (ref 0–0.4)
EOSINOPHILS RELATIVE PERCENT: 1 % (ref 1–4)
ERYTHROCYTE [DISTWIDTH] IN BLOOD BY AUTOMATED COUNT: 13.9 % (ref 12.5–15.4)
GFR, ESTIMATED: 74 ML/MIN/1.73M2
GLUCOSE SERPL-MCNC: 118 MG/DL (ref 74–99)
HCT VFR BLD AUTO: 41.9 % (ref 41–53)
HGB BLD-MCNC: 13.9 G/DL (ref 13.5–17.5)
LYMPHOCYTES NFR BLD: 0.9 K/UL (ref 1–4.8)
LYMPHOCYTES RELATIVE PERCENT: 19 % (ref 24–44)
MAGNESIUM SERPL-MCNC: 2.1 MG/DL (ref 1.6–2.4)
MCH RBC QN AUTO: 29.9 PG (ref 26–34)
MCHC RBC AUTO-ENTMCNC: 33.2 G/DL (ref 31–37)
MCV RBC AUTO: 90.2 FL (ref 80–100)
MONOCYTES NFR BLD: 0.4 K/UL (ref 0.1–1.2)
MONOCYTES NFR BLD: 8 % (ref 2–11)
NEUTROPHILS NFR BLD: 71 % (ref 36–66)
NEUTS SEG NFR BLD: 3.4 K/UL (ref 1.8–7.7)
PLATELET # BLD AUTO: 225 K/UL (ref 140–450)
PMV BLD AUTO: 6.9 FL (ref 6–12)
POTASSIUM SERPL-SCNC: 4 MMOL/L (ref 3.7–5.3)
PROT SERPL-MCNC: 6.3 G/DL (ref 6.6–8.7)
RBC # BLD AUTO: 4.65 M/UL (ref 4.5–5.9)
SODIUM SERPL-SCNC: 141 MMOL/L (ref 136–145)
TROPONIN I SERPL HS-MCNC: 7 NG/L (ref 0–22)
TROPONIN I SERPL HS-MCNC: <6 NG/L (ref 0–22)
WBC OTHER # BLD: 4.9 K/UL (ref 3.5–11)

## 2025-05-15 PROCEDURE — 71045 X-RAY EXAM CHEST 1 VIEW: CPT

## 2025-05-15 PROCEDURE — 2580000003 HC RX 258

## 2025-05-15 PROCEDURE — 93005 ELECTROCARDIOGRAM TRACING: CPT

## 2025-05-15 PROCEDURE — 83735 ASSAY OF MAGNESIUM: CPT

## 2025-05-15 PROCEDURE — 85025 COMPLETE CBC W/AUTO DIFF WBC: CPT

## 2025-05-15 PROCEDURE — 96361 HYDRATE IV INFUSION ADD-ON: CPT

## 2025-05-15 PROCEDURE — 36415 COLL VENOUS BLD VENIPUNCTURE: CPT

## 2025-05-15 PROCEDURE — 96360 HYDRATION IV INFUSION INIT: CPT

## 2025-05-15 PROCEDURE — 80053 COMPREHEN METABOLIC PANEL: CPT

## 2025-05-15 PROCEDURE — 99285 EMERGENCY DEPT VISIT HI MDM: CPT

## 2025-05-15 PROCEDURE — 84484 ASSAY OF TROPONIN QUANT: CPT

## 2025-05-15 RX ORDER — SODIUM CHLORIDE, SODIUM LACTATE, POTASSIUM CHLORIDE, AND CALCIUM CHLORIDE .6; .31; .03; .02 G/100ML; G/100ML; G/100ML; G/100ML
1000 INJECTION, SOLUTION INTRAVENOUS ONCE
Status: COMPLETED | OUTPATIENT
Start: 2025-05-15 | End: 2025-05-15

## 2025-05-15 RX ADMIN — SODIUM CHLORIDE, SODIUM LACTATE, POTASSIUM CHLORIDE, AND CALCIUM CHLORIDE 1000 ML: .6; .31; .03; .02 INJECTION, SOLUTION INTRAVENOUS at 16:18

## 2025-05-15 ASSESSMENT — PAIN - FUNCTIONAL ASSESSMENT: PAIN_FUNCTIONAL_ASSESSMENT: NONE - DENIES PAIN

## 2025-05-15 NOTE — DISCHARGE INSTRUCTIONS
Please stay well-hydrated.  Continue your medications as prescribed and indicated.  Please call your primary care provider for close reevaluation.    Get up slowly; dangle your feet over the bed before standing up, do not stand up quickly.    Return to the Emergency Department for blacking out, any headache, slurring of speech, loss of strength, excessive nausea or vomiting, any other care or concern.

## 2025-05-15 NOTE — ED PROVIDER NOTES
eMERGENCY dEPARTMENT  Attending Physician Attestation     Pt Name: William Babcock  MRN: [id number]  Birthdate 1960  Date of evaluation: 5/15/25     William Babocck is a 65 y.o. male with CC: Loss of Consciousness (Pt arrives via ems dt co syncopal event at [facility] in [place]. Pt is a Full Code)      Based on the medical record the care appears appropriate.  I was personally available for consultation in the Emergency Department.    Alvaro Cochran MD  Attending Emergency Physician                Alvaro Cochran MD  05/15/25 [id number]    
Absolute 0.10 0.0 - 0.4 k/uL    Basophils Absolute 0.10 0.0 - 0.2 k/uL   CMP   Result Value Ref Range    Sodium 141 136 - 145 mmol/L    Potassium 4.0 3.7 - 5.3 mmol/L    Chloride 105 98 - 107 mmol/L    CO2 24 20 - 31 mmol/L    Anion Gap 12 9 - 16 mmol/L    Glucose 118 (H) 74 - 99 mg/dL    BUN 13 8 - 23 mg/dL    Creatinine 1.1 0.7 - 1.2 mg/dL    Est, Glom Filt Rate 74 >60 mL/min/1.73m2    Calcium 8.9 8.6 - 10.4 mg/dL    Total Protein 6.3 (L) 6.6 - 8.7 g/dL    Albumin 3.8 3.5 - 5.2 g/dL    Albumin/Globulin Ratio 1.5 1.0 - 2.5    Total Bilirubin 0.4 0.0 - 1.2 mg/dL    Alkaline Phosphatase 55 40 - 129 U/L    ALT <5 (L) 10 - 50 U/L    AST 13 10 - 50 U/L   Magnesium   Result Value Ref Range    Magnesium 2.1 1.6 - 2.4 mg/dL   Troponin   Result Value Ref Range    Troponin, High Sensitivity <6 0 - 22 ng/L   Troponin   Result Value Ref Range    Troponin, High Sensitivity 7 0 - 22 ng/L       DEPARTMENT VITAL SIGNS:     Vitals:    05/15/25 1522 05/15/25 1525   BP:  107/69   Pulse: 66    Resp: 14    Temp:  97.7 °F (36.5 °C)   TempSrc:  Oral   SpO2: 96%    Weight: 72.6 kg (160 lb)    Height: 1.727 m (5' 8\")      BP: 107/69, Temp: 97.7 °F (36.5 °C), Pulse: 66, Respirations: 14     EMERGENCY DEPARTMENT COURSE:     ED Course as of 05/15/25 1821   Thu May 15, 2025   1630 Orthostatic vital signs are unremarkable. [AH]   1736 Initial troponin less than 6 CMP shows mildly elevated glucose which correlates with patient recently eating lunch no profound leukocytosis, anemia, magnesium within normal limits, EKG without acute ST segment changes or life-threatening cardiac arrhythmia chest x-ray negative for acute cardiopulmonary process.  Patient has had stable vital signs during his ED course.  Repeat troponin drawn and in process.  If there is no significant elevation in the troponin we will plan for discharge home with outpatient follow-up.  Patient's brother at bedside and the patient both feel that this is an appropriate plan and are

## 2025-05-16 LAB
EKG ATRIAL RATE: 63 BPM
EKG P AXIS: 13 DEGREES
EKG P-R INTERVAL: 174 MS
EKG Q-T INTERVAL: 426 MS
EKG QRS DURATION: 94 MS
EKG QTC CALCULATION (BAZETT): 435 MS
EKG R AXIS: 3 DEGREES
EKG T AXIS: 36 DEGREES
EKG VENTRICULAR RATE: 63 BPM

## 2025-05-16 PROCEDURE — 93010 ELECTROCARDIOGRAM REPORT: CPT | Performed by: INTERNAL MEDICINE
